# Patient Record
Sex: FEMALE | Race: BLACK OR AFRICAN AMERICAN | NOT HISPANIC OR LATINO | Employment: OTHER | URBAN - METROPOLITAN AREA
[De-identification: names, ages, dates, MRNs, and addresses within clinical notes are randomized per-mention and may not be internally consistent; named-entity substitution may affect disease eponyms.]

---

## 2014-07-14 LAB — CRC RECOMMENDATION EXT: NORMAL

## 2017-01-10 ENCOUNTER — HISTORICAL (OUTPATIENT)
Dept: ADMINISTRATIVE | Facility: HOSPITAL | Age: 67
End: 2017-01-10

## 2017-04-25 ENCOUNTER — HISTORICAL (OUTPATIENT)
Dept: LAB | Facility: HOSPITAL | Age: 67
End: 2017-04-25

## 2017-08-02 ENCOUNTER — HISTORICAL (OUTPATIENT)
Dept: LAB | Facility: HOSPITAL | Age: 67
End: 2017-08-02

## 2017-08-02 LAB
DEPRECATED CALCIDIOL+CALCIFEROL SERPL-MC: 145.22 NG/ML (ref 30–80)
EST. AVERAGE GLUCOSE BLD GHB EST-MCNC: 131 MG/DL
HAV IGM SERPL QL IA: NEGATIVE
HBA1C MFR BLD: 6.2 % (ref 4.2–6.3)
HBV CORE IGM SERPL QL IA: NEGATIVE
HBV SURFACE AG SERPL QL IA: NEGATIVE
HCV AB SERPL QL IA: NEGATIVE
HEPATITIS PANEL INTERP: NORMAL

## 2017-10-11 ENCOUNTER — HISTORICAL (OUTPATIENT)
Dept: LAB | Facility: HOSPITAL | Age: 67
End: 2017-10-11

## 2017-10-11 LAB
BUN SERPL-MCNC: 14 MG/DL (ref 7–18)
CALCIUM SERPL-MCNC: 8.8 MG/DL (ref 8.5–10.1)
CHLORIDE SERPL-SCNC: 108 MMOL/L (ref 98–107)
CHOLEST SERPL-MCNC: 187 MG/DL (ref 0–200)
CHOLEST/HDLC SERPL: 3.2 {RATIO} (ref 0–4)
CO2 SERPL-SCNC: 27 MMOL/L (ref 21–32)
CREAT SERPL-MCNC: 0.59 MG/DL (ref 0.55–1.02)
DEPRECATED CALCIDIOL+CALCIFEROL SERPL-MC: 62.56 NG/ML (ref 30–80)
GLUCOSE SERPL-MCNC: 106 MG/DL (ref 74–106)
HDLC SERPL-MCNC: 58 MG/DL (ref 35–60)
LDLC SERPL CALC-MCNC: 115 MG/DL (ref 0–129)
MAGNESIUM SERPL-MCNC: 2.3 MG/DL (ref 1.8–2.4)
POTASSIUM SERPL-SCNC: 3.5 MMOL/L (ref 3.5–5.1)
SODIUM SERPL-SCNC: 145 MMOL/L (ref 136–145)
TRIGL SERPL-MCNC: 68 MG/DL (ref 30–150)
VLDLC SERPL CALC-MCNC: 14 MG/DL

## 2017-11-28 ENCOUNTER — HISTORICAL (OUTPATIENT)
Dept: INTENSIVE CARE | Facility: HOSPITAL | Age: 67
End: 2017-11-28

## 2018-07-17 ENCOUNTER — HISTORICAL (OUTPATIENT)
Dept: LAB | Facility: HOSPITAL | Age: 68
End: 2018-07-17

## 2018-07-17 LAB
ABS NEUT (OLG): 4.76 X10(3)/MCL (ref 2.1–9.2)
ALBUMIN SERPL-MCNC: 3.2 GM/DL (ref 3.4–5)
ALBUMIN/GLOB SERPL: 0.8 {RATIO}
ALP SERPL-CCNC: 100 UNIT/L (ref 38–126)
ALT SERPL-CCNC: 35 UNIT/L (ref 12–78)
APPEARANCE, UA: CLEAR
AST SERPL-CCNC: 27 UNIT/L (ref 15–37)
BACTERIA SPEC CULT: ABNORMAL /HPF
BASOPHILS # BLD AUTO: 0 X10(3)/MCL (ref 0–0.2)
BASOPHILS NFR BLD AUTO: 0 %
BILIRUB SERPL-MCNC: 0.5 MG/DL (ref 0.2–1)
BILIRUB UR QL STRIP: NEGATIVE
BILIRUBIN DIRECT+TOT PNL SERPL-MCNC: 0.1 MG/DL (ref 0–0.2)
BILIRUBIN DIRECT+TOT PNL SERPL-MCNC: 0.4 MG/DL (ref 0–0.8)
BUN SERPL-MCNC: 16 MG/DL (ref 7–18)
CALCIUM SERPL-MCNC: 8.1 MG/DL (ref 8.5–10.1)
CHLORIDE SERPL-SCNC: 106 MMOL/L (ref 98–107)
CHOLEST SERPL-MCNC: 203 MG/DL (ref 0–200)
CHOLEST/HDLC SERPL: 2.8 {RATIO} (ref 0–4)
CO2 SERPL-SCNC: 29 MMOL/L (ref 21–32)
COLOR UR: YELLOW
CREAT SERPL-MCNC: 0.69 MG/DL (ref 0.55–1.02)
DEPRECATED CALCIDIOL+CALCIFEROL SERPL-MC: 44 NG/ML (ref 30–80)
EOSINOPHIL # BLD AUTO: 0.1 X10(3)/MCL (ref 0–0.9)
EOSINOPHIL NFR BLD AUTO: 2 %
ERYTHROCYTE [DISTWIDTH] IN BLOOD BY AUTOMATED COUNT: 18.6 % (ref 11.5–17)
EST. AVERAGE GLUCOSE BLD GHB EST-MCNC: 140 MG/DL
GLOBULIN SER-MCNC: 3.9 GM/DL (ref 2.4–3.5)
GLUCOSE (UA): NEGATIVE
GLUCOSE SERPL-MCNC: 79 MG/DL (ref 74–106)
HBA1C MFR BLD: 6.5 % (ref 4.2–6.3)
HCT VFR BLD AUTO: 40.2 % (ref 37–47)
HDLC SERPL-MCNC: 72 MG/DL (ref 35–60)
HGB BLD-MCNC: 12.4 GM/DL (ref 12–16)
HGB UR QL STRIP: NEGATIVE
KETONES UR QL STRIP: NEGATIVE
LDLC SERPL CALC-MCNC: 113 MG/DL (ref 0–129)
LEUKOCYTE ESTERASE UR QL STRIP: ABNORMAL
LYMPHOCYTES # BLD AUTO: 1.7 X10(3)/MCL (ref 0.6–4.6)
LYMPHOCYTES NFR BLD AUTO: 24 %
MCH RBC QN AUTO: 27.9 PG (ref 27–31)
MCHC RBC AUTO-ENTMCNC: 30.8 GM/DL (ref 33–36)
MCV RBC AUTO: 90.3 FL (ref 80–94)
MONOCYTES # BLD AUTO: 0.5 X10(3)/MCL (ref 0.1–1.3)
MONOCYTES NFR BLD AUTO: 7 %
NEUTROPHILS # BLD AUTO: 4.76 X10(3)/MCL (ref 2.1–9.2)
NEUTROPHILS NFR BLD AUTO: 66 %
NITRITE UR QL STRIP: NEGATIVE
PH UR STRIP: 9 [PH] (ref 5–9)
PLATELET # BLD AUTO: 181 X10(3)/MCL (ref 130–400)
PMV BLD AUTO: 12.3 FL (ref 9.4–12.4)
POTASSIUM SERPL-SCNC: 3.5 MMOL/L (ref 3.5–5.1)
PROT SERPL-MCNC: 7.1 GM/DL (ref 6.4–8.2)
PROT UR QL STRIP: NEGATIVE
RBC # BLD AUTO: 4.45 X10(6)/MCL (ref 4.2–5.4)
RBC #/AREA URNS HPF: ABNORMAL /HPF
SODIUM SERPL-SCNC: 144 MMOL/L (ref 136–145)
SP GR UR STRIP: 1.02 (ref 1–1.03)
SQUAMOUS EPITHELIAL, UA: ABNORMAL
TRIGL SERPL-MCNC: 91 MG/DL (ref 30–150)
TSH SERPL-ACNC: 1.74 MIU/L (ref 0.36–3.74)
UROBILINOGEN UR STRIP-ACNC: 1
VLDLC SERPL CALC-MCNC: 18 MG/DL
WBC # SPEC AUTO: 7.2 X10(3)/MCL (ref 4.5–11.5)
WBC #/AREA URNS HPF: ABNORMAL /[HPF]

## 2018-09-04 ENCOUNTER — HISTORICAL (OUTPATIENT)
Dept: LAB | Facility: HOSPITAL | Age: 68
End: 2018-09-04

## 2018-09-04 LAB
CHOLEST SERPL-MCNC: 131 MG/DL (ref 0–200)
CHOLEST/HDLC SERPL: 2.2 {RATIO} (ref 0–4)
HDLC SERPL-MCNC: 59 MG/DL (ref 35–60)
LDLC SERPL CALC-MCNC: 60 MG/DL (ref 0–129)
TRIGL SERPL-MCNC: 58 MG/DL (ref 30–150)
VLDLC SERPL CALC-MCNC: 12 MG/DL

## 2019-01-22 ENCOUNTER — HISTORICAL (OUTPATIENT)
Dept: LAB | Facility: HOSPITAL | Age: 69
End: 2019-01-22

## 2019-01-22 LAB
ABS NEUT (OLG): 3.46 X10(3)/MCL (ref 2.1–9.2)
ALBUMIN SERPL-MCNC: 3.4 GM/DL (ref 3.4–5)
ALBUMIN/GLOB SERPL: 1.1 {RATIO}
ALP SERPL-CCNC: 93 UNIT/L (ref 38–126)
ALT SERPL-CCNC: 25 UNIT/L (ref 12–78)
APPEARANCE, UA: CLEAR
AST SERPL-CCNC: 25 UNIT/L (ref 15–37)
BACTERIA SPEC CULT: ABNORMAL /HPF
BASOPHILS # BLD AUTO: 0 X10(3)/MCL (ref 0–0.2)
BASOPHILS NFR BLD AUTO: 0 %
BILIRUB SERPL-MCNC: 0.7 MG/DL (ref 0.2–1)
BILIRUB UR QL STRIP: NEGATIVE
BILIRUBIN DIRECT+TOT PNL SERPL-MCNC: 0.2 MG/DL (ref 0–0.2)
BILIRUBIN DIRECT+TOT PNL SERPL-MCNC: 0.5 MG/DL (ref 0–0.8)
BUN SERPL-MCNC: 15 MG/DL (ref 7–18)
CALCIUM SERPL-MCNC: 8.5 MG/DL (ref 8.5–10.1)
CHLORIDE SERPL-SCNC: 104 MMOL/L (ref 98–107)
CHOLEST SERPL-MCNC: 121 MG/DL (ref 0–200)
CHOLEST/HDLC SERPL: 2.4 {RATIO} (ref 0–4)
CO2 SERPL-SCNC: 32 MMOL/L (ref 21–32)
COLOR UR: YELLOW
CREAT SERPL-MCNC: 0.72 MG/DL (ref 0.55–1.02)
CREAT UR-MCNC: 211 MG/DL
EOSINOPHIL # BLD AUTO: 0.1 X10(3)/MCL (ref 0–0.9)
EOSINOPHIL NFR BLD AUTO: 1 %
ERYTHROCYTE [DISTWIDTH] IN BLOOD BY AUTOMATED COUNT: 18.6 % (ref 11.5–17)
EST. AVERAGE GLUCOSE BLD GHB EST-MCNC: 140 MG/DL
GLOBULIN SER-MCNC: 3.1 GM/DL (ref 2.4–3.5)
GLUCOSE (UA): NEGATIVE
GLUCOSE SERPL-MCNC: 136 MG/DL (ref 74–106)
HBA1C MFR BLD: 6.5 % (ref 4.2–6.3)
HCT VFR BLD AUTO: 38.4 % (ref 37–47)
HDLC SERPL-MCNC: 51 MG/DL (ref 35–60)
HGB BLD-MCNC: 11.9 GM/DL (ref 12–16)
HGB UR QL STRIP: NEGATIVE
KETONES UR QL STRIP: ABNORMAL
LDLC SERPL CALC-MCNC: 53 MG/DL (ref 0–129)
LEUKOCYTE ESTERASE UR QL STRIP: ABNORMAL
LYMPHOCYTES # BLD AUTO: 1.4 X10(3)/MCL (ref 0.6–4.6)
LYMPHOCYTES NFR BLD AUTO: 26 %
MCH RBC QN AUTO: 27.9 PG (ref 27–31)
MCHC RBC AUTO-ENTMCNC: 31 GM/DL (ref 33–36)
MCV RBC AUTO: 90.1 FL (ref 80–94)
MICROALBUMIN UR-MCNC: 1.5 MG/DL
MICROALBUMIN/CREAT RATIO PNL UR: 7.1 MG/GM CR (ref 0–30)
MONOCYTES # BLD AUTO: 0.5 X10(3)/MCL (ref 0.1–1.3)
MONOCYTES NFR BLD AUTO: 9 %
NEUTROPHILS # BLD AUTO: 3.46 X10(3)/MCL (ref 2.1–9.2)
NEUTROPHILS NFR BLD AUTO: 62 %
NITRITE UR QL STRIP: NEGATIVE
PH UR STRIP: 7 [PH] (ref 5–9)
PLATELET # BLD AUTO: 222 X10(3)/MCL (ref 130–400)
PMV BLD AUTO: 11.3 FL (ref 9.4–12.4)
POTASSIUM SERPL-SCNC: 3.2 MMOL/L (ref 3.5–5.1)
PROT SERPL-MCNC: 6.5 GM/DL (ref 6.4–8.2)
PROT UR QL STRIP: NEGATIVE
RBC # BLD AUTO: 4.26 X10(6)/MCL (ref 4.2–5.4)
RBC #/AREA URNS HPF: ABNORMAL /[HPF]
SODIUM SERPL-SCNC: 143 MMOL/L (ref 136–145)
SP GR UR STRIP: 1.03 (ref 1–1.03)
SQUAMOUS EPITHELIAL, UA: 17 /HPF (ref 0–4)
TRIGL SERPL-MCNC: 83 MG/DL (ref 30–150)
UROBILINOGEN UR STRIP-ACNC: 1
VLDLC SERPL CALC-MCNC: 17 MG/DL
WBC # SPEC AUTO: 5.6 X10(3)/MCL (ref 4.5–11.5)
WBC #/AREA URNS HPF: ABNORMAL /[HPF]

## 2019-01-28 ENCOUNTER — HISTORICAL (OUTPATIENT)
Dept: LAB | Facility: HOSPITAL | Age: 69
End: 2019-01-28

## 2019-01-28 LAB — HEMOCCULT SP1 STL QL: NEGATIVE

## 2019-01-31 ENCOUNTER — HISTORICAL (OUTPATIENT)
Dept: LAB | Facility: HOSPITAL | Age: 69
End: 2019-01-31

## 2019-01-31 LAB — HEMOCCULT SP2 STL QL: NEGATIVE

## 2019-04-22 ENCOUNTER — HISTORICAL (OUTPATIENT)
Dept: ADMINISTRATIVE | Facility: HOSPITAL | Age: 69
End: 2019-04-22

## 2019-04-22 LAB
BUN SERPL-MCNC: 17 MG/DL (ref 7–18)
CALCIUM SERPL-MCNC: 8.5 MG/DL (ref 8.5–10.1)
CHLORIDE SERPL-SCNC: 104 MMOL/L (ref 98–107)
CO2 SERPL-SCNC: 31 MMOL/L (ref 21–32)
CREAT SERPL-MCNC: 0.67 MG/DL (ref 0.55–1.02)
CREAT/UREA NIT SERPL: 25.4
EST. AVERAGE GLUCOSE BLD GHB EST-MCNC: 134 MG/DL
GLUCOSE SERPL-MCNC: 84 MG/DL (ref 74–106)
HBA1C MFR BLD: 6.3 % (ref 4.2–6.3)
POTASSIUM SERPL-SCNC: 3.3 MMOL/L (ref 3.5–5.1)
SODIUM SERPL-SCNC: 142 MMOL/L (ref 136–145)

## 2019-10-02 ENCOUNTER — HISTORICAL (OUTPATIENT)
Dept: LAB | Facility: HOSPITAL | Age: 69
End: 2019-10-02

## 2019-10-02 LAB
ABS NEUT (OLG): 3.31 X10(3)/MCL (ref 2.1–9.2)
ALBUMIN SERPL-MCNC: 3.3 GM/DL (ref 3.4–5)
ALBUMIN/GLOB SERPL: 0.9 {RATIO}
ALP SERPL-CCNC: 77 UNIT/L (ref 38–126)
ALT SERPL-CCNC: 28 UNIT/L (ref 12–78)
AST SERPL-CCNC: 26 UNIT/L (ref 15–37)
BASOPHILS # BLD AUTO: 0 X10(3)/MCL (ref 0–0.2)
BASOPHILS NFR BLD AUTO: 0 %
BILIRUB SERPL-MCNC: 0.4 MG/DL (ref 0.2–1)
BILIRUBIN DIRECT+TOT PNL SERPL-MCNC: 0.2 MG/DL (ref 0–0.2)
BILIRUBIN DIRECT+TOT PNL SERPL-MCNC: 0.2 MG/DL (ref 0–0.8)
BUN SERPL-MCNC: 17 MG/DL (ref 7–18)
CALCIUM SERPL-MCNC: 8.8 MG/DL (ref 8.5–10.1)
CHLORIDE SERPL-SCNC: 108 MMOL/L (ref 98–107)
CO2 SERPL-SCNC: 28 MMOL/L (ref 21–32)
CREAT SERPL-MCNC: 0.7 MG/DL (ref 0.55–1.02)
EOSINOPHIL # BLD AUTO: 0.1 X10(3)/MCL (ref 0–0.9)
EOSINOPHIL NFR BLD AUTO: 1 %
ERYTHROCYTE [DISTWIDTH] IN BLOOD BY AUTOMATED COUNT: 18.4 % (ref 11.5–17)
GLOBULIN SER-MCNC: 3.5 GM/DL (ref 2.4–3.5)
GLUCOSE SERPL-MCNC: 120 MG/DL (ref 74–106)
HCT VFR BLD AUTO: 36.2 % (ref 37–47)
HGB BLD-MCNC: 11.3 GM/DL (ref 12–16)
LYMPHOCYTES # BLD AUTO: 1.5 X10(3)/MCL (ref 0.6–4.6)
LYMPHOCYTES NFR BLD AUTO: 28 %
MCH RBC QN AUTO: 28.5 PG (ref 27–31)
MCHC RBC AUTO-ENTMCNC: 31.2 GM/DL (ref 33–36)
MCV RBC AUTO: 91.2 FL (ref 80–94)
MONOCYTES # BLD AUTO: 0.5 X10(3)/MCL (ref 0.1–1.3)
MONOCYTES NFR BLD AUTO: 9 %
NEUTROPHILS # BLD AUTO: 3.31 X10(3)/MCL (ref 2.1–9.2)
NEUTROPHILS NFR BLD AUTO: 61 %
PLATELET # BLD AUTO: 206 X10(3)/MCL (ref 130–400)
PMV BLD AUTO: 12.4 FL (ref 9.4–12.4)
POTASSIUM SERPL-SCNC: 3.5 MMOL/L (ref 3.5–5.1)
PROT SERPL-MCNC: 6.8 GM/DL (ref 6.4–8.2)
RBC # BLD AUTO: 3.97 X10(6)/MCL (ref 4.2–5.4)
SODIUM SERPL-SCNC: 141 MMOL/L (ref 136–145)
WBC # SPEC AUTO: 5.4 X10(3)/MCL (ref 4.5–11.5)

## 2019-10-24 ENCOUNTER — HISTORICAL (OUTPATIENT)
Dept: ADMINISTRATIVE | Facility: HOSPITAL | Age: 69
End: 2019-10-24

## 2019-10-24 LAB
ABS NEUT (OLG): 8.84 X10(3)/MCL (ref 2.1–9.2)
BASOPHILS # BLD AUTO: 0 X10(3)/MCL (ref 0–0.2)
BASOPHILS NFR BLD AUTO: 0 %
CRP SERPL HS-MCNC: 1.83 MG/L (ref 0–3)
EOSINOPHIL # BLD AUTO: 0.1 X10(3)/MCL (ref 0–0.9)
EOSINOPHIL NFR BLD AUTO: 1 %
ERYTHROCYTE [DISTWIDTH] IN BLOOD BY AUTOMATED COUNT: 19.9 % (ref 11.5–17)
ERYTHROCYTE [SEDIMENTATION RATE] IN BLOOD: 13 MM/HR (ref 0–20)
HAV IGM SERPL QL IA: NEGATIVE
HBV CORE IGM SERPL QL IA: NEGATIVE
HBV SURFACE AG SERPL QL IA: NEGATIVE
HCT VFR BLD AUTO: 38 % (ref 37–47)
HCV AB SERPL QL IA: NEGATIVE
HEPATITIS PANEL INTERP: NORMAL
HGB BLD-MCNC: 12.1 GM/DL (ref 12–16)
LYMPHOCYTES # BLD AUTO: 2.5 X10(3)/MCL (ref 0.6–4.6)
LYMPHOCYTES NFR BLD AUTO: 21 %
MCH RBC QN AUTO: 29 PG (ref 27–31)
MCHC RBC AUTO-ENTMCNC: 31.8 GM/DL (ref 33–36)
MCV RBC AUTO: 91.1 FL (ref 80–94)
MONOCYTES # BLD AUTO: 0.7 X10(3)/MCL (ref 0.1–1.3)
MONOCYTES NFR BLD AUTO: 6 %
NEUTROPHILS # BLD AUTO: 8.84 X10(3)/MCL (ref 2.1–9.2)
NEUTROPHILS NFR BLD AUTO: 72 %
PLATELET # BLD AUTO: 195 X10(3)/MCL (ref 130–400)
PMV BLD AUTO: 10.5 FL (ref 9.4–12.4)
RBC # BLD AUTO: 4.17 X10(6)/MCL (ref 4.2–5.4)
WBC # SPEC AUTO: 12.2 X10(3)/MCL (ref 4.5–11.5)

## 2020-05-22 ENCOUNTER — HISTORICAL (OUTPATIENT)
Dept: CARDIOLOGY | Facility: HOSPITAL | Age: 70
End: 2020-05-22

## 2020-05-22 LAB
ABS NEUT (OLG): 3.61 X10(3)/MCL (ref 2.1–9.2)
ALBUMIN SERPL-MCNC: 3.5 GM/DL (ref 3.4–5)
ALBUMIN/GLOB SERPL: 1 RATIO (ref 1.1–2)
ALP SERPL-CCNC: 95 UNIT/L (ref 40–150)
ALT SERPL-CCNC: 19 UNIT/L (ref 0–55)
APPEARANCE, UA: CLEAR
AST SERPL-CCNC: 36 UNIT/L (ref 5–34)
BACTERIA SPEC CULT: ABNORMAL /HPF
BASOPHILS # BLD AUTO: 0 X10(3)/MCL (ref 0–0.2)
BASOPHILS NFR BLD AUTO: 0 %
BILIRUB SERPL-MCNC: 0.6 MG/DL
BILIRUB UR QL STRIP: NEGATIVE
BILIRUBIN DIRECT+TOT PNL SERPL-MCNC: 0.2 MG/DL (ref 0–0.5)
BILIRUBIN DIRECT+TOT PNL SERPL-MCNC: 0.4 MG/DL (ref 0–0.8)
BUN SERPL-MCNC: 13.8 MG/DL (ref 9.8–20.1)
CALCIUM SERPL-MCNC: 8.5 MG/DL (ref 8.4–10.2)
CHLORIDE SERPL-SCNC: 102 MMOL/L (ref 98–107)
CHOLEST SERPL-MCNC: 133 MG/DL
CHOLEST/HDLC SERPL: 3 {RATIO} (ref 0–5)
CO2 SERPL-SCNC: 28 MMOL/L (ref 23–31)
COLOR UR: YELLOW
CREAT SERPL-MCNC: 0.71 MG/DL (ref 0.55–1.02)
CREAT UR-MCNC: 130.5 MG/DL (ref 45–106)
EOSINOPHIL # BLD AUTO: 0.1 X10(3)/MCL (ref 0–0.9)
EOSINOPHIL NFR BLD AUTO: 1 %
ERYTHROCYTE [DISTWIDTH] IN BLOOD BY AUTOMATED COUNT: 18.6 % (ref 11.5–17)
EST. AVERAGE GLUCOSE BLD GHB EST-MCNC: 128.4 MG/DL
GLOBULIN SER-MCNC: 3.6 GM/DL (ref 2.4–3.5)
GLUCOSE (UA): NEGATIVE
GLUCOSE SERPL-MCNC: 91 MG/DL (ref 82–115)
HBA1C MFR BLD: 6.1 %
HCT VFR BLD AUTO: 37 % (ref 37–47)
HDLC SERPL-MCNC: 49 MG/DL (ref 35–60)
HGB BLD-MCNC: 11.1 GM/DL (ref 12–16)
HGB UR QL STRIP: NEGATIVE
KETONES UR QL STRIP: NEGATIVE
LDLC SERPL CALC-MCNC: 71 MG/DL (ref 50–140)
LEUKOCYTE ESTERASE UR QL STRIP: ABNORMAL
LYMPHOCYTES # BLD AUTO: 1.3 X10(3)/MCL (ref 0.6–4.6)
LYMPHOCYTES NFR BLD AUTO: 24 %
MCH RBC QN AUTO: 26.1 PG (ref 27–31)
MCHC RBC AUTO-ENTMCNC: 30 GM/DL (ref 33–36)
MCV RBC AUTO: 86.9 FL (ref 80–94)
MICROALBUMIN UR-MCNC: 7.4 UG/ML
MICROALBUMIN/CREAT RATIO PNL UR: 5.7 MG/GM CR (ref 0–30)
MONOCYTES # BLD AUTO: 0.5 X10(3)/MCL (ref 0.1–1.3)
MONOCYTES NFR BLD AUTO: 9 %
NEUTROPHILS # BLD AUTO: 3.61 X10(3)/MCL (ref 2.1–9.2)
NEUTROPHILS NFR BLD AUTO: 65 %
NITRITE UR QL STRIP: NEGATIVE
PH UR STRIP: 8 [PH] (ref 5–9)
PLATELET # BLD AUTO: 196 X10(3)/MCL (ref 130–400)
PMV BLD AUTO: 11.4 FL (ref 9.4–12.4)
POTASSIUM SERPL-SCNC: 4.2 MMOL/L (ref 3.5–5.1)
PROT SERPL-MCNC: 7.1 GM/DL (ref 5.8–7.6)
PROT UR QL STRIP: NEGATIVE
RBC # BLD AUTO: 4.26 X10(6)/MCL (ref 4.2–5.4)
RBC #/AREA URNS HPF: ABNORMAL /[HPF]
SODIUM SERPL-SCNC: 139 MMOL/L (ref 136–145)
SP GR UR STRIP: 1.02 (ref 1–1.03)
SQUAMOUS EPITHELIAL, UA: ABNORMAL
TRIGL SERPL-MCNC: 66 MG/DL (ref 37–140)
UROBILINOGEN UR STRIP-ACNC: 1
VLDLC SERPL CALC-MCNC: 13 MG/DL
WBC # SPEC AUTO: 5.5 X10(3)/MCL (ref 4.5–11.5)
WBC #/AREA URNS HPF: ABNORMAL /[HPF]

## 2020-07-15 ENCOUNTER — HISTORICAL (OUTPATIENT)
Dept: ADMINISTRATIVE | Facility: HOSPITAL | Age: 70
End: 2020-07-15

## 2020-07-15 LAB — POC CREATININE: 0.6 MG/DL (ref 0.6–1.3)

## 2020-09-28 ENCOUNTER — HISTORICAL (OUTPATIENT)
Dept: ADMINISTRATIVE | Facility: HOSPITAL | Age: 70
End: 2020-09-28

## 2020-09-28 LAB
DEPRECATED CALCIDIOL+CALCIFEROL SERPL-MC: 41.3 NG/ML (ref 6.6–49.9)
EST. AVERAGE GLUCOSE BLD GHB EST-MCNC: 157.1 MG/DL
HBA1C MFR BLD: 7.1 %
T PALLIDUM AB SER QL: NONREACTIVE
VIT B12 SERPL-MCNC: 277 PG/ML (ref 213–816)

## 2020-10-28 ENCOUNTER — HISTORICAL (OUTPATIENT)
Dept: RADIOLOGY | Facility: HOSPITAL | Age: 70
End: 2020-10-28

## 2020-11-12 ENCOUNTER — HISTORICAL (OUTPATIENT)
Dept: ADMINISTRATIVE | Facility: HOSPITAL | Age: 70
End: 2020-11-12

## 2020-11-12 LAB
ALBUMIN SERPL-MCNC: 3.6 GM/DL (ref 3.4–4.8)
ALBUMIN/GLOB SERPL: 1.3 RATIO (ref 1.1–2)
ALP SERPL-CCNC: 81 UNIT/L (ref 40–150)
ALT SERPL-CCNC: 17 UNIT/L (ref 0–55)
APPEARANCE, UA: CLEAR
AST SERPL-CCNC: 25 UNIT/L (ref 5–34)
BACTERIA SPEC CULT: NORMAL /HPF
BILIRUB SERPL-MCNC: 0.7 MG/DL
BILIRUB UR QL STRIP: NEGATIVE
BILIRUBIN DIRECT+TOT PNL SERPL-MCNC: 0.3 MG/DL (ref 0–0.5)
BILIRUBIN DIRECT+TOT PNL SERPL-MCNC: 0.4 MG/DL (ref 0–0.8)
BUN SERPL-MCNC: 14.2 MG/DL (ref 9.8–20.1)
CALCIUM SERPL-MCNC: 8.4 MG/DL (ref 8.4–10.2)
CHLORIDE SERPL-SCNC: 105 MMOL/L (ref 98–107)
CHOLEST SERPL-MCNC: 118 MG/DL
CHOLEST/HDLC SERPL: 3 {RATIO} (ref 0–5)
CO2 SERPL-SCNC: 31 MMOL/L (ref 23–31)
COLOR UR: NORMAL
CREAT SERPL-MCNC: 0.65 MG/DL (ref 0.55–1.02)
CREAT UR-MCNC: 109.1 MG/DL (ref 45–106)
EST. AVERAGE GLUCOSE BLD GHB EST-MCNC: 131.2 MG/DL
GLOBULIN SER-MCNC: 2.8 GM/DL (ref 2.4–3.5)
GLUCOSE (UA): NEGATIVE
GLUCOSE SERPL-MCNC: 84 MG/DL (ref 82–115)
HBA1C MFR BLD: 6.2 %
HDLC SERPL-MCNC: 47 MG/DL (ref 35–60)
HGB UR QL STRIP: NEGATIVE
KETONES UR QL STRIP: NEGATIVE
LDLC SERPL CALC-MCNC: 58 MG/DL (ref 50–140)
LEUKOCYTE ESTERASE UR QL STRIP: NEGATIVE
MICROALBUMIN UR-MCNC: 7.7 UG/ML
MICROALBUMIN/CREAT RATIO PNL UR: 7.1 MG/GM CR (ref 0–30)
NITRITE UR QL STRIP: NEGATIVE
PH UR STRIP: 7 [PH] (ref 5–9)
POTASSIUM SERPL-SCNC: 3.6 MMOL/L (ref 3.5–5.1)
PROT SERPL-MCNC: 6.4 GM/DL (ref 5.8–7.6)
PROT UR QL STRIP: NEGATIVE
RBC #/AREA URNS HPF: NORMAL /[HPF]
SODIUM SERPL-SCNC: 145 MMOL/L (ref 136–145)
SP GR UR STRIP: 1.02 (ref 1–1.03)
SQUAMOUS EPITHELIAL, UA: NORMAL
TRIGL SERPL-MCNC: 66 MG/DL (ref 37–140)
UROBILINOGEN UR STRIP-ACNC: 1
VIT B12 SERPL-MCNC: 402 PG/ML (ref 213–816)
VIT B12 SERPL-MCNC: 426 PG/ML (ref 213–816)
VLDLC SERPL CALC-MCNC: 13 MG/DL
WBC #/AREA URNS HPF: NORMAL /[HPF]

## 2020-12-17 ENCOUNTER — HISTORICAL (OUTPATIENT)
Dept: RADIOLOGY | Facility: HOSPITAL | Age: 70
End: 2020-12-17

## 2020-12-31 ENCOUNTER — HISTORICAL (OUTPATIENT)
Dept: RADIOLOGY | Facility: HOSPITAL | Age: 70
End: 2020-12-31

## 2021-05-12 ENCOUNTER — HISTORICAL (OUTPATIENT)
Dept: ADMINISTRATIVE | Facility: HOSPITAL | Age: 71
End: 2021-05-12

## 2021-05-12 LAB
ABS NEUT (OLG): 3.6 X10(3)/MCL (ref 2.1–9.2)
ALBUMIN SERPL-MCNC: 3.4 GM/DL (ref 3.4–4.8)
ALBUMIN/GLOB SERPL: 1.1 RATIO (ref 1.1–2)
ALP SERPL-CCNC: 91 UNIT/L (ref 40–150)
ALT SERPL-CCNC: 17 UNIT/L (ref 0–55)
APPEARANCE, UA: CLEAR
AST SERPL-CCNC: 25 UNIT/L (ref 5–34)
BACTERIA SPEC CULT: NORMAL /HPF
BASOPHILS # BLD AUTO: 0 X10(3)/MCL (ref 0–0.2)
BASOPHILS NFR BLD AUTO: 0 %
BILIRUB SERPL-MCNC: 0.6 MG/DL
BILIRUB UR QL STRIP: NEGATIVE
BILIRUBIN DIRECT+TOT PNL SERPL-MCNC: 0.3 MG/DL (ref 0–0.5)
BILIRUBIN DIRECT+TOT PNL SERPL-MCNC: 0.3 MG/DL (ref 0–0.8)
BUN SERPL-MCNC: 14.4 MG/DL (ref 9.8–20.1)
CALCIUM SERPL-MCNC: 8.9 MG/DL (ref 8.4–10.2)
CHLORIDE SERPL-SCNC: 108 MMOL/L (ref 98–107)
CO2 SERPL-SCNC: 28 MMOL/L (ref 23–31)
COLOR UR: NORMAL
CREAT SERPL-MCNC: 0.82 MG/DL (ref 0.55–1.02)
CREAT UR-MCNC: 114 MG/DL (ref 45–106)
EOSINOPHIL # BLD AUTO: 0.1 X10(3)/MCL (ref 0–0.9)
EOSINOPHIL NFR BLD AUTO: 2 %
ERYTHROCYTE [DISTWIDTH] IN BLOOD BY AUTOMATED COUNT: 19.7 % (ref 11.5–17)
EST. AVERAGE GLUCOSE BLD GHB EST-MCNC: 119.8 MG/DL
FERRITIN SERPL-MCNC: 5.78 NG/ML (ref 4.63–204)
GLOBULIN SER-MCNC: 3 GM/DL (ref 2.4–3.5)
GLUCOSE (UA): NEGATIVE
GLUCOSE SERPL-MCNC: 149 MG/DL (ref 82–115)
HBA1C MFR BLD: 5.8 %
HCT VFR BLD AUTO: 33.6 % (ref 37–47)
HGB BLD-MCNC: 10 GM/DL (ref 12–16)
HGB UR QL STRIP: NEGATIVE
IRON SATN MFR SERPL: 9 % (ref 20–50)
IRON SERPL-MCNC: 33 UG/DL (ref 50–170)
KETONES UR QL STRIP: NEGATIVE
LEUKOCYTE ESTERASE UR QL STRIP: NEGATIVE
LYMPHOCYTES # BLD AUTO: 1.5 X10(3)/MCL (ref 0.6–4.6)
LYMPHOCYTES NFR BLD AUTO: 26 %
MCH RBC QN AUTO: 24.7 PG (ref 27–31)
MCHC RBC AUTO-ENTMCNC: 29.8 GM/DL (ref 33–36)
MCV RBC AUTO: 83 FL (ref 80–94)
MICROALBUMIN UR-MCNC: 6.7 UG/ML
MICROALBUMIN/CREAT RATIO PNL UR: 5.9 MG/GM CR (ref 0–30)
MONOCYTES # BLD AUTO: 0.5 X10(3)/MCL (ref 0.1–1.3)
MONOCYTES NFR BLD AUTO: 9 %
NEUTROPHILS # BLD AUTO: 3.6 X10(3)/MCL (ref 2.1–9.2)
NEUTROPHILS NFR BLD AUTO: 63 %
NITRITE UR QL STRIP: NEGATIVE
PH UR STRIP: 8 [PH] (ref 5–9)
PLATELET # BLD AUTO: 234 X10(3)/MCL (ref 130–400)
PMV BLD AUTO: 11.6 FL (ref 9.4–12.4)
POTASSIUM SERPL-SCNC: 3.5 MMOL/L (ref 3.5–5.1)
PROT SERPL-MCNC: 6.4 GM/DL (ref 5.8–7.6)
PROT UR QL STRIP: NEGATIVE
RBC # BLD AUTO: 4.05 X10(6)/MCL (ref 4.2–5.4)
RBC #/AREA URNS HPF: NORMAL /[HPF]
SODIUM SERPL-SCNC: 142 MMOL/L (ref 136–145)
SP GR UR STRIP: 1.02 (ref 1–1.03)
SQUAMOUS EPITHELIAL, UA: NORMAL /HPF (ref 0–4)
TIBC SERPL-MCNC: 325 UG/DL (ref 70–310)
TIBC SERPL-MCNC: 358 UG/DL (ref 250–450)
TRANSFERRIN SERPL-MCNC: 317 MG/DL (ref 173–360)
UROBILINOGEN UR STRIP-ACNC: 1
WBC # SPEC AUTO: 5.7 X10(3)/MCL (ref 4.5–11.5)
WBC #/AREA URNS HPF: NORMAL /[HPF]

## 2021-05-13 ENCOUNTER — HISTORICAL (OUTPATIENT)
Dept: ADMINISTRATIVE | Facility: HOSPITAL | Age: 71
End: 2021-05-13

## 2021-05-13 LAB
ABS NEUT (OLG): 4.06 X10(3)/MCL (ref 2.1–9.2)
BASOPHILS # BLD AUTO: 0 X10(3)/MCL (ref 0–0.2)
BASOPHILS NFR BLD AUTO: 0 %
CHOLEST SERPL-MCNC: 128 MG/DL
CHOLEST/HDLC SERPL: 3 {RATIO} (ref 0–5)
EOSINOPHIL # BLD AUTO: 0.1 X10(3)/MCL (ref 0–0.9)
EOSINOPHIL NFR BLD AUTO: 2 %
ERYTHROCYTE [DISTWIDTH] IN BLOOD BY AUTOMATED COUNT: 19.9 % (ref 11.5–17)
FERRITIN SERPL-MCNC: 6.04 NG/ML (ref 4.63–204)
HCT VFR BLD AUTO: 34.3 % (ref 37–47)
HDLC SERPL-MCNC: 49 MG/DL (ref 35–60)
HGB BLD-MCNC: 10.3 GM/DL (ref 12–16)
IRON SATN MFR SERPL: 10 % (ref 20–50)
IRON SERPL-MCNC: 36 UG/DL (ref 50–170)
LDLC SERPL CALC-MCNC: 67 MG/DL (ref 50–140)
LYMPHOCYTES # BLD AUTO: 1.2 X10(3)/MCL (ref 0.6–4.6)
LYMPHOCYTES NFR BLD AUTO: 20 %
MCH RBC QN AUTO: 24.6 PG (ref 27–31)
MCHC RBC AUTO-ENTMCNC: 30 GM/DL (ref 33–36)
MCV RBC AUTO: 82.1 FL (ref 80–94)
MONOCYTES # BLD AUTO: 0.6 X10(3)/MCL (ref 0.1–1.3)
MONOCYTES NFR BLD AUTO: 10 %
NEUTROPHILS # BLD AUTO: 4.06 X10(3)/MCL (ref 2.1–9.2)
NEUTROPHILS NFR BLD AUTO: 68 %
PLATELET # BLD AUTO: 206 X10(3)/MCL (ref 130–400)
PMV BLD AUTO: 11.2 FL (ref 9.4–12.4)
RBC # BLD AUTO: 4.18 X10(6)/MCL (ref 4.2–5.4)
TIBC SERPL-MCNC: 329 UG/DL (ref 70–310)
TIBC SERPL-MCNC: 365 UG/DL (ref 250–450)
TRANSFERRIN SERPL-MCNC: 329 MG/DL (ref 173–360)
TRIGL SERPL-MCNC: 58 MG/DL (ref 37–140)
VLDLC SERPL CALC-MCNC: 12 MG/DL
WBC # SPEC AUTO: 6 X10(3)/MCL (ref 4.5–11.5)

## 2021-05-17 ENCOUNTER — HISTORICAL (OUTPATIENT)
Dept: ADMINISTRATIVE | Facility: HOSPITAL | Age: 71
End: 2021-05-17

## 2021-05-17 LAB — HEMOCCULT SP1 STL QL: NEGATIVE

## 2021-05-18 LAB — HEMOCCULT SP2 STL QL: NEGATIVE

## 2021-05-20 ENCOUNTER — HISTORICAL (OUTPATIENT)
Dept: INFUSION THERAPY | Facility: HOSPITAL | Age: 71
End: 2021-05-20

## 2021-05-27 ENCOUNTER — HISTORICAL (OUTPATIENT)
Dept: INFUSION THERAPY | Facility: HOSPITAL | Age: 71
End: 2021-05-27

## 2021-07-27 ENCOUNTER — HISTORICAL (OUTPATIENT)
Dept: ADMINISTRATIVE | Facility: HOSPITAL | Age: 71
End: 2021-07-27

## 2021-07-27 LAB
ABS NEUT (OLG): 4.66 X10(3)/MCL (ref 2.1–9.2)
ALBUMIN SERPL-MCNC: 3.2 GM/DL (ref 3.4–4.8)
ALBUMIN/GLOB SERPL: 1 RATIO (ref 1.1–2)
ALP SERPL-CCNC: 93 UNIT/L (ref 40–150)
ALT SERPL-CCNC: 37 UNIT/L (ref 0–55)
ANISOCYTOSIS BLD QL SMEAR: 1
AST SERPL-CCNC: 31 UNIT/L (ref 5–34)
BASOPHILS # BLD AUTO: 0 X10(3)/MCL (ref 0–0.2)
BASOPHILS NFR BLD AUTO: 0 %
BILIRUB SERPL-MCNC: 0.5 MG/DL
BILIRUBIN DIRECT+TOT PNL SERPL-MCNC: 0.2 MG/DL (ref 0–0.5)
BILIRUBIN DIRECT+TOT PNL SERPL-MCNC: 0.3 MG/DL (ref 0–0.8)
BUN SERPL-MCNC: 11.7 MG/DL (ref 9.8–20.1)
CALCIUM SERPL-MCNC: 8.6 MG/DL (ref 8.4–10.2)
CHLORIDE SERPL-SCNC: 105 MMOL/L (ref 98–107)
CO2 SERPL-SCNC: 28 MMOL/L (ref 23–31)
CREAT SERPL-MCNC: 0.72 MG/DL (ref 0.55–1.02)
EOSINOPHIL # BLD AUTO: 0.2 X10(3)/MCL (ref 0–0.9)
EOSINOPHIL NFR BLD AUTO: 3 %
ERYTHROCYTE [DISTWIDTH] IN BLOOD BY AUTOMATED COUNT: 23.9 % (ref 11.5–17)
GLOBULIN SER-MCNC: 3.3 GM/DL (ref 2.4–3.5)
GLUCOSE SERPL-MCNC: 102 MG/DL (ref 82–115)
HCT VFR BLD AUTO: 40 % (ref 37–47)
HGB BLD-MCNC: 12.7 GM/DL (ref 12–16)
HYPOCHROMIA BLD QL SMEAR: 1
IRON SATN MFR SERPL: 30 % (ref 20–50)
IRON SERPL-MCNC: 76 UG/DL (ref 50–170)
LYMPHOCYTES # BLD AUTO: 1.4 X10(3)/MCL (ref 0.6–4.6)
LYMPHOCYTES NFR BLD AUTO: 20 %
MACROCYTES BLD QL SMEAR: 1 /MCL
MCH RBC QN AUTO: 29.6 PG (ref 27–31)
MCHC RBC AUTO-ENTMCNC: 31.8 GM/DL (ref 33–36)
MCV RBC AUTO: 93.2 FL (ref 80–94)
MICROCYTES BLD QL SMEAR: 1
MONOCYTES # BLD AUTO: 0.6 X10(3)/MCL (ref 0.1–1.3)
MONOCYTES NFR BLD AUTO: 8 %
NEUTROPHILS # BLD AUTO: 4.66 X10(3)/MCL (ref 2.1–9.2)
NEUTROPHILS NFR BLD AUTO: 68 %
PLATELET # BLD AUTO: 167 X10(3)/MCL (ref 130–400)
PLATELET # BLD EST: NORMAL 10*3/UL
PMV BLD AUTO: 12.1 FL (ref 7.4–10.4)
POIKILOCYTOSIS BLD QL SMEAR: 1
POTASSIUM SERPL-SCNC: 3.5 MMOL/L (ref 3.5–5.1)
PROT SERPL-MCNC: 6.5 GM/DL (ref 5.8–7.6)
RBC # BLD AUTO: 4.29 X10(6)/MCL (ref 4.2–5.4)
RBC MORPH BLD: ABNORMAL
SODIUM SERPL-SCNC: 144 MMOL/L (ref 136–145)
TARGETS BLD QL SMEAR: 2
TIBC SERPL-MCNC: 177 UG/DL (ref 70–310)
TIBC SERPL-MCNC: 253 UG/DL (ref 250–450)
TRANSFERRIN SERPL-MCNC: 213 MG/DL (ref 173–360)
WBC # SPEC AUTO: 6.9 X10(3)/MCL (ref 4.5–11.5)

## 2021-09-13 ENCOUNTER — HISTORICAL (OUTPATIENT)
Dept: ADMINISTRATIVE | Facility: HOSPITAL | Age: 71
End: 2021-09-13

## 2021-09-13 LAB
ABS NEUT (OLG): 2.89 X10(3)/MCL (ref 2.1–9.2)
ALBUMIN SERPL-MCNC: 3.8 GM/DL (ref 3.4–4.8)
ALBUMIN/GLOB SERPL: 1.2 RATIO (ref 1.1–2)
ALP SERPL-CCNC: 76 UNIT/L (ref 40–150)
ALT SERPL-CCNC: 30 UNIT/L (ref 0–55)
AST SERPL-CCNC: 29 UNIT/L (ref 5–34)
BASOPHILS # BLD AUTO: 0 X10(3)/MCL (ref 0–0.2)
BASOPHILS NFR BLD AUTO: 0.4 %
BILIRUB SERPL-MCNC: 0.5 MG/DL
BILIRUBIN DIRECT+TOT PNL SERPL-MCNC: 0.2 MG/DL (ref 0–0.5)
BILIRUBIN DIRECT+TOT PNL SERPL-MCNC: 0.3 MG/DL (ref 0–0.8)
BUN SERPL-MCNC: 14.2 MG/DL (ref 9.8–20.1)
CALCIUM SERPL-MCNC: 9.3 MG/DL (ref 8.4–10.2)
CHLORIDE SERPL-SCNC: 102 MMOL/L (ref 98–107)
CO2 SERPL-SCNC: 32 MMOL/L (ref 23–31)
CREAT SERPL-MCNC: 0.63 MG/DL (ref 0.55–1.02)
EOSINOPHIL # BLD AUTO: 0.1 X10(3)/MCL (ref 0–0.9)
EOSINOPHIL NFR BLD AUTO: 2.1 %
ERYTHROCYTE [DISTWIDTH] IN BLOOD BY AUTOMATED COUNT: 15.5 % (ref 11.5–17)
FERRITIN SERPL-MCNC: 294.66 NG/ML (ref 4.63–204)
FOLATE SERPL-MCNC: 12.6 NG/ML (ref 7–31.4)
GLOBULIN SER-MCNC: 3.1 GM/DL (ref 2.4–3.5)
GLUCOSE SERPL-MCNC: 83 MG/DL (ref 82–115)
HCT VFR BLD AUTO: 43 % (ref 37–47)
HGB BLD-MCNC: 13.8 GM/DL (ref 12–16)
IRON SATN MFR SERPL: 25 % (ref 20–50)
IRON SERPL-MCNC: 63 UG/DL (ref 50–170)
LDH SERPL-CCNC: 216 UNIT/L (ref 140–271)
LYMPHOCYTES # BLD AUTO: 1.4 X10(3)/MCL (ref 0.6–4.6)
LYMPHOCYTES NFR BLD AUTO: 29.4 %
MCH RBC QN AUTO: 31.9 PG (ref 27–31)
MCHC RBC AUTO-ENTMCNC: 32.1 GM/DL (ref 33–36)
MCV RBC AUTO: 99.3 FL (ref 80–94)
MONOCYTES # BLD AUTO: 0.4 X10(3)/MCL (ref 0.1–1.3)
MONOCYTES NFR BLD AUTO: 8.3 %
NEUTROPHILS # BLD AUTO: 2.9 X10(3)/MCL (ref 2.1–9.2)
NEUTROPHILS NFR BLD AUTO: 59.8 %
PLATELET # BLD AUTO: 153 X10(3)/MCL (ref 130–400)
PMV BLD AUTO: 11.5 FL (ref 9.4–12.4)
POTASSIUM SERPL-SCNC: 3.8 MMOL/L (ref 3.5–5.1)
PROT SERPL-MCNC: 6.9 GM/DL (ref 5.8–7.6)
RBC # BLD AUTO: 4.33 X10(6)/MCL (ref 4.2–5.4)
SODIUM SERPL-SCNC: 143 MMOL/L (ref 136–145)
TIBC SERPL-MCNC: 187 UG/DL (ref 70–310)
TIBC SERPL-MCNC: 250 UG/DL (ref 250–450)
TRANSFERRIN SERPL-MCNC: 228 MG/DL (ref 173–360)
VIT B12 SERPL-MCNC: 545 PG/ML (ref 213–816)
WBC # SPEC AUTO: 4.8 X10(3)/MCL (ref 4.5–11.5)

## 2021-09-16 ENCOUNTER — HISTORICAL (OUTPATIENT)
Dept: ADMINISTRATIVE | Facility: HOSPITAL | Age: 71
End: 2021-09-16

## 2021-09-16 LAB
ABS NEUT (OLG): 3.19 X10(3)/MCL (ref 2.1–9.2)
BASOPHILS # BLD AUTO: 0 X10(3)/MCL (ref 0–0.2)
BASOPHILS NFR BLD AUTO: 0 %
EOSINOPHIL # BLD AUTO: 0.1 X10(3)/MCL (ref 0–0.9)
EOSINOPHIL NFR BLD AUTO: 2 %
ERYTHROCYTE [DISTWIDTH] IN BLOOD BY AUTOMATED COUNT: 15.2 % (ref 11.5–17)
HCT VFR BLD AUTO: 38.6 % (ref 37–47)
HGB BLD-MCNC: 12.6 GM/DL (ref 12–16)
INR PPP: 2.9 (ref 0–1.3)
LYMPHOCYTES # BLD AUTO: 1.3 X10(3)/MCL (ref 0.6–4.6)
LYMPHOCYTES NFR BLD AUTO: 25 %
MCH RBC QN AUTO: 31.7 PG (ref 27–31)
MCHC RBC AUTO-ENTMCNC: 32.6 GM/DL (ref 33–36)
MCV RBC AUTO: 97.2 FL (ref 80–94)
MONOCYTES # BLD AUTO: 0.5 X10(3)/MCL (ref 0.1–1.3)
MONOCYTES NFR BLD AUTO: 10 %
NEUTROPHILS # BLD AUTO: 3.19 X10(3)/MCL (ref 2.1–9.2)
NEUTROPHILS NFR BLD AUTO: 63 %
PLATELET # BLD AUTO: 158 X10(3)/MCL (ref 130–400)
PMV BLD AUTO: 11.1 FL (ref 9.4–12.4)
PROTHROMBIN TIME: 30 SECOND(S) (ref 12.5–14.5)
RBC # BLD AUTO: 3.97 X10(6)/MCL (ref 4.2–5.4)
WBC # SPEC AUTO: 5.1 X10(3)/MCL (ref 4.5–11.5)

## 2022-04-10 ENCOUNTER — HISTORICAL (OUTPATIENT)
Dept: ADMINISTRATIVE | Facility: HOSPITAL | Age: 72
End: 2022-04-10
Payer: MEDICARE

## 2022-04-18 ENCOUNTER — HISTORICAL (OUTPATIENT)
Dept: LAB | Facility: HOSPITAL | Age: 72
End: 2022-04-18
Payer: MEDICARE

## 2022-04-21 LAB
BCS RECOMMENDATION EXT: NORMAL
BMD RECOMMENDATION EXT: NORMAL

## 2022-04-29 VITALS
WEIGHT: 159.19 LBS | HEIGHT: 64 IN | SYSTOLIC BLOOD PRESSURE: 109 MMHG | OXYGEN SATURATION: 96 % | BODY MASS INDEX: 27.18 KG/M2 | DIASTOLIC BLOOD PRESSURE: 67 MMHG

## 2022-05-11 ENCOUNTER — TELEPHONE (OUTPATIENT)
Dept: INTERNAL MEDICINE | Facility: CLINIC | Age: 72
End: 2022-05-11
Payer: MEDICARE

## 2022-05-11 NOTE — TELEPHONE ENCOUNTER
----- Message from Per Medrano MA sent at 5/11/2022  8:10 AM CDT -----  Regarding: PV 5/18/22 @ 9:40 Dr. Madrid  1. Are there any outstanding tasks in the patient's chart? Yes, fasting labs    2. Is there any documentation in the chart? No    3.Has patient been seen in an ER, Urgent care clinic, or been admitted since last visit?  If yes, When, where, and why    4. Has patient seen any other healthcare providers since last visit?  If yes, when, where, and why    5. Has patient had any bloodwork or XR done since last visit?    6. Is patient signed up for patient portal?

## 2022-05-13 DIAGNOSIS — E78.5 HYPERLIPIDEMIA, UNSPECIFIED HYPERLIPIDEMIA TYPE: Primary | ICD-10-CM

## 2022-05-13 DIAGNOSIS — I10 HYPERTENSION, UNSPECIFIED TYPE: ICD-10-CM

## 2022-05-20 ENCOUNTER — HOSPITAL ENCOUNTER (OUTPATIENT)
Facility: HOSPITAL | Age: 72
Discharge: HOME | End: 2022-05-21
Attending: EMERGENCY MEDICINE | Admitting: INTERNAL MEDICINE
Payer: MEDICARE

## 2022-05-20 DIAGNOSIS — R07.9 CHEST PAIN: Primary | ICD-10-CM

## 2022-05-20 DIAGNOSIS — I10 HYPERTENSION, UNSPECIFIED TYPE: ICD-10-CM

## 2022-05-20 DIAGNOSIS — R07.9 CHEST PAIN WITH MODERATE RISK OF ACUTE CORONARY SYNDROME: ICD-10-CM

## 2022-05-20 LAB
ALBUMIN SERPL-MCNC: 3.8 GM/DL (ref 3.4–4.8)
ALBUMIN/GLOB SERPL: 1.2 RATIO (ref 1.1–2)
ALP SERPL-CCNC: 70 UNIT/L (ref 40–150)
ALT SERPL-CCNC: 31 UNIT/L (ref 0–55)
AST SERPL-CCNC: 32 UNIT/L (ref 5–34)
BASOPHILS # BLD AUTO: 0.02 X10(3)/MCL (ref 0–0.2)
BASOPHILS NFR BLD AUTO: 0.4 %
BILIRUBIN DIRECT+TOT PNL SERPL-MCNC: 0.7 MG/DL
BUN SERPL-MCNC: 12 MG/DL (ref 9.8–20.1)
CALCIUM SERPL-MCNC: 9.4 MG/DL (ref 8.4–10.2)
CHLORIDE SERPL-SCNC: 103 MMOL/L (ref 98–107)
CO2 SERPL-SCNC: 33 MMOL/L (ref 23–31)
CREAT SERPL-MCNC: 0.61 MG/DL (ref 0.55–1.02)
EOSINOPHIL # BLD AUTO: 0.08 X10(3)/MCL (ref 0–0.9)
EOSINOPHIL NFR BLD AUTO: 1.5 %
ERYTHROCYTE [DISTWIDTH] IN BLOOD BY AUTOMATED COUNT: 13.4 % (ref 11.5–17)
GLOBULIN SER-MCNC: 3.2 GM/DL (ref 2.4–3.5)
GLUCOSE SERPL-MCNC: 78 MG/DL (ref 82–115)
HCT VFR BLD AUTO: 43.4 % (ref 37–47)
HGB BLD-MCNC: 14 GM/DL (ref 12–16)
IMM GRANULOCYTES # BLD AUTO: 0.02 X10(3)/MCL (ref 0–0.02)
IMM GRANULOCYTES NFR BLD AUTO: 0.4 % (ref 0–0.43)
LYMPHOCYTES # BLD AUTO: 1.5 X10(3)/MCL (ref 0.6–4.6)
LYMPHOCYTES NFR BLD AUTO: 28 %
MCH RBC QN AUTO: 31.9 PG (ref 27–31)
MCHC RBC AUTO-ENTMCNC: 32.3 MG/DL (ref 33–36)
MCV RBC AUTO: 98.9 FL (ref 80–94)
MONOCYTES # BLD AUTO: 0.45 X10(3)/MCL (ref 0.1–1.3)
MONOCYTES NFR BLD AUTO: 8.4 %
NEUTROPHILS # BLD AUTO: 3.3 X10(3)/MCL (ref 2.1–9.2)
NEUTROPHILS NFR BLD AUTO: 61.3 %
NRBC BLD AUTO-RTO: 0 %
PLATELET # BLD AUTO: 176 X10(3)/MCL (ref 130–400)
PMV BLD AUTO: 11.8 FL (ref 9.4–12.4)
POTASSIUM SERPL-SCNC: 3.9 MMOL/L (ref 3.5–5.1)
PROT SERPL-MCNC: 7 GM/DL (ref 5.8–7.6)
RBC # BLD AUTO: 4.39 X10(6)/MCL (ref 4.2–5.4)
SODIUM SERPL-SCNC: 143 MMOL/L (ref 136–145)
TROPONIN I SERPL-MCNC: <0.01 NG/ML (ref 0–0.04)
WBC # SPEC AUTO: 5.4 X10(3)/MCL (ref 4.5–11.5)

## 2022-05-20 PROCEDURE — 25000242 PHARM REV CODE 250 ALT 637 W/ HCPCS: Performed by: EMERGENCY MEDICINE

## 2022-05-20 PROCEDURE — 93005 ELECTROCARDIOGRAM TRACING: CPT

## 2022-05-20 PROCEDURE — G0378 HOSPITAL OBSERVATION PER HR: HCPCS

## 2022-05-20 PROCEDURE — 85025 COMPLETE CBC W/AUTO DIFF WBC: CPT | Performed by: PHYSICIAN ASSISTANT

## 2022-05-20 PROCEDURE — 80053 COMPREHEN METABOLIC PANEL: CPT | Performed by: PHYSICIAN ASSISTANT

## 2022-05-20 PROCEDURE — 93010 EKG 12-LEAD: ICD-10-PCS | Mod: ,,, | Performed by: INTERNAL MEDICINE

## 2022-05-20 PROCEDURE — 94761 N-INVAS EAR/PLS OXIMETRY MLT: CPT

## 2022-05-20 PROCEDURE — 93010 ELECTROCARDIOGRAM REPORT: CPT | Mod: ,,, | Performed by: INTERNAL MEDICINE

## 2022-05-20 PROCEDURE — 87635 SARS-COV-2 COVID-19 AMP PRB: CPT | Performed by: EMERGENCY MEDICINE

## 2022-05-20 PROCEDURE — 25000003 PHARM REV CODE 250: Performed by: EMERGENCY MEDICINE

## 2022-05-20 PROCEDURE — 99285 EMERGENCY DEPT VISIT HI MDM: CPT | Mod: 25,CS

## 2022-05-20 PROCEDURE — 84484 ASSAY OF TROPONIN QUANT: CPT | Performed by: PHYSICIAN ASSISTANT

## 2022-05-20 PROCEDURE — 36415 COLL VENOUS BLD VENIPUNCTURE: CPT | Performed by: PHYSICIAN ASSISTANT

## 2022-05-20 RX ORDER — OMEPRAZOLE 40 MG/1
40 CAPSULE, DELAYED RELEASE ORAL DAILY
COMMUNITY
Start: 2021-12-04 | End: 2022-06-28

## 2022-05-20 RX ORDER — HYDROCHLOROTHIAZIDE 25 MG/1
25 TABLET ORAL DAILY
COMMUNITY
Start: 2022-05-05 | End: 2022-09-14

## 2022-05-20 RX ORDER — TALC
6 POWDER (GRAM) TOPICAL NIGHTLY PRN
Status: DISCONTINUED | OUTPATIENT
Start: 2022-05-21 | End: 2022-05-21 | Stop reason: HOSPADM

## 2022-05-20 RX ORDER — HYDROCHLOROTHIAZIDE 25 MG/1
25 TABLET ORAL DAILY
Status: ON HOLD | COMMUNITY
Start: 2021-11-04 | End: 2022-05-21 | Stop reason: SDUPTHER

## 2022-05-20 RX ORDER — NITROGLYCERIN 0.4 MG/1
0.4 TABLET SUBLINGUAL
Status: COMPLETED | OUTPATIENT
Start: 2022-05-20 | End: 2022-05-20

## 2022-05-20 RX ORDER — SODIUM CHLORIDE 0.9 % (FLUSH) 0.9 %
10 SYRINGE (ML) INJECTION
Status: DISCONTINUED | OUTPATIENT
Start: 2022-05-21 | End: 2022-05-21 | Stop reason: HOSPADM

## 2022-05-20 RX ORDER — MORPHINE SULFATE 15 MG/1
1.5 TABLET ORAL 2 TIMES DAILY PRN
COMMUNITY
Start: 2022-05-19 | End: 2023-09-05

## 2022-05-20 RX ORDER — TIMOLOL MALEATE 5 MG/ML
1 SOLUTION/ DROPS OPHTHALMIC DAILY
COMMUNITY
Start: 2022-05-12 | End: 2024-02-15

## 2022-05-20 RX ORDER — ESCITALOPRAM OXALATE 10 MG/1
TABLET, FILM COATED ORAL
COMMUNITY
Start: 2022-05-03

## 2022-05-20 RX ORDER — FAMOTIDINE 20 MG/1
TABLET, FILM COATED ORAL
Status: ON HOLD | COMMUNITY
Start: 2022-04-25 | End: 2022-05-21 | Stop reason: HOSPADM

## 2022-05-20 RX ORDER — WARFARIN 4 MG/1
TABLET ORAL
COMMUNITY
Start: 2021-10-08 | End: 2022-06-28

## 2022-05-20 RX ORDER — IPRATROPIUM BROMIDE 21 UG/1
2 SPRAY, METERED NASAL
COMMUNITY
End: 2023-03-01

## 2022-05-20 RX ORDER — METFORMIN HYDROCHLORIDE 500 MG/1
TABLET ORAL
Status: ON HOLD | COMMUNITY
Start: 2022-04-25 | End: 2022-05-21 | Stop reason: SDUPTHER

## 2022-05-20 RX ORDER — BACLOFEN 10 MG/1
TABLET ORAL 4 TIMES DAILY
COMMUNITY

## 2022-05-20 RX ORDER — CELECOXIB 100 MG/1
CAPSULE ORAL
COMMUNITY
Start: 2022-03-08 | End: 2023-09-05

## 2022-05-20 RX ORDER — METFORMIN HYDROCHLORIDE 500 MG/1
500 TABLET ORAL 2 TIMES DAILY
COMMUNITY
Start: 2022-04-25 | End: 2023-07-17

## 2022-05-20 RX ORDER — CHOLECALCIFEROL (VITAMIN D3) 25 MCG
5000 TABLET ORAL DAILY
COMMUNITY

## 2022-05-20 RX ORDER — AZELASTINE 1 MG/ML
SPRAY, METERED NASAL
COMMUNITY
Start: 2022-04-25 | End: 2023-01-23 | Stop reason: SDUPTHER

## 2022-05-20 RX ORDER — LIDOCAINE 40 MG/G
CREAM TOPICAL NIGHTLY
COMMUNITY
End: 2022-08-16

## 2022-05-20 RX ORDER — ASPIRIN 325 MG
325 TABLET, DELAYED RELEASE (ENTERIC COATED) ORAL
Status: COMPLETED | OUTPATIENT
Start: 2022-05-20 | End: 2022-05-20

## 2022-05-20 RX ORDER — FAMOTIDINE 20 MG/1
20 TABLET, FILM COATED ORAL 2 TIMES DAILY
COMMUNITY
Start: 2022-04-25 | End: 2022-09-14

## 2022-05-20 RX ORDER — ASPIRIN 81 MG/1
81 TABLET ORAL DAILY
COMMUNITY
End: 2023-03-01

## 2022-05-20 RX ORDER — PRAVASTATIN SODIUM 40 MG/1
40 TABLET ORAL DAILY
COMMUNITY
Start: 2022-04-25 | End: 2022-06-28

## 2022-05-20 RX ORDER — POLYETHYLENE GLYCOL 3350 17 G/17G
17 POWDER, FOR SOLUTION ORAL ONCE
COMMUNITY
End: 2023-03-01

## 2022-05-20 RX ORDER — ONDANSETRON 2 MG/ML
4 INJECTION INTRAMUSCULAR; INTRAVENOUS EVERY 8 HOURS PRN
Status: DISCONTINUED | OUTPATIENT
Start: 2022-05-21 | End: 2022-05-21 | Stop reason: HOSPADM

## 2022-05-20 RX ADMIN — ASPIRIN 325 MG: 325 TABLET, COATED ORAL at 09:05

## 2022-05-20 RX ADMIN — NITROGLYCERIN 0.4 MG: 0.4 TABLET SUBLINGUAL at 10:05

## 2022-05-20 NOTE — FIRST PROVIDER EVALUATION
"Medical screening exam completed.  I have conducted a focused provider triage encounter, findings are as follows:    Brief history of present illness:  71 yo female presents to ED for evaluation of chest pain since this morning. Denies any SOB.  Follows with [facility], Dr. Rosas    Vitals:    05/20/22 1538   BP: (!) 160/84   BP Location: Left arm   Patient Position: Sitting   Pulse: (!) 59   Resp: 18   Temp: 97.7 °F (36.5 °C)   TempSrc: Oral   SpO2: 99%   Weight: 72.6 kg (160 lb)   Height: 5' 4" (1.626 m)       Pertinent physical exam:  Awake, alert and oriented      Brief workup plan:  Labs including troponin, CXR, and EKG.    Preliminary workup initiated; this workup will be continued and followed by the physician or advanced practice provider that is assigned to the patient when roomed.  "

## 2022-05-21 VITALS
BODY MASS INDEX: 27.31 KG/M2 | SYSTOLIC BLOOD PRESSURE: 96 MMHG | WEIGHT: 160 LBS | RESPIRATION RATE: 18 BRPM | HEART RATE: 55 BPM | OXYGEN SATURATION: 96 % | HEIGHT: 64 IN | DIASTOLIC BLOOD PRESSURE: 56 MMHG | TEMPERATURE: 98 F

## 2022-05-21 PROBLEM — R07.9 CHEST PAIN: Status: ACTIVE | Noted: 2022-05-21

## 2022-05-21 LAB
INR BLD: 3.14 (ref 0–1.3)
PROTHROMBIN TIME: 31.8 SECONDS (ref 12.5–14.5)
SARS-COV-2 RDRP RESP QL NAA+PROBE: NEGATIVE
TROPONIN I SERPL-MCNC: 0.02 NG/ML (ref 0–0.04)
TROPONIN I SERPL-MCNC: <0.01 NG/ML (ref 0–0.04)
TROPONIN I SERPL-MCNC: <0.01 NG/ML (ref 0–0.04)

## 2022-05-21 PROCEDURE — G0378 HOSPITAL OBSERVATION PER HR: HCPCS

## 2022-05-21 PROCEDURE — 25000003 PHARM REV CODE 250: Performed by: NURSE PRACTITIONER

## 2022-05-21 PROCEDURE — 84484 ASSAY OF TROPONIN QUANT: CPT | Mod: 91 | Performed by: NURSE PRACTITIONER

## 2022-05-21 PROCEDURE — 85610 PROTHROMBIN TIME: CPT | Performed by: NURSE PRACTITIONER

## 2022-05-21 PROCEDURE — 84484 ASSAY OF TROPONIN QUANT: CPT | Performed by: EMERGENCY MEDICINE

## 2022-05-21 PROCEDURE — 94761 N-INVAS EAR/PLS OXIMETRY MLT: CPT

## 2022-05-21 PROCEDURE — 36415 COLL VENOUS BLD VENIPUNCTURE: CPT | Performed by: NURSE PRACTITIONER

## 2022-05-21 PROCEDURE — 36415 COLL VENOUS BLD VENIPUNCTURE: CPT | Performed by: EMERGENCY MEDICINE

## 2022-05-21 RX ORDER — MORPHINE SULFATE 15 MG/1
15 TABLET ORAL 2 TIMES DAILY PRN
Status: DISCONTINUED | OUTPATIENT
Start: 2022-05-21 | End: 2022-05-21 | Stop reason: HOSPADM

## 2022-05-21 RX ORDER — ASPIRIN 81 MG/1
81 TABLET ORAL DAILY
Status: DISCONTINUED | OUTPATIENT
Start: 2022-05-21 | End: 2022-05-21 | Stop reason: HOSPADM

## 2022-05-21 RX ORDER — HYDROCHLOROTHIAZIDE 25 MG/1
25 TABLET ORAL DAILY
Status: DISCONTINUED | OUTPATIENT
Start: 2022-05-21 | End: 2022-05-21 | Stop reason: HOSPADM

## 2022-05-21 RX ORDER — ESCITALOPRAM OXALATE 10 MG/1
10 TABLET ORAL DAILY
Status: DISCONTINUED | OUTPATIENT
Start: 2022-05-21 | End: 2022-05-21 | Stop reason: HOSPADM

## 2022-05-21 RX ORDER — BETAMETHASONE DIPROPIONATE 0.5 MG/G
1 CREAM TOPICAL 2 TIMES DAILY PRN
COMMUNITY
End: 2022-10-04 | Stop reason: SDUPTHER

## 2022-05-21 RX ORDER — PRAVASTATIN SODIUM 40 MG/1
40 TABLET ORAL DAILY
Status: DISCONTINUED | OUTPATIENT
Start: 2022-05-21 | End: 2022-05-21 | Stop reason: HOSPADM

## 2022-05-21 RX ORDER — TIMOLOL MALEATE 5 MG/ML
1 SOLUTION/ DROPS OPHTHALMIC DAILY
Status: DISCONTINUED | OUTPATIENT
Start: 2022-05-21 | End: 2022-05-21 | Stop reason: HOSPADM

## 2022-05-21 RX ORDER — B-COMPLEX WITH VITAMIN C
1 TABLET ORAL DAILY
COMMUNITY

## 2022-05-21 RX ORDER — FAMOTIDINE 20 MG/1
20 TABLET, FILM COATED ORAL DAILY
Status: DISCONTINUED | OUTPATIENT
Start: 2022-05-21 | End: 2022-05-21 | Stop reason: HOSPADM

## 2022-05-21 RX ORDER — LABETALOL HYDROCHLORIDE 5 MG/ML
20 INJECTION, SOLUTION INTRAVENOUS EVERY 4 HOURS PRN
Status: DISCONTINUED | OUTPATIENT
Start: 2022-05-21 | End: 2022-05-21 | Stop reason: HOSPADM

## 2022-05-21 RX ORDER — HYDRALAZINE HYDROCHLORIDE 20 MG/ML
20 INJECTION INTRAMUSCULAR; INTRAVENOUS EVERY 4 HOURS PRN
Status: DISCONTINUED | OUTPATIENT
Start: 2022-05-21 | End: 2022-05-21 | Stop reason: HOSPADM

## 2022-05-21 RX ORDER — ALPRAZOLAM 0.25 MG/1
0.25 TABLET ORAL 3 TIMES DAILY PRN
Status: DISCONTINUED | OUTPATIENT
Start: 2022-05-21 | End: 2022-05-21 | Stop reason: HOSPADM

## 2022-05-21 RX ORDER — FAMOTIDINE 20 MG/1
20 TABLET, FILM COATED ORAL 2 TIMES DAILY
Status: DISCONTINUED | OUTPATIENT
Start: 2022-05-21 | End: 2022-05-21 | Stop reason: HOSPADM

## 2022-05-21 RX ORDER — ZOLPIDEM TARTRATE 5 MG/1
5 TABLET ORAL NIGHTLY PRN
Status: DISCONTINUED | OUTPATIENT
Start: 2022-05-21 | End: 2022-05-21 | Stop reason: HOSPADM

## 2022-05-21 RX ADMIN — ASPIRIN 81 MG: 81 TABLET, COATED ORAL at 10:05

## 2022-05-21 RX ADMIN — FAMOTIDINE 20 MG: 20 TABLET, FILM COATED ORAL at 10:05

## 2022-05-21 RX ADMIN — HYDROCHLOROTHIAZIDE 25 MG: 25 TABLET ORAL at 10:05

## 2022-05-21 RX ADMIN — ESCITALOPRAM OXALATE 10 MG: 10 TABLET ORAL at 10:05

## 2022-05-21 NOTE — PROGRESS NOTES
72-year-old female followed by Dr. Rosas with a history of DVT secondary to protein C deficiency currently on Coumadin, carotid disease, hypertension, dyslipidemia, chronic back pain, sleep apnea and diabetes.  She had an echocardiogram performed February 2022 that showed good LV function and no valvular disease.  Previous PET scan in July 2020 was negative for ischemia.  She presents to the ER this evening with complaints of chest pain on and off for the past month describes a pressure type sensation.  EKG done upon arrival shows a normal sinus rhythm with no acute changes.  Her lab work is unremarkable.  She is still having some discomfort in the ER.    Admit for observation  Cycle cardiac biomarkers  Telemetry monitoring  Continue her home dose p.o. morphine  Check INR  Aspirin

## 2022-05-21 NOTE — ED NOTES
Pt to ED with complaints of L sided CP that began at approx 0300 this AM; states that pain has decreased since then from 8 to 4. Denies SOB, N/V; pt aaox4, nad, MAGALLON: pt denies needs at this time; pt updated on plan of care, verbalizes understanding

## 2022-05-21 NOTE — ED PROVIDER NOTES
Encounter Date: 5/20/2022       History     Chief Complaint   Patient presents with    Chest Pain     Pt presents c/o midline CP. Onset this am.  Denies SOB/ denies cardiac hx/ dr galvez.      72-year-old female with history of hypertension, diabetes, former smoker who presents to the ED w/ left sided chest pain since this AM. She denies nay associated shortness of breath, N/V or diaphoresis. No known hx CAD.    The history is provided by the patient.   Chest Pain  The current episode started today. Chest pain occurs constantly. The chest pain is unchanged. The quality of the pain is described as pressure-like. The pain does not radiate. Pertinent negatives for primary symptoms include no fever, no cough, no abdominal pain, no nausea, no vomiting and no dizziness.   Pertinent negatives for associated symptoms include no diaphoresis. She tried nothing for the symptoms. Risk factors include smoking/tobacco exposure.   Her past medical history is significant for diabetes and hypertension.     Review of patient's allergies indicates:   Allergen Reactions    Hydrocodone-acetaminophen      Other reaction(s): Difficulty breathing, Throat tightness    Oxycodone-acetaminophen Hallucinations    Iodine     Meperidine      Other reaction(s): Unknown (qualifier value)    Promethazine      Other reaction(s): Unknown (qualifier value), Unknown (qualifier value)     Past Medical History:   Diagnosis Date    Back pain     Diabetes mellitus     DVT (deep venous thrombosis)     Hypertension     Neuropathy     Protein C deficiency      Past Surgical History:   Procedure Laterality Date    SPINAL CORD STIMULATOR IMPLANT       History reviewed. No pertinent family history.     Review of Systems   Constitutional: Negative for diaphoresis and fever.   HENT: Negative for congestion.    Respiratory: Negative for cough.    Cardiovascular: Positive for chest pain. Negative for leg swelling.   Gastrointestinal: Negative for abdominal  pain, nausea and vomiting.   Musculoskeletal: Negative for back pain.   Skin: Negative for rash.   Neurological: Negative for dizziness and syncope.   All other systems reviewed and are negative.      Physical Exam     Initial Vitals [05/20/22 1538]   BP Pulse Resp Temp SpO2   (!) 160/84 (!) 59 18 97.7 °F (36.5 °C) 99 %      MAP       --         Physical Exam    Nursing note and vitals reviewed.  Constitutional: She appears well-developed and well-nourished. No distress.   HENT:   Head: Normocephalic and atraumatic.   Eyes: Conjunctivae are normal.   Neck: No tracheal deviation present. No JVD present.   Cardiovascular: Normal rate, regular rhythm and normal heart sounds.   Pulmonary/Chest: Breath sounds normal. No respiratory distress.   Abdominal: Abdomen is soft. She exhibits no distension. There is no abdominal tenderness.   Musculoskeletal:         General: No tenderness.     Neurological: She is alert and oriented to person, place, and time. GCS score is 15. GCS eye subscore is 4. GCS verbal subscore is 5. GCS motor subscore is 6.   Skin: Skin is warm and dry. No rash noted.         ED Course   Procedures  Labs Reviewed   COMPREHENSIVE METABOLIC PANEL - Abnormal; Notable for the following components:       Result Value    Carbon Dioxide 33 (*)     Glucose Level 78 (*)     All other components within normal limits   CBC WITH DIFFERENTIAL - Abnormal; Notable for the following components:    MCV 98.9 (*)     MCH 31.9 (*)     MCHC 32.3 (*)     IG# 0.02 (*)     All other components within normal limits   TROPONIN I - Normal   CBC W/ AUTO DIFFERENTIAL    Narrative:     The following orders were created for panel order CBC auto differential.  Procedure                               Abnormality         Status                     ---------                               -----------         ------                     CBC with Differential[[id number]]        Abnormal            Final result                 Please view  results for these tests on the individual orders.   TROPONIN I     EKG Readings: (Independently Interpreted)   Initial Reading: No STEMI. Rhythm: Sinus Bradycardia. Heart Rate: 56. ST Segments: Normal ST Segments. T Waves: Normal. Axis: Normal.   Low amplitude QRS in anterior leads     ECG Results          EKG 12-lead (In process)  Result time 05/20/22 15:50:23    In process by Interface, Lab In [facility] (05/20/22 15:50:23)                 Narrative:    Test Reason : R07.9,    Vent. Rate : 056 BPM     Atrial Rate : 056 BPM     P-R Int : 168 ms          QRS Dur : 084 ms      QT Int : 468 ms       P-R-T Axes : 071 063 061 degrees     QTc Int : 451 ms    Sinus bradycardia  Cannot rule out Anterior infarct ,age undetermined  Abnormal ECG  No previous ECGs available    Referred By:             Confirmed By:                             Imaging Results          X-Ray Chest 1 View (Final result)  Result time 05/20/22 16:24:41    Final result by Ana M Ward MD (05/20/22 16:24:41)                 Impression:      No acute abnormality of the chest.      Electronically signed by: Ana M Ward  Date:    05/20/2022  Time:    16:24             Narrative:    EXAMINATION:  XR CHEST 1 VIEW    CLINICAL HISTORY:  Chest pain, unspecified    TECHNIQUE:  AP chest    COMPARISON:  Chest x-ray dated 10/24/2019    FINDINGS:  The heart is normal in size.  There is no focal airspace consolidation.  There is no pleural effusion or visible pneumothorax.  Spinal cord leads are visualized.                              X-Rays:   Independently Interpreted Readings:   Chest X-Ray: Normal heart size.  No infiltrates.  No acute abnormalities.     Medications   nitroGLYCERIN SL tablet 0.4 mg (0.4 mg Sublingual Given 5/20/22 2200)   aspirin EC tablet 325 mg (325 mg Oral Given 5/20/22 2158)     Medical Decision Making:   Initial Assessment:   Ms. Rodriguez presented for evaluation of chest pain in setting of multiple cardiac risk factors.  Hemodynamically stable. Nontoxic apeparing. Nonfocal neurologic examination.   Differential Diagnosis:   Acute coronary syndrome, MI, unstable angina, atypical chest pain, pneumonia, pneumothorax, GERD, acute hepatobiliary disease, atypical chest pain  Independently Interpreted Test(s):   I have ordered and independently interpreted X-rays - see prior notes.  I have ordered and independently interpreted EKG Reading(s) - see prior notes  Clinical Tests:   Lab Tests: Ordered and Reviewed  The following lab test(s) were unremarkable: CBC, CMP and Troponin  Radiological Study: Ordered and Reviewed  Medical Tests: Ordered and Reviewed  ED Management:  Ms. Rodriguez's work up is unremarkable in the ED; however, given her age, comorbid conditions/CAD risk factors and new onset chest pain, will admit for further telemetry monitoring, plan to trend cardiac enzymes, cardiology evaluation to r/o ACS. Findings and plan discussed with the patient and she is agreeable to admission at this time.                         Clinical Impression:   Final diagnoses:  [R07.9] Chest pain (Primary)  [R07.9] Chest pain with moderate risk of acute coronary syndrome  [I10] Hypertension, unspecified type            Observation     Jovita Quintana MD  05/25/22 2460

## 2022-05-21 NOTE — H&P
[facility] - 4th Floor Medical Telemetry  Cardiology  History and Physical     Patient Name: Alejandrina Rodriguez  MRN: [id number]  Admission Date: 5/20/2022  Code Status: Full Code   Attending Provider: Shawn Rosas MD   Primary Care Physician: Alex Trevioñ MD  Principal Problem:<principal problem not specified>    Patient information was obtained from patient and ER records.     Subjective:     Chief Complaint: Chest Pain     HPI:  Ms. Rodriguez is a 72 year old female, known to Dr. Rosas, who presented to the ED with reports of anterior CP. Initial troponin is WNL. EKG revealed Sinus Bradycardia with no overt ST-Segment elevation or Depression. Chest Xray is without overt cardiopulmonary abnormality. Patient is hemodynamically stable. She is admitted to [facility] for further workup of her CP. Her CP lasted for hours yesterday.  Was non-exertional without associated symptoms.  No recent exertional CP or SOB.  Feels fine now other than her weak legs from her chronic back problems and neuropathy.     PMH: Hypertension, Dyslipidemia, Carotid Artery Stenosis, Sleep Apnea, Radiculopathy, DM II, History of DVT Due to Protein C Deficiency (On Warfarin- Managed by [facility]), Chronic Back Pain  PSH: Back Surgery  Family History: No Significant Family History is Noted  Social History: Tobacco- Former Smoker, Alcohol- Negative, Substance Abuse- Negative    Previous Cardiac Diagnostics:   Echocardiogram (2.21.22):  The study quality is average.   The left ventricle is normal in size. Global left ventricular systolic function is normal. The left ventricular ejection fraction is 60%. Left ventricular diastolic function is normal. Normal wall thickness.  No significant valvular pathology noted.     Cardiac PET (7.28.20):  This is a normal perfusion study, no perfusion defects noted. There is no evidence of ischemia.   This scan is suggestive of low risk for future cardiovascular events.   The left  ventricular cavity is noted to be normal on the stress studies. The stress left ventricular ejection fraction was calculated to be 74% and left ventricular global function is normal. The rest left ventricular cavity is noted to be normal. The rest left ventricular ejection fraction was calculated to be 67% and rest left ventricular global function is normal.   When compared to the resting ejection fraction (67%), the stress ejection fraction (74%) has increased.   The study quality is excellent.     Calcium Score (7.13.20): Total Score 0 Between 0-25th Percentile for Women her Age    Carotid US (5.22.20):  CARMINA < 50% Stenosis. LICA with no Hemodynamically Significant Stenosis.  Bilateral Vertebral Arteries were Patient with Antegrade Flow.    Review of patient's allergies indicates:   Allergen Reactions    Hydrocodone-acetaminophen      Other reaction(s): Difficulty breathing, Throat tightness    Oxycodone-acetaminophen Hallucinations    Iodine     Meperidine      Other reaction(s): Unknown (qualifier value)    Promethazine      Other reaction(s): Unknown (qualifier value), Unknown (qualifier value)       No current facility-administered medications on file prior to encounter.     Current Outpatient Medications on File Prior to Encounter   Medication Sig    aspirin (ECOTRIN) 81 MG EC tablet Take 81 mg by mouth once daily.    azelastine (ASTELIN) 137 mcg (0.1 %) nasal spray     B-complex with vitamin C (Z-BEC OR EQUIV) tablet Take 1 tablet by mouth once daily.    baclofen (LIORESAL) 10 MG tablet Take 10 mg by mouth 4 (four) times daily. PRN muscle spasms    betamethasone dipropionate 0.05 % cream Apply 1 application topically 2 (two) times daily as needed.    celecoxib (CELEBREX) 100 MG capsule TAKE ONE CAPSULE BY MOUTH ONCE DAILY WITH FOOD AS NEEDED FOR PAIN    famotidine (PEPCID) 20 MG tablet     famotidine (PEPCID) 20 MG tablet Take 20 mg by mouth 2 (two) times a day.    hydroCHLOROthiazide  (HYDRODIURIL) 25 MG tablet Take 25 mg by mouth once daily.    ipratropium (ATROVENT) 21 mcg (0.03 %) nasal spray 2 sprays by Nasal route every 12 (twelve) hours.    LEXAPRO 10 mg tablet TAKE 1 TABLET BY MOUTH ONCE DAILY AS DIRECTED FOR MOOD/CHRONIC PAIN AS DIRECTED    LIDOcaine (LMX) 4 % cream Apply topically nightly.    metFORMIN (GLUCOPHAGE) 500 MG tablet Take 500 mg by mouth 2 (two) times a day.    morphine (MSIR) 15 MG tablet Take 1.5 tablets by mouth 2 (two) times daily as needed.    OMEGA-3-EPA-FISH OIL ORAL Take 1 capsule by mouth once daily.    omeprazole (PRILOSEC) 40 MG capsule Take 40 mg by mouth Daily. At bedtime    polyethylene glycol (GLYCOLAX) 17 gram PwPk Take 17 g by mouth once. PRN    pravastatin (PRAVACHOL) 40 MG tablet Take 40 mg by mouth Daily. At bedtime    timolol maleate 0.5% (TIMOPTIC) 0.5 % Drop Place 1 drop into both eyes Daily.    vitamin D (VITAMIN D3) 1000 units Tab Take 5,000 Units by mouth once daily.    warfarin (COUMADIN) 4 MG tablet   See Instructions, TAKE 1 TABLET EVERY DAY, # 90 tab(s), 1 Refill(s), Pharmacy: [facility], 163, cm, Height/Length Dosing, 10/04/21 9:42:00 CDT, 72.3, kg, Weight Dosing, 10/04/21 9:42:00 CDT    [DISCONTINUED] hydroCHLOROthiazide (HYDRODIURIL) 25 MG tablet Take 25 mg by mouth Daily.    [DISCONTINUED] metFORMIN (GLUCOPHAGE) 500 MG tablet      Review of Systems   Constitutional: Negative for diaphoresis.   HENT: Negative for congestion.    Eyes: Negative for double vision.   Cardiovascular: Positive for chest pain. Negative for dyspnea on exertion and near-syncope.        Presented with Anterior CP   Respiratory: Negative for shortness of breath.    Endocrine: Negative for polyuria.   Skin: Negative for rash.   Musculoskeletal: Positive for back pain.   Gastrointestinal: Negative for bloating and abdominal pain.   Genitourinary: Negative for dysuria.   Psychiatric/Behavioral: Negative for altered mental status.   All other  systems reviewed and are negative.    Objective:     Vital Signs (Most Recent):  Temp: 97.6 °F (36.4 °C) (05/21/22 0444)  Pulse: (!) 56 (05/21/22 0444)  Resp: 18 (05/21/22 0444)  BP: 124/64 (05/21/22 0444)  SpO2: 98 % (05/21/22 0444) Vital Signs (24h Range):  Temp:  [97.6 °F (36.4 °C)-97.7 °F (36.5 °C)] 97.6 °F (36.4 °C)  Pulse:  [50-61] 56  Resp:  [13-20] 18  SpO2:  [96 %-99 %] 98 %  BP: (124-187)/(64-88) 124/64     Weight: 72.6 kg (160 lb)  Body mass index is 27.46 kg/m².    SpO2: 98 %  O2 Device (Oxygen Therapy): room air    No intake or output data in the 24 hours ending 05/21/22 0654    Lines/Drains/Airways     Peripheral Intravenous Line  Duration                Peripheral IV - Single Lumen 05/20/22 2355 22 G Left Wrist <1 day                Physical Exam  HENT:      Head: Normocephalic.      Mouth/Throat:      Mouth: Mucous membranes are moist.   Eyes:      Extraocular Movements: Extraocular movements intact.   Cardiovascular:      Rate and Rhythm: Normal rate and regular rhythm.      Heart sounds: Normal heart sounds.   Pulmonary:      Effort: Pulmonary effort is normal. No respiratory distress.      Breath sounds: Normal breath sounds.   Abdominal:      General: Abdomen is flat.      Palpations: Abdomen is soft.   Musculoskeletal:         General: Normal range of motion.      Cervical back: Neck supple.      Right lower leg: No edema.      Left lower leg: No edema.   Skin:     General: Skin is warm and dry.   Neurological:      General: No focal deficit present.      Mental Status: She is alert and oriented to person, place, and time. Mental status is at baseline.   Psychiatric:         Mood and Affect: Mood normal.         Behavior: Behavior normal.         Significant Labs:   CMP   Recent Labs   Lab 05/20/22  1713      K 3.9   CO2 33*   BUN 12.0   CREATININE 0.61   CALCIUM 9.4   ALBUMIN 3.8   BILITOT 0.7   ALKPHOS 70   AST 32   ALT 31   , CBC   Recent Labs   Lab 05/20/22 1713   WBC 5.4   HGB 14.0    HCT 43.4      , INR No results for input(s): INR, PROTIME in the last 48 hours. and Troponin   Recent Labs   Lab 05/20/22  1713   TROPONINI <0.010       Significant Imaging: X-Ray: CXR: X-Ray Chest PA and Lateral (CXR): No results found for this visit on 05/20/22.  Assessment and Plan:   Impression:  Chest Pain    - Initial Troponin WNL    - Calcium Score 0 (July 2020)    - Cardiac PET - No Ischemia/Low Risk (July 2020)     - CP was for hours yesterday and no troponin yet, prior cardiac testing suggests that CAD is unlikely    - Will get final troponin, but can go home if that is ok  Hypertension (Above Goal initially, but improved and is well controlled at home)  Hyperlipidemia  Carotid Artery Stenosis (mild/Nonobstructive)  History of Hypercoagulable State (Protein C Deficiency)- On Chronic Warfarin    - History of Lower Extremity DVT  Chronic Back Pain with Neuropathy to her legs  Obstructive Sleep Apnea  History of Radiculopathy  DM II  COVID-19 Negative on 5.20.22  Iodine Allergy    Plan:  Resume Home Medications  Get her final troponin, can discharge if ok and f/u with Rosas      VTE Risk Mitigation (From admission, onward)         Ordered     IP VTE HIGH RISK PATIENT  Once         05/20/22 2321     Place sequential compression device  Until discontinued         05/20/22 2321                DEYVI Ireland  Cardiology  [facility] - 4th Floor Medical Telemetry  05/21/2022 6:54 AM

## 2022-05-21 NOTE — DISCHARGE SUMMARY
[facility] - 4th Floor Medical Telemetry  Discharge Note      DISPOSITION: Home or Self Care    FINAL DIAGNOSIS:  Chest pain    FOLLOWUP: In clinic    DISCHARGE INSTRUCTIONS:      Discharge Procedure Orders   Diet Cardiac     Activity as tolerated      Patient to be discharged home on prior home medications if last troponin is negative. This discussed with bedside RN. Follow up with Dr. Rosas Outpatient. Please see Same Day H/P for Full Note/Discharge Summary.    TIME SPENT ON DISCHARGE: 30 minutes

## 2022-06-13 ENCOUNTER — LAB VISIT (OUTPATIENT)
Dept: LAB | Facility: HOSPITAL | Age: 72
End: 2022-06-13
Attending: INTERNAL MEDICINE
Payer: MEDICARE

## 2022-06-13 ENCOUNTER — TELEPHONE (OUTPATIENT)
Dept: INTERNAL MEDICINE | Facility: CLINIC | Age: 72
End: 2022-06-13
Payer: MEDICARE

## 2022-06-13 DIAGNOSIS — I10 HYPERTENSION, UNSPECIFIED TYPE: ICD-10-CM

## 2022-06-13 DIAGNOSIS — R73.09 INCREASED GLUCOSE LEVEL: Primary | ICD-10-CM

## 2022-06-13 DIAGNOSIS — E78.5 HYPERLIPIDEMIA, UNSPECIFIED HYPERLIPIDEMIA TYPE: ICD-10-CM

## 2022-06-13 DIAGNOSIS — R73.09 INCREASED GLUCOSE LEVEL: ICD-10-CM

## 2022-06-13 LAB
ALBUMIN SERPL-MCNC: 3.6 GM/DL (ref 3.4–4.8)
ALBUMIN/GLOB SERPL: 1.2 RATIO (ref 1.1–2)
ALP SERPL-CCNC: 71 UNIT/L (ref 40–150)
ALT SERPL-CCNC: 24 UNIT/L (ref 0–55)
APPEARANCE UR: CLEAR
AST SERPL-CCNC: 26 UNIT/L (ref 5–34)
BACTERIA #/AREA URNS AUTO: NORMAL /HPF
BASOPHILS # BLD AUTO: 0.02 X10(3)/MCL (ref 0–0.2)
BASOPHILS NFR BLD AUTO: 0.4 %
BILIRUB UR QL STRIP.AUTO: ABNORMAL MG/DL
BILIRUBIN DIRECT+TOT PNL SERPL-MCNC: 0.9 MG/DL
BUN SERPL-MCNC: 13.2 MG/DL (ref 9.8–20.1)
CALCIUM SERPL-MCNC: 9.2 MG/DL (ref 8.4–10.2)
CHLORIDE SERPL-SCNC: 106 MMOL/L (ref 98–107)
CHOLEST SERPL-MCNC: 119 MG/DL
CHOLEST/HDLC SERPL: 2 {RATIO} (ref 0–5)
CO2 SERPL-SCNC: 30 MMOL/L (ref 23–31)
COLOR UR AUTO: ABNORMAL
CREAT SERPL-MCNC: 0.65 MG/DL (ref 0.55–1.02)
EOSINOPHIL # BLD AUTO: 0.1 X10(3)/MCL (ref 0–0.9)
EOSINOPHIL NFR BLD AUTO: 1.9 %
ERYTHROCYTE [DISTWIDTH] IN BLOOD BY AUTOMATED COUNT: 13.3 % (ref 11.5–17)
EST. AVERAGE GLUCOSE BLD GHB EST-MCNC: 119.8 MG/DL
GLOBULIN SER-MCNC: 3 GM/DL (ref 2.4–3.5)
GLUCOSE SERPL-MCNC: 86 MG/DL (ref 82–115)
GLUCOSE UR QL STRIP.AUTO: NEGATIVE MG/DL
HBA1C MFR BLD: 5.8 %
HCT VFR BLD AUTO: 41.1 % (ref 37–47)
HDLC SERPL-MCNC: 48 MG/DL (ref 35–60)
HGB BLD-MCNC: 13.8 GM/DL (ref 12–16)
IMM GRANULOCYTES # BLD AUTO: 0.01 X10(3)/MCL (ref 0–0.02)
IMM GRANULOCYTES NFR BLD AUTO: 0.2 % (ref 0–0.43)
KETONES UR QL STRIP.AUTO: NEGATIVE MG/DL
LDLC SERPL CALC-MCNC: 61 MG/DL (ref 50–140)
LEUKOCYTE ESTERASE UR QL STRIP.AUTO: NEGATIVE UNIT/L
LYMPHOCYTES # BLD AUTO: 1.32 X10(3)/MCL (ref 0.6–4.6)
LYMPHOCYTES NFR BLD AUTO: 24.9 %
MCH RBC QN AUTO: 32.2 PG (ref 27–31)
MCHC RBC AUTO-ENTMCNC: 33.6 MG/DL (ref 33–36)
MCV RBC AUTO: 95.8 FL (ref 80–94)
MONOCYTES # BLD AUTO: 0.45 X10(3)/MCL (ref 0.1–1.3)
MONOCYTES NFR BLD AUTO: 8.5 %
NEUTROPHILS # BLD AUTO: 3.4 X10(3)/MCL (ref 2.1–9.2)
NEUTROPHILS NFR BLD AUTO: 64.1 %
NITRITE UR QL STRIP.AUTO: NEGATIVE
NRBC BLD AUTO-RTO: 0 %
PH UR STRIP.AUTO: 6.5 [PH]
PLATELET # BLD AUTO: 194 X10(3)/MCL (ref 130–400)
PMV BLD AUTO: 11.8 FL (ref 9.4–12.4)
POTASSIUM SERPL-SCNC: 3.1 MMOL/L (ref 3.5–5.1)
PROT SERPL-MCNC: 6.6 GM/DL (ref 5.8–7.6)
PROT UR QL STRIP.AUTO: NEGATIVE MG/DL
RBC # BLD AUTO: 4.29 X10(6)/MCL (ref 4.2–5.4)
RBC #/AREA URNS AUTO: <5 /HPF
RBC UR QL AUTO: NEGATIVE UNIT/L
SODIUM SERPL-SCNC: 146 MMOL/L (ref 136–145)
SP GR UR STRIP.AUTO: 1.03 (ref 1–1.03)
SQUAMOUS #/AREA URNS AUTO: <4 /LPF
TRIGL SERPL-MCNC: 49 MG/DL (ref 37–140)
TSH SERPL-ACNC: 1.27 UIU/ML (ref 0.35–4.94)
UROBILINOGEN UR STRIP-ACNC: 1 MG/DL
VLDLC SERPL CALC-MCNC: 10 MG/DL
WBC # SPEC AUTO: 5.3 X10(3)/MCL (ref 4.5–11.5)
WBC #/AREA URNS AUTO: <5 /HPF

## 2022-06-13 PROCEDURE — 83036 HEMOGLOBIN GLYCOSYLATED A1C: CPT

## 2022-06-13 PROCEDURE — 81001 URINALYSIS AUTO W/SCOPE: CPT

## 2022-06-13 PROCEDURE — 85025 COMPLETE CBC W/AUTO DIFF WBC: CPT

## 2022-06-13 PROCEDURE — 80061 LIPID PANEL: CPT

## 2022-06-13 PROCEDURE — 36415 COLL VENOUS BLD VENIPUNCTURE: CPT

## 2022-06-13 PROCEDURE — 80053 COMPREHEN METABOLIC PANEL: CPT

## 2022-06-13 PROCEDURE — 84443 ASSAY THYROID STIM HORMONE: CPT

## 2022-06-13 NOTE — TELEPHONE ENCOUNTER
----- Message from Alex Treviño MD sent at 6/13/2022 12:32 PM CDT -----  Received patient's laboratory studies could potassium levels a touch low at 3.1 would encourage her to increase intake of potassium rich foods, she could eat a banana several times per week she could also benefit from an over-the-counter potassium supplement, 10 mEq 1 daily.  She is coming in for a visit on the 21st so will recheck that potassium level at that visit.

## 2022-06-14 DIAGNOSIS — M85.80 OSTEOPENIA, UNSPECIFIED LOCATION: ICD-10-CM

## 2022-06-14 DIAGNOSIS — E78.5 HYPERLIPIDEMIA, UNSPECIFIED HYPERLIPIDEMIA TYPE: ICD-10-CM

## 2022-06-14 DIAGNOSIS — I10 HYPERTENSION, UNSPECIFIED TYPE: Primary | ICD-10-CM

## 2022-06-14 DIAGNOSIS — R06.02 SHORTNESS OF BREATH: Primary | ICD-10-CM

## 2022-06-14 DIAGNOSIS — E11.9 TYPE 2 DIABETES MELLITUS WITHOUT COMPLICATION, UNSPECIFIED WHETHER LONG TERM INSULIN USE: ICD-10-CM

## 2022-06-16 ENCOUNTER — PATIENT OUTREACH (OUTPATIENT)
Dept: ADMINISTRATIVE | Facility: HOSPITAL | Age: 72
End: 2022-06-16
Payer: MEDICARE

## 2022-06-16 NOTE — PROGRESS NOTES
Population Health. Out Reach.  The following record(s)  below were uploaded for Health Maintenance .      4/21/22 MAMMOGRAM SCREENING    4/21/22 DEXA SCREENING         7/14/14 COLONOSCOPY

## 2022-06-20 RX ORDER — POLYETHYLENE GLYCOL 400 AND PROPYLENE GLYCOL 4; 3 MG/ML; MG/ML
1 SOLUTION/ DROPS OPHTHALMIC
COMMUNITY
Start: 2021-09-14 | End: 2023-09-05

## 2022-06-20 RX ORDER — NITROGLYCERIN 0.4 MG/1
TABLET SUBLINGUAL
COMMUNITY
Start: 2022-06-03 | End: 2023-09-05

## 2022-06-21 ENCOUNTER — OFFICE VISIT (OUTPATIENT)
Dept: INTERNAL MEDICINE | Facility: CLINIC | Age: 72
End: 2022-06-21
Payer: MEDICARE

## 2022-06-21 VITALS
SYSTOLIC BLOOD PRESSURE: 126 MMHG | BODY MASS INDEX: 26.6 KG/M2 | TEMPERATURE: 96 F | OXYGEN SATURATION: 99 % | WEIGHT: 155.81 LBS | HEART RATE: 65 BPM | DIASTOLIC BLOOD PRESSURE: 72 MMHG | RESPIRATION RATE: 16 BRPM | HEIGHT: 64 IN

## 2022-06-21 DIAGNOSIS — I82.409 RECURRENT DEEP VEIN THROMBOSIS (DVT): ICD-10-CM

## 2022-06-21 DIAGNOSIS — D68.59 PROTEIN C DEFICIENCY: ICD-10-CM

## 2022-06-21 DIAGNOSIS — M70.71 BURSITIS OF BOTH HIPS, UNSPECIFIED BURSA: ICD-10-CM

## 2022-06-21 DIAGNOSIS — E11.9 TYPE 2 DIABETES MELLITUS WITHOUT COMPLICATION, WITHOUT LONG-TERM CURRENT USE OF INSULIN: ICD-10-CM

## 2022-06-21 DIAGNOSIS — Z00.00 ANNUAL PHYSICAL EXAM: Primary | ICD-10-CM

## 2022-06-21 DIAGNOSIS — M70.72 BURSITIS OF BOTH HIPS, UNSPECIFIED BURSA: ICD-10-CM

## 2022-06-21 DIAGNOSIS — E87.6 HYPOKALEMIA: ICD-10-CM

## 2022-06-21 PROBLEM — M70.70 HIP BURSITIS: Status: ACTIVE | Noted: 2022-06-21

## 2022-06-21 PROCEDURE — 3288F FALL RISK ASSESSMENT DOCD: CPT | Mod: CPTII,,, | Performed by: INTERNAL MEDICINE

## 2022-06-21 PROCEDURE — 1101F PT FALLS ASSESS-DOCD LE1/YR: CPT | Mod: CPTII,,, | Performed by: INTERNAL MEDICINE

## 2022-06-21 PROCEDURE — 3074F PR MOST RECENT SYSTOLIC BLOOD PRESSURE < 130 MM HG: ICD-10-PCS | Mod: CPTII,,, | Performed by: INTERNAL MEDICINE

## 2022-06-21 PROCEDURE — 3078F PR MOST RECENT DIASTOLIC BLOOD PRESSURE < 80 MM HG: ICD-10-PCS | Mod: CPTII,,, | Performed by: INTERNAL MEDICINE

## 2022-06-21 PROCEDURE — 3008F PR BODY MASS INDEX (BMI) DOCUMENTED: ICD-10-PCS | Mod: CPTII,,, | Performed by: INTERNAL MEDICINE

## 2022-06-21 PROCEDURE — G0439 PPPS, SUBSEQ VISIT: HCPCS | Mod: ,,, | Performed by: INTERNAL MEDICINE

## 2022-06-21 PROCEDURE — 1159F MED LIST DOCD IN RCRD: CPT | Mod: CPTII,,, | Performed by: INTERNAL MEDICINE

## 2022-06-21 PROCEDURE — 3008F BODY MASS INDEX DOCD: CPT | Mod: CPTII,,, | Performed by: INTERNAL MEDICINE

## 2022-06-21 PROCEDURE — 3078F DIAST BP <80 MM HG: CPT | Mod: CPTII,,, | Performed by: INTERNAL MEDICINE

## 2022-06-21 PROCEDURE — 1101F PR PT FALLS ASSESS DOC 0-1 FALLS W/OUT INJ PAST YR: ICD-10-PCS | Mod: CPTII,,, | Performed by: INTERNAL MEDICINE

## 2022-06-21 PROCEDURE — G0439 PR MEDICARE ANNUAL WELLNESS SUBSEQUENT VISIT: ICD-10-PCS | Mod: ,,, | Performed by: INTERNAL MEDICINE

## 2022-06-21 PROCEDURE — 3288F PR FALLS RISK ASSESSMENT DOCUMENTED: ICD-10-PCS | Mod: CPTII,,, | Performed by: INTERNAL MEDICINE

## 2022-06-21 PROCEDURE — 1159F PR MEDICATION LIST DOCUMENTED IN MEDICAL RECORD: ICD-10-PCS | Mod: CPTII,,, | Performed by: INTERNAL MEDICINE

## 2022-06-21 PROCEDURE — 3074F SYST BP LT 130 MM HG: CPT | Mod: CPTII,,, | Performed by: INTERNAL MEDICINE

## 2022-06-21 RX ORDER — LIDOCAINE 50 MG/G
1 PATCH TOPICAL
COMMUNITY

## 2022-06-21 RX ORDER — POTASSIUM CHLORIDE 20 MEQ/1
20 TABLET, EXTENDED RELEASE ORAL DAILY
Qty: 30 TABLET | Refills: 4 | Status: SHIPPED | OUTPATIENT
Start: 2022-06-21 | End: 2022-12-07 | Stop reason: SDUPTHER

## 2022-06-21 NOTE — PROGRESS NOTES
"TIME UP & GO (TUG)  Test begins with patient sitting back in standard arm chair.   When "Go" is said, the patient stands up and walks 10 feet at a normal pace before turning, walking back and sitting down.    Observe the patients postural stability, gait, stride length, and sway.  Check all that apply:  ? [] Slow tentative pace  ? [] Loss of balance  ? [] Short strides  ? [] Little or no arm swing  ? [] Steadying self on walls  ? [] Shuffling  ? [] En bloc turning  ? [] Not using assistive device properly    Time in seconds:  10 Seconds  (Older adults who takes = or > 12 seconds to complete TUG is at risk for falling.      WHISPER TEST  Test begins with patient standing arms length away (2 feet), facing away from examiner.  Patient covers the ear that is NOT being tested with one finger over the tragus.  Whisper a number-letter-number combination.  If a patient gets 3 total letters and/or numbers correct after a second attempt, it is considered a pass.    Right Ear: passed    [] 8-M-3   [] K-5-R   [] 2-K-7   [] S-4-G  Left Ear: passed       [] 8-M-3   [] K-5-R   [] 2-K-7   [] S-4-G      VISION SCREENING  unable to measure      MINI-COGNITIVE  Three Word Registration   []Version 1 []Version 2 []Version 3 []Version 4 []Version 5 []Version 6   [facility] Captain Daughter   [employer] Garden Heaven   Chair Table Baby Finger Picture Moutain     Word Recall 3 points  Clock Drawing 2      HOME SAFETY QUESTIONNAIRE  Are emergency numbers kept by the phone and regularly updated? Yes  Are all household members aware of the dangers of smoking, especially in bed? Yes  Are working smoke alarm(s) and fire extinguisher(s) available for use? Yes  Do all household members know how to use them? Yes  Are firearms stored unloaded and securely locked? N/A  Have throw rugs been removed or fastened down? N/A  Are non-slip mats in all bathtubs and showers?  Yes  Do all stairways have a railing or " banister?  Yes  Are sidewalks and all outdoor steps clear of tools, toys and other articles?  Yes  Are doorways, halls, and stairs free of clutter?  Yes  Are all electrical cords in working order, easily seen, and not run under rug/carpets or wrapped around nails? N/A             Answers for HPI/ROS submitted by the patient on 6/20/2022  activity change: No  unexpected weight change: No  neck pain: Yes  hearing loss: No  rhinorrhea: No  trouble swallowing: Yes  eye discharge: No  visual disturbance: No  chest tightness: No  wheezing: No  chest pain: No  palpitations: No  blood in stool: Yes  constipation: Yes  vomiting: No  diarrhea: No  polydipsia: Yes  polyuria: Yes  difficulty urinating: No  hematuria: No  menstrual problem: No  dysuria: No  joint swelling: No  arthralgias: No  headaches: No  weakness: Yes  confusion: No  dysphoric mood: Yes

## 2022-06-21 NOTE — PROGRESS NOTES
Patient Name: Alejandrina Rodriguez     Date of service:  6/21/22      Member ID: [id number] - (Managed Medicare)    YOB: 1950   Gender: female   Race: White      Ethnicity: Not  or /a   Medical Record Number: [id number]     Reason for Visit:   Chief Complaint   Patient presents with    Medicare AWV        History of Present Illness:   72-year-old -American female here today for revisit  Dr. Howard for chronic pain management  Past medical history of recurrent DVT secondary to protein C deficiency. She is on chronic anticoagulation with Coumadin and has her INR levels checked  Past medical history of intestinal rupture? She sees Dr. Humberto Chopra- GI; had a FOBT positive  She's been disabled for the past 14 years since back surgery with Dr. Sanchez left her with lower extremity radiculopathy.  Dr. Jurgen Witt for spider veins to the lower extremities; history of ablation on the left   Dr. Rosas-carotid disease  Dr. Blue for for venous insufficency  Dr. Eng 2 years for Pap smears  Having hip bursitis so needs referral for her pain management doctor.   Labs reviewed at 3.1      Past Medical History:   Diagnosis Date    Abnormal blood smear     Acid reflux     Anxiety and depression     Back pain     Bilateral carotid artery disease     Cervical spondylosis with radiculopathy     Chronic lumbar pain     Chronic pain     Diabetes mellitus     DVT (deep venous thrombosis)     History of continuous positive airway pressure (CPAP) therapy at home     HLD (hyperlipidemia)     Hypertension     Iron deficiency anemia, unspecified     Neuropathy     On anticoagulant therapy     Osteopenia     Personal history of colonic polyps 07/14/2014    Protein C deficiency     Sleep apnea, unspecified     Unspecified osteoarthritis, unspecified site       Providers regularly involved with care (specialists/suppliers):   Patient Care Team:  Alex Treviño MD as PCP - General  (Internal Medicine)     Past Surgical History:   Procedure Laterality Date    COLONOSCOPY  07/14/2014    SPINAL CORD STIMULATOR IMPLANT          Medication reconciliation completed.      No current facility-administered medications for this visit.      There are no discontinued medications.     Review of patient's allergies indicates:   Allergen Reactions    Hydrocodone-acetaminophen      Other reaction(s): Difficulty breathing, Throat tightness    Oxycodone-acetaminophen Hallucinations    Iodine     Meperidine      Other reaction(s): Unknown (qualifier value)    Promethazine      Other reaction(s): Unknown (qualifier value), Unknown (qualifier value)     Social History     Socioeconomic History    Marital status:    Tobacco Use    Smoking status: Never Smoker    Smokeless tobacco: Never Used   Substance and Sexual Activity    Alcohol use: Never    Drug use: Never    Sexual activity: Yes     Partners: Male        Family History   Problem Relation Age of Onset    Cancer Mother     Hypertension Mother     Diabetes Mother     Arthritis Mother     Cervical cancer Mother     Hypertension Father     Diabetes Father     Arthritis Father     Heart disease Father     Prostate cancer Father           Review of Systems   Constitutional: No fever, no chills, no night sweats, no changes in weight, no changes in appetite.  Eye: No blurring of vision, no double vision, no conjunctival injection, no acute vision loss  ENMT: No trauma, No sore throat, no rhinorrhea, no tinnitus, no headache, no vertigo, no ear pain or discharge  Respiratory: No cough, no sputum production, no shortness of breath, no hemoptysis, no wheezing.  Cardiovascular: No chest pain, no GREEN, no PND, no orthopnea, no edema, no palpitations.  Gastrointestinal: No abdominal pain, nausea, no vomiting, no changes in frequency or consistency of stools, no diarrhea, no constipation, no BRBPR.  Genitourinary: No dysuria, no hematuria, no  "foul-smelling urine, no straining to urinate, no increase in frequency  Heme/Lymph: No easy bruising/ bleeding, no swollen or painful glands.  Endocrine: No polyuria, no polydipsia, no polyphagia, no heat or cold intolerance.  Musculoskeletal: No muscle pain, no muscle weakness, no joint pain, no difficulties on reference to range of motion.  Integumentary: No rash, no pruritis.  Neurologic: No sensory deficit, no motor deficit, no headache, no neck rigidity, no paresthesias, no syncope.  Psychiatric: no mood changes, no anxiety, no depression, no tension, no memory defecits  All Other ROS: Negative with exception of what is documented in the history of present illness  Vitals:    06/21/22 1402   BP: 126/72   Pulse: 65   Resp: 16   Temp: 96.4 °F (35.8 °C)   TempSrc: Temporal   SpO2: 99%   Weight: 70.7 kg (155 lb 12.8 oz)   Height: 5' 4" (1.626 m)      Body mass index is 26.74 kg/m².       Physical Exam   General : Alert and oriented, No acute distress, afebrile.  Eye : PERRLA. EOMI. Normal conjunctiva, Sclerae are nonicteric. No conjunctival injection, no pallor.  HEENT : Normocephalic/ atraumatic, Normal hearing, Oral mucosa is moist.  Respiratory : Respirations are non-labored and clear to auscultation bilaterally. Symmetrical air entry bilaterally, no crackles, no wheezes, no rhonchi. No cyanosis, no clubbing.  Cardiovascular : Normal rate, Regular rhythm. No murmurs, rubs, or gallops. Pulses are 2+ throughout. No JVD. No Edema.  Gastrointestinal : Soft, nontender, non-distended, bowel sounds are present in all quadrants, no organomegaly, no guarding, no rebound.  Musculoskeletal : Normal range of motion throughout. No muscle tenderness.  Integumentary : Warm, moist, intact.  Neurologic : Alert, Oriented  Psychiatric : Cooperative, Appropriate mood & affect.    Health Maintenance Topics with due status: Not Due       Topic Last Completion Date    Colorectal Cancer Screening 07/14/2014    Mammogram 04/21/2022    " DEXA Scan 04/21/2022    Lipid Panel 06/13/2022        Social History     Tobacco Use   Smoking Status Never Smoker   Smokeless Tobacco Never Used          Immunization History   Administered Date(s) Administered    COVID-19, MRNA, LN-S, PF (Pfizer) (Purple Cap) 01/22/2021, 02/12/2021, 10/28/2021    Influenza - High Dose - PF (65 years and older) 11/03/2015, 09/21/2018, 10/23/2019    Influenza - Quadrivalent - High Dose - PF (65 years and older) 09/28/2021    Influenza - Trivalent (ADULT) 09/05/2011, 09/08/2012, 11/22/2013, 11/03/2014    Influenza - Trivalent - PF (ADULT) 09/05/2011, 09/08/2012, 11/22/2013, 11/03/2014, 11/07/2016, 10/11/2017, 09/10/2020    Influenza A (H1N1) 2009 Monovalent - IM 12/17/2009    Pneumococcal Conjugate - 13 Valent 12/23/2016    Pneumococcal Polysaccharide - 23 Valent 07/17/2018        Lab Results   Component Value Date    CHOL 119 06/13/2022    TRIG 49 06/13/2022    HDL 48 06/13/2022    TOTALCHOLEST 2 06/13/2022        The following assessments were completed and reviewed.  Timed Get Up and Go  Depression screening  Whisper Test  Nutrition screening  Cognitive function screening  ADLs/Functional status assessment  Physical Activity Questionnaire (PAQ)  Functional/Cognitive Status: Disability Status      Diagnoses with ICD-10 code:    ICD-10-CM ICD-9-CM   1. Annual physical exam  Z00.00 V70.0   2. Bursitis of both hips, unspecified bursa  M70.71 726.5    M70.72    3. Hypokalemia  E87.6 276.8   4. Type 2 diabetes mellitus without complication, without long-term current use of insulin  E11.9 250.00   5. Protein C deficiency  D68.59 289.81   6. Recurrent deep vein thrombosis (DVT)  I82.409 453.40      Diagnoses with associated orders:  Problem List Items Addressed This Visit        Renal/    Hypokalemia    Relevant Orders    Basic Metabolic Panel       Hematology    Protein C deficiency    Current Assessment & Plan     Stable on chronic anticoagulation           Recurrent deep vein  thrombosis (DVT)       Endocrine    Type 2 diabetes mellitus without complication, without long-term current use of insulin       Orthopedic    Hip bursitis    Current Assessment & Plan     Patient requesting referral to her pain management physician for her hip bursitis for insurance reasons.           Relevant Orders    Ambulatory referral/consult to Pain Clinic       Other    Annual physical exam - Primary    Current Assessment & Plan     General health maintenance education given, labs reviewed potassium at 3.1 will plan to replenish potassium and recheck in 6 weeks.  Remainder of age-appropriate exams are up-to-date.               Plan:  1.  Continue current medications  2.  Side effects and expected results discussed  3.  Diet and exercise as tolerated  4.  Nutritional support  5.  Health maintenance updated accordingly  6.  Call with increased complaints or concerns      Screening/prevention plan for the next 10 years:  Goal of exercise is 150 minutes a week.   Encouraged to follow balanced diet with daily servings of fresh fruit and vegetables.  Make sure to schedule all health maintenance appointments to achieve health care goals.   Annual check up is due every 12 months with your designated provider/care team.  Health Maintenance Due   Topic Date Due    TETANUS VACCINE  Never done    Shingles Vaccine (1 of 2) Never done    COVID-19 Vaccine (4 - Booster for Pfizer series) 02/28/2022      Follow-up: Follow up in about 6 weeks (around 8/2/2022) for with labs prior to visit, NURSE PRACTITIONER--- K revisit.   Patient Instructions:  There are no Patient Instructions on file for this visit.

## 2022-06-21 NOTE — ASSESSMENT & PLAN NOTE
Patient requesting referral to her pain management physician for her hip bursitis for insurance reasons.

## 2022-06-21 NOTE — ASSESSMENT & PLAN NOTE
General health maintenance education given, labs reviewed potassium at 3.1 will plan to replenish potassium and recheck in 6 weeks.  Remainder of age-appropriate exams are up-to-date.

## 2022-06-24 ENCOUNTER — TELEPHONE (OUTPATIENT)
Dept: INTERNAL MEDICINE | Facility: CLINIC | Age: 72
End: 2022-06-24
Payer: MEDICARE

## 2022-06-24 NOTE — TELEPHONE ENCOUNTER
----- Message from Kelle Marshall sent at 6/24/2022  9:03 AM CDT -----  Regarding: Advice  Type:  Needs Medical Advice    Who Called: Patient  Symptoms (please be specific):    How long has patient had these symptoms:    Pharmacy name and phone #:    Would the patient rather a call back or a response via [facility]?   Best Call Back Number: [phone]  Additional Information: Wanted to report she is covid positive with at home test. She wants to know how many days to quarantine.

## 2022-07-08 ENCOUNTER — HOSPITAL ENCOUNTER (OUTPATIENT)
Dept: RADIOLOGY | Facility: HOSPITAL | Age: 72
Discharge: HOME | End: 2022-07-08
Attending: INTERNAL MEDICINE
Payer: MEDICARE

## 2022-07-08 DIAGNOSIS — R06.02 SHORTNESS OF BREATH: ICD-10-CM

## 2022-07-08 PROCEDURE — 25500020 PHARM REV CODE 255: Performed by: INTERNAL MEDICINE

## 2022-07-08 PROCEDURE — 75635 CT ANGIO ABDOMINAL ARTERIES: CPT | Mod: TC

## 2022-07-08 RX ADMIN — IOPAMIDOL 100 ML: 755 INJECTION, SOLUTION INTRAVENOUS at 10:07

## 2022-08-11 NOTE — TELEPHONE ENCOUNTER
After Visit Summary   1/12/2018    Ijeoma Avalos    MRN: [id number]           Patient Information     Date Of Birth          1968        Visit Information        Provider Department      1/12/2018 7:30 AM EC DIABETIC ED RESOURCE [facility] [place]        Today's Diagnoses     NO SHOW    -  1       Follow-ups after your visit        Who to contact     If you have questions or need follow up information about today's clinic visit or your schedule please contact [facility] [place] directly at [phone].  Normal or non-critical lab and imaging results will be communicated to you by [facility], letter or phone within 4 business days after the clinic has received the results. If you do not hear from us within 7 days, please contact the clinic through [email or web address] or phone. If you have a critical or abnormal lab result, we will notify you by phone as soon as possible.  Submit refill requests through [facility] or call your pharmacy and they will forward the refill request to us. Please allow 3 business days for your refill to be completed.          Additional Information About Your Visit        MyChart Information     [facility] gives you secure access to your electronic health record. If you see a primary care provider, you can also send messages to your care team and make appointments. If you have questions, please call your primary care clinic.  If you do not have a primary care provider, please call [phone] and they will assist you.        Care EveryWhere ID     This is your Care EveryWhere ID. This could be used by other organizations to access your [facility] medical records  [id number]        Your Vitals Were     Last Period                   04/14/2017            Blood Pressure from Last 3 Encounters:   12/19/17 (!) 151/106   12/13/17 (!) 136/95   11/22/17 126/84    Weight from Last 3 Encounters:   12/19/17 (P) 64.4 kg (142 lb)   12/13/17 63.5 kg (140  Called patient to advise quarantine 5 days. Continue to wear a mask when in out public for 10 days post Covid. Patient states she is experiencing a post nasal drip otherwise she is feeling ok.     Patient advised if symptoms progress give us a call to schedule telemed visit, patient verbalized understanding.   lb)   11/22/17 65.3 kg (144 lb)              Today, you had the following     No orders found for display       Primary Care Provider Office Phone # Fax #    Dulce Hernandez [phone]       [address]  [place] [address]        Equal Access to Services     [facility]: Hadii aad ku hadasho Soomaali, waaxda luqadaha, qaybta kaalmada adeegyada, waxay idiin hayaan adeeg kharash la'aan . So [facility] [phone].    ATENCIÓN: Si habla español, tiene a irene disposición servicios gratuitos de asistencia lingüística. Llame al [phone].    We comply with applicable federal civil rights laws and [place] laws. We do not discriminate on the basis of race, color, national origin, age, disability, sex, sexual orientation, or gender identity.            Thank you!     Thank you for choosing [facility] [place]  for your care. Our goal is always to provide you with excellent care. Hearing back from our patients is one way we can continue to improve our services. Please take a few minutes to complete the written survey that you may receive in the mail after your visit with us. Thank you!             Your Updated Medication List - Protect others around you: Learn how to safely use, store and throw away your medicines at www.disposemymeds.org.          This list is accurate as of: 1/12/18  7:38 AM.  Always use your most recent med list.                   Brand Name Dispense Instructions for use Diagnosis    aspirin 81 MG tablet     90 tablet    Take 1 tablet (81 mg) by mouth daily    New onset type 2 diabetes mellitus (H)       atorvastatin 10 MG tablet    LIPITOR    30 tablet    Take 1 tablet (10 mg) by mouth daily    Hyperlipidemia LDL goal <130       blood glucose monitoring lancets     100 each    Use to test blood sugars  1-2 times daily or as directed.    New onset type 2 diabetes mellitus (H)       blood glucose monitoring meter device kit    no brand specified    1  kit    Use to test blood sugar  2-3  times daily or as directed.    New onset type 2 diabetes mellitus (H)       blood glucose monitoring test strip    no brand specified    1 Box    Use to test blood sugar 1-2  times daily or as directed.    New onset type 2 diabetes mellitus (H)       Glucosamine-Chondroitin--200-150 MG Tabs           levothyroxine 112 MCG tablet    SYNTHROID/LEVOTHROID    90 tablet    Take 1 tablet (112 mcg) by mouth daily    Other specified hypothyroidism       lisinopril 5 MG tablet    PRINIVIL/ZESTRIL    30 tablet    Take 1 tablet (5 mg) by mouth daily    Essential hypertension, New onset type 2 diabetes mellitus (H)       LORazepam 0.5 MG tablet    ATIVAN    20 tablet    Take 1 tablet (0.5 mg) by mouth every 8 hours as needed for anxiety    Adjustment disorder with mixed anxiety and depressed mood       metFORMIN 500 MG tablet    GLUCOPHAGE    60 tablet    Take 1 tablet (500 mg) by mouth 2 times daily (with meals)    New onset type 2 diabetes mellitus (H)       MULTIVITAMIN PO      Take by mouth daily        nabumetone 500 MG tablet    RELAFEN    30 tablet    Take 1-2 tablets (500-1,000 mg) by mouth 2 times daily as needed for moderate pain    Neck pain, TMJ (temporomandibular joint syndrome), Adjustment disorder with mixed anxiety and depressed mood       omeprazole 40 MG capsule    priLOSEC    90 capsule    Take 1 capsule (40 mg) by mouth daily    Gastroesophageal reflux disease, esophagitis presence not specified          [Negative] : Neurological

## 2022-08-15 NOTE — PROGRESS NOTES
Patient ID: [id number]     Chief Complaint: No chief complaint on file.        HPI:     Alejandrina Rodriguez is a 72 y.o. female here today for a follow up. No other complaints today.         ----------------------------  Abnormal blood smear  Acid reflux  Anxiety and depression  Back pain  Bilateral carotid artery disease  Cervical spondylosis with radiculopathy  Chronic lumbar pain  Chronic pain  Diabetes mellitus  DVT (deep venous thrombosis)  History of continuous positive airway pressure (CPAP) therapy at home  HLD (hyperlipidemia)  Hypertension  Iron deficiency anemia, unspecified  Neuropathy  On anticoagulant therapy  Osteopenia  Personal history of colonic polyps  Protein C deficiency  Sleep apnea, unspecified  Unspecified osteoarthritis, unspecified site     Past Surgical History:   Procedure Laterality Date    COLONOSCOPY  07/14/2014    SPINAL CORD STIMULATOR IMPLANT         Review of patient's allergies indicates:   Allergen Reactions    Hydrocodone-acetaminophen      Other reaction(s): Difficulty breathing, Throat tightness    Oxycodone-acetaminophen Hallucinations    Iodine     Meperidine      Other reaction(s): Unknown (qualifier value)    Promethazine      Other reaction(s): Unknown (qualifier value), Unknown (qualifier value)       No outpatient medications have been marked as taking for the 8/16/22 encounter (Appointment) with DEYVI Da Silva.       Social History     Socioeconomic History    Marital status:    Tobacco Use    Smoking status: Never Smoker    Smokeless tobacco: Never Used   Substance and Sexual Activity    Alcohol use: Never    Drug use: Never    Sexual activity: Yes     Partners: Male        Family History   Problem Relation Age of Onset    Cancer Mother     Hypertension Mother     Diabetes Mother     Arthritis Mother     Cervical cancer Mother     Hypertension Father     Diabetes Father     Arthritis Father     Heart disease Father     Prostate cancer  Father         Subjective:     ROS      See HPI for details    Constitutional: Denies Change in appetite. Denies Chills. Denies Fever. Denies Night sweats.  Eye: Denies Blurred vision. Denies Discharge. Denies Eye pain.  ENT: Denies Decreased hearing. Denies Sore throat. Denies Swollen glands.  Respiratory: Denies Cough. Denies Shortness of breath. Denies Shortness of breath with exertion. Denies Wheezing.  Cardiovascular: Denies Chest pain at rest. Denies Chest pain with exertion. Denies Irregular heartbeat. Denies Palpitations.  Gastrointestinal: Denies Abdominal pain. Denies Diarrhea. Denies Nausea. Denies Vomiting. Denies Hematemesis or Hematochezia.  Genitourinary: Denies Dysuria. Denies Urinary frequency. Denies Urinary urgency. Denies Blood in urine.  Endocrine: Denies Cold intolerance. Denies Excessive thirst. Denies Heat intolerance. Denies Weight loss. Denies Weight gain.  Musculoskeletal: Denies Painful joints. Denies Weakness.  Integumentary: Denies Rash. Denies Itching. Denies Dry skin.  Neurologic: Denies Dizziness. Denies Fainting. Denies Headache.  Psychiatric: Denies Depression. Denies Anxiety. Denies Suicidal/Homicidal ideations.    All Other ROS: Negative except as stated in HPI.       Objective:     There were no vitals taken for this visit.    Physical Exam    General: Alert and oriented, No acute distress.  Head: Normocephalic, Atraumatic.  Eye: Pupils are equal, round and reactive to light, Extraocular movements are intact, Sclera non-icteric.  Ears/Nose/Throat: Normal, Mucosa moist,Clear.  Neck/Thyroid: Supple, Non-tender, No carotid bruit, No palpable thyromegaly or thyroid nodule, No lymphadenopathy, No JVD, Full range of motion.  Respiratory: Clear to auscultation bilaterally; No wheezes, rales or rhonchi,Non-labored respirations, Symmetrical chest wall expansion.  Cardiovascular: Regular rate and rhythm, S1/S2 normal, No murmurs, rubs or gallops.  Gastrointestinal: Soft, Non-tender,  Non-distended, Normal bowel sounds, No palpable organomegaly.  Musculoskeletal: Normal range of motion.  Integumentary: Warm, Dry, Intact, No suspicious lesions or rashes.  Extremities: No clubbing, cyanosis or edema  Neurologic: No focal deficits, Cranial Nerves II-XII are grossly intact, Motor strength normal upper and lower extremities, Sensory exam intact.  Psychiatric: Normal interaction, Coherent speech, Euthymic mood, Appropriate affect         Assessment:     No diagnosis found.     Plan:     There are no diagnoses linked to this encounter.         Medication List with Changes/Refills   Current Medications    ASPIRIN (ECOTRIN) 81 MG EC TABLET    Take 81 mg by mouth once daily.       Start Date: --        End Date: --    AZELASTINE (ASTELIN) 137 MCG (0.1 %) NASAL SPRAY           Start Date: 4/25/2022 End Date: --    B-COMPLEX WITH VITAMIN C (Z-BEC OR EQUIV) TABLET    Take 1 tablet by mouth once daily.       Start Date: --        End Date: --    BACLOFEN (LIORESAL) 10 MG TABLET    Take 10 mg by mouth 4 (four) times daily. PRN muscle spasms       Start Date: --        End Date: --    BETAMETHASONE DIPROPIONATE 0.05 % CREAM    Apply 1 application topically 2 (two) times daily as needed.       Start Date: --        End Date: --    CELECOXIB (CELEBREX) 100 MG CAPSULE    TAKE ONE CAPSULE BY MOUTH ONCE DAILY WITH FOOD AS NEEDED FOR PAIN       Start Date: 3/8/2022  End Date: --    FAMOTIDINE (PEPCID) 20 MG TABLET    Take 20 mg by mouth 2 (two) times a day.       Start Date: 4/25/2022 End Date: --    HYDROCHLOROTHIAZIDE (HYDRODIURIL) 25 MG TABLET    Take 25 mg by mouth once daily.       Start Date: 5/5/2022  End Date: --    IPRATROPIUM (ATROVENT) 21 MCG (0.03 %) NASAL SPRAY    2 sprays by Nasal route every 12 (twelve) hours.       Start Date: --        End Date: --    LEXAPRO 10 MG TABLET    TAKE 1 TABLET BY MOUTH ONCE DAILY AS DIRECTED FOR MOOD/CHRONIC PAIN AS DIRECTED       Start Date: 5/3/2022  End Date: --     LIDOCAINE (LIDODERM) 5 %    Place 1 patch onto the skin every 24 hours. Remove & Discard patch within 12 hours or as directed by MD       Start Date: --        End Date: --    LIDOCAINE (LMX) 4 % CREAM    Apply topically nightly.       Start Date: --        End Date: --    METFORMIN (GLUCOPHAGE) 500 MG TABLET    Take 500 mg by mouth 2 (two) times a day.       Start Date: 4/25/2022 End Date: --    MORPHINE (MSIR) 15 MG TABLET    Take 1.5 tablets by mouth 2 (two) times daily as needed.       Start Date: 5/19/2022 End Date: --    NITROGLYCERIN (NITROSTAT) 0.4 MG SL TABLET    PLEASE SEE ATTACHED FOR DETAILED DIRECTIONS       Start Date: 6/3/2022  End Date: --    OMEGA-3-EPA-FISH OIL ORAL    Take 1 capsule by mouth once daily.       Start Date: --        End Date: --    OMEPRAZOLE (PRILOSEC) 40 MG CAPSULE    TAKE 1 CAPSULE EVERY DAY       Start Date: 6/28/2022 End Date: --    -PROPYLENE GLYCOL (SYSTANE, PROPYLENE GLYCOL,) 0.4-0.3 % DROP    Apply 1 drop to eye.       Start Date: 9/14/2021 End Date: --    POLYETHYLENE GLYCOL (GLYCOLAX) 17 GRAM PWPK    Take 17 g by mouth once. PRN       Start Date: --        End Date: --    POTASSIUM CHLORIDE SA (K-DUR,KLOR-CON) 20 MEQ TABLET    Take 1 tablet (20 mEq total) by mouth once daily.       Start Date: 6/21/2022 End Date: --    PRAVASTATIN (PRAVACHOL) 40 MG TABLET    TAKE 1 TABLET EVERY DAY       Start Date: 6/28/2022 End Date: --    TIMOLOL MALEATE 0.5% (TIMOPTIC) 0.5 % DROP    Place 1 drop into both eyes Daily.       Start Date: 5/12/2022 End Date: --    VITAMIN D (VITAMIN D3) 1000 UNITS TAB    Take 5,000 Units by mouth once daily.       Start Date: --        End Date: --    WARFARIN (COUMADIN) 4 MG TABLET    TAKE 1 TABLET EVERY DAY       Start Date: 6/28/2022 End Date: --          No follow-ups on file.

## 2022-08-16 ENCOUNTER — OFFICE VISIT (OUTPATIENT)
Dept: INTERNAL MEDICINE | Facility: CLINIC | Age: 72
End: 2022-08-16
Payer: MEDICARE

## 2022-08-16 VITALS
WEIGHT: 149 LBS | BODY MASS INDEX: 25.44 KG/M2 | SYSTOLIC BLOOD PRESSURE: 124 MMHG | RESPIRATION RATE: 16 BRPM | OXYGEN SATURATION: 99 % | HEART RATE: 60 BPM | DIASTOLIC BLOOD PRESSURE: 76 MMHG | TEMPERATURE: 97 F | HEIGHT: 64 IN

## 2022-08-16 DIAGNOSIS — E87.6 HYPOKALEMIA: ICD-10-CM

## 2022-08-16 DIAGNOSIS — B35.1 NAIL FUNGUS: ICD-10-CM

## 2022-08-16 DIAGNOSIS — E87.5 HYPERKALEMIA: Primary | ICD-10-CM

## 2022-08-16 DIAGNOSIS — L71.9 ROSACEA: ICD-10-CM

## 2022-08-16 PROCEDURE — 1159F MED LIST DOCD IN RCRD: CPT | Mod: CPTII,,, | Performed by: INTERNAL MEDICINE

## 2022-08-16 PROCEDURE — 3074F PR MOST RECENT SYSTOLIC BLOOD PRESSURE < 130 MM HG: ICD-10-PCS | Mod: CPTII,,, | Performed by: INTERNAL MEDICINE

## 2022-08-16 PROCEDURE — 99213 OFFICE O/P EST LOW 20 MIN: CPT | Mod: ,,, | Performed by: INTERNAL MEDICINE

## 2022-08-16 PROCEDURE — 1101F PT FALLS ASSESS-DOCD LE1/YR: CPT | Mod: CPTII,,, | Performed by: INTERNAL MEDICINE

## 2022-08-16 PROCEDURE — 3008F BODY MASS INDEX DOCD: CPT | Mod: CPTII,,, | Performed by: INTERNAL MEDICINE

## 2022-08-16 PROCEDURE — 1159F PR MEDICATION LIST DOCUMENTED IN MEDICAL RECORD: ICD-10-PCS | Mod: CPTII,,, | Performed by: INTERNAL MEDICINE

## 2022-08-16 PROCEDURE — 3074F SYST BP LT 130 MM HG: CPT | Mod: CPTII,,, | Performed by: INTERNAL MEDICINE

## 2022-08-16 PROCEDURE — 1101F PR PT FALLS ASSESS DOC 0-1 FALLS W/OUT INJ PAST YR: ICD-10-PCS | Mod: CPTII,,, | Performed by: INTERNAL MEDICINE

## 2022-08-16 PROCEDURE — 3008F PR BODY MASS INDEX (BMI) DOCUMENTED: ICD-10-PCS | Mod: CPTII,,, | Performed by: INTERNAL MEDICINE

## 2022-08-16 PROCEDURE — 3288F PR FALLS RISK ASSESSMENT DOCUMENTED: ICD-10-PCS | Mod: CPTII,,, | Performed by: INTERNAL MEDICINE

## 2022-08-16 PROCEDURE — 3078F DIAST BP <80 MM HG: CPT | Mod: CPTII,,, | Performed by: INTERNAL MEDICINE

## 2022-08-16 PROCEDURE — 3078F PR MOST RECENT DIASTOLIC BLOOD PRESSURE < 80 MM HG: ICD-10-PCS | Mod: CPTII,,, | Performed by: INTERNAL MEDICINE

## 2022-08-16 PROCEDURE — 3288F FALL RISK ASSESSMENT DOCD: CPT | Mod: CPTII,,, | Performed by: INTERNAL MEDICINE

## 2022-08-16 PROCEDURE — 99213 PR OFFICE/OUTPT VISIT, EST, LEVL III, 20-29 MIN: ICD-10-PCS | Mod: ,,, | Performed by: INTERNAL MEDICINE

## 2022-08-16 RX ORDER — CICLOPIROX OLAMINE 7.7 MG/100ML
SUSPENSION TOPICAL 2 TIMES DAILY
Qty: 30 ML | Refills: 0 | Status: SHIPPED | OUTPATIENT
Start: 2022-08-16 | End: 2023-03-01

## 2022-08-16 RX ORDER — METRONIDAZOLE 7.5 MG/G
GEL TOPICAL 2 TIMES DAILY
Qty: 45 G | Refills: 0 | Status: SHIPPED | OUTPATIENT
Start: 2022-08-16 | End: 2022-10-03 | Stop reason: SDUPTHER

## 2022-08-16 NOTE — PROGRESS NOTES
Subjective:      Patient ID: Alejandrina Rodriguez is a 72 y.o. female.    Chief Complaint: Follow-up (6 week f/u for hypokalemia)      HPI:    72-year-old -American female here for potassium revisit   Dr. 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 She is on chronic anticoagulation with Coumadin and has her INR levels checked  Past medical history of intestinal rupture? She sees Dr. Humberto Chopra- GI; had a FOBT positive  She's been disabled for the past 14 years since back surgery with Dr. Sanchez left her with lower extremity radiculopathy.  Dr. Jurgen Witt for spider veins to the lower extremities; history of ablation on the left   Dr. Rosas-carotid disease  Dr. Blue for for venous insufficency  Dr. Eng 2 years for Pap smears  Having hip bursitis so needs referral for her pain management doctor.   Labs reviewed K was at 3.1; needs recheck  Complains of nail fungus to a big toe, and her thumb nail  She also claims of acneiform rash to the cheeks that comes and goes      Problem List Items Addressed This Visit        Derm    Rosacea    Current Assessment & Plan     Acneiform/pustular rosacea.  Rx metronidazole gel           Nail fungus    Current Assessment & Plan     Topical antifungals for those 2 nails.  Rx sent to pharmacy              Renal/    Hypokalemia    Current Assessment & Plan     Order placed, will check that today.  Will call patient with results             Other Visit Diagnoses     Hyperkalemia    -  Primary    Relevant Orders    Basic Metabolic Panel              Past Medical History:  Past Medical History:   Diagnosis Date    Abnormal blood smear     Acid reflux     Anxiety and depression     Back pain     Bilateral carotid artery disease     Cervical spondylosis with radiculopathy     Chronic lumbar pain     Chronic pain     Diabetes mellitus     DVT (deep venous thrombosis)     History of continuous positive  airway pressure (CPAP) therapy at home     HLD (hyperlipidemia)     Hypertension     Iron deficiency anemia, unspecified     Neuropathy     On anticoagulant therapy     Osteopenia     Personal history of colonic polyps 07/14/2014    Protein C deficiency     Sleep apnea, unspecified     Unspecified osteoarthritis, unspecified site      Past Surgical History:   Procedure Laterality Date    COLONOSCOPY  07/14/2014    SPINAL CORD STIMULATOR IMPLANT       Review of patient's allergies indicates:   Allergen Reactions    Hydrocodone-acetaminophen      Other reaction(s): Difficulty breathing, Throat tightness    Oxycodone-acetaminophen Hallucinations    Iodine     Meperidine      Other reaction(s): Unknown (qualifier value)    Promethazine      Other reaction(s): Unknown (qualifier value), Unknown (qualifier value)     Current Outpatient Medications on File Prior to Visit   Medication Sig Dispense Refill    aspirin (ECOTRIN) 81 MG EC tablet Take 81 mg by mouth once daily.      azelastine (ASTELIN) 137 mcg (0.1 %) nasal spray       B-complex with vitamin C (Z-BEC OR EQUIV) tablet Take 1 tablet by mouth once daily.      baclofen (LIORESAL) 10 MG tablet Take 10 mg by mouth 4 (four) times daily. PRN muscle spasms      betamethasone dipropionate 0.05 % cream Apply 1 application topically 2 (two) times daily as needed.      celecoxib (CELEBREX) 100 MG capsule TAKE ONE CAPSULE BY MOUTH ONCE DAILY WITH FOOD AS NEEDED FOR PAIN      famotidine (PEPCID) 20 MG tablet Take 20 mg by mouth 2 (two) times a day.      hydroCHLOROthiazide (HYDRODIURIL) 25 MG tablet Take 25 mg by mouth once daily.      ipratropium (ATROVENT) 21 mcg (0.03 %) nasal spray 2 sprays by Nasal route every 12 (twelve) hours.      LEXAPRO 10 mg tablet TAKE 1 TABLET BY MOUTH ONCE DAILY AS DIRECTED FOR MOOD/CHRONIC PAIN AS DIRECTED      LIDOcaine (LIDODERM) 5 % Place 1 patch onto the skin every 24 hours. Remove & Discard patch within 12 hours  or as directed by MD      metFORMIN (GLUCOPHAGE) 500 MG tablet Take 500 mg by mouth 2 (two) times a day.      morphine (MSIR) 15 MG tablet Take 1.5 tablets by mouth 2 (two) times daily as needed.      nitroGLYCERIN (NITROSTAT) 0.4 MG SL tablet PLEASE SEE ATTACHED FOR DETAILED DIRECTIONS      OMEGA-3-EPA-FISH OIL ORAL Take 1 capsule by mouth once daily.      omeprazole (PRILOSEC) 40 MG capsule TAKE 1 CAPSULE EVERY DAY 90 capsule 4    peg 400-propylene glycol (SYSTANE, PROPYLENE GLYCOL,) 0.4-0.3 % Drop Apply 1 drop to eye.      polyethylene glycol (GLYCOLAX) 17 gram PwPk Take 17 g by mouth once. PRN      potassium chloride SA (K-DUR,KLOR-CON) 20 MEQ tablet Take 1 tablet (20 mEq total) by mouth once daily. 30 tablet 4    pravastatin (PRAVACHOL) 40 MG tablet TAKE 1 TABLET EVERY DAY 90 tablet 4    timolol maleate 0.5% (TIMOPTIC) 0.5 % Drop Place 1 drop into both eyes Daily.      vitamin D (VITAMIN D3) 1000 units Tab Take 5,000 Units by mouth once daily.      warfarin (COUMADIN) 4 MG tablet TAKE 1 TABLET EVERY DAY 90 tablet 4    [DISCONTINUED] LIDOcaine (LMX) 4 % cream Apply topically nightly.       No current facility-administered medications on file prior to visit.     Social History     Socioeconomic History    Marital status:    Tobacco Use    Smoking status: Never Smoker    Smokeless tobacco: Never Used   Substance and Sexual Activity    Alcohol use: Never    Drug use: Never    Sexual activity: Yes     Partners: Male     Family History   Problem Relation Age of Onset    Cancer Mother     Hypertension Mother     Diabetes Mother     Arthritis Mother     Cervical cancer Mother     Hypertension Father     Diabetes Father     Arthritis Father     Heart disease Father     Prostate cancer Father            Review of Systems  Constitutional: No fever,  no fatigue, no chills, no night sweats, no weight gain, no weight loss, no changes in appetite.   Eye: No redness, no acute vision loss, no  "blurred vision, no double vision, no eye pain  ENMT: No sore throat, no nasal drainage, no nose bleeds,  no headache, no ear pain, no ear drainage, no acute hearing loss  Respiratory: No cough, no sputum production, no shortness of breath, no hemoptysis, no wheezing.  Cardiovascular: No chest pain, no chest tightness, no GREEN, no PND, no orthopnea, no swelling, no palpitations.  Gastrointestinal: No abdominal pain, no nausea, no vomiting, no diarrhea, no constipation, no difficulty swallowing, no change in bowel habits, no rectal bleeding  Genitourinary: no urgency, no frequency, no burning or pain when urinating, no blood in urine, no incontinence  Heme/Lymph: No easy bruising and/or bleeding, no swollen or painful glands.  Endocrine: No polyuria, no polydipsia, no polyphagia, no heat or cold intolerance.  Musculoskeletal: No muscle pain, no muscle weakness, no joint pain, no red or swollen joints.  Integumentary: + rash, no pruritis, no hair or nail changes.  Neurologic: No dizziness, no fainting, no tremors, no tingling and/ or numbness.  Psychiatric: No anxiety, no depression, no memory loss  All Other ROS: Negative with exception of what is documented in the history of present illness     Objective:   /76 (BP Location: Left arm, Patient Position: Sitting, BP Method: Medium (Manual))   Pulse 60   Temp 97.3 °F (36.3 °C) (Temporal)   Resp 16   Ht 5' 4" (1.626 m)   Wt 67.6 kg (149 lb)   SpO2 99%   BMI 25.58 kg/m²     Physical Exam  General : Alert and oriented, No acute distress, well, developed, well nourished, afebrile   Eye : PERRLA. EOMI.   Respiratory : Lungs are clear to auscultation bilaterally, non-labored. Symmetrical chest wall expansion. No crackles, wheeze, or rhonci.  Cardiovascular : Normal rate, Regular rhythm. No murmurs, rubs, or gallops. Pulses 2+ in all extremities. No Edema.  Gastrointestinal : Soft, nontender, non-distended, bowel sounds normal, no organomegaly, no guarding, no " rebound.  Musculoskeletal : Normal ROM.  No muscle tenderness.  Integumentary : Warm to touch. Intact. No rash.    Neurologic : Alert and oriented. No focal deficits.   Psychiatric : Cooperative, Appropriate mood & affect. Normal judgment.          Assessment:     1. Hyperkalemia    2. Rosacea    3. Nail fungus    4. Hypokalemia                  Plan:       I am having Alejandrina Rodriguez start on metroNIDAZOLE and ciclopirox. I am also having her maintain her azelastine, celecoxib, LEXAPRO, hydroCHLOROthiazide, morphine, timolol maleate 0.5%, famotidine, metFORMIN, baclofen, aspirin, polyethylene glycol, vitamin D, ipratropium, OMEGA-3-EPA-FISH OIL ORAL, B-complex with vitamin C, betamethasone dipropionate, Systane (propylene glycoL), nitroGLYCERIN, LIDOcaine, potassium chloride SA, omeprazole, warfarin, and pravastatin.      Problem List Items Addressed This Visit        Derm    Rosacea     Acneiform/pustular rosacea.  Rx metronidazole gel           Nail fungus     Topical antifungals for those 2 nails.  Rx sent to pharmacy              Renal/    Hypokalemia     Order placed, will check that today.  Will call patient with results             Other Visit Diagnoses     Hyperkalemia    -  Primary    Relevant Orders    Basic Metabolic Panel            Alejandrina was seen today for follow-up.    Diagnoses and all orders for this visit:    Hyperkalemia  -     Basic Metabolic Panel; Future    Rosacea    Nail fungus    Hypokalemia    Other orders  -     metroNIDAZOLE (METROGEL) 0.75 % gel; Apply topically 2 (two) times daily.  -     ciclopirox (LOPROX) 0.77 % Susp; Apply topically 2 (two) times daily.            Medications Ordered This Encounter   Medications    ciclopirox (LOPROX) 0.77 % Susp     Sig: Apply topically 2 (two) times daily.     Dispense:  30 mL     Refill:  0    metroNIDAZOLE (METROGEL) 0.75 % gel     Sig: Apply topically 2 (two) times daily.     Dispense:  45 g     Refill:  0     [unfilled]  Orders Placed This  Encounter   Procedures    Basic Metabolic Panel     Standing Status:   Future     Standing Expiration Date:   9/16/2022       Medication List with Changes/Refills   New Medications    CICLOPIROX (LOPROX) 0.77 % SUSP    Apply topically 2 (two) times daily.    METRONIDAZOLE (METROGEL) 0.75 % GEL    Apply topically 2 (two) times daily.   Current Medications    ASPIRIN (ECOTRIN) 81 MG EC TABLET    Take 81 mg by mouth once daily.    AZELASTINE (ASTELIN) 137 MCG (0.1 %) NASAL SPRAY        B-COMPLEX WITH VITAMIN C (Z-BEC OR EQUIV) TABLET    Take 1 tablet by mouth once daily.    BACLOFEN (LIORESAL) 10 MG TABLET    Take 10 mg by mouth 4 (four) times daily. PRN muscle spasms    BETAMETHASONE DIPROPIONATE 0.05 % CREAM    Apply 1 application topically 2 (two) times daily as needed.    CELECOXIB (CELEBREX) 100 MG CAPSULE    TAKE ONE CAPSULE BY MOUTH ONCE DAILY WITH FOOD AS NEEDED FOR PAIN    FAMOTIDINE (PEPCID) 20 MG TABLET    Take 20 mg by mouth 2 (two) times a day.    HYDROCHLOROTHIAZIDE (HYDRODIURIL) 25 MG TABLET    Take 25 mg by mouth once daily.    IPRATROPIUM (ATROVENT) 21 MCG (0.03 %) NASAL SPRAY    2 sprays by Nasal route every 12 (twelve) hours.    LEXAPRO 10 MG TABLET    TAKE 1 TABLET BY MOUTH ONCE DAILY AS DIRECTED FOR MOOD/CHRONIC PAIN AS DIRECTED    LIDOCAINE (LIDODERM) 5 %    Place 1 patch onto the skin every 24 hours. Remove & Discard patch within 12 hours or as directed by MD    METFORMIN (GLUCOPHAGE) 500 MG TABLET    Take 500 mg by mouth 2 (two) times a day.    MORPHINE (MSIR) 15 MG TABLET    Take 1.5 tablets by mouth 2 (two) times daily as needed.    NITROGLYCERIN (NITROSTAT) 0.4 MG SL TABLET    PLEASE SEE ATTACHED FOR DETAILED DIRECTIONS    OMEGA-3-EPA-FISH OIL ORAL    Take 1 capsule by mouth once daily.    OMEPRAZOLE (PRILOSEC) 40 MG CAPSULE    TAKE 1 CAPSULE EVERY DAY    -PROPYLENE GLYCOL (SYSTANE, PROPYLENE GLYCOL,) 0.4-0.3 % DROP    Apply 1 drop to eye.    POLYETHYLENE GLYCOL (GLYCOLAX) 17 GRAM  PWPK    Take 17 g by mouth once. PRN    POTASSIUM CHLORIDE SA (K-DUR,KLOR-CON) 20 MEQ TABLET    Take 1 tablet (20 mEq total) by mouth once daily.    PRAVASTATIN (PRAVACHOL) 40 MG TABLET    TAKE 1 TABLET EVERY DAY    TIMOLOL MALEATE 0.5% (TIMOPTIC) 0.5 % DROP    Place 1 drop into both eyes Daily.    VITAMIN D (VITAMIN D3) 1000 UNITS TAB    Take 5,000 Units by mouth once daily.    WARFARIN (COUMADIN) 4 MG TABLET    TAKE 1 TABLET EVERY DAY   Discontinued Medications    LIDOCAINE (LMX) 4 % CREAM    Apply topically nightly.      Medication List with Changes/Refills   New Medications    CICLOPIROX (LOPROX) 0.77 % SUSP    Apply topically 2 (two) times daily.       Start Date: 8/16/2022 End Date: --    METRONIDAZOLE (METROGEL) 0.75 % GEL    Apply topically 2 (two) times daily.       Start Date: 8/16/2022 End Date: 8/16/2023   Current Medications    ASPIRIN (ECOTRIN) 81 MG EC TABLET    Take 81 mg by mouth once daily.       Start Date: --        End Date: --    AZELASTINE (ASTELIN) 137 MCG (0.1 %) NASAL SPRAY           Start Date: 4/25/2022 End Date: --    B-COMPLEX WITH VITAMIN C (Z-BEC OR EQUIV) TABLET    Take 1 tablet by mouth once daily.       Start Date: --        End Date: --    BACLOFEN (LIORESAL) 10 MG TABLET    Take 10 mg by mouth 4 (four) times daily. PRN muscle spasms       Start Date: --        End Date: --    BETAMETHASONE DIPROPIONATE 0.05 % CREAM    Apply 1 application topically 2 (two) times daily as needed.       Start Date: --        End Date: --    CELECOXIB (CELEBREX) 100 MG CAPSULE    TAKE ONE CAPSULE BY MOUTH ONCE DAILY WITH FOOD AS NEEDED FOR PAIN       Start Date: 3/8/2022  End Date: --    FAMOTIDINE (PEPCID) 20 MG TABLET    Take 20 mg by mouth 2 (two) times a day.       Start Date: 4/25/2022 End Date: --    HYDROCHLOROTHIAZIDE (HYDRODIURIL) 25 MG TABLET    Take 25 mg by mouth once daily.       Start Date: 5/5/2022  End Date: --    IPRATROPIUM (ATROVENT) 21 MCG (0.03 %) NASAL SPRAY    2 sprays by Nasal  route every 12 (twelve) hours.       Start Date: --        End Date: --    LEXAPRO 10 MG TABLET    TAKE 1 TABLET BY MOUTH ONCE DAILY AS DIRECTED FOR MOOD/CHRONIC PAIN AS DIRECTED       Start Date: 5/3/2022  End Date: --    LIDOCAINE (LIDODERM) 5 %    Place 1 patch onto the skin every 24 hours. Remove & Discard patch within 12 hours or as directed by MD       Start Date: --        End Date: --    METFORMIN (GLUCOPHAGE) 500 MG TABLET    Take 500 mg by mouth 2 (two) times a day.       Start Date: 4/25/2022 End Date: --    MORPHINE (MSIR) 15 MG TABLET    Take 1.5 tablets by mouth 2 (two) times daily as needed.       Start Date: 5/19/2022 End Date: --    NITROGLYCERIN (NITROSTAT) 0.4 MG SL TABLET    PLEASE SEE ATTACHED FOR DETAILED DIRECTIONS       Start Date: 6/3/2022  End Date: --    OMEGA-3-EPA-FISH OIL ORAL    Take 1 capsule by mouth once daily.       Start Date: --        End Date: --    OMEPRAZOLE (PRILOSEC) 40 MG CAPSULE    TAKE 1 CAPSULE EVERY DAY       Start Date: 6/28/2022 End Date: --    -PROPYLENE GLYCOL (SYSTANE, PROPYLENE GLYCOL,) 0.4-0.3 % DROP    Apply 1 drop to eye.       Start Date: 9/14/2021 End Date: --    POLYETHYLENE GLYCOL (GLYCOLAX) 17 GRAM PWPK    Take 17 g by mouth once. PRN       Start Date: --        End Date: --    POTASSIUM CHLORIDE SA (K-DUR,KLOR-CON) 20 MEQ TABLET    Take 1 tablet (20 mEq total) by mouth once daily.       Start Date: 6/21/2022 End Date: --    PRAVASTATIN (PRAVACHOL) 40 MG TABLET    TAKE 1 TABLET EVERY DAY       Start Date: 6/28/2022 End Date: --    TIMOLOL MALEATE 0.5% (TIMOPTIC) 0.5 % DROP    Place 1 drop into both eyes Daily.       Start Date: 5/12/2022 End Date: --    VITAMIN D (VITAMIN D3) 1000 UNITS TAB    Take 5,000 Units by mouth once daily.       Start Date: --        End Date: --    WARFARIN (COUMADIN) 4 MG TABLET    TAKE 1 TABLET EVERY DAY       Start Date: 6/28/2022 End Date: --   Discontinued Medications    LIDOCAINE (LMX) 4 % CREAM    Apply topically  nightly.       Start Date: --        End Date: 8/16/2022            Follow up in about 6 months (around 2/16/2023) for NURSE PRACTITIONER.

## 2022-08-17 ENCOUNTER — TELEPHONE (OUTPATIENT)
Dept: INTERNAL MEDICINE | Facility: CLINIC | Age: 72
End: 2022-08-17
Payer: MEDICARE

## 2022-08-17 ENCOUNTER — LAB VISIT (OUTPATIENT)
Dept: LAB | Facility: HOSPITAL | Age: 72
End: 2022-08-17
Attending: INTERNAL MEDICINE
Payer: MEDICARE

## 2022-08-17 DIAGNOSIS — E87.5 HYPERKALEMIA: ICD-10-CM

## 2022-08-17 LAB
ANION GAP SERPL CALC-SCNC: 13 MEQ/L
BUN SERPL-MCNC: 11.8 MG/DL (ref 9.8–20.1)
CALCIUM SERPL-MCNC: 9.5 MG/DL (ref 8.4–10.2)
CHLORIDE SERPL-SCNC: 103 MMOL/L (ref 98–107)
CO2 SERPL-SCNC: 28 MMOL/L (ref 23–31)
CREAT SERPL-MCNC: 0.66 MG/DL (ref 0.55–1.02)
CREAT/UREA NIT SERPL: 18
GFR SERPLBLD CREATININE-BSD FMLA CKD-EPI: >60 MLS/MIN/1.73/M2
GLUCOSE SERPL-MCNC: 92 MG/DL (ref 82–115)
POTASSIUM SERPL-SCNC: 3.4 MMOL/L (ref 3.5–5.1)
SODIUM SERPL-SCNC: 144 MMOL/L (ref 136–145)

## 2022-08-17 PROCEDURE — 36415 COLL VENOUS BLD VENIPUNCTURE: CPT

## 2022-08-17 PROCEDURE — 80048 BASIC METABOLIC PNL TOTAL CA: CPT

## 2022-08-17 NOTE — TELEPHONE ENCOUNTER
----- Message from Alex Treviño MD sent at 8/17/2022  2:01 PM CDT -----  Potassium improved from 3.1 to 3.4, continue potassium supplementation

## 2022-08-29 LAB — CRC RECOMMENDATION EXT: NORMAL

## 2022-09-26 PROBLEM — Z00.00 ANNUAL PHYSICAL EXAM: Status: RESOLVED | Noted: 2022-06-21 | Resolved: 2022-09-26

## 2022-10-03 RX ORDER — METRONIDAZOLE 7.5 MG/G
GEL TOPICAL
Qty: 45 G | Refills: 0 | Status: SHIPPED | OUTPATIENT
Start: 2022-10-03 | End: 2023-03-01

## 2022-10-04 ENCOUNTER — TELEPHONE (OUTPATIENT)
Dept: INTERNAL MEDICINE | Facility: CLINIC | Age: 72
End: 2022-10-04
Payer: MEDICARE

## 2022-10-04 DIAGNOSIS — L71.9 ROSACEA: Primary | ICD-10-CM

## 2022-10-04 RX ORDER — BETAMETHASONE DIPROPIONATE 0.5 MG/G
1 CREAM TOPICAL 2 TIMES DAILY
Qty: 45 G | Refills: 1 | Status: SHIPPED | OUTPATIENT
Start: 2022-10-04 | End: 2023-06-27

## 2022-10-04 RX ORDER — BETAMETHASONE DIPROPIONATE 0.5 MG/G
1 CREAM TOPICAL 2 TIMES DAILY
Qty: 45 G | Refills: 1 | Status: SHIPPED | OUTPATIENT
Start: 2022-10-04 | End: 2022-10-04 | Stop reason: SDUPTHER

## 2022-10-04 NOTE — TELEPHONE ENCOUNTER
----- Message from Alex Treviño MD sent at 10/4/2022  9:52 AM CDT -----  Is this for her face?  We don't typically put that on the face for an extended period of time  And the refill on the metro gel came as a request from the [facility]   ----- Message -----  From: Saira Kitchen  Sent: 10/4/2022   9:26 AM CDT  To: Alex Treviño MD    Pt stated Medication that was called in for her on yesterday Metronidaxole (Metrogel) 0.75, she has some already and finds that don't work as well.    Pt requested to get the Betamethasone Valerate 0.1% cream instead, once prescribed (she finds it works better).    Pharmacy: [facility] ([id number] [facility]

## 2022-10-04 NOTE — TELEPHONE ENCOUNTER
----- Message from Saira Kitchen sent at 10/4/2022  3:43 PM CDT -----  Pt requesting Betamethasone Valerate 0.1% cream (for her Hands)  Pharmacy: [facility] ([id number] [facility]

## 2022-10-04 NOTE — TELEPHONE ENCOUNTER
----- Message from Saira Kitchen sent at 10/4/2022  9:20 AM CDT -----  Pt stated Medication that was called in for her on yesterday Metronidaxole (Metrogel) 0.75, she has some already and finds that don't work as well.    Pt requested to get the Betamethasone Valerate 0.1% cream instead, once prescribed (she finds it works better).    Ph[facility]: [facility] ([id number] [facility])

## 2022-12-07 DIAGNOSIS — E87.6 HYPOKALEMIA: Primary | ICD-10-CM

## 2022-12-07 RX ORDER — POTASSIUM CHLORIDE 20 MEQ/1
20 TABLET, EXTENDED RELEASE ORAL DAILY
Qty: 30 TABLET | Refills: 4 | Status: SHIPPED | OUTPATIENT
Start: 2022-12-07 | End: 2023-01-23

## 2022-12-27 ENCOUNTER — DOCUMENTATION ONLY (OUTPATIENT)
Dept: FAMILY MEDICINE | Facility: CLINIC | Age: 72
End: 2022-12-27
Payer: MEDICARE

## 2023-01-23 ENCOUNTER — TELEPHONE (OUTPATIENT)
Dept: INTERNAL MEDICINE | Facility: CLINIC | Age: 73
End: 2023-01-23
Payer: MEDICARE

## 2023-01-23 DIAGNOSIS — Z91.09 ENVIRONMENTAL ALLERGIES: Primary | ICD-10-CM

## 2023-01-23 RX ORDER — AZELASTINE 1 MG/ML
1 SPRAY, METERED NASAL 2 TIMES DAILY
Qty: 30 ML | Refills: 3 | Status: SHIPPED | OUTPATIENT
Start: 2023-01-23 | End: 2023-01-31 | Stop reason: SDUPTHER

## 2023-01-23 NOTE — TELEPHONE ENCOUNTER
----- Message from Saira Kitchen sent at 1/23/2023  8:10 AM CST -----  Refill:   Azelastine 0.1% 137MCG / ACT Nasal Spray  Qty: 30  [facility]

## 2023-01-31 ENCOUNTER — TELEPHONE (OUTPATIENT)
Dept: INTERNAL MEDICINE | Facility: CLINIC | Age: 73
End: 2023-01-31
Payer: MEDICARE

## 2023-01-31 DIAGNOSIS — Z91.09 ENVIRONMENTAL ALLERGIES: ICD-10-CM

## 2023-01-31 RX ORDER — AZELASTINE 1 MG/ML
1 SPRAY, METERED NASAL 2 TIMES DAILY
Qty: 30 ML | Refills: 3 | Status: SHIPPED | OUTPATIENT
Start: 2023-01-31

## 2023-01-31 NOTE — TELEPHONE ENCOUNTER
----- Message from Mara Frias sent at 1/31/2023  8:51 AM CST -----  Regarding: refill  Type:  RX Refill Request    Who Called: pt  Refill or New Rx:refill  RX Name and Strength:azelastine (ASTELIN) 137 mcg (0.1 %) nasal spray  How is the patient currently taking it? (ex. 1XDay):  Is this a 30 day or 90 day RX:90  [facility]  Local or Mail Order:mail order  Ordering Provider:robby james  Would the patient rather a call back or a response via [facility]? C/b  Best Call Back Number:[phone]  Additional Information: pt is looking for medication, was supposed to be sent to [facility] mail order not [facility]

## 2023-02-20 ENCOUNTER — LAB VISIT (OUTPATIENT)
Dept: LAB | Facility: HOSPITAL | Age: 73
End: 2023-02-20
Attending: INTERNAL MEDICINE
Payer: MEDICARE

## 2023-02-20 DIAGNOSIS — E11.9 TYPE 2 DIABETES MELLITUS WITHOUT COMPLICATION, UNSPECIFIED WHETHER LONG TERM INSULIN USE: ICD-10-CM

## 2023-02-20 DIAGNOSIS — E55.9 VITAMIN D DEFICIENCY: ICD-10-CM

## 2023-02-20 DIAGNOSIS — I10 HYPERTENSION, UNSPECIFIED TYPE: ICD-10-CM

## 2023-02-20 DIAGNOSIS — I10 HYPERTENSION, UNSPECIFIED TYPE: Primary | ICD-10-CM

## 2023-02-20 DIAGNOSIS — E78.5 HYPERLIPIDEMIA, UNSPECIFIED HYPERLIPIDEMIA TYPE: ICD-10-CM

## 2023-02-20 LAB
ALBUMIN SERPL-MCNC: 3.2 G/DL (ref 3.4–4.8)
ALBUMIN/GLOB SERPL: 1.3 RATIO (ref 1.1–2)
ALP SERPL-CCNC: 58 UNIT/L (ref 40–150)
ALT SERPL-CCNC: 16 UNIT/L (ref 0–55)
APPEARANCE UR: CLEAR
AST SERPL-CCNC: 17 UNIT/L (ref 5–34)
BACTERIA #/AREA URNS AUTO: NORMAL /HPF
BASOPHILS # BLD AUTO: 0.02 X10(3)/MCL (ref 0–0.2)
BASOPHILS NFR BLD AUTO: 0.4 %
BILIRUB UR QL STRIP.AUTO: NEGATIVE MG/DL
BILIRUBIN DIRECT+TOT PNL SERPL-MCNC: 0.7 MG/DL
BUN SERPL-MCNC: 16.6 MG/DL (ref 9.8–20.1)
CALCIUM SERPL-MCNC: 8.7 MG/DL (ref 8.4–10.2)
CHLORIDE SERPL-SCNC: 105 MMOL/L (ref 98–107)
CHOLEST SERPL-MCNC: 129 MG/DL
CHOLEST/HDLC SERPL: 2 {RATIO} (ref 0–5)
CO2 SERPL-SCNC: 32 MMOL/L (ref 23–31)
COLOR UR AUTO: YELLOW
CREAT SERPL-MCNC: 0.62 MG/DL (ref 0.55–1.02)
CREAT UR-MCNC: 78.2 MG/DL (ref 47–110)
DEPRECATED CALCIDIOL+CALCIFEROL SERPL-MC: 37.3 NG/ML (ref 30–80)
EOSINOPHIL # BLD AUTO: 0.11 X10(3)/MCL (ref 0–0.9)
EOSINOPHIL NFR BLD AUTO: 2 %
ERYTHROCYTE [DISTWIDTH] IN BLOOD BY AUTOMATED COUNT: 15.8 % (ref 11.5–17)
EST. AVERAGE GLUCOSE BLD GHB EST-MCNC: 122.6 MG/DL
GFR SERPLBLD CREATININE-BSD FMLA CKD-EPI: >60 MLS/MIN/1.73/M2
GLOBULIN SER-MCNC: 2.5 GM/DL (ref 2.4–3.5)
GLUCOSE SERPL-MCNC: 83 MG/DL (ref 82–115)
GLUCOSE UR QL STRIP.AUTO: NEGATIVE MG/DL
HBA1C MFR BLD: 5.9 %
HCT VFR BLD AUTO: 38.7 % (ref 37–47)
HDLC SERPL-MCNC: 54 MG/DL (ref 35–60)
HGB BLD-MCNC: 12.7 G/DL (ref 12–16)
IMM GRANULOCYTES # BLD AUTO: 0.02 X10(3)/MCL (ref 0–0.04)
IMM GRANULOCYTES NFR BLD AUTO: 0.4 %
KETONES UR QL STRIP.AUTO: NEGATIVE MG/DL
LDLC SERPL CALC-MCNC: 68 MG/DL (ref 50–140)
LEUKOCYTE ESTERASE UR QL STRIP.AUTO: NEGATIVE UNIT/L
LYMPHOCYTES # BLD AUTO: 1.15 X10(3)/MCL (ref 0.6–4.6)
LYMPHOCYTES NFR BLD AUTO: 20.5 %
MCH RBC QN AUTO: 31.4 PG
MCHC RBC AUTO-ENTMCNC: 32.8 G/DL (ref 33–36)
MCV RBC AUTO: 95.6 FL (ref 80–94)
MICROALBUMIN UR-MCNC: <5 UG/ML
MICROALBUMIN/CREAT RATIO PNL UR: <6.4 MG/GM CR (ref 0–30)
MONOCYTES # BLD AUTO: 0.52 X10(3)/MCL (ref 0.1–1.3)
MONOCYTES NFR BLD AUTO: 9.3 %
NEUTROPHILS # BLD AUTO: 3.78 X10(3)/MCL (ref 2.1–9.2)
NEUTROPHILS NFR BLD AUTO: 67.4 %
NITRITE UR QL STRIP.AUTO: NEGATIVE
NRBC BLD AUTO-RTO: 0 %
PH UR STRIP.AUTO: 8 [PH]
PLATELET # BLD AUTO: 172 X10(3)/MCL (ref 130–400)
PMV BLD AUTO: 10.6 FL (ref 7.4–10.4)
POTASSIUM SERPL-SCNC: 3.6 MMOL/L (ref 3.5–5.1)
PROT SERPL-MCNC: 5.7 GM/DL (ref 5.8–7.6)
PROT UR QL STRIP.AUTO: NEGATIVE MG/DL
RBC # BLD AUTO: 4.05 X10(6)/MCL (ref 4.2–5.4)
RBC #/AREA URNS AUTO: <5 /HPF
RBC UR QL AUTO: NEGATIVE UNIT/L
SODIUM SERPL-SCNC: 143 MMOL/L (ref 136–145)
SP GR UR STRIP.AUTO: 1.02 (ref 1–1.03)
SQUAMOUS #/AREA URNS AUTO: <5 /HPF
TRIGL SERPL-MCNC: 33 MG/DL (ref 37–140)
TSH SERPL-ACNC: 0.77 UIU/ML (ref 0.35–4.94)
UROBILINOGEN UR STRIP-ACNC: 1 MG/DL
VLDLC SERPL CALC-MCNC: 7 MG/DL
WBC # SPEC AUTO: 5.6 X10(3)/MCL (ref 4.5–11.5)
WBC #/AREA URNS AUTO: <5 /HPF

## 2023-02-20 PROCEDURE — 85025 COMPLETE CBC W/AUTO DIFF WBC: CPT

## 2023-02-20 PROCEDURE — 81001 URINALYSIS AUTO W/SCOPE: CPT

## 2023-02-20 PROCEDURE — 82043 UR ALBUMIN QUANTITATIVE: CPT

## 2023-02-20 PROCEDURE — 80061 LIPID PANEL: CPT

## 2023-02-20 PROCEDURE — 82306 VITAMIN D 25 HYDROXY: CPT

## 2023-02-20 PROCEDURE — 83036 HEMOGLOBIN GLYCOSYLATED A1C: CPT

## 2023-02-20 PROCEDURE — 36415 COLL VENOUS BLD VENIPUNCTURE: CPT

## 2023-02-20 PROCEDURE — 84443 ASSAY THYROID STIM HORMONE: CPT

## 2023-02-20 PROCEDURE — 80053 COMPREHEN METABOLIC PANEL: CPT

## 2023-03-01 ENCOUNTER — OFFICE VISIT (OUTPATIENT)
Dept: INTERNAL MEDICINE | Facility: CLINIC | Age: 73
End: 2023-03-01
Payer: MEDICARE

## 2023-03-01 VITALS
HEIGHT: 64 IN | OXYGEN SATURATION: 97 % | TEMPERATURE: 97 F | SYSTOLIC BLOOD PRESSURE: 124 MMHG | HEART RATE: 83 BPM | WEIGHT: 138 LBS | BODY MASS INDEX: 23.56 KG/M2 | DIASTOLIC BLOOD PRESSURE: 68 MMHG | RESPIRATION RATE: 16 BRPM

## 2023-03-01 DIAGNOSIS — E87.6 HYPOKALEMIA: ICD-10-CM

## 2023-03-01 DIAGNOSIS — E11.9 TYPE 2 DIABETES MELLITUS WITHOUT COMPLICATION, WITHOUT LONG-TERM CURRENT USE OF INSULIN: ICD-10-CM

## 2023-03-01 DIAGNOSIS — I10 PRIMARY HYPERTENSION: Primary | ICD-10-CM

## 2023-03-01 DIAGNOSIS — E11.9 TYPE 2 DIABETES MELLITUS WITHOUT COMPLICATION, WITHOUT LONG-TERM CURRENT USE OF INSULIN: Primary | ICD-10-CM

## 2023-03-01 DIAGNOSIS — I10 PRIMARY HYPERTENSION: ICD-10-CM

## 2023-03-01 DIAGNOSIS — I82.409 RECURRENT DEEP VEIN THROMBOSIS (DVT): ICD-10-CM

## 2023-03-01 DIAGNOSIS — E78.2 MIXED HYPERLIPIDEMIA: ICD-10-CM

## 2023-03-01 DIAGNOSIS — K21.9 GASTROESOPHAGEAL REFLUX DISEASE, UNSPECIFIED WHETHER ESOPHAGITIS PRESENT: ICD-10-CM

## 2023-03-01 PROBLEM — G89.29 CHRONIC LUMBAR PAIN: Status: ACTIVE | Noted: 2023-03-01

## 2023-03-01 PROBLEM — E78.5 HLD (HYPERLIPIDEMIA): Status: ACTIVE | Noted: 2023-03-01

## 2023-03-01 PROBLEM — M85.80 OSTEOPENIA: Status: ACTIVE | Noted: 2023-03-01

## 2023-03-01 PROBLEM — Z79.01 ON ANTICOAGULANT THERAPY: Status: ACTIVE | Noted: 2023-03-01

## 2023-03-01 PROBLEM — M54.50 CHRONIC LUMBAR PAIN: Status: ACTIVE | Noted: 2023-03-01

## 2023-03-01 PROBLEM — M47.22 CERVICAL SPONDYLOSIS WITH RADICULOPATHY: Status: ACTIVE | Noted: 2023-03-01

## 2023-03-01 PROBLEM — F41.9 ANXIETY AND DEPRESSION: Status: ACTIVE | Noted: 2023-03-01

## 2023-03-01 PROBLEM — M54.9 BACK PAIN: Status: ACTIVE | Noted: 2023-03-01

## 2023-03-01 PROBLEM — G62.9 NEUROPATHY: Status: ACTIVE | Noted: 2023-03-01

## 2023-03-01 PROBLEM — I77.9 BILATERAL CAROTID ARTERY DISEASE: Status: ACTIVE | Noted: 2023-03-01

## 2023-03-01 PROBLEM — D50.9 IRON DEFICIENCY ANEMIA, UNSPECIFIED: Status: ACTIVE | Noted: 2023-03-01

## 2023-03-01 PROBLEM — Z99.89 HISTORY OF CONTINUOUS POSITIVE AIRWAY PRESSURE (CPAP) THERAPY AT HOME: Status: ACTIVE | Noted: 2023-03-01

## 2023-03-01 PROBLEM — F32.A ANXIETY AND DEPRESSION: Status: ACTIVE | Noted: 2023-03-01

## 2023-03-01 PROCEDURE — 1101F PR PT FALLS ASSESS DOC 0-1 FALLS W/OUT INJ PAST YR: ICD-10-PCS | Mod: CPTII,,, | Performed by: NURSE PRACTITIONER

## 2023-03-01 PROCEDURE — 3061F NEG MICROALBUMINURIA REV: CPT | Mod: CPTII,,, | Performed by: NURSE PRACTITIONER

## 2023-03-01 PROCEDURE — 3008F BODY MASS INDEX DOCD: CPT | Mod: CPTII,,, | Performed by: NURSE PRACTITIONER

## 2023-03-01 PROCEDURE — 3078F PR MOST RECENT DIASTOLIC BLOOD PRESSURE < 80 MM HG: ICD-10-PCS | Mod: CPTII,,, | Performed by: NURSE PRACTITIONER

## 2023-03-01 PROCEDURE — 3061F PR NEG MICROALBUMINURIA RESULT DOCUMENTED/REVIEW: ICD-10-PCS | Mod: CPTII,,, | Performed by: NURSE PRACTITIONER

## 2023-03-01 PROCEDURE — 3288F FALL RISK ASSESSMENT DOCD: CPT | Mod: CPTII,,, | Performed by: NURSE PRACTITIONER

## 2023-03-01 PROCEDURE — 3074F PR MOST RECENT SYSTOLIC BLOOD PRESSURE < 130 MM HG: ICD-10-PCS | Mod: CPTII,,, | Performed by: NURSE PRACTITIONER

## 2023-03-01 PROCEDURE — 3078F DIAST BP <80 MM HG: CPT | Mod: CPTII,,, | Performed by: NURSE PRACTITIONER

## 2023-03-01 PROCEDURE — 1101F PT FALLS ASSESS-DOCD LE1/YR: CPT | Mod: CPTII,,, | Performed by: NURSE PRACTITIONER

## 2023-03-01 PROCEDURE — 3288F PR FALLS RISK ASSESSMENT DOCUMENTED: ICD-10-PCS | Mod: CPTII,,, | Performed by: NURSE PRACTITIONER

## 2023-03-01 PROCEDURE — 99213 PR OFFICE/OUTPT VISIT, EST, LEVL III, 20-29 MIN: ICD-10-PCS | Mod: ,,, | Performed by: NURSE PRACTITIONER

## 2023-03-01 PROCEDURE — 1160F RVW MEDS BY RX/DR IN RCRD: CPT | Mod: CPTII,,, | Performed by: NURSE PRACTITIONER

## 2023-03-01 PROCEDURE — 3074F SYST BP LT 130 MM HG: CPT | Mod: CPTII,,, | Performed by: NURSE PRACTITIONER

## 2023-03-01 PROCEDURE — 1160F PR REVIEW ALL MEDS BY PRESCRIBER/CLIN PHARMACIST DOCUMENTED: ICD-10-PCS | Mod: CPTII,,, | Performed by: NURSE PRACTITIONER

## 2023-03-01 PROCEDURE — 3066F PR DOCUMENTATION OF TREATMENT FOR NEPHROPATHY: ICD-10-PCS | Mod: CPTII,,, | Performed by: NURSE PRACTITIONER

## 2023-03-01 PROCEDURE — 3008F PR BODY MASS INDEX (BMI) DOCUMENTED: ICD-10-PCS | Mod: CPTII,,, | Performed by: NURSE PRACTITIONER

## 2023-03-01 PROCEDURE — 99213 OFFICE O/P EST LOW 20 MIN: CPT | Mod: ,,, | Performed by: NURSE PRACTITIONER

## 2023-03-01 PROCEDURE — 1159F MED LIST DOCD IN RCRD: CPT | Mod: CPTII,,, | Performed by: NURSE PRACTITIONER

## 2023-03-01 PROCEDURE — 3066F NEPHROPATHY DOC TX: CPT | Mod: CPTII,,, | Performed by: NURSE PRACTITIONER

## 2023-03-01 PROCEDURE — 1159F PR MEDICATION LIST DOCUMENTED IN MEDICAL RECORD: ICD-10-PCS | Mod: CPTII,,, | Performed by: NURSE PRACTITIONER

## 2023-03-01 RX ORDER — CLOPIDOGREL BISULFATE 75 MG/1
1 TABLET ORAL DAILY
Status: ON HOLD | COMMUNITY
Start: 2022-12-19 | End: 2024-02-08 | Stop reason: HOSPADM

## 2023-03-01 NOTE — PROGRESS NOTES
Patient ID: [id number]     Chief Complaint: Follow-up (6 month F/U)      HPI:     Alejandrina Rodriguez is a 72 y.o. female here today for a follow up. Reviewed and discussed lab results. Overall she feels well. Potassium normalized. No other complaints today.       Past Medical History:  has a past medical history of Abnormal blood smear, Acid reflux, Anxiety and depression, Bilateral carotid artery disease, Cervical spondylosis with radiculopathy, Chronic lumbar pain, Diabetes mellitus, History of continuous positive airway pressure (CPAP) therapy at home, HLD (hyperlipidemia), Hypertension, Iron deficiency anemia, unspecified, Neuropathy, On anticoagulant therapy, Osteopenia, Personal history of colonic polyps, Protein C deficiency, Recurrent deep vein thrombosis (DVT), Sleep apnea, unspecified, and Unspecified osteoarthritis, unspecified site.    Surgical History:  has a past surgical history that includes Spinal cord stimulator implant and Colonoscopy (07/14/2014).    Family History: family history includes Arthritis in her father and mother; Cancer in her mother; Cervical cancer in her mother; Diabetes in her father and mother; Heart disease in her father; Hypertension in her father and mother; Prostate cancer in her father.    Social History:  reports that she has never smoked. She has never used smokeless tobacco. She reports that she does not drink alcohol and does not use drugs.    Current Outpatient Medications   Medication Instructions    azelastine (ASTELIN) 137 mcg, Nasal, 2 times daily    B-complex with vitamin C (Z-BEC OR EQUIV) tablet 1 tablet, Oral, Daily    baclofen (LIORESAL) 10 mg, Oral, 4 times daily, PRN muscle spasms    betamethasone dipropionate 0.05 % cream 1 application, Topical (Top), 2 times daily    celecoxib (CELEBREX) 100 MG capsule TAKE ONE CAPSULE BY MOUTH ONCE DAILY WITH FOOD AS NEEDED FOR PAIN    clopidogreL (PLAVIX) 75 mg tablet 1 tablet, Oral, Daily    famotidine (PEPCID) 20 MG  "tablet TAKE 1 TABLET TWICE DAILY    hydroCHLOROthiazide (HYDRODIURIL) 25 MG tablet TAKE 1 TABLET EVERY DAY    LEXAPRO 10 mg tablet TAKE 1 TABLET BY MOUTH ONCE DAILY AS DIRECTED FOR MOOD/CHRONIC PAIN AS DIRECTED    LIDOcaine (LIDODERM) 5 % 1 patch, Transdermal, Every 24 hours (non-standard times), Remove & Discard patch within 12 hours or as directed by MD    metFORMIN (GLUCOPHAGE) 500 mg, Oral, 2 times daily    morphine (MSIR) 15 MG tablet 1.5 tablets, Oral, 2 times daily PRN    nitroGLYCERIN (NITROSTAT) 0.4 MG SL tablet PLEASE SEE ATTACHED FOR DETAILED DIRECTIONS    OMEGA-3-EPA-FISH OIL ORAL 1 capsule, Oral, Daily    omeprazole (PRILOSEC) 40 MG capsule TAKE 1 CAPSULE EVERY DAY    peg 400-propylene glycol (SYSTANE, PROPYLENE GLYCOL,) 0.4-0.3 % Drop 1 drop, Ophthalmic    potassium chloride SA (K-DUR,KLOR-CON) 20 MEQ tablet TAKE 1 TABLET ONE TIME DAILY    pravastatin (PRAVACHOL) 40 MG tablet TAKE 1 TABLET EVERY DAY    timolol maleate 0.5% (TIMOPTIC) 0.5 % Drop 1 drop, Both Eyes, Daily    vitamin D (VITAMIN D3) 5,000 Units, Oral, Daily    warfarin (COUMADIN) 4 MG tablet TAKE 1 TABLET EVERY DAY       Patient is allergic to hydrocodone-acetaminophen, oxycodone-acetaminophen, iodine, meperidine, and promethazine.     Patient Care Team:  Alex Treviño MD as PCP - General (Internal Medicine)       Subjective:     Review of Systems    12 point review of systems conducted, negative except as stated in the history of present illness. See HPI for details.      Objective:     Visit Vitals  /68 (BP Location: Right arm, Patient Position: Sitting, BP Method: Medium (Manual))   Pulse 83   Temp 96.8 °F (36 °C) (Temporal)   Resp 16   Ht 5' 4" (1.626 m)   Wt 62.6 kg (138 lb)   SpO2 97%   BMI 23.69 kg/m²       Physical Exam    General: Alert and oriented, No acute distress.  Head: Normocephalic, Atraumatic.  Eye: Pupils are equal, round and reactive to light, Extraocular movements are intact, Sclera " non-icteric.  Ears/Nose/Throat: Normal, Mucosa moist,Clear.  Neck/Thyroid: Supple, Non-tender, No carotid bruit, No lymphadenopathy, No JVD, Full range of motion.  Respiratory: Clear to auscultation bilaterally; No wheezes, rales or rhonchi,Non-labored respirations, Symmetrical chest wall expansion.  Cardiovascular: Regular rate and rhythm, S1/S2 normal, No murmurs, rubs or gallops.  Gastrointestinal: Soft, Non-tender, Non-distended, Normal bowel sounds, No palpable organomegaly.  Musculoskeletal: Normal range of motion.  Integumentary: Warm, Dry, Intact, No suspicious lesions or rashes.  Extremities: No clubbing, cyanosis or edema  Neurologic: No focal deficits, Cranial Nerves II-XII are grossly intact, Motor strength normal upper and lower extremities, Sensory exam intact.  Psychiatric: Normal interaction, Coherent speech, Euthymic mood, Appropriate affect       Labs Reviewed:     Chemistry:  Lab Results   Component Value Date     02/20/2023    K 3.6 02/20/2023    CHLORIDE 105 02/20/2023    BUN 16.6 02/20/2023    CREATININE 0.62 02/20/2023    EGFRNORACEVR >60 02/20/2023    GLUCOSE 83 02/20/2023    CALCIUM 8.7 02/20/2023    ALKPHOS 58 02/20/2023    LABPROT 5.7 (L) 02/20/2023    ALBUMIN 3.2 (L) 02/20/2023    BILIDIR 0.2 09/13/2021    IBILI 0.30 09/13/2021    AST 17 02/20/2023    ALT 16 02/20/2023    MG 2.3 10/11/2017    [id number] 37.3 02/20/2023    TSH 0.768 02/20/2023        Lab Results   Component Value Date    HGBA1C 5.9 02/20/2023        Hematology:  Lab Results   Component Value Date    WBC 5.6 02/20/2023    HGB 12.7 02/20/2023    HCT 38.7 02/20/2023     02/20/2023       Lipid Panel:  Lab Results   Component Value Date    CHOL 129 02/20/2023    HDL 54 02/20/2023    LDL 68.00 02/20/2023    TRIG 33 (L) 02/20/2023    TOTALCHOLEST 2 02/20/2023        Urine:  Lab Results   Component Value Date    COLORUA Yellow 02/20/2023    APPEARANCEUA Clear 02/20/2023    SGUA 1.020 02/20/2023    PHUA 8.0  02/20/2023    PROTEINUA Negative 02/20/2023    GLUCOSEUA Negative 02/20/2023    KETONESUA Negative 02/20/2023    BLOODUA Negative 02/20/2023    NITRITESUA Negative 02/20/2023    LEUKOCYTESUR Negative 02/20/2023    RBCUA <5 02/20/2023    WBCUA <5 02/20/2023    BACTERIA None Seen 02/20/2023    CREATRANDUR 78.2 02/20/2023          Assessment:       ICD-10-CM ICD-9-CM   1. Type 2 diabetes mellitus without complication, without long-term current use of insulin  E11.9 250.00   2. Primary hypertension  I10 401.9   3. Mixed hyperlipidemia  E78.2 272.2   4. Recurrent deep vein thrombosis (DVT)  I82.409 453.40        Plan:     1. Type 2 diabetes mellitus without complication, without long-term current use of insulin  Lab Results   Component Value Date    HGBA1C 5.9 02/20/2023    HGBA1C 5.8 06/13/2022    LDL 68.00 02/20/2023    CREATININE 0.62 02/20/2023      Continue Metformin 500mg BID  On ACE and Statin according to guidelines.  Discussed caution with SGLT2s + diuretics as concomitant use can cause volume depletion. Discussed caution with DPP-Ivs and HF risk.  Follow ADA Diet. Avoid soda, simple sweets, and limit rice/pasta/breads/starches (no more than 45-50 grams per meal).  Maintain healthy weight with goal BMI <30.  Exercise 5 times per week for 30 minutes per day.  Stressed importance of daily foot exams.  Stressed importance of annual dilated eye exam.      2. Primary hypertension  Well controlled.   Continue HCTZ 25mg daily + Potassium 20meq daily  Low Sodium Diet (DASH Diet - Less than 2 grams of sodium per day).  Monitor blood pressure daily and log. Report consistent numbers greater than 140/90.  Maintain healthy weight with goal BMI <30. Exercise 30 minutes per day, 5 days per week.      3. Mixed hyperlipidemia  Lab Results   Component Value Date    LDL 68.00 02/20/2023    TRIG 33 (L) 02/20/2023    HDL 54 02/20/2023    TOTALCHOLEST 2 02/20/2023     Continue Pravastatin 40mg daily   Stressed importance of dietary  modifications. Follow a low cholesterol, low saturated fat diet with less that 200mg of cholesterol a day.  Avoid fried foods and high saturated fats (high saturated fats less than 7% of calories).  Add Flax Seed/Fish Oil supplements to diet. Increase dietary fiber.  Regular exercise can reduce LDL and raise HDL. Stressed importance of physical activity 5 times per week for 30 minutes per day.       4. Recurrent deep vein thrombosis (DVT)  Protein C Deficiency  Coumadin Managed by coumadin clinic with [facility].         Follow up in about 6 months (around 9/1/2023) for Medicare Wellness. In addition to their scheduled follow up, the patient has also been instructed to follow up on as needed basis.     Future Appointments   Date Time Provider Department Center   9/5/2023  9:20 AM Alex Treviño MD [facility]        DEYVI Maher

## 2023-03-15 ENCOUNTER — PATIENT MESSAGE (OUTPATIENT)
Dept: ADMINISTRATIVE | Facility: HOSPITAL | Age: 73
End: 2023-03-15
Payer: MEDICARE

## 2023-05-10 LAB — BCS RECOMMENDATION EXT: NORMAL

## 2023-05-15 ENCOUNTER — PATIENT MESSAGE (OUTPATIENT)
Dept: ADMINISTRATIVE | Facility: HOSPITAL | Age: 73
End: 2023-05-15
Payer: MEDICARE

## 2023-05-17 ENCOUNTER — PATIENT OUTREACH (OUTPATIENT)
Dept: ADMINISTRATIVE | Facility: HOSPITAL | Age: 73
End: 2023-05-17
Payer: MEDICARE

## 2023-05-17 NOTE — LETTER
AUTHORIZATION FOR RELEASE OF   CONFIDENTIAL INFORMATION    Dear Dr. Chopra,    We are seeing Alejandrina Rodriguez, date of birth 1950, in the clinic at [place] [address] [facility]. Alex Treviño MD is the patient's PCP. Alejandrina Rodriguez has an outstanding lab/procedure at the time we reviewed her chart. In order to help keep her health information updated, she has authorized us to request the following medical record(s):        (  )  MAMMOGRAM                                      ( x )  COLONOSCOPY      (  )  PAP SMEAR                                          (  )  OUTSIDE LAB RESULTS     (  )  DEXA SCAN                                          (  )  EYE EXAM            (  )  FOOT EXAM                                          (  )  ENTIRE RECORD     (  )  OUTSIDE IMMUNIZATIONS                 (  )  _______________         Please fax records to [facility], Tyler Perrin-Bellelo, MD, [phone]       If you have any questions, please contact Margaret at [phone]            Patient Name: Alejandrina Rodriguez  : 1950  Patient Phone #: [phone]

## 2023-05-17 NOTE — PROGRESS NOTES
Population Health Outreach.   The following record(s)  below were uploaded for Health Maintenance .    MAMMOGRAM SCREENING     05/10/2023         COLONOSCOPY    -external claim from Dr. Chopra-record request sent.

## 2023-05-22 ENCOUNTER — PATIENT OUTREACH (OUTPATIENT)
Dept: ADMINISTRATIVE | Facility: HOSPITAL | Age: 73
End: 2023-05-22
Payer: MEDICARE

## 2023-05-22 NOTE — PROGRESS NOTES
Population Health Outreach.   The following record(s)  below were uploaded for Health Maintenance .    COLONOSCOPY     08/29/22

## 2023-05-25 ENCOUNTER — PATIENT MESSAGE (OUTPATIENT)
Dept: ADMINISTRATIVE | Facility: HOSPITAL | Age: 73
End: 2023-05-25
Payer: MEDICARE

## 2023-06-03 ENCOUNTER — OFFICE VISIT (OUTPATIENT)
Dept: URGENT CARE | Facility: CLINIC | Age: 73
End: 2023-06-03
Payer: MEDICARE

## 2023-06-03 VITALS
HEIGHT: 64 IN | OXYGEN SATURATION: 98 % | WEIGHT: 138 LBS | SYSTOLIC BLOOD PRESSURE: 155 MMHG | RESPIRATION RATE: 20 BRPM | DIASTOLIC BLOOD PRESSURE: 78 MMHG | HEART RATE: 65 BPM | TEMPERATURE: 98 F | BODY MASS INDEX: 23.56 KG/M2

## 2023-06-03 DIAGNOSIS — K29.70 GASTRITIS, PRESENCE OF BLEEDING UNSPECIFIED, UNSPECIFIED CHRONICITY, UNSPECIFIED GASTRITIS TYPE: Primary | ICD-10-CM

## 2023-06-03 PROCEDURE — 99214 OFFICE O/P EST MOD 30 MIN: CPT | Mod: 25,,, | Performed by: FAMILY MEDICINE

## 2023-06-03 PROCEDURE — 99214 PR OFFICE/OUTPT VISIT, EST, LEVL IV, 30-39 MIN: ICD-10-PCS | Mod: 25,,, | Performed by: FAMILY MEDICINE

## 2023-06-03 PROCEDURE — 96372 THER/PROPH/DIAG INJ SC/IM: CPT | Mod: ,,, | Performed by: FAMILY MEDICINE

## 2023-06-03 PROCEDURE — 96372 PR INJECTION,THERAP/PROPH/DIAG2ST, IM OR SUBCUT: ICD-10-PCS | Mod: ,,, | Performed by: FAMILY MEDICINE

## 2023-06-03 RX ORDER — ONDANSETRON 2 MG/ML
4 INJECTION INTRAMUSCULAR; INTRAVENOUS
Status: DISCONTINUED | OUTPATIENT
Start: 2023-06-03 | End: 2023-06-03

## 2023-06-03 RX ORDER — ONDANSETRON 2 MG/ML
4 INJECTION INTRAMUSCULAR; INTRAVENOUS
Status: DISCONTINUED | OUTPATIENT
Start: 2023-06-03 | End: 2023-06-03 | Stop reason: HOSPADM

## 2023-06-03 RX ORDER — ONDANSETRON 8 MG/1
8 TABLET, ORALLY DISINTEGRATING ORAL 2 TIMES DAILY
Qty: 12 TABLET | Refills: 0 | Status: SHIPPED | OUTPATIENT
Start: 2023-06-03 | End: 2023-09-05

## 2023-06-03 RX ADMIN — ONDANSETRON 4 MG: 2 INJECTION INTRAMUSCULAR; INTRAVENOUS at 10:06

## 2023-06-03 NOTE — PROGRESS NOTES
"Subjective:      Patient ID: Alejandrina Rodriguez is a 73 y.o. female.    Vitals:  height is 5' 4" (1.626 m) and weight is 62.6 kg (138 lb). Her temperature is 98.4 °F (36.9 °C). Her blood pressure is 155/78 (abnormal) and her pulse is 65. Her respiration is 20 and oxygen saturation is 98%.     Chief Complaint: Abdominal Pain (Esophagus and stomach cramps, N/V, vomited x3. Started last night.)    1 day of upper GI upset with NV.  No diarrhea, no fever, no known exposures.       Constitution: Negative for chills, sweating and fever.   HENT:  Negative for sinus pressure.    Respiratory:  Negative for cough.    Gastrointestinal:  Positive for abdominal pain, nausea and vomiting.   Genitourinary:  Negative for frequency, urgency, flank pain, vaginal discharge and pelvic pain.   Skin:  Negative for rash.   Neurological:  Negative for loss of consciousness.    Objective:     Physical Exam   Constitutional: She is oriented to person, place, and time. She appears well-developed.   HENT:   Head: Normocephalic and atraumatic.   Ears:   Right Ear: External ear normal.   Left Ear: External ear normal.   Nose: Nose normal.   Mouth/Throat: Mucous membranes are normal.   Eyes: Conjunctivae and lids are normal.   Neck: Trachea normal. Neck supple.   Cardiovascular: Normal rate, regular rhythm and normal heart sounds.   Pulmonary/Chest: Effort normal and breath sounds normal. No respiratory distress.   Abdominal: Normal appearance and bowel sounds are normal. She exhibits no distension and no mass. Soft. There is abdominal tenderness (epigastric, mild). There is no rebound and no guarding.   Musculoskeletal: Normal range of motion.         General: Normal range of motion.   Neurological: no focal deficit. She is alert and oriented to person, place, and time. She has normal strength.   Skin: Skin is warm, dry, intact, not diaphoretic and not pale.   Psychiatric: Her speech is normal and behavior is normal. Judgment and thought content " normal.   Nursing note and vitals reviewed.    Assessment:     1. Gastritis, presence of bleeding unspecified, unspecified chronicity, unspecified gastritis type        Plan:       Gastritis, presence of bleeding unspecified, unspecified chronicity, unspecified gastritis type  -     ondansetron (ZOFRAN-ODT) 8 MG TbDL; Take 1 tablet (8 mg total) by mouth 2 (two) times daily.  Dispense: 12 tablet; Refill: 0  -     ondansetron injection 4 mg

## 2023-06-03 NOTE — PATIENT INSTRUCTIONS
Force fluids, Zofran for nausea.  Dingle diet. Nexium daily.  Can also try liquid antacid like Mylanta.  Recheck as needed.

## 2023-06-04 ENCOUNTER — ANESTHESIA EVENT (OUTPATIENT)
Dept: SURGERY | Facility: HOSPITAL | Age: 73
DRG: 335 | End: 2023-06-04
Payer: MEDICARE

## 2023-06-04 ENCOUNTER — HOSPITAL ENCOUNTER (INPATIENT)
Facility: HOSPITAL | Age: 73
LOS: 15 days | Discharge: HOME | DRG: 335 | End: 2023-06-19
Attending: EMERGENCY MEDICINE | Admitting: INTERNAL MEDICINE
Payer: MEDICARE

## 2023-06-04 ENCOUNTER — ANESTHESIA (OUTPATIENT)
Dept: SURGERY | Facility: HOSPITAL | Age: 73
DRG: 335 | End: 2023-06-04
Payer: MEDICARE

## 2023-06-04 DIAGNOSIS — R58 HEMORRHAGE: ICD-10-CM

## 2023-06-04 DIAGNOSIS — I95.9 HYPOTENSION, UNSPECIFIED HYPOTENSION TYPE: ICD-10-CM

## 2023-06-04 DIAGNOSIS — K21.9 GASTROESOPHAGEAL REFLUX DISEASE, UNSPECIFIED WHETHER ESOPHAGITIS PRESENT: ICD-10-CM

## 2023-06-04 DIAGNOSIS — R52 PAIN: ICD-10-CM

## 2023-06-04 DIAGNOSIS — I82.409 DVT (DEEP VENOUS THROMBOSIS): ICD-10-CM

## 2023-06-04 DIAGNOSIS — M79.631 PAIN AND SWELLING OF FOREARM, RIGHT: ICD-10-CM

## 2023-06-04 DIAGNOSIS — K56.609 SMALL BOWEL OBSTRUCTION: ICD-10-CM

## 2023-06-04 DIAGNOSIS — G89.29 CHRONIC LOW BACK PAIN, UNSPECIFIED BACK PAIN LATERALITY, UNSPECIFIED WHETHER SCIATICA PRESENT: ICD-10-CM

## 2023-06-04 DIAGNOSIS — M54.9 BACK PAIN, UNSPECIFIED BACK LOCATION, UNSPECIFIED BACK PAIN LATERALITY, UNSPECIFIED CHRONICITY: ICD-10-CM

## 2023-06-04 DIAGNOSIS — M79.89 PAIN AND SWELLING OF FOREARM, RIGHT: ICD-10-CM

## 2023-06-04 DIAGNOSIS — M54.50 CHRONIC LOW BACK PAIN, UNSPECIFIED BACK PAIN LATERALITY, UNSPECIFIED WHETHER SCIATICA PRESENT: ICD-10-CM

## 2023-06-04 DIAGNOSIS — K56.609 SBO (SMALL BOWEL OBSTRUCTION): Primary | ICD-10-CM

## 2023-06-04 DIAGNOSIS — D68.59 PROTEIN C DEFICIENCY: ICD-10-CM

## 2023-06-04 LAB
ABORH RETYPE: NORMAL
ALBUMIN SERPL-MCNC: 4.2 G/DL (ref 3.4–4.8)
ALBUMIN/GLOB SERPL: 1.1 RATIO (ref 1.1–2)
ALP SERPL-CCNC: 101 UNIT/L (ref 40–150)
ALT SERPL-CCNC: 17 UNIT/L (ref 0–55)
APTT PPP: 28.1 SECONDS (ref 23.2–33.7)
AST SERPL-CCNC: 24 UNIT/L (ref 5–34)
BASOPHILS # BLD AUTO: 0.01 X10(3)/MCL
BASOPHILS NFR BLD AUTO: 0.1 %
BILIRUBIN DIRECT+TOT PNL SERPL-MCNC: 1.6 MG/DL
BUN SERPL-MCNC: 13.3 MG/DL (ref 9.8–20.1)
CALCIUM SERPL-MCNC: 9.8 MG/DL (ref 8.4–10.2)
CHLORIDE SERPL-SCNC: 96 MMOL/L (ref 98–107)
CLOSURE TME COLL+ADP BLD: 93 SECONDS (ref 46–119)
CLOSURE TME COLL+EPINEP BLD: 107 SECONDS (ref 68–183)
CO2 SERPL-SCNC: 29 MMOL/L (ref 23–31)
CREAT SERPL-MCNC: 0.7 MG/DL (ref 0.55–1.02)
EOSINOPHIL # BLD AUTO: 0 X10(3)/MCL (ref 0–0.9)
EOSINOPHIL NFR BLD AUTO: 0 %
ERYTHROCYTE [DISTWIDTH] IN BLOOD BY AUTOMATED COUNT: 13.4 % (ref 11.5–17)
EST. AVERAGE GLUCOSE BLD GHB EST-MCNC: 122.6 MG/DL
GFR SERPLBLD CREATININE-BSD FMLA CKD-EPI: >60 MLS/MIN/1.73/M2
GLOBULIN SER-MCNC: 3.7 GM/DL (ref 2.4–3.5)
GLUCOSE SERPL-MCNC: 116 MG/DL (ref 82–115)
GROUP & RH: NORMAL
HBA1C MFR BLD: 5.9 %
HCT VFR BLD AUTO: 48 % (ref 37–47)
HGB BLD-MCNC: 16.4 G/DL (ref 12–16)
IMM GRANULOCYTES # BLD AUTO: 0.03 X10(3)/MCL (ref 0–0.04)
IMM GRANULOCYTES NFR BLD AUTO: 0.3 %
INDIRECT COOMBS GEL: NORMAL
INR BLD: 1.59 (ref 0–1.3)
LACTATE SERPL-SCNC: 1.3 MMOL/L (ref 0.5–2.2)
LIPASE SERPL-CCNC: 15 U/L
LYMPHOCYTES # BLD AUTO: 0.97 X10(3)/MCL (ref 0.6–4.6)
LYMPHOCYTES NFR BLD AUTO: 9.8 %
MCH RBC QN AUTO: 31.8 PG (ref 27–31)
MCHC RBC AUTO-ENTMCNC: 34.2 G/DL (ref 33–36)
MCV RBC AUTO: 93.2 FL (ref 80–94)
MONOCYTES # BLD AUTO: 0.47 X10(3)/MCL (ref 0.1–1.3)
MONOCYTES NFR BLD AUTO: 4.8 %
NEUTROPHILS # BLD AUTO: 8.41 X10(3)/MCL (ref 2.1–9.2)
NEUTROPHILS NFR BLD AUTO: 85 %
NRBC BLD AUTO-RTO: 0 %
PLATELET # BLD AUTO: 219 X10(3)/MCL (ref 130–400)
PMV BLD AUTO: 10.7 FL (ref 7.4–10.4)
POCT GLUCOSE: 124 MG/DL (ref 70–110)
POTASSIUM SERPL-SCNC: 3.4 MMOL/L (ref 3.5–5.1)
PROT SERPL-MCNC: 7.9 GM/DL (ref 5.8–7.6)
PROTHROMBIN TIME: 18.8 SECONDS (ref 12.5–14.5)
RBC # BLD AUTO: 5.15 X10(6)/MCL (ref 4.2–5.4)
SODIUM SERPL-SCNC: 142 MMOL/L (ref 136–145)
SPECIMEN OUTDATE: NORMAL
WBC # SPEC AUTO: 9.89 X10(3)/MCL (ref 4.5–11.5)

## 2023-06-04 PROCEDURE — 99223 1ST HOSP IP/OBS HIGH 75: CPT | Mod: AI,,, | Performed by: INTERNAL MEDICINE

## 2023-06-04 PROCEDURE — 63600175 PHARM REV CODE 636 W HCPCS: Performed by: STUDENT IN AN ORGANIZED HEALTH CARE EDUCATION/TRAINING PROGRAM

## 2023-06-04 PROCEDURE — 85025 COMPLETE CBC W/AUTO DIFF WBC: CPT | Performed by: EMERGENCY MEDICINE

## 2023-06-04 PROCEDURE — 93010 ELECTROCARDIOGRAM REPORT: CPT | Mod: ,,, | Performed by: INTERNAL MEDICINE

## 2023-06-04 PROCEDURE — 37000008 HC ANESTHESIA 1ST 15 MINUTES: Performed by: SURGERY

## 2023-06-04 PROCEDURE — 63600175 PHARM REV CODE 636 W HCPCS: Performed by: INTERNAL MEDICINE

## 2023-06-04 PROCEDURE — 96361 HYDRATE IV INFUSION ADD-ON: CPT

## 2023-06-04 PROCEDURE — 83690 ASSAY OF LIPASE: CPT | Performed by: EMERGENCY MEDICINE

## 2023-06-04 PROCEDURE — 85576 BLOOD PLATELET AGGREGATION: CPT | Performed by: STUDENT IN AN ORGANIZED HEALTH CARE EDUCATION/TRAINING PROGRAM

## 2023-06-04 PROCEDURE — 85730 THROMBOPLASTIN TIME PARTIAL: CPT | Performed by: EMERGENCY MEDICINE

## 2023-06-04 PROCEDURE — 25000003 PHARM REV CODE 250

## 2023-06-04 PROCEDURE — 96367 TX/PROPH/DG ADDL SEQ IV INF: CPT

## 2023-06-04 PROCEDURE — 36000707: Performed by: SURGERY

## 2023-06-04 PROCEDURE — 83605 ASSAY OF LACTIC ACID: CPT | Performed by: EMERGENCY MEDICINE

## 2023-06-04 PROCEDURE — 86923 COMPATIBILITY TEST ELECTRIC: CPT | Mod: 91 | Performed by: SURGERY

## 2023-06-04 PROCEDURE — 63600175 PHARM REV CODE 636 W HCPCS: Performed by: ANESTHESIOLOGY

## 2023-06-04 PROCEDURE — 71000033 HC RECOVERY, INTIAL HOUR: Performed by: SURGERY

## 2023-06-04 PROCEDURE — 25000003 PHARM REV CODE 250: Performed by: NURSE ANESTHETIST, CERTIFIED REGISTERED

## 2023-06-04 PROCEDURE — D9220A PRA ANESTHESIA: ICD-10-PCS | Mod: CRNA,,, | Performed by: NURSE ANESTHETIST, CERTIFIED REGISTERED

## 2023-06-04 PROCEDURE — 63600175 PHARM REV CODE 636 W HCPCS: Performed by: EMERGENCY MEDICINE

## 2023-06-04 PROCEDURE — D9220A PRA ANESTHESIA: Mod: ANES,,, | Performed by: ANESTHESIOLOGY

## 2023-06-04 PROCEDURE — 37000009 HC ANESTHESIA EA ADD 15 MINS: Performed by: SURGERY

## 2023-06-04 PROCEDURE — 80053 COMPREHEN METABOLIC PANEL: CPT | Performed by: EMERGENCY MEDICINE

## 2023-06-04 PROCEDURE — 96365 THER/PROPH/DIAG IV INF INIT: CPT

## 2023-06-04 PROCEDURE — P9047 ALBUMIN (HUMAN), 25%, 50ML: HCPCS | Mod: JZ,JG | Performed by: NURSE ANESTHETIST, CERTIFIED REGISTERED

## 2023-06-04 PROCEDURE — 93005 ELECTROCARDIOGRAM TRACING: CPT

## 2023-06-04 PROCEDURE — D9220A PRA ANESTHESIA: Mod: CRNA,,, | Performed by: NURSE ANESTHETIST, CERTIFIED REGISTERED

## 2023-06-04 PROCEDURE — 85610 PROTHROMBIN TIME: CPT | Performed by: EMERGENCY MEDICINE

## 2023-06-04 PROCEDURE — 83036 HEMOGLOBIN GLYCOSYLATED A1C: CPT | Performed by: INTERNAL MEDICINE

## 2023-06-04 PROCEDURE — C9113 INJ PANTOPRAZOLE SODIUM, VIA: HCPCS | Performed by: INTERNAL MEDICINE

## 2023-06-04 PROCEDURE — 86900 BLOOD TYPING SEROLOGIC ABO: CPT | Performed by: SURGERY

## 2023-06-04 PROCEDURE — 99223 PR INITIAL HOSPITAL CARE,LEVL III: ICD-10-PCS | Mod: AI,,, | Performed by: INTERNAL MEDICINE

## 2023-06-04 PROCEDURE — 25000003 PHARM REV CODE 250: Performed by: EMERGENCY MEDICINE

## 2023-06-04 PROCEDURE — 36000706: Performed by: SURGERY

## 2023-06-04 PROCEDURE — 93010 EKG 12-LEAD: ICD-10-PCS | Mod: ,,, | Performed by: INTERNAL MEDICINE

## 2023-06-04 PROCEDURE — 96375 TX/PRO/DX INJ NEW DRUG ADDON: CPT

## 2023-06-04 PROCEDURE — 63600175 PHARM REV CODE 636 W HCPCS: Performed by: NURSE ANESTHETIST, CERTIFIED REGISTERED

## 2023-06-04 PROCEDURE — D9220A PRA ANESTHESIA: ICD-10-PCS | Mod: ANES,,, | Performed by: ANESTHESIOLOGY

## 2023-06-04 PROCEDURE — 71000039 HC RECOVERY, EACH ADD'L HOUR: Performed by: SURGERY

## 2023-06-04 PROCEDURE — 11000001 HC ACUTE MED/SURG PRIVATE ROOM

## 2023-06-04 PROCEDURE — 99285 EMERGENCY DEPT VISIT HI MDM: CPT | Mod: 25

## 2023-06-04 RX ORDER — TIMOLOL MALEATE 5 MG/ML
1 SOLUTION/ DROPS OPHTHALMIC DAILY
Status: DISCONTINUED | OUTPATIENT
Start: 2023-06-04 | End: 2023-06-19 | Stop reason: HOSPADM

## 2023-06-04 RX ORDER — HYDROMORPHONE HYDROCHLORIDE 2 MG/ML
0.4 INJECTION, SOLUTION INTRAMUSCULAR; INTRAVENOUS; SUBCUTANEOUS EVERY 5 MIN PRN
Status: DISCONTINUED | OUTPATIENT
Start: 2023-06-04 | End: 2023-06-04 | Stop reason: HOSPADM

## 2023-06-04 RX ORDER — PROPOFOL 10 MG/ML
VIAL (ML) INTRAVENOUS
Status: DISCONTINUED | OUTPATIENT
Start: 2023-06-04 | End: 2023-06-04

## 2023-06-04 RX ORDER — NITROGLYCERIN 0.4 MG/1
0.4 TABLET SUBLINGUAL EVERY 5 MIN PRN
Status: DISCONTINUED | OUTPATIENT
Start: 2023-06-04 | End: 2023-06-19 | Stop reason: HOSPADM

## 2023-06-04 RX ORDER — ONDANSETRON 2 MG/ML
4 INJECTION INTRAMUSCULAR; INTRAVENOUS ONCE
Status: DISCONTINUED | OUTPATIENT
Start: 2023-06-04 | End: 2023-06-04 | Stop reason: HOSPADM

## 2023-06-04 RX ORDER — ONDANSETRON 2 MG/ML
4 INJECTION INTRAMUSCULAR; INTRAVENOUS
Status: COMPLETED | OUTPATIENT
Start: 2023-06-04 | End: 2023-06-04

## 2023-06-04 RX ORDER — LABETALOL HYDROCHLORIDE 5 MG/ML
20 INJECTION, SOLUTION INTRAVENOUS EVERY 4 HOURS PRN
Status: DISCONTINUED | OUTPATIENT
Start: 2023-06-04 | End: 2023-06-05

## 2023-06-04 RX ORDER — MEPERIDINE HYDROCHLORIDE 25 MG/ML
12.5 INJECTION INTRAMUSCULAR; INTRAVENOUS; SUBCUTANEOUS ONCE
Status: DISCONTINUED | OUTPATIENT
Start: 2023-06-04 | End: 2023-06-04 | Stop reason: HOSPADM

## 2023-06-04 RX ORDER — HYDROMORPHONE HYDROCHLORIDE 2 MG/ML
INJECTION, SOLUTION INTRAMUSCULAR; INTRAVENOUS; SUBCUTANEOUS
Status: DISPENSED
Start: 2023-06-04 | End: 2023-06-05

## 2023-06-04 RX ORDER — LIDOCAINE HYDROCHLORIDE 20 MG/ML
5 SOLUTION OROPHARYNGEAL
Status: DISCONTINUED | OUTPATIENT
Start: 2023-06-04 | End: 2023-06-04

## 2023-06-04 RX ORDER — PANTOPRAZOLE SODIUM 40 MG/10ML
40 INJECTION, POWDER, LYOPHILIZED, FOR SOLUTION INTRAVENOUS DAILY
Status: DISCONTINUED | OUTPATIENT
Start: 2023-06-04 | End: 2023-06-19 | Stop reason: HOSPADM

## 2023-06-04 RX ORDER — ONDANSETRON 2 MG/ML
INJECTION INTRAMUSCULAR; INTRAVENOUS
Status: DISCONTINUED | OUTPATIENT
Start: 2023-06-04 | End: 2023-06-04

## 2023-06-04 RX ORDER — DEXTROSE MONOHYDRATE AND SODIUM CHLORIDE 5; .9 G/100ML; G/100ML
INJECTION, SOLUTION INTRAVENOUS CONTINUOUS
Status: DISCONTINUED | OUTPATIENT
Start: 2023-06-04 | End: 2023-06-05

## 2023-06-04 RX ORDER — LIDOCAINE HYDROCHLORIDE 20 MG/ML
INJECTION, SOLUTION EPIDURAL; INFILTRATION; INTRACAUDAL; PERINEURAL
Status: DISCONTINUED | OUTPATIENT
Start: 2023-06-04 | End: 2023-06-04

## 2023-06-04 RX ORDER — PHENYLEPHRINE HYDROCHLORIDE 10 MG/ML
INJECTION INTRAVENOUS
Status: DISCONTINUED | OUTPATIENT
Start: 2023-06-04 | End: 2023-06-04

## 2023-06-04 RX ORDER — INSULIN ASPART 100 [IU]/ML
0-5 INJECTION, SOLUTION INTRAVENOUS; SUBCUTANEOUS EVERY 6 HOURS PRN
Status: DISCONTINUED | OUTPATIENT
Start: 2023-06-04 | End: 2023-06-19 | Stop reason: HOSPADM

## 2023-06-04 RX ORDER — ALBUMIN HUMAN 250 G/1000ML
SOLUTION INTRAVENOUS CONTINUOUS PRN
Status: DISCONTINUED | OUTPATIENT
Start: 2023-06-04 | End: 2023-06-04

## 2023-06-04 RX ORDER — HYDROMORPHONE HYDROCHLORIDE 2 MG/ML
0.5 INJECTION, SOLUTION INTRAMUSCULAR; INTRAVENOUS; SUBCUTANEOUS EVERY 6 HOURS PRN
Status: DISCONTINUED | OUTPATIENT
Start: 2023-06-04 | End: 2023-06-06

## 2023-06-04 RX ORDER — SODIUM CITRATE AND CITRIC ACID MONOHYDRATE 334; 500 MG/5ML; MG/5ML
SOLUTION ORAL
Status: COMPLETED
Start: 2023-06-04 | End: 2023-06-04

## 2023-06-04 RX ORDER — MORPHINE SULFATE 4 MG/ML
2 INJECTION, SOLUTION INTRAMUSCULAR; INTRAVENOUS EVERY 4 HOURS PRN
Status: DISCONTINUED | OUTPATIENT
Start: 2023-06-04 | End: 2023-06-05

## 2023-06-04 RX ORDER — ENOXAPARIN SODIUM 100 MG/ML
40 INJECTION SUBCUTANEOUS EVERY 12 HOURS
Status: DISCONTINUED | OUTPATIENT
Start: 2023-06-04 | End: 2023-06-05

## 2023-06-04 RX ORDER — ACETAMINOPHEN 10 MG/ML
INJECTION, SOLUTION INTRAVENOUS
Status: DISCONTINUED | OUTPATIENT
Start: 2023-06-04 | End: 2023-06-04

## 2023-06-04 RX ORDER — SUCCINYLCHOLINE CHLORIDE 20 MG/ML
INJECTION INTRAMUSCULAR; INTRAVENOUS
Status: DISCONTINUED | OUTPATIENT
Start: 2023-06-04 | End: 2023-06-04

## 2023-06-04 RX ORDER — MIDAZOLAM HYDROCHLORIDE 1 MG/ML
INJECTION INTRAMUSCULAR; INTRAVENOUS
Status: DISCONTINUED | OUTPATIENT
Start: 2023-06-04 | End: 2023-06-04

## 2023-06-04 RX ORDER — FENTANYL CITRATE 50 UG/ML
INJECTION, SOLUTION INTRAMUSCULAR; INTRAVENOUS
Status: DISCONTINUED | OUTPATIENT
Start: 2023-06-04 | End: 2023-06-04

## 2023-06-04 RX ORDER — BUPIVACAINE HYDROCHLORIDE AND EPINEPHRINE 5; 5 MG/ML; UG/ML
INJECTION, SOLUTION EPIDURAL; INTRACAUDAL; PERINEURAL
Status: DISPENSED
Start: 2023-06-04 | End: 2023-06-05

## 2023-06-04 RX ORDER — ROCURONIUM BROMIDE 10 MG/ML
INJECTION, SOLUTION INTRAVENOUS
Status: DISCONTINUED | OUTPATIENT
Start: 2023-06-04 | End: 2023-06-04

## 2023-06-04 RX ORDER — POTASSIUM CHLORIDE 7.45 MG/ML
10 INJECTION INTRAVENOUS 2 TIMES DAILY
Status: DISCONTINUED | OUTPATIENT
Start: 2023-06-04 | End: 2023-06-08

## 2023-06-04 RX ORDER — GLUCAGON 1 MG
1 KIT INJECTION
Status: DISCONTINUED | OUTPATIENT
Start: 2023-06-04 | End: 2023-06-19 | Stop reason: HOSPADM

## 2023-06-04 RX ORDER — GLYCOPYRROLATE 0.2 MG/ML
INJECTION INTRAMUSCULAR; INTRAVENOUS
Status: DISCONTINUED | OUTPATIENT
Start: 2023-06-04 | End: 2023-06-04

## 2023-06-04 RX ORDER — HYDROMORPHONE HYDROCHLORIDE 2 MG/ML
0.5 INJECTION, SOLUTION INTRAMUSCULAR; INTRAVENOUS; SUBCUTANEOUS
Status: COMPLETED | OUTPATIENT
Start: 2023-06-04 | End: 2023-06-04

## 2023-06-04 RX ADMIN — ALBUMIN (HUMAN): 12.5 SOLUTION INTRAVENOUS at 05:06

## 2023-06-04 RX ADMIN — SODIUM CHLORIDE, POTASSIUM CHLORIDE, SODIUM LACTATE AND CALCIUM CHLORIDE 1000 ML: 600; 310; 30; 20 INJECTION, SOLUTION INTRAVENOUS at 09:06

## 2023-06-04 RX ADMIN — SODIUM CITRATE AND CITRIC ACID MONOHYDRATE 30 ML: 500; 334 SOLUTION ORAL at 04:06

## 2023-06-04 RX ADMIN — ACETAMINOPHEN 1000 MG: 10 INJECTION, SOLUTION INTRAVENOUS at 05:06

## 2023-06-04 RX ADMIN — HYDROMORPHONE HYDROCHLORIDE 0.4 MG: 2 INJECTION INTRAMUSCULAR; INTRAVENOUS; SUBCUTANEOUS at 06:06

## 2023-06-04 RX ADMIN — ONDANSETRON 4 MG: 2 INJECTION INTRAMUSCULAR; INTRAVENOUS at 07:06

## 2023-06-04 RX ADMIN — ONDANSETRON 4 MG: 2 INJECTION INTRAMUSCULAR; INTRAVENOUS at 05:06

## 2023-06-04 RX ADMIN — FENTANYL CITRATE 100 MCG: 50 INJECTION, SOLUTION INTRAMUSCULAR; INTRAVENOUS at 04:06

## 2023-06-04 RX ADMIN — PHENYLEPHRINE HYDROCHLORIDE 300 MCG: 10 INJECTION INTRAVENOUS at 04:06

## 2023-06-04 RX ADMIN — ROCURONIUM BROMIDE 10 MG: 10 SOLUTION INTRAVENOUS at 04:06

## 2023-06-04 RX ADMIN — LIDOCAINE HYDROCHLORIDE 40 MG: 20 INJECTION, SOLUTION EPIDURAL; INFILTRATION; INTRACAUDAL; PERINEURAL at 04:06

## 2023-06-04 RX ADMIN — PROPOFOL 50 MG: 10 INJECTION, EMULSION INTRAVENOUS at 05:06

## 2023-06-04 RX ADMIN — DEXTROSE AND SODIUM CHLORIDE: 5; 900 INJECTION, SOLUTION INTRAVENOUS at 11:06

## 2023-06-04 RX ADMIN — DEXTROSE AND SODIUM CHLORIDE: 5; 900 INJECTION, SOLUTION INTRAVENOUS at 08:06

## 2023-06-04 RX ADMIN — PROPOFOL 50 MG: 10 INJECTION, EMULSION INTRAVENOUS at 06:06

## 2023-06-04 RX ADMIN — POTASSIUM CHLORIDE 10 MEQ: 7.46 INJECTION, SOLUTION INTRAVENOUS at 10:06

## 2023-06-04 RX ADMIN — ENOXAPARIN SODIUM 40 MG: 40 INJECTION SUBCUTANEOUS at 11:06

## 2023-06-04 RX ADMIN — PIPERACILLIN AND TAZOBACTAM 4.5 G: 2; .25 INJECTION, POWDER, LYOPHILIZED, FOR SOLUTION INTRAVENOUS; PARENTERAL at 05:06

## 2023-06-04 RX ADMIN — MIDAZOLAM HYDROCHLORIDE 2 MG: 1 INJECTION, SOLUTION INTRAMUSCULAR; INTRAVENOUS at 04:06

## 2023-06-04 RX ADMIN — PROPOFOL 150 MG: 10 INJECTION, EMULSION INTRAVENOUS at 04:06

## 2023-06-04 RX ADMIN — HYDROMORPHONE HYDROCHLORIDE 0.5 MG: 2 INJECTION INTRAMUSCULAR; INTRAVENOUS; SUBCUTANEOUS at 12:06

## 2023-06-04 RX ADMIN — SODIUM CHLORIDE, SODIUM GLUCONATE, SODIUM ACETATE, POTASSIUM CHLORIDE AND MAGNESIUM CHLORIDE: 526; 502; 368; 37; 30 INJECTION, SOLUTION INTRAVENOUS at 04:06

## 2023-06-04 RX ADMIN — SUGAMMADEX 200 MG: 100 INJECTION, SOLUTION INTRAVENOUS at 05:06

## 2023-06-04 RX ADMIN — POTASSIUM CHLORIDE 10 MEQ: 7.46 INJECTION, SOLUTION INTRAVENOUS at 11:06

## 2023-06-04 RX ADMIN — SUCCINYLCHOLINE CHLORIDE 160 MG: 20 INJECTION, SOLUTION INTRAMUSCULAR; INTRAVENOUS at 04:06

## 2023-06-04 RX ADMIN — GLYCOPYRROLATE 0.1 MG: 0.2 INJECTION INTRAMUSCULAR; INTRAVENOUS at 04:06

## 2023-06-04 RX ADMIN — ENOXAPARIN SODIUM 40 MG: 40 INJECTION SUBCUTANEOUS at 01:06

## 2023-06-04 RX ADMIN — ROCURONIUM BROMIDE 30 MG: 10 SOLUTION INTRAVENOUS at 05:06

## 2023-06-04 RX ADMIN — PANTOPRAZOLE SODIUM 40 MG: 40 INJECTION, POWDER, FOR SOLUTION INTRAVENOUS at 10:06

## 2023-06-04 RX ADMIN — MORPHINE SULFATE 2 MG: 4 INJECTION INTRAVENOUS at 10:06

## 2023-06-04 RX ADMIN — PIPERACILLIN AND TAZOBACTAM 4.5 G: 4; .5 INJECTION, POWDER, LYOPHILIZED, FOR SOLUTION INTRAVENOUS; PARENTERAL at 09:06

## 2023-06-04 RX ADMIN — HYDROMORPHONE HYDROCHLORIDE 0.5 MG: 2 INJECTION INTRAMUSCULAR; INTRAVENOUS; SUBCUTANEOUS at 07:06

## 2023-06-04 RX ADMIN — SODIUM CHLORIDE, POTASSIUM CHLORIDE, SODIUM LACTATE AND CALCIUM CHLORIDE 1000 ML: 600; 310; 30; 20 INJECTION, SOLUTION INTRAVENOUS at 07:06

## 2023-06-04 NOTE — BRIEF OP NOTE
[facility]  Brief Operative Note    SUMMARY     Surgery Date: 6/4/2023     Surgeon(s) and Role:     * Sd Conway Jr., MD - Primary    Assisting Surgeon: Matilde Vasquez MD PGY2 Angeles Cagle MD PGY1    Pre-op Diagnosis:  Small bowel obstruction [K56.609]    Post-op Diagnosis:  Post-Op Diagnosis Codes:     * Small bowel obstruction [K56.609]    Procedure(s) (LRB):  LAPAROTOMY, EXPLORATORY (N/A)  LYSIS, ADHESIONS (N/A)    Anesthesia: General    Operative Findings: many omental adhesions, loop of small bowel adhesed to left later abdominal wall with large stool ball proximal to twisted loop, stool ball broken up and adhesions lysed. Dilated injected bowel proximally and collapsed bowel distally, all pink and viable.    Estimated Blood Loss: Minimal    Estimated Blood Loss has been documented.         Specimens:   Specimen (24h ago, onward)      None            [id number]    Patient tolerated the procedure well and was awoken from anesthesia, extubated, and taken to PACU without immediate complication.    Plan: keep Vasquez overnight, NGT to LIWS with PPI, okay for ice chips for comfort, okay to clamp for meds.    Matilde Vasquez MD  [facility] General Surgery [facility]

## 2023-06-04 NOTE — ANESTHESIA PREPROCEDURE EVALUATION
"                                                                                                             06/04/2023  Alejandrina Rodriguez is a 73 y.o., female   Pre-operative evaluation for Procedure(s) (LRB):  LAPAROTOMY, EXPLORATORY (N/A)    BP (!) 145/99   Pulse 68   Temp 36.7 °C (98.1 °F)   Resp (!) 22   Ht 5' 4" (1.626 m)   Wt 61.2 kg (135 lb)   SpO2 96%   BMI 23.17 kg/m²     Past Medical History:   Diagnosis Date    Abnormal blood smear     Acid reflux     Anxiety and depression     Bilateral carotid artery disease     Cervical spondylosis with radiculopathy     Chronic lumbar pain     Diabetes mellitus     History of continuous positive airway pressure (CPAP) therapy at home     HLD (hyperlipidemia)     Hypertension     Iron deficiency anemia, unspecified     Neuropathy     On anticoagulant therapy     Osteopenia     Personal history of colonic polyps 07/14/2014    Protein C deficiency     Recurrent deep vein thrombosis (DVT) 6/21/2022    Sleep apnea, unspecified     Unspecified osteoarthritis, unspecified site        Patient Active Problem List   Diagnosis    Hip bursitis    Diabetes mellitus    Protein C deficiency    Recurrent deep vein thrombosis (DVT)    Hypokalemia    Rosacea    Nail fungus    Acid reflux    Anxiety and depression    Back pain    Bilateral carotid artery disease    Cervical spondylosis with radiculopathy    Chronic lumbar pain    Personal history of colonic polyps    Osteopenia    On anticoagulant therapy    Neuropathy    Iron deficiency anemia, unspecified    Hypertension    HLD (hyperlipidemia)    History of continuous positive airway pressure (CPAP) therapy at home    Sleep apnea, unspecified    SBO (small bowel obstruction)       Review of patient's allergies indicates:   Allergen Reactions    Hydrocodone-acetaminophen      Other reaction(s): Difficulty breathing, Throat tightness    Oxycodone-acetaminophen Hallucinations    Iodine  "    Meperidine      Other reaction(s): Unknown (qualifier value)    Promethazine      Other reaction(s): Unknown (qualifier value), Unknown (qualifier value)       Current Outpatient Medications   Medication Instructions    azelastine (ASTELIN) 137 mcg, Nasal, 2 times daily    B-complex with vitamin C (Z-BEC OR EQUIV) tablet 1 tablet, Oral, Daily    baclofen (LIORESAL) 10 mg, Oral, 4 times daily, PRN muscle spasms    betamethasone dipropionate 0.05 % cream 1 application, Topical (Top), 2 times daily    celecoxib (CELEBREX) 100 MG capsule TAKE ONE CAPSULE BY MOUTH ONCE DAILY WITH FOOD AS NEEDED FOR PAIN    clopidogreL (PLAVIX) 75 mg tablet 1 tablet, Oral, Daily    famotidine (PEPCID) 20 MG tablet TAKE 1 TABLET TWICE DAILY    hydroCHLOROthiazide (HYDRODIURIL) 25 MG tablet TAKE 1 TABLET EVERY DAY    LEXAPRO 10 mg tablet TAKE 1 TABLET BY MOUTH ONCE DAILY AS DIRECTED FOR MOOD/CHRONIC PAIN AS DIRECTED    LIDOcaine (LIDODERM) 5 % 1 patch, Transdermal, Every 24 hours (non-standard times), Remove & Discard patch within 12 hours or as directed by MD    metFORMIN (GLUCOPHAGE) 500 mg, Oral, 2 times daily    morphine (MSIR) 15 MG tablet 1.5 tablets, Oral, 2 times daily PRN    nitroGLYCERIN (NITROSTAT) 0.4 MG SL tablet PLEASE SEE ATTACHED FOR DETAILED DIRECTIONS    OMEGA-3-EPA-FISH OIL ORAL 1 capsule, Oral, Daily    omeprazole (PRILOSEC) 40 MG capsule TAKE 1 CAPSULE EVERY DAY    ondansetron (ZOFRAN-ODT) 8 mg, Oral, 2 times daily    peg 400-propylene glycol (SYSTANE, PROPYLENE GLYCOL,) 0.4-0.3 % Drop 1 drop, Ophthalmic    potassium chloride SA (K-DUR,KLOR-CON) 20 MEQ tablet TAKE 1 TABLET ONE TIME DAILY    pravastatin (PRAVACHOL) 40 MG tablet TAKE 1 TABLET EVERY DAY    timolol maleate 0.5% (TIMOPTIC) 0.5 % Drop 1 drop, Both Eyes, Daily    vitamin D (VITAMIN D3) 5,000 Units, Oral, Daily    warfarin (COUMADIN) 4 MG tablet TAKE 1 TABLET EVERY DAY       Past Surgical History:   Procedure Laterality Date     COLONOSCOPY  07/14/2014    COLONOSCOPY W/ POLYPECTOMY  08/29/2022    Humberto Chopra/ Follow up pending pathology results    SPINAL CORD STIMULATOR IMPLANT           Lab Results   Component Value Date    WBC 9.89 06/04/2023    HGB 16.4 (H) 06/04/2023    HCT 48.0 (H) 06/04/2023    MCV 93.2 06/04/2023     06/04/2023          BMP  Lab Results   Component Value Date     06/04/2023    K 3.4 (L) 06/04/2023    CHLORIDE 96 (L) 06/04/2023    CO2 29 06/04/2023    GLUCOSE 116 (H) 06/04/2023    BUN 13.3 06/04/2023    CREATININE 0.70 06/04/2023    CALCIUM 9.8 06/04/2023    EGFRNONAA >60 09/13/2021        INR  Recent Labs     06/04/23  1201   INR 1.59*   PROTIME 18.8*           Diagnostic Studies:      EKG:  Results for orders placed or performed during the hospital encounter of 05/20/22   EKG 12-lead    Collection Time: 05/20/22  3:40 PM    Narrative    Test Reason : R07.9,    Vent. Rate : 056 BPM     Atrial Rate : 056 BPM     P-R Int : 168 ms          QRS Dur : 084 ms      QT Int : 468 ms       P-R-T Axes : 071 063 061 degrees     QTc Int : 451 ms    Sinus bradycardia  Otherwise normal ECG  Confirmed by Leeroy Goss MD ([id number]) on 5/21/2022 8:21:48 AM    Referred By:             Confirmed By:Leeroy Goss MD       No results found for this or any previous visit.        .Last Plavix and Coumadin 2d ago    ASSESSMENT & PLAN:    74 y/o w/ hx ex lap several decades ago here with SBO. Hemodynamically stable, labs wnl, mild TTP on exam. Discussed attempting initial conservative management first due to multiple blood thinners and stable clinical picture. Will likely require surgery during this admission considering appearance of bowel on CT scan with apparent swirl in mesentery. If her exam or pain changes significantly we will take her to OR sooner otherwise likely tomorrow. Pt and  are in agreement with this plan,     -hospital medicine for admission & mgmt comorbidities  -ngt to [facility]  -npo with  mivf  -replace electrolytes as needed  -repeat labs in AM including coags. INR 1.6 today  -platelet function assay ordered, pending.  -repeat abd exams  -hold anticoagulation  -ppi     Matilde Vasquez MD  [facility] General Surgery [facility]II         Pre-op Assessment    I have reviewed the Patient Summary Reports.    I have reviewed the NPO Status.   I have reviewed the Medications.     Review of Systems  Anesthesia Hx:  No problems with previous Anesthesia  Denies Family Hx of Anesthesia complications.   Denies Personal Hx of Anesthesia complications.   Cardiovascular:  Cardiovascular Normal  No Cardiac Complaints   Pulmonary:  Pulmonary Normal No Pulmonary Complaints   Hepatic/GI:   No Current GERD Sx       Physical Exam  General: Alert and Oriented    Airway:  Mallampati: II   Mouth Opening: Normal  TM Distance: Normal  Tongue: Normal  Neck ROM: Normal ROM    Dental:  Intact    Chest/Lungs:  Clear to auscultation, Normal Respiratory Rate    Heart:  Rate: Normal  Rhythm: Regular Rhythm        Anesthesia Plan  Type of Anesthesia, risks & benefits discussed:    Anesthesia Type: Gen ETT  Intra-op Monitoring Plan: Standard ASA Monitors  Post Op Pain Control Plan: multimodal analgesia  Induction:  IV and Inhalation  Airway Plan: Direct, Post-Induction  Informed Consent: Informed consent signed with the Patient and all parties understand the risks and agree with anesthesia plan.  All questions answered.   ASA Score: 3  Day of Surgery Review of History & Physical: H&P Update referred to the surgeon/provider.  Anesthesia Plan Notes: Discussed Anesthetic Plan w/ Pt/Family. Questions Entertained. Accepted.    Ready For Surgery From Anesthesia Perspective.     .    Bicitra given

## 2023-06-04 NOTE — ANESTHESIA PROCEDURE NOTES
Intubation    Date/Time: 6/4/2023 4:53 PM  Performed by: Justyn Beltre CRNA  Authorized by: Neptali Pratt MD     Intubation:     Induction:  Rapid sequence induction    Intubated:  Postinduction    Mask Ventilation:  Not attempted    Attempts:  1    Attempted By:  CRNA    Method of Intubation:  Direct    Blade:  Landon 2    Laryngeal View Grade: Grade IIA - cords partially seen      Difficult Airway Encountered?: No      Complications:  None    Airway Device:  Oral endotracheal tube    Airway Device Size:  7.5    Style/Cuff Inflation:  Cuffed    Tube secured:  21    Secured at:  The lips    Placement Verified By:  Capnometry    Complicating Factors:  None    Findings Post-Intubation:  BS equal bilateral  Notes:      Ngt sxn prior

## 2023-06-04 NOTE — CONSULTS
Acute Care Surgery   History and Physical Note    Patient Name: Alejandrina Rodriguez  YOB: 1950  Date: 06/04/2023 2:39 PM  Date of Admission: 6/4/2023  HD#0  POD#* No surgery found *    PRESENTING HISTORY   Chief Complaint/Reason for Admission: SBO (small bowel obstruction)    History of Present Illness:  74 y/o F with 2 days of cramping abdominal pain, n/v, constipation. Last bm Friday, does have hx chronic constipation. No sob cp fevers dysuria hematochezia melena. She states she had an ex lap in the 70s where a piece of her colon had to be repaired with a piece of her stomach. No issues since then. On plavix for L iliac vein stent and coumadin for hx DVT with protein C deficiency. CT scan shows dilated proximal small bowel with transition point in pelvis.    Review of Systems:  12 point ROS negative except as stated in HPI    PAST HISTORY:   Past medical history:  Past Medical History:   Diagnosis Date    Abnormal blood smear     Acid reflux     Anxiety and depression     Bilateral carotid artery disease     Cervical spondylosis with radiculopathy     Chronic lumbar pain     Diabetes mellitus     History of continuous positive airway pressure (CPAP) therapy at home     HLD (hyperlipidemia)     Hypertension     Iron deficiency anemia, unspecified     Neuropathy     On anticoagulant therapy     Osteopenia     Personal history of colonic polyps 07/14/2014    Protein C deficiency     Recurrent deep vein thrombosis (DVT) 6/21/2022    Sleep apnea, unspecified     Unspecified osteoarthritis, unspecified site        Past surgical history:  Past Surgical History:   Procedure Laterality Date    COLONOSCOPY  07/14/2014    COLONOSCOPY W/ POLYPECTOMY  08/29/2022    Humberto Chopra/ Follow up pending pathology results    SPINAL CORD STIMULATOR IMPLANT         Family history:  Family History   Problem Relation Age of Onset    Cancer Mother     Hypertension Mother     Diabetes Mother     Arthritis Mother     Cervical  cancer Mother     Hypertension Father     Diabetes Father     Arthritis Father     Heart disease Father     Prostate cancer Father        Social history:  Social History     Socioeconomic History    Marital status:    Tobacco Use    Smoking status: Never    Smokeless tobacco: Never   Substance and Sexual Activity    Alcohol use: Never    Drug use: Never    Sexual activity: Yes     Partners: Male     Social History     Tobacco Use   Smoking Status Never   Smokeless Tobacco Never      Social History     Substance and Sexual Activity   Alcohol Use Never        MEDICATIONS & ALLERGIES:     No current facility-administered medications on file prior to encounter.     Current Outpatient Medications on File Prior to Encounter   Medication Sig    azelastine (ASTELIN) 137 mcg (0.1 %) nasal spray 1 spray (137 mcg total) by Nasal route 2 (two) times daily.    B-complex with vitamin C (Z-BEC OR EQUIV) tablet Take 1 tablet by mouth once daily.    baclofen (LIORESAL) 10 MG tablet Take 10 mg by mouth 4 (four) times daily. PRN muscle spasms    betamethasone dipropionate 0.05 % cream Apply 1 application topically 2 (two) times daily.    celecoxib (CELEBREX) 100 MG capsule TAKE ONE CAPSULE BY MOUTH ONCE DAILY WITH FOOD AS NEEDED FOR PAIN    clopidogreL (PLAVIX) 75 mg tablet Take 1 tablet by mouth Daily.    famotidine (PEPCID) 20 MG tablet TAKE 1 TABLET TWICE DAILY    hydroCHLOROthiazide (HYDRODIURIL) 25 MG tablet TAKE 1 TABLET EVERY DAY    LEXAPRO 10 mg tablet TAKE 1 TABLET BY MOUTH ONCE DAILY AS DIRECTED FOR MOOD/CHRONIC PAIN AS DIRECTED    LIDOcaine (LIDODERM) 5 % Place 1 patch onto the skin every 24 hours. Remove & Discard patch within 12 hours or as directed by MD    metFORMIN (GLUCOPHAGE) 500 MG tablet Take 500 mg by mouth 2 (two) times a day.    morphine (MSIR) 15 MG tablet Take 1.5 tablets by mouth 2 (two) times daily as needed.    nitroGLYCERIN (NITROSTAT) 0.4 MG SL tablet PLEASE SEE ATTACHED FOR DETAILED DIRECTIONS     "OMEGA-3-EPA-FISH OIL ORAL Take 1 capsule by mouth once daily.    omeprazole (PRILOSEC) 40 MG capsule TAKE 1 CAPSULE EVERY DAY    ondansetron (ZOFRAN-ODT) 8 MG TbDL Take 1 tablet (8 mg total) by mouth 2 (two) times daily.    peg 400-propylene glycol (SYSTANE, PROPYLENE GLYCOL,) 0.4-0.3 % Drop Apply 1 drop to eye.    potassium chloride SA (K-DUR,KLOR-CON) 20 MEQ tablet TAKE 1 TABLET ONE TIME DAILY    pravastatin (PRAVACHOL) 40 MG tablet TAKE 1 TABLET EVERY DAY    timolol maleate 0.5% (TIMOPTIC) 0.5 % Drop Place 1 drop into both eyes Daily.    vitamin D (VITAMIN D3) 1000 units Tab Take 5,000 Units by mouth once daily.    warfarin (COUMADIN) 4 MG tablet TAKE 1 TABLET EVERY DAY       Allergies:   Review of patient's allergies indicates:   Allergen Reactions    Hydrocodone-acetaminophen      Other reaction(s): Difficulty breathing, Throat tightness    Oxycodone-acetaminophen Hallucinations    Iodine     Meperidine      Other reaction(s): Unknown (qualifier value)    Promethazine      Other reaction(s): Unknown (qualifier value), Unknown (qualifier value)       Scheduled Meds:   benzocaine   Mouth/Throat ED 1 Time    enoxparin  40 mg Subcutaneous Q12H (prophylaxis, 0900/2100)    pantoprazole  40 mg Intravenous Daily    potassium chloride in water  10 mEq Intravenous BID    timolol maleate 0.5%  1 drop Both Eyes Daily       Continuous Infusions:   dextrose 5 % and 0.9 % NaCl         PRN Meds:dextrose 10%, dextrose 10%, glucagon (human recombinant), HYDROmorphone, insulin aspart U-100, labetaloL, nitroGLYCERIN    OBJECTIVE:   Vital Signs:  VITAL SIGNS: 24 HR MIN & MAX LAST   Temp  Min: 98.1 °F (36.7 °C)  Max: 98.1 °F (36.7 °C)  98.1 °F (36.7 °C)   BP  Min: 128/77  Max: 173/114  128/77    Pulse  Min: 74  Max: 92  74    Resp  Min: 17  Max: 21  20    SpO2  Min: 95 %  Max: 100 %  95 %      HT: 5' 4" (162.6 cm)  WT: 61.2 kg (135 lb)  BMI: 23.2     Intake/output:  Intake/Output - Last 3 Shifts       None          No intake or " output data in the 24 hours ending 06/04/23 1439      Physical Exam:  General: Well developed, well nourished, no acute distress  HEENT: Normocephalic, atraumatic, PERRL, ngt in place  CV: RR  Resp: NWOB  GI:  Abdomen soft, ND, mild TTP R sided abdomen no guarding or rebound. No masses palpated. Ex lap incision well healed  MSK: No muscle atrophy, cyanosis, peripheral edema, moving all extremities spontaneously  Skin/wounds:  No rashes, ulcers, erythema  Neuro:  CNII-XII grossly intact, alert and oriented to person, place, and time    Labs:  Troponin:  No results for input(s): TROPONINI in the last 72 hours.  CBC:  Recent Labs     06/04/23  0739   WBC 9.89   RBC 5.15   HGB 16.4*   HCT 48.0*      MCV 93.2   MCH 31.8*   MCHC 34.2     CMP:  Recent Labs     06/04/23  0739   CALCIUM 9.8   ALBUMIN 4.2      K 3.4*   CO2 29   BUN 13.3   CREATININE 0.70   ALKPHOS 101   ALT 17   AST 24   BILITOT 1.6*     Lactic Acid:  No results for input(s): LACTATE in the last 72 hours.  ETOH:  No results for input(s): ETHANOL in the last 72 hours.   Urine Drug Screen:  No results for input(s): COCAINE, OPIATE, BARBITURATE, AMPHETAMINE, FENTANYL, CANNABINOIDS, MDMA in the last 72 hours.    Invalid input(s): BENZODIAZEPINE, PHENCYCLIDINE   ABG:  No results for input(s): PH, PCO2, PO2, HCO3, BE, POCSATURATED in the last 72 hours.    Diagnostic Results:  XR Gastric tube check, non-radiologist performed   Final Result      NG tube projects over the body of the stomach.         Electronically signed by: Evin Manzo   Date:    06/04/2023   Time:    10:47      CT Abdomen Pelvis  Without Contrast   Final Result      Suspected small-bowel obstruction with transition point in the right lower abdomen.         Electronically signed by: Sunil Peralta   Date:    06/04/2023   Time:    09:05      XR Gastric tube check, non-radiologist performed    (Results Pending)       ASSESSMENT & PLAN:    74 y/o w/ hx ex lap several decades ago here  with SBO. Hemodynamically stable, labs wnl, mild TTP on exam. Discussed attempting initial conservative management first due to multiple blood thinners and stable clinical picture. Will likely require surgery during this admission considering appearance of bowel on CT scan with apparent swirl in mesentery. If her exam or pain changes significantly we will take her to OR sooner otherwise likely tomorrow. Pt and  are in agreement with this plan,    -hospital medicine for admission & mgmt comorbidities  -ngt to liws  -npo with mivf  -replace electrolytes as needed  -repeat labs in AM including coags. INR 1.6 today  -platelet function assay ordered, pending.  -repeat abd exams  -hold anticoagulation  -ppi    Matilde Vasquez MD  [employer]

## 2023-06-04 NOTE — TRANSFER OF CARE
"Anesthesia Transfer of Care Note    Patient: Alejandrina Rodriguez    Procedure(s) Performed: Procedure(s) (LRB):  LAPAROTOMY, EXPLORATORY (N/A)  LYSIS, ADHESIONS (N/A)    Patient location: PACU    Anesthesia Type: general    Transport from OR: Transported from OR on room air with adequate spontaneous ventilation    Post pain: adequate analgesia    Post assessment: no apparent anesthetic complications    Post vital signs: stable    Level of consciousness: awake and responds to stimulation    Nausea/Vomiting: no nausea/vomiting    Complications: none    Transfer of care protocol was followed      Last vitals:   Visit Vitals  BP (!) 172/89   Pulse 70   Temp 36.7 °C (98.1 °F)   Resp 20   Ht 5' 4" (1.626 m)   Wt 61.2 kg (135 lb)   SpO2 97%   BMI 23.17 kg/m²     "

## 2023-06-04 NOTE — ED PROVIDER NOTES
Encounter Date: 6/4/2023    SCRIBE #1 NOTE: I, Brandi Rogel, am scribing for, and in the presence of,  Erasmo Cardoza III, MD. I have scribed the following portions of the note - Other sections scribed: HPI, ROS, PE.     History     Chief Complaint   Patient presents with    Abdominal Pain     Pt reports upper abd pain, bloating nausea and vomiting for a couple of days. Pt was seen at urgent care and given nausea meds however pt states she is now vomiting chunks that are brown. Pt concerned with obstruction. Pt states she had exploratory surgery on her colon in the 70s and they had to take part of her stomach to repair her colon.      72 y/o female with pmhx of Exploratory surgery, DM, HLD, HTN, DVT, and CAD presents to ED for worsening, generalized abdominal pain with associated vomiting onset 2 days ago. Pt reports she came home from grocery shopping and started cramping that lasted throughout the night. The next day, she went to [facility] and was given a shot of Ondansetron and prescribed Ondansetron PO with no relief. She reports chronic constipation and chills, but denies diarrhea, hematemesis, SOB, chest pain, and irregular flatulence. States she is on pain medications for her lower back.  at bedside states pt had a colonoscopy last year and found a polyp stuck in esophagus, but was unable to remove due to risks.     The history is provided by the patient and the spouse. No  was used.   Review of patient's allergies indicates:   Allergen Reactions    Hydrocodone-acetaminophen      Other reaction(s): Difficulty breathing, Throat tightness    Oxycodone-acetaminophen Hallucinations    Iodine     Meperidine      Other reaction(s): Unknown (qualifier value)    Promethazine      Other reaction(s): Unknown (qualifier value), Unknown (qualifier value)     Past Medical History:   Diagnosis Date    Abnormal blood smear     Acid reflux     Anxiety and depression     Bilateral carotid  artery disease     Cervical spondylosis with radiculopathy     Chronic lumbar pain     Diabetes mellitus     History of continuous positive airway pressure (CPAP) therapy at home     HLD (hyperlipidemia)     Hypertension     Iron deficiency anemia, unspecified     Neuropathy     On anticoagulant therapy     Osteopenia     Personal history of colonic polyps 07/14/2014    Protein C deficiency     Recurrent deep vein thrombosis (DVT) 6/21/2022    Sleep apnea, unspecified     Unspecified osteoarthritis, unspecified site      Past Surgical History:   Procedure Laterality Date    COLONOSCOPY  07/14/2014    COLONOSCOPY W/ POLYPECTOMY  08/29/2022    Humberto Chopra/ Follow up pending pathology results    SPINAL CORD STIMULATOR IMPLANT       Family History   Problem Relation Age of Onset    Cancer Mother     Hypertension Mother     Diabetes Mother     Arthritis Mother     Cervical cancer Mother     Hypertension Father     Diabetes Father     Arthritis Father     Heart disease Father     Prostate cancer Father      Social History     Tobacco Use    Smoking status: Never    Smokeless tobacco: Never   Substance Use Topics    Alcohol use: Never    Drug use: Never     Review of Systems   Constitutional:  Positive for chills. Negative for fatigue and fever.   HENT:  Negative for congestion and sore throat.    Eyes:  Negative for visual disturbance.   Respiratory:  Negative for cough and shortness of breath.    Cardiovascular:  Negative for chest pain.   Gastrointestinal:  Positive for abdominal pain (generalized), constipation and vomiting. Negative for diarrhea and nausea.   Genitourinary:  Negative for dysuria.   Musculoskeletal:  Negative for myalgias.   Skin:  Negative for rash.   Neurological:  Negative for weakness, numbness and headaches.   All other systems reviewed and are negative.    Physical Exam     Initial Vitals [06/04/23 0714]   BP Pulse Resp Temp SpO2   (!) 161/99 92 18 98.1 °F (36.7 °C) 99 %      MAP       --          Physical Exam    Nursing note and vitals reviewed.  Constitutional:   Pt appears moderately uncomfortable.   HENT:   Head: Normocephalic and atraumatic.   Neck: Trachea normal.   Cardiovascular:  Normal rate and regular rhythm.           No murmur heard.  Pulmonary/Chest: Breath sounds normal. No respiratory distress.   Abdominal: She exhibits distension (mild). There is abdominal tenderness (global).   Musculoskeletal:         General: Normal range of motion.      Lumbar back: Normal range of motion.     Neurological: She is alert and oriented to person, place, and time. She has normal strength. No cranial nerve deficit.   Skin: Skin is warm and dry. No rash noted.   Psychiatric: She has a normal mood and affect. Judgment normal.       ED Course   Procedures  Labs Reviewed   COMPREHENSIVE METABOLIC PANEL - Abnormal; Notable for the following components:       Result Value    Potassium Level 3.4 (*)     Chloride 96 (*)     Glucose Level 116 (*)     Protein Total 7.9 (*)     Globulin 3.7 (*)     Bilirubin Total 1.6 (*)     All other components within normal limits   CBC WITH DIFFERENTIAL - Abnormal; Notable for the following components:    Hgb 16.4 (*)     Hct 48.0 (*)     MCH 31.8 (*)     MPV 10.7 (*)     All other components within normal limits   LIPASE - Normal   LACTIC ACID, PLASMA - Normal   CBC W/ AUTO DIFFERENTIAL    Narrative:     The following orders were created for panel order CBC W/ AUTO DIFFERENTIAL.  Procedure                               Abnormality         Status                     ---------                               -----------         ------                     CBC with Differential[[id number]]        Abnormal            Final result                 Please view results for these tests on the individual orders.   URINALYSIS, REFLEX TO URINE CULTURE   PROTIME-INR   APTT          Imaging Results              CT Abdomen Pelvis  Without Contrast (Final result)  Result time 06/04/23 09:05:58       Final result by Sunil Peralta MD (06/04/23 09:05:58)                   Impression:      Suspected small-bowel obstruction with transition point in the right lower abdomen.      Electronically signed by: Sunil Peralta  Date:    06/04/2023  Time:    09:05               Narrative:    EXAMINATION:  CT ABDOMEN PELVIS WITHOUT CONTRAST    CLINICAL HISTORY:  Abdominal pain, acute, nonlocalized;    TECHNIQUE:  CT imaging of the abdomen and pelvis without intravenous contrast. Axial, coronal and sagittal reformatted images reviewed. Dose length product is 460 mGycm. Automatic exposure control, adjustment of mA/kV or iterative reconstruction technique used to limit radiation dose.    COMPARISON:  CTA 07/08/2022    FINDINGS:  Assessment of the visceral organs and vasculature is limited by the lack of IV contrast.    Liver/biliary: No concerning hepatic findings. No radiodense gallstone or biliary dilatation appreciated.    Pancreas: Normal.    Spleen: Normal.    Adrenals: Normal.    Genitourinary: No defined urinary stone or hydronephrosis.  Bladder within normal limits.    Stomach/bowel: Proximal and mid segments of small bowel are fluid-filled and dilated, measuring up to about 4 cm.  Suspected transition point in the right lower abdomen (series 2, images 70 through 84).  Appendix not confidently seen.    Lymph nodes and peritoneum: No pathologically enlarged lymph node identified with noncontrast technique. No pneumoperitoneum or significant ascites.    Vasculature: Small caliber labial varices.  Left external/common iliac venous stent.    Abdominal wall: Subcutaneous generator in the right buttock with lead extending to the central canal.    Lung bases: No consolidation or pleural effusion.    Bones: No acute osseous findings.                                       Medications   piperacillin-tazobactam (ZOSYN) 4.5 g in dextrose 5 % in water (D5W) 5 % 100 mL IVPB (MB+) (4.5 g Intravenous New Bag 6/4/23 0915)    lactated ringers bolus 1,000 mL (1,000 mLs Intravenous New Bag 6/4/23 0915)   LIDOcaine HCl 2% oral solution 5 mL (has no administration in time range)   benzocaine 20 % mouth spray (has no administration in time range)   ondansetron injection 4 mg (4 mg Intravenous Given 6/4/23 0754)   HYDROmorphone (PF) injection 0.5 mg (0.5 mg Intravenous Given 6/4/23 0754)   lactated ringers bolus 1,000 mL (1,000 mLs Intravenous New Bag 6/4/23 0757)       Medical Decision Making  The differential diagnosis includes, but is not limited to: Gastritis, Pancreatitis, Bowel obstruction and perforation.     Global abdominal pain without outright rebound or guarding.  Patient's CBC normal was given IV fluids pain control with some moderation of symptoms CT did show dilated bowel consistent with small-bowel obstruction patient was given IV Zosyn and surgery consulted for surgical management NG tube will be placed no evidence of perforation    Amount and/or Complexity of Data Reviewed  Independent Historian: spouse     Details:  at bedside states pt had a colonoscopy last year and found a polyp stuck in esophagus, but was unable to remove due to risks.               Scribe Attestation:   Scribe #1: I performed the above scribed service and the documentation accurately describes the services I performed. I attest to the accuracy of the note.    Attending Attestation:           Physician Attestation for Scribe:  Physician Attestation Statement for Scribe #1: I, Erasmo Cardoza III, MD, reviewed documentation, as scribed by Brandi Rogel in my presence, and it is both accurate and complete.           ED Course as of 06/04/23 0930   Sun Jun 04, 2023   0923 CT wet read and confirmed as small-bowel obstruction NG tube placed discussed case with surgery will see and evaluate patient patient was given IV fluids and Zosyn [FK]   0930 Discussed case with Dr. Cagle will evaluate patient also informed Dr. Lee who was on-call for patient's  primary care doctor [FK]      ED Course User Index  [FK] Erasmo Cardoza III, MD                 Clinical Impression:   Final diagnoses:  [K56.609] Small bowel obstruction (Primary)        ED Disposition Condition    Admit Stable                Erasmo Cardoza III, MD  06/04/23 0926       Erasmo Cardoza III, MD  06/04/23 0956

## 2023-06-04 NOTE — H&P
Chief complaint:  Nausea vomiting and epigastric discomfort for the last 2 days     The patient is a 73-year-old woman who presented to the walk-in clinic with those symptoms.  She was diagnosed with gastritis but her symptoms persisted and she presented to the emergency room where she is been found to have a small-bowel obstruction by CT scanning as well as clinical findings.  She is had no hematemesis.  Everything she tried to eat would come up.  No change in bowel habits, she does have issues with constipation.  No fever chills.  No prior episodes in the past but she is have surgery many years ago for a ruptured colon.  This would traumatic that was corrected surgically.  She does not remember exactly what was done.    Prior to the onset of those symptoms she was doing well does have a number of chronic problems that have been stable.    Home meds include baclofen Celebrex Plavix Pepcid hydrochlorothiazide Lexapro metformin 500 b.i.d. morphine instant release 15 mg tablets 1-1/2 b.i.d. nitroglycerin sublingual p.r.n. fish oil, Protonix 40 daily KCl 20 mEq daily pravastatin 40 daily eyedrops vitamin-D and Coumadin 4 mg daily.    Allergies:  Hydrocodone, iodine, Demerol, Phenergan and oxycodone.    Past medical history notable for hypertension, recurrent DVT with a history of protein C deficiency, diabetes mellitus been controlled with metformin diet, chronic low back pain, hyperlipidemia, and peripheral vascular disease and cerebrovascular disease.      She does not smoke or use alcohol.      She is a retired  home with her .  She remains active.  Family history noncontributory.      Review of systems notable for small weight loss over the past few months.  She attributes that to improve diet.  No chronic fevers chills or sweats or recent fevers or sweats.  No unusual headaches.  No dysphagia.  No thyroid disease.  Her blood sugars have been well controlled.  No recent chest pain shortness her  breath orthopnea PND or pedal edema.  No chronic cough wheezing asthma.  The above-mentioned nausea vomiting.  Chronic constipation noted.  No melena or hematochezia.  No urgency frequency dysuria.    Physical exam:  She is afebrile.  Blood pressure most recently 128/77.  It did spike earlier at 173/114.  Heart rate 74.  Respiratory rate 16.  O2 sat 95% room air.  General appearance is that of an elderly woman of average build in no distress.  She is alert and appropriate.  HEENT exam was unremarkable except for the presence of an NG tube that was just placed.  Neck is supple without thyromegaly or adenopathy.  Carotid pulses 2+ bilaterally.  Heart has a regular rate and rhythm.  Lungs clear.  Abdomen non tender.  Bowel sounds scant.  Extremities without clubbing cyanosis or edema.  Foot pulses intact.    Laboratory studies show H&H of 16 and 48 which is higher than normal.  Chemistry profile notable for potassium 3.4.  Blood sugar 116.  Other studies essentially unremarkable.  Lactic acid normal.  CT abdomen without contrast showed evidence of small-bowel obstruction.  No other abnormalities noted.    Assessment:  1. Acute small-bowel obstruction.  Likely related to adhesive disease from prior surgery     2. Remote history of traumatic colon rupture status post surgical repair     3. Diabetes mellitus.  Well controlled in the past     4. Hypertension.  Currently stable     5. History of protein C deficiency and recurrent DVT on anticoagulation with Coumadin    6. History of peripheral vascular disease including stent in 1 leg and carotid stenosis.  On Plavix    7. Chronic low back pain    Plan:  The patient has been admitted for further evaluation and treatment.  She is already been seen by the surgery team.  We will have to check a protime and begin Lovenox as an anticoagulant.  She will get some extra potassium through her IV as well as continued IV fluids.  Will put on sliding scale of insulin and add some  p.r.n. antihypertensives.

## 2023-06-05 ENCOUNTER — ANESTHESIA EVENT (OUTPATIENT)
Dept: SURGERY | Facility: HOSPITAL | Age: 73
DRG: 335 | End: 2023-06-05
Payer: MEDICARE

## 2023-06-05 ENCOUNTER — ANESTHESIA (OUTPATIENT)
Dept: SURGERY | Facility: HOSPITAL | Age: 73
DRG: 335 | End: 2023-06-05
Payer: MEDICARE

## 2023-06-05 LAB
ABO + RH BLD: NORMAL
ALBUMIN SERPL-MCNC: 2.6 G/DL (ref 3.4–4.8)
ALBUMIN/GLOB SERPL: 1.5 RATIO (ref 1.1–2)
ALP SERPL-CCNC: 36 UNIT/L (ref 40–150)
ALT SERPL-CCNC: 7 UNIT/L (ref 0–55)
ANION GAP SERPL CALC-SCNC: 10 MEQ/L
ANION GAP SERPL CALC-SCNC: 9 MEQ/L
APPEARANCE UR: ABNORMAL
AST SERPL-CCNC: 14 UNIT/L (ref 5–34)
BACTERIA #/AREA URNS AUTO: ABNORMAL /HPF
BASE EXCESS BLD CALC-SCNC: 7.9 MMOL/L
BASOPHILS # BLD AUTO: 0.01 X10(3)/MCL
BASOPHILS # BLD AUTO: 0.02 X10(3)/MCL
BASOPHILS NFR BLD AUTO: 0.1 %
BASOPHILS NFR BLD AUTO: 0.3 %
BILIRUB UR QL STRIP.AUTO: ABNORMAL MG/DL
BILIRUBIN DIRECT+TOT PNL SERPL-MCNC: 1.3 MG/DL
BLD PROD TYP BPU: NORMAL
BLOOD GAS SAMPLE TYPE (OHS): ABNORMAL
BLOOD UNIT EXPIRATION DATE: NORMAL
BLOOD UNIT TYPE CODE: 1700
BLOOD UNIT TYPE CODE: 6200
BUN SERPL-MCNC: 12.2 MG/DL (ref 9.8–20.1)
BUN SERPL-MCNC: 12.9 MG/DL (ref 9.8–20.1)
BUN SERPL-MCNC: 13.5 MG/DL (ref 9.8–20.1)
CA-I BLD-SCNC: 1.18 MMOL/L (ref 1.12–1.23)
CALCIUM SERPL-MCNC: 7.2 MG/DL (ref 8.4–10.2)
CALCIUM SERPL-MCNC: 7.2 MG/DL (ref 8.4–10.2)
CALCIUM SERPL-MCNC: 9.3 MG/DL (ref 8.4–10.2)
CHLORIDE SERPL-SCNC: 105 MMOL/L (ref 98–107)
CHLORIDE SERPL-SCNC: 106 MMOL/L (ref 98–107)
CHLORIDE SERPL-SCNC: 107 MMOL/L (ref 98–107)
CO2 BLDA-SCNC: 29.7 MMOL/L
CO2 SERPL-SCNC: 22 MMOL/L (ref 23–31)
CO2 SERPL-SCNC: 24 MMOL/L (ref 23–31)
CO2 SERPL-SCNC: 27 MMOL/L (ref 23–31)
COLOR UR: ABNORMAL
CREAT SERPL-MCNC: 0.59 MG/DL (ref 0.55–1.02)
CREAT SERPL-MCNC: 0.72 MG/DL (ref 0.55–1.02)
CREAT SERPL-MCNC: 0.87 MG/DL (ref 0.55–1.02)
CREAT/UREA NIT SERPL: 15
CREAT/UREA NIT SERPL: 19
CROSSMATCH INTERPRETATION: NORMAL
DISPENSE STATUS: NORMAL
DRAWN BY BLOOD GAS (OHS): ABNORMAL
EOSINOPHIL # BLD AUTO: 0 X10(3)/MCL (ref 0–0.9)
EOSINOPHIL # BLD AUTO: 0.01 X10(3)/MCL (ref 0–0.9)
EOSINOPHIL NFR BLD AUTO: 0 %
EOSINOPHIL NFR BLD AUTO: 0.1 %
ERYTHROCYTE [DISTWIDTH] IN BLOOD BY AUTOMATED COUNT: 13.8 % (ref 11.5–17)
ERYTHROCYTE [DISTWIDTH] IN BLOOD BY AUTOMATED COUNT: 15.9 % (ref 11.5–17)
ERYTHROCYTE [DISTWIDTH] IN BLOOD BY AUTOMATED COUNT: 16.2 % (ref 11.5–17)
FIO2 BLOOD GAS (OHS): 40 %
GFR SERPLBLD CREATININE-BSD FMLA CKD-EPI: >60 MLS/MIN/1.73/M2
GLOBULIN SER-MCNC: 1.7 GM/DL (ref 2.4–3.5)
GLUCOSE SERPL-MCNC: 171 MG/DL (ref 82–115)
GLUCOSE SERPL-MCNC: 194 MG/DL (ref 82–115)
GLUCOSE SERPL-MCNC: 308 MG/DL (ref 82–115)
GLUCOSE UR QL STRIP.AUTO: NEGATIVE MG/DL
HCO3 BLDA-SCNC: 28.8 MMOL/L
HCT VFR BLD AUTO: 21.4 % (ref 37–47)
HCT VFR BLD AUTO: 24.6 % (ref 37–47)
HCT VFR BLD AUTO: 25.8 % (ref 37–47)
HCT VFR BLD AUTO: 26.7 % (ref 37–47)
HGB BLD-MCNC: 6.9 G/DL (ref 12–16)
HGB BLD-MCNC: 7.9 G/DL (ref 12–16)
HGB BLD-MCNC: 9 G/DL (ref 12–16)
HGB BLD-MCNC: 9.4 G/DL (ref 12–16)
IMM GRANULOCYTES # BLD AUTO: 0.01 X10(3)/MCL (ref 0–0.04)
IMM GRANULOCYTES # BLD AUTO: 0.03 X10(3)/MCL (ref 0–0.04)
IMM GRANULOCYTES NFR BLD AUTO: 0.1 %
IMM GRANULOCYTES NFR BLD AUTO: 0.4 %
INR BLD: 1.54 (ref 0–1.3)
INR BLD: 2.56 (ref 0–1.3)
KETONES UR QL STRIP.AUTO: NEGATIVE MG/DL
LACTATE SERPL-SCNC: 1.7 MMOL/L (ref 0.5–2.2)
LACTATE SERPL-SCNC: 3.7 MMOL/L (ref 0.5–2.2)
LEUKOCYTE ESTERASE UR QL STRIP.AUTO: NEGATIVE UNIT/L
LIPASE SERPL-CCNC: 6 U/L
LYMPHOCYTES # BLD AUTO: 1.1 X10(3)/MCL (ref 0.6–4.6)
LYMPHOCYTES # BLD AUTO: 1.21 X10(3)/MCL (ref 0.6–4.6)
LYMPHOCYTES NFR BLD AUTO: 14.8 %
LYMPHOCYTES NFR BLD AUTO: 15.6 %
MAGNESIUM SERPL-MCNC: 1.9 MG/DL (ref 1.6–2.6)
MCH RBC QN AUTO: 30 PG (ref 27–31)
MCH RBC QN AUTO: 30.2 PG (ref 27–31)
MCH RBC QN AUTO: 31.2 PG (ref 27–31)
MCHC RBC AUTO-ENTMCNC: 32.1 G/DL (ref 33–36)
MCHC RBC AUTO-ENTMCNC: 34.9 G/DL (ref 33–36)
MCHC RBC AUTO-ENTMCNC: 35.2 G/DL (ref 33–36)
MCV RBC AUTO: 85.3 FL (ref 80–94)
MCV RBC AUTO: 86.6 FL (ref 80–94)
MCV RBC AUTO: 97.2 FL (ref 80–94)
MECH RR (OHS): 22 B/MIN
MECH VT (OHS): 450 ML
MODE (OHS): AC
MONOCYTES # BLD AUTO: 0.52 X10(3)/MCL (ref 0.1–1.3)
MONOCYTES # BLD AUTO: 0.67 X10(3)/MCL (ref 0.1–1.3)
MONOCYTES NFR BLD AUTO: 6.7 %
MONOCYTES NFR BLD AUTO: 9 %
NEUTROPHILS # BLD AUTO: 5.62 X10(3)/MCL (ref 2.1–9.2)
NEUTROPHILS # BLD AUTO: 6 X10(3)/MCL (ref 2.1–9.2)
NEUTROPHILS NFR BLD AUTO: 75.6 %
NEUTROPHILS NFR BLD AUTO: 77.3 %
NITRITE UR QL STRIP.AUTO: NEGATIVE
NRBC BLD AUTO-RTO: 0 %
OXYGEN DEVICE BLOOD GAS (OHS): ABNORMAL
PCO2 BLDA: 28 MMHG (ref 35–45)
PEEP (OHS): 5 CMH2O
PH BLDA: 7.62 [PH] (ref 7.35–7.45)
PH UR STRIP.AUTO: 6 [PH]
PHOSPHATE SERPL-MCNC: 2.9 MG/DL (ref 2.3–4.7)
PLATELET # BLD AUTO: 110 X10(3)/MCL (ref 130–400)
PLATELET # BLD AUTO: 126 X10(3)/MCL (ref 130–400)
PLATELET # BLD AUTO: 176 X10(3)/MCL (ref 130–400)
PMV BLD AUTO: 10.1 FL (ref 7.4–10.4)
PMV BLD AUTO: 10.3 FL (ref 7.4–10.4)
PMV BLD AUTO: 11.1 FL (ref 7.4–10.4)
PO2 BLDA: 69 MMHG (ref 80–100)
POCT GLUCOSE: 132 MG/DL (ref 70–110)
POCT GLUCOSE: 144 MG/DL (ref 70–110)
POTASSIUM BLOOD GAS (OHS): 2.5 MMOL/L (ref 3.5–5)
POTASSIUM SERPL-SCNC: 3 MMOL/L (ref 3.5–5.1)
POTASSIUM SERPL-SCNC: 3 MMOL/L (ref 3.5–5.1)
POTASSIUM SERPL-SCNC: 3.6 MMOL/L (ref 3.5–5.1)
PROT SERPL-MCNC: 4.3 GM/DL (ref 5.8–7.6)
PROT UR QL STRIP.AUTO: ABNORMAL MG/DL
PROTHROMBIN TIME: 18.3 SECONDS (ref 12.5–14.5)
PROTHROMBIN TIME: 27.1 SECONDS (ref 12.5–14.5)
RBC # BLD AUTO: 2.53 X10(6)/MCL (ref 4.2–5.4)
RBC # BLD AUTO: 2.98 X10(6)/MCL (ref 4.2–5.4)
RBC # BLD AUTO: 3.13 X10(6)/MCL (ref 4.2–5.4)
RBC #/AREA URNS AUTO: 12 /HPF
RBC UR QL AUTO: ABNORMAL UNIT/L
SAMPLE SITE BLOOD GAS (OHS): ABNORMAL
SAO2 % BLDA: 96 %
SODIUM BLOOD GAS (OHS): 138 MMOL/L (ref 137–145)
SODIUM SERPL-SCNC: 138 MMOL/L (ref 136–145)
SODIUM SERPL-SCNC: 138 MMOL/L (ref 136–145)
SODIUM SERPL-SCNC: 139 MMOL/L (ref 136–145)
SP GR UR STRIP.AUTO: 1.04 (ref 1–1.03)
SQUAMOUS #/AREA URNS AUTO: <5 /HPF
UNIT NUMBER: NORMAL
UROBILINOGEN UR STRIP-ACNC: 1 MG/DL
WBC # SPEC AUTO: 7.06 X10(3)/MCL (ref 4.5–11.5)
WBC # SPEC AUTO: 7.44 X10(3)/MCL (ref 4.5–11.5)
WBC # SPEC AUTO: 7.76 X10(3)/MCL (ref 4.5–11.5)
WBC #/AREA URNS AUTO: 9 /HPF

## 2023-06-05 PROCEDURE — 25000003 PHARM REV CODE 250: Performed by: INTERNAL MEDICINE

## 2023-06-05 PROCEDURE — 85014 HEMATOCRIT: CPT | Performed by: INTERNAL MEDICINE

## 2023-06-05 PROCEDURE — P9017 PLASMA 1 DONOR FRZ W/IN 8 HR: HCPCS | Performed by: SURGERY

## 2023-06-05 PROCEDURE — 27201423 OPTIME MED/SURG SUP & DEVICES STERILE SUPPLY: Performed by: SURGERY

## 2023-06-05 PROCEDURE — P9016 RBC LEUKOCYTES REDUCED: HCPCS | Performed by: SURGERY

## 2023-06-05 PROCEDURE — D9220A PRA ANESTHESIA: ICD-10-PCS | Mod: ANES,,, | Performed by: ANESTHESIOLOGY

## 2023-06-05 PROCEDURE — 88307 TISSUE EXAM BY PATHOLOGIST: CPT | Performed by: SURGERY

## 2023-06-05 PROCEDURE — 63600175 PHARM REV CODE 636 W HCPCS: Performed by: STUDENT IN AN ORGANIZED HEALTH CARE EDUCATION/TRAINING PROGRAM

## 2023-06-05 PROCEDURE — 84100 ASSAY OF PHOSPHORUS: CPT | Performed by: STUDENT IN AN ORGANIZED HEALTH CARE EDUCATION/TRAINING PROGRAM

## 2023-06-05 PROCEDURE — 83605 ASSAY OF LACTIC ACID: CPT

## 2023-06-05 PROCEDURE — 85025 COMPLETE CBC W/AUTO DIFF WBC: CPT

## 2023-06-05 PROCEDURE — 20000000 HC ICU ROOM

## 2023-06-05 PROCEDURE — 36000707: Performed by: SURGERY

## 2023-06-05 PROCEDURE — 63600175 PHARM REV CODE 636 W HCPCS

## 2023-06-05 PROCEDURE — 94002 VENT MGMT INPAT INIT DAY: CPT

## 2023-06-05 PROCEDURE — 63600175 PHARM REV CODE 636 W HCPCS: Performed by: INTERNAL MEDICINE

## 2023-06-05 PROCEDURE — 85027 COMPLETE CBC AUTOMATED: CPT | Performed by: STUDENT IN AN ORGANIZED HEALTH CARE EDUCATION/TRAINING PROGRAM

## 2023-06-05 PROCEDURE — 99900035 HC TECH TIME PER 15 MIN (STAT)

## 2023-06-05 PROCEDURE — 27200966 HC CLOSED SUCTION SYSTEM

## 2023-06-05 PROCEDURE — C9113 INJ PANTOPRAZOLE SODIUM, VIA: HCPCS | Performed by: INTERNAL MEDICINE

## 2023-06-05 PROCEDURE — 36620 INSERTION CATHETER ARTERY: CPT | Mod: 59,,, | Performed by: ANESTHESIOLOGY

## 2023-06-05 PROCEDURE — 99233 PR SUBSEQUENT HOSPITAL CARE,LEVL III: ICD-10-PCS | Mod: ,,, | Performed by: INTERNAL MEDICINE

## 2023-06-05 PROCEDURE — 99233 SBSQ HOSP IP/OBS HIGH 50: CPT | Mod: ,,, | Performed by: INTERNAL MEDICINE

## 2023-06-05 PROCEDURE — 83605 ASSAY OF LACTIC ACID: CPT | Performed by: STUDENT IN AN ORGANIZED HEALTH CARE EDUCATION/TRAINING PROGRAM

## 2023-06-05 PROCEDURE — D9220A PRA ANESTHESIA: ICD-10-PCS | Mod: CRNA,,, | Performed by: NURSE ANESTHETIST, CERTIFIED REGISTERED

## 2023-06-05 PROCEDURE — D9220A PRA ANESTHESIA: Mod: ANES,,, | Performed by: ANESTHESIOLOGY

## 2023-06-05 PROCEDURE — D9220A PRA ANESTHESIA: Mod: CRNA,,, | Performed by: NURSE ANESTHETIST, CERTIFIED REGISTERED

## 2023-06-05 PROCEDURE — 81001 URINALYSIS AUTO W/SCOPE: CPT | Performed by: EMERGENCY MEDICINE

## 2023-06-05 PROCEDURE — 80053 COMPREHEN METABOLIC PANEL: CPT | Performed by: STUDENT IN AN ORGANIZED HEALTH CARE EDUCATION/TRAINING PROGRAM

## 2023-06-05 PROCEDURE — 37799 UNLISTED PX VASCULAR SURGERY: CPT

## 2023-06-05 PROCEDURE — 37000009 HC ANESTHESIA EA ADD 15 MINS: Performed by: SURGERY

## 2023-06-05 PROCEDURE — 83735 ASSAY OF MAGNESIUM: CPT | Performed by: STUDENT IN AN ORGANIZED HEALTH CARE EDUCATION/TRAINING PROGRAM

## 2023-06-05 PROCEDURE — 27000221 HC OXYGEN, UP TO 24 HOURS

## 2023-06-05 PROCEDURE — 83690 ASSAY OF LIPASE: CPT | Performed by: STUDENT IN AN ORGANIZED HEALTH CARE EDUCATION/TRAINING PROGRAM

## 2023-06-05 PROCEDURE — 25000003 PHARM REV CODE 250: Performed by: NURSE ANESTHETIST, CERTIFIED REGISTERED

## 2023-06-05 PROCEDURE — 85384 FIBRINOGEN ACTIVITY: CPT | Performed by: STUDENT IN AN ORGANIZED HEALTH CARE EDUCATION/TRAINING PROGRAM

## 2023-06-05 PROCEDURE — 82803 BLOOD GASES ANY COMBINATION: CPT

## 2023-06-05 PROCEDURE — 63600175 PHARM REV CODE 636 W HCPCS: Performed by: NURSE ANESTHETIST, CERTIFIED REGISTERED

## 2023-06-05 PROCEDURE — 36000706: Performed by: SURGERY

## 2023-06-05 PROCEDURE — 37000008 HC ANESTHESIA 1ST 15 MINUTES: Performed by: SURGERY

## 2023-06-05 PROCEDURE — 85025 COMPLETE CBC W/AUTO DIFF WBC: CPT | Performed by: STUDENT IN AN ORGANIZED HEALTH CARE EDUCATION/TRAINING PROGRAM

## 2023-06-05 PROCEDURE — 36620 ARTERIAL: ICD-10-PCS | Mod: 59,,, | Performed by: ANESTHESIOLOGY

## 2023-06-05 PROCEDURE — P9037 PLATE PHERES LEUKOREDU IRRAD: HCPCS | Performed by: SURGERY

## 2023-06-05 PROCEDURE — 85610 PROTHROMBIN TIME: CPT | Performed by: STUDENT IN AN ORGANIZED HEALTH CARE EDUCATION/TRAINING PROGRAM

## 2023-06-05 RX ORDER — BISACODYL 10 MG
10 SUPPOSITORY, RECTAL RECTAL DAILY PRN
Status: DISCONTINUED | OUTPATIENT
Start: 2023-06-05 | End: 2023-06-19 | Stop reason: HOSPADM

## 2023-06-05 RX ORDER — ACETAMINOPHEN 10 MG/ML
1000 INJECTION, SOLUTION INTRAVENOUS EVERY 8 HOURS
Status: DISPENSED | OUTPATIENT
Start: 2023-06-05 | End: 2023-06-06

## 2023-06-05 RX ORDER — HYDROCODONE BITARTRATE AND ACETAMINOPHEN 500; 5 MG/1; MG/1
TABLET ORAL
Status: DISCONTINUED | OUTPATIENT
Start: 2023-06-05 | End: 2023-06-19 | Stop reason: HOSPADM

## 2023-06-05 RX ORDER — DEXAMETHASONE SODIUM PHOSPHATE 4 MG/ML
INJECTION, SOLUTION INTRA-ARTICULAR; INTRALESIONAL; INTRAMUSCULAR; INTRAVENOUS; SOFT TISSUE
Status: DISCONTINUED | OUTPATIENT
Start: 2023-06-05 | End: 2023-06-05

## 2023-06-05 RX ORDER — POTASSIUM CHLORIDE 14.9 MG/ML
40 INJECTION INTRAVENOUS
Status: DISPENSED | OUTPATIENT
Start: 2023-06-05 | End: 2023-06-05

## 2023-06-05 RX ORDER — CEFAZOLIN SODIUM 1 G/3ML
INJECTION, POWDER, FOR SOLUTION INTRAMUSCULAR; INTRAVENOUS
Status: DISCONTINUED | OUTPATIENT
Start: 2023-06-05 | End: 2023-06-05

## 2023-06-05 RX ORDER — PROPOFOL 10 MG/ML
VIAL (ML) INTRAVENOUS
Status: DISCONTINUED | OUTPATIENT
Start: 2023-06-05 | End: 2023-06-05

## 2023-06-05 RX ORDER — HYDROMORPHONE HYDROCHLORIDE 2 MG/ML
1 INJECTION, SOLUTION INTRAMUSCULAR; INTRAVENOUS; SUBCUTANEOUS EVERY 4 HOURS PRN
Status: DISCONTINUED | OUTPATIENT
Start: 2023-06-05 | End: 2023-06-10

## 2023-06-05 RX ORDER — ROCURONIUM BROMIDE 10 MG/ML
INJECTION, SOLUTION INTRAVENOUS
Status: DISCONTINUED | OUTPATIENT
Start: 2023-06-05 | End: 2023-06-05

## 2023-06-05 RX ORDER — HYDROMORPHONE HYDROCHLORIDE 2 MG/ML
INJECTION, SOLUTION INTRAMUSCULAR; INTRAVENOUS; SUBCUTANEOUS
Status: DISCONTINUED | OUTPATIENT
Start: 2023-06-05 | End: 2023-06-05

## 2023-06-05 RX ORDER — POTASSIUM CHLORIDE 14.9 MG/ML
40 INJECTION INTRAVENOUS
Status: DISCONTINUED | OUTPATIENT
Start: 2023-06-05 | End: 2023-06-05

## 2023-06-05 RX ORDER — CALCIUM CHLORIDE INJECTION 100 MG/ML
INJECTION, SOLUTION INTRAVENOUS
Status: DISCONTINUED | OUTPATIENT
Start: 2023-06-05 | End: 2023-06-05

## 2023-06-05 RX ORDER — ONDANSETRON 2 MG/ML
INJECTION INTRAMUSCULAR; INTRAVENOUS
Status: DISCONTINUED | OUTPATIENT
Start: 2023-06-05 | End: 2023-06-05

## 2023-06-05 RX ORDER — HYDROMORPHONE HYDROCHLORIDE 2 MG/ML
0.2 INJECTION, SOLUTION INTRAMUSCULAR; INTRAVENOUS; SUBCUTANEOUS EVERY 5 MIN PRN
Status: CANCELLED | OUTPATIENT
Start: 2023-06-05

## 2023-06-05 RX ORDER — MIDAZOLAM HYDROCHLORIDE 1 MG/ML
INJECTION INTRAMUSCULAR; INTRAVENOUS
Status: DISCONTINUED | OUTPATIENT
Start: 2023-06-05 | End: 2023-06-05

## 2023-06-05 RX ORDER — ACETAMINOPHEN 10 MG/ML
1000 INJECTION, SOLUTION INTRAVENOUS EVERY 8 HOURS
Status: DISCONTINUED | OUTPATIENT
Start: 2023-06-05 | End: 2023-06-05

## 2023-06-05 RX ORDER — HYDROCODONE BITARTRATE AND ACETAMINOPHEN 500; 5 MG/1; MG/1
TABLET ORAL
Status: DISCONTINUED | OUTPATIENT
Start: 2023-06-05 | End: 2023-06-07 | Stop reason: HOSPADM

## 2023-06-05 RX ORDER — SODIUM CHLORIDE, SODIUM LACTATE, POTASSIUM CHLORIDE, CALCIUM CHLORIDE 600; 310; 30; 20 MG/100ML; MG/100ML; MG/100ML; MG/100ML
INJECTION, SOLUTION INTRAVENOUS CONTINUOUS
Status: DISCONTINUED | OUTPATIENT
Start: 2023-06-05 | End: 2023-06-08

## 2023-06-05 RX ORDER — ACETAMINOPHEN 10 MG/ML
1000 INJECTION, SOLUTION INTRAVENOUS ONCE
Status: COMPLETED | OUTPATIENT
Start: 2023-06-05 | End: 2023-06-05

## 2023-06-05 RX ORDER — PHENYLEPHRINE HYDROCHLORIDE 10 MG/ML
INJECTION INTRAVENOUS
Status: DISCONTINUED | OUTPATIENT
Start: 2023-06-05 | End: 2023-06-05

## 2023-06-05 RX ORDER — PROPOFOL 10 MG/ML
0-50 INJECTION, EMULSION INTRAVENOUS CONTINUOUS
Status: DISCONTINUED | OUTPATIENT
Start: 2023-06-05 | End: 2023-06-07

## 2023-06-05 RX ORDER — MUPIROCIN 20 MG/G
OINTMENT TOPICAL 2 TIMES DAILY
Status: DISPENSED | OUTPATIENT
Start: 2023-06-05 | End: 2023-06-10

## 2023-06-05 RX ORDER — SODIUM CHLORIDE 0.9 % (FLUSH) 0.9 %
10 SYRINGE (ML) INJECTION
Status: CANCELLED | OUTPATIENT
Start: 2023-06-05

## 2023-06-05 RX ORDER — FENTANYL CITRATE 50 UG/ML
25 INJECTION, SOLUTION INTRAMUSCULAR; INTRAVENOUS EVERY 5 MIN PRN
Status: CANCELLED | OUTPATIENT
Start: 2023-06-05

## 2023-06-05 RX ORDER — FENTANYL CITRATE 50 UG/ML
INJECTION, SOLUTION INTRAMUSCULAR; INTRAVENOUS
Status: DISCONTINUED | OUTPATIENT
Start: 2023-06-05 | End: 2023-06-05

## 2023-06-05 RX ORDER — PHYTONADIONE 1 MG/.5ML
1 INJECTION, EMULSION INTRAMUSCULAR; INTRAVENOUS; SUBCUTANEOUS ONCE
Status: DISCONTINUED | OUTPATIENT
Start: 2023-06-05 | End: 2023-06-05

## 2023-06-05 RX ORDER — LIDOCAINE HYDROCHLORIDE 20 MG/ML
INJECTION, SOLUTION EPIDURAL; INFILTRATION; INTRACAUDAL; PERINEURAL
Status: DISCONTINUED | OUTPATIENT
Start: 2023-06-05 | End: 2023-06-05

## 2023-06-05 RX ORDER — SUCCINYLCHOLINE CHLORIDE 20 MG/ML
INJECTION INTRAMUSCULAR; INTRAVENOUS
Status: DISCONTINUED | OUTPATIENT
Start: 2023-06-05 | End: 2023-06-05

## 2023-06-05 RX ORDER — ACETAMINOPHEN 10 MG/ML
1000 INJECTION, SOLUTION INTRAVENOUS ONCE
Status: CANCELLED | OUTPATIENT
Start: 2023-06-05 | End: 2023-06-05

## 2023-06-05 RX ADMIN — PHENYLEPHRINE HYDROCHLORIDE 0.5 MCG/KG/MIN: 10 INJECTION INTRAVENOUS at 02:06

## 2023-06-05 RX ADMIN — ACETAMINOPHEN 1000 MG: 10 INJECTION, SOLUTION INTRAVENOUS at 05:06

## 2023-06-05 RX ADMIN — CALCIUM CHLORIDE INJECTION 2 G: 100 INJECTION, SOLUTION INTRAVENOUS at 02:06

## 2023-06-05 RX ADMIN — HYDROMORPHONE HYDROCHLORIDE 2 MG: 2 INJECTION, SOLUTION INTRAMUSCULAR; INTRAVENOUS; SUBCUTANEOUS at 03:06

## 2023-06-05 RX ADMIN — LIDOCAINE HYDROCHLORIDE 3 ML: 20 INJECTION, SOLUTION EPIDURAL; INFILTRATION; INTRACAUDAL; PERINEURAL at 01:06

## 2023-06-05 RX ADMIN — FENTANYL CITRATE 50 MCG: 50 INJECTION, SOLUTION INTRAMUSCULAR; INTRAVENOUS at 03:06

## 2023-06-05 RX ADMIN — FENTANYL CITRATE 50 MCG: 50 INJECTION, SOLUTION INTRAMUSCULAR; INTRAVENOUS at 01:06

## 2023-06-05 RX ADMIN — ROCURONIUM BROMIDE 50 MG: 10 SOLUTION INTRAVENOUS at 03:06

## 2023-06-05 RX ADMIN — MIDAZOLAM HYDROCHLORIDE 2 MG: 1 INJECTION, SOLUTION INTRAMUSCULAR; INTRAVENOUS at 03:06

## 2023-06-05 RX ADMIN — PANTOPRAZOLE SODIUM 40 MG: 40 INJECTION, POWDER, FOR SOLUTION INTRAVENOUS at 08:06

## 2023-06-05 RX ADMIN — SODIUM CHLORIDE, POTASSIUM CHLORIDE, SODIUM LACTATE AND CALCIUM CHLORIDE: 600; 310; 30; 20 INJECTION, SOLUTION INTRAVENOUS at 09:06

## 2023-06-05 RX ADMIN — INSULIN ASPART 4 UNITS: 100 INJECTION, SOLUTION INTRAVENOUS; SUBCUTANEOUS at 12:06

## 2023-06-05 RX ADMIN — ACETAMINOPHEN 1000 MG: 10 INJECTION, SOLUTION INTRAVENOUS at 09:06

## 2023-06-05 RX ADMIN — SUCCINYLCHOLINE CHLORIDE 140 MG: 20 INJECTION, SOLUTION INTRAMUSCULAR; INTRAVENOUS at 01:06

## 2023-06-05 RX ADMIN — HYDROMORPHONE HYDROCHLORIDE 0.5 MG: 2 INJECTION INTRAMUSCULAR; INTRAVENOUS; SUBCUTANEOUS at 08:06

## 2023-06-05 RX ADMIN — SODIUM CHLORIDE, POTASSIUM CHLORIDE, SODIUM LACTATE AND CALCIUM CHLORIDE: 600; 310; 30; 20 INJECTION, SOLUTION INTRAVENOUS at 04:06

## 2023-06-05 RX ADMIN — ROCURONIUM BROMIDE 50 MG: 10 SOLUTION INTRAVENOUS at 02:06

## 2023-06-05 RX ADMIN — PROPOFOL 75 MG: 10 INJECTION, EMULSION INTRAVENOUS at 01:06

## 2023-06-05 RX ADMIN — PROPOFOL 35 MCG/KG/MIN: 10 INJECTION, EMULSION INTRAVENOUS at 04:06

## 2023-06-05 RX ADMIN — ONDANSETRON 4 MG: 2 INJECTION INTRAMUSCULAR; INTRAVENOUS at 01:06

## 2023-06-05 RX ADMIN — POTASSIUM CHLORIDE 40 MEQ: 14.9 INJECTION, SOLUTION INTRAVENOUS at 04:06

## 2023-06-05 RX ADMIN — SODIUM CHLORIDE, POTASSIUM CHLORIDE, SODIUM LACTATE AND CALCIUM CHLORIDE 1000 ML: 600; 310; 30; 20 INJECTION, SOLUTION INTRAVENOUS at 08:06

## 2023-06-05 RX ADMIN — PHENYLEPHRINE HYDROCHLORIDE 200 MCG: 10 INJECTION INTRAVENOUS at 01:06

## 2023-06-05 RX ADMIN — CEFAZOLIN 2 G: 330 INJECTION, POWDER, FOR SOLUTION INTRAMUSCULAR; INTRAVENOUS at 02:06

## 2023-06-05 RX ADMIN — MUPIROCIN: 20 OINTMENT TOPICAL at 08:06

## 2023-06-05 RX ADMIN — MUPIROCIN: 20 OINTMENT TOPICAL at 10:06

## 2023-06-05 RX ADMIN — HYDROMORPHONE HYDROCHLORIDE 1 MG: 2 INJECTION INTRAMUSCULAR; INTRAVENOUS; SUBCUTANEOUS at 08:06

## 2023-06-05 RX ADMIN — PROPOFOL 30 MCG/KG/MIN: 10 INJECTION, EMULSION INTRAVENOUS at 08:06

## 2023-06-05 RX ADMIN — ACETAMINOPHEN 1000 MG: 10 INJECTION, SOLUTION INTRAVENOUS at 12:06

## 2023-06-05 RX ADMIN — POTASSIUM CHLORIDE 10 MEQ: 7.46 INJECTION, SOLUTION INTRAVENOUS at 09:06

## 2023-06-05 RX ADMIN — DEXAMETHASONE SODIUM PHOSPHATE 4 MG: 4 INJECTION, SOLUTION INTRA-ARTICULAR; INTRALESIONAL; INTRAMUSCULAR; INTRAVENOUS; SOFT TISSUE at 01:06

## 2023-06-05 RX ADMIN — SODIUM CHLORIDE, SODIUM GLUCONATE, SODIUM ACETATE, POTASSIUM CHLORIDE AND MAGNESIUM CHLORIDE: 526; 502; 368; 37; 30 INJECTION, SOLUTION INTRAVENOUS at 01:06

## 2023-06-05 RX ADMIN — ENOXAPARIN SODIUM 40 MG: 40 INJECTION SUBCUTANEOUS at 08:06

## 2023-06-05 NOTE — PROGRESS NOTES
Subjective:  The patient is complaining of lots of postop pain.  Also back pain.  She has been on chronic narcotics at home for low back pain.  She could not get any overnight because her blood pressure was too low.  She has responded to fluid boluses.  She just received 0.5 mg of Dilaudid and she is starting to feel somewhat better.  She was complaining of the need to pass gas and has not had a bowel movement in a few days.    Objective:  She is afebrile.  Blood pressure most recently 106/65 but most of her readings were less than that overnight.  Heart rate 66.  Respiratory rate 18.  O2 sat 99%.  She reportedly had 600 mL of urine output overnight    General appearance is that of a pale elderly woman in no acute distress.  She does appear to be uncomfortable.  Heart has a regular rate and rhythm.  Lungs clear.  Abdomen with mild generalized tenderness.  Extremities without clubbing cyanosis or edema.  No calf tenderness.    Today's lab work pending.  CBGS okay.    Assessment:  1.  Status post surgery for acute small-bowel obstruction.  It was secondary to adhesive disease.  She is stable clinically overall except for relatively low blood pressure but with adequate perfusion.  She was having lots of pain but we have been limited in what we could give her because of the hypotension.  She has been getting Ofirmev with minimal benefit.    2. Hypertension.  She is been mildly hypotensive all night     3. Mild diabetes mellitus.  Stable    4. History of hypercoagulable state and DVT    5. History of peripheral vascular disease    6. Chronic low back pain/chronic pain syndrome on chronic narcotics    Plan:  Await results of today's blood work.  Continue high dose of IV fluids.  Repeat blood work in the morning.  Narcotic pain medicines as tolerated.  Will add p.r.n. Dulcolax suppositories

## 2023-06-05 NOTE — H&P
[facility] - 8th Floor Med Surg  Pulmonary Critical Care Note    Patient Name: Alejandrina Rodriguez  MRN: [id number]  Admission Date: 6/4/2023  Hospital Length of Stay: 1 days  Code Status: Prior  Attending Provider: Alex Treviño, *  Primary Care Provider: Alex Treviño MD     Subjective:     HPI:   Alejandrina Reyes is a 73 y.o. female with PMH of ex-lap several decades ago, prior DVT with protein C deficiency w/ L iliac stent on plavix and coumadin at home, PVD, HTN, and DM. Patient was sent to the ED from clinic after presenting with several days of abdominal distention, nausea, and vomiting concerning for a small bowel obstruction. She was taken to the OR by surgery for lysis of adhesions and was found to have a large stool ball proximal to a twisted loop of bowel which was broken up. All bowel was pink and viable per OR note. Overnight she became hypotensive to 80s-90s systolic which dropped to the 60s-70s mid morning. Her abdomen was distended and her pain worsened and her AM hemoglobin was 6.9 from 16.4 yesterday for which she was transfused with multiple blood products. On AM labs her PT was 27 and INR was 2.56. Surgery plans to take her for a repeat ex-lap this afternoon with during which further adhesions were lysed, however, no bowel injury was found. The majority of her greater omentum was removed and her proximal and distal inferior epigastric arteries on the right were ligated and an abthera was used for closure with plans to return the OR later this week.     Hospital Course/Significant events:  6/4 - ex-lap with lysis of adhesions   6/5 - ex-lap      Past Medical History:   Diagnosis Date    Abnormal blood smear     Acid reflux     Anxiety and depression     Bilateral carotid artery disease     Cervical spondylosis with radiculopathy     Chronic lumbar pain     Diabetes mellitus     History of continuous positive airway pressure (CPAP) therapy at home     HLD (hyperlipidemia)      Hypertension     Iron deficiency anemia, unspecified     Neuropathy     On anticoagulant therapy     Osteopenia     Personal history of colonic polyps 07/14/2014    Protein C deficiency     Recurrent deep vein thrombosis (DVT) 6/21/2022    Sleep apnea, unspecified     Unspecified osteoarthritis, unspecified site        Past Surgical History:   Procedure Laterality Date    COLONOSCOPY  07/14/2014    COLONOSCOPY W/ POLYPECTOMY  08/29/2022    Humberto Nav/ Follow up pending pathology results    LAPAROTOMY, EXPLORATORY N/A 6/4/2023    Procedure: LAPAROTOMY, EXPLORATORY;  Surgeon: Sd Conway Jr., MD;  Location: [facility] OR;  Service: General;  Laterality: N/A;    LYSIS OF ADHESIONS N/A 6/4/2023    Procedure: LYSIS, ADHESIONS;  Surgeon: Sd Conway Jr., MD;  Location: [facility] OR;  Service: General;  Laterality: N/A;  detorsion of small bowel volvulus    SPINAL CORD STIMULATOR IMPLANT         Social History     Socioeconomic History    Marital status:    Tobacco Use    Smoking status: Never    Smokeless tobacco: Never   Substance and Sexual Activity    Alcohol use: Never    Drug use: Never    Sexual activity: Yes     Partners: Male           Current Outpatient Medications   Medication Instructions    azelastine (ASTELIN) 137 mcg, Nasal, 2 times daily    B-complex with vitamin C (Z-BEC OR EQUIV) tablet 1 tablet, Oral, Daily    baclofen (LIORESAL) 10 mg, Oral, 4 times daily, PRN muscle spasms    betamethasone dipropionate 0.05 % cream 1 application, Topical (Top), 2 times daily    celecoxib (CELEBREX) 100 MG capsule TAKE ONE CAPSULE BY MOUTH ONCE DAILY WITH FOOD AS NEEDED FOR PAIN    clopidogreL (PLAVIX) 75 mg tablet 1 tablet, Oral, Daily    famotidine (PEPCID) 20 MG tablet TAKE 1 TABLET TWICE DAILY    hydroCHLOROthiazide (HYDRODIURIL) 25 MG tablet TAKE 1 TABLET EVERY DAY    LEXAPRO 10 mg tablet TAKE 1 TABLET BY MOUTH ONCE DAILY AS DIRECTED FOR MOOD/CHRONIC PAIN AS DIRECTED    LIDOcaine (LIDODERM) 5 % 1 patch,  Transdermal, Every 24 hours (non-standard times), Remove & Discard patch within 12 hours or as directed by MD    metFORMIN (GLUCOPHAGE) 500 mg, Oral, 2 times daily    morphine (MSIR) 15 MG tablet 1.5 tablets, Oral, 2 times daily PRN    nitroGLYCERIN (NITROSTAT) 0.4 MG SL tablet PLEASE SEE ATTACHED FOR DETAILED DIRECTIONS    OMEGA-3-EPA-FISH OIL ORAL 1 capsule, Oral, Daily    omeprazole (PRILOSEC) 40 MG capsule TAKE 1 CAPSULE EVERY DAY    ondansetron (ZOFRAN-ODT) 8 mg, Oral, 2 times daily    peg 400-propylene glycol (SYSTANE, PROPYLENE GLYCOL,) 0.4-0.3 % Drop 1 drop, Ophthalmic    potassium chloride SA (K-DUR,KLOR-CON) 20 MEQ tablet TAKE 1 TABLET ONE TIME DAILY    pravastatin (PRAVACHOL) 40 MG tablet TAKE 1 TABLET EVERY DAY    timolol maleate 0.5% (TIMOPTIC) 0.5 % Drop 1 drop, Both Eyes, Daily    vitamin D (VITAMIN D3) 5,000 Units, Oral, Daily    warfarin (COUMADIN) 4 MG tablet TAKE 1 TABLET EVERY DAY       Current Inpatient Medications   acetaminophen  1,000 mg Intravenous Q8H    enoxparin  40 mg Subcutaneous Q12H (prophylaxis, 0900/2100)    mupirocin   Nasal BID    pantoprazole  40 mg Intravenous Daily    phytonadione ((AQUA-MEPHYTON) IVPB  10 mg Intravenous Once    potassium chloride in water  10 mEq Intravenous BID    timolol maleate 0.5%  1 drop Both Eyes Daily       Current Intravenous Infusions   dextrose 5 % and 0.9 % NaCl 100 mL/hr at 06/04/23 2039         Review of Systems   Reason unable to perform ROS: unable to perform dud to intubation and sedation.        Objective:       Intake/Output Summary (Last 24 hours) at 6/5/2023 1344  Last data filed at 6/4/2023 2200  Gross per 24 hour   Intake 1500 ml   Output 600 ml   Net 900 ml         Vital Signs (Most Recent):  Temp: 97.8 °F (36.6 °C) (06/05/23 0833)  Pulse: 78 (06/05/23 1320)  Resp: 18 (06/05/23 0843)  BP: 91/60 (06/05/23 1320)  SpO2: 99 % (06/05/23 1320)  Body mass index is 23.17 kg/m².  Weight: 61.2 kg (135 lb) Vital Signs (24h Range):  Temp:  [97.8 °F  (36.6 °C)-98.3 °F (36.8 °C)] 97.8 °F (36.6 °C)  Pulse:  [56-95] 78  Resp:  [8-30] 18  SpO2:  [86 %-100 %] 99 %  BP: ()/(41-99) 91/60     Physical Exam  Constitutional:       Comments: sedated   HENT:      Head: Normocephalic and atraumatic.      Mouth/Throat:      Mouth: Mucous membranes are moist.      Comments: ETT in place  Cardiovascular:      Rate and Rhythm: Normal rate and regular rhythm.   Pulmonary:      Comments: Intubated, mechanically ventilated   Abdominal:      Palpations: Abdomen is soft.      Comments: Open abdomen with wound vac in place   Musculoskeletal:      Cervical back: Normal range of motion.   Skin:     General: Skin is warm and dry.      Capillary Refill: Capillary refill takes less than 2 seconds.         Lines/Drains/Airways       Drain  Duration                  NG/OG Tube 06/04/23 1435 nasogastric 16 Fr. Left nostril <1 day         Urethral Catheter 06/04/23 1653 16 Fr. <1 day              Peripheral Intravenous Line  Duration                  Peripheral IV - Single Lumen 06/04/23 0735 22 G Posterior;Right Hand 1 day         Peripheral IV - Single Lumen 06/04/23 1150 20 G;Other (Comments) Anterior;Distal;Right Upper Arm 1 day         Peripheral IV - Single Lumen 06/04/23 1830 20 G Left;Posterior Wrist <1 day                    Significant Labs:    Lab Results   Component Value Date    WBC 7.44 06/05/2023    HGB 6.9 (L) 06/05/2023    HCT 21.4 (L) 06/05/2023    MCV 97.2 (H) 06/05/2023     06/05/2023         BMP  Lab Results   Component Value Date     06/05/2023    K 3.6 06/05/2023    CHLORIDE 107 06/05/2023    CO2 22 (L) 06/05/2023    BUN 12.9 06/05/2023    CREATININE 0.87 06/05/2023    CALCIUM 7.2 (L) 06/05/2023    AGAP 9.0 06/05/2023    EGFRNONAA >60 09/13/2021       ABG  No results for input(s): PH, PO2, PCO2, HCO3, BE in the last 168 hours.      Mechanical Ventilation Support:       Significant Imaging:  I have reviewed the pertinent imaging within the past 24  hours.        Assessment/Plan:     Assessment  SBO  Hypotension  Anemia 2/2 post-operative hemorrhage       Plan  Admit to ICU   Surgery on board for assistance with management  Continue intubation and sedation     DVT Prophylaxis: held 2/2 hemorrhage   GI Prophylaxis: pantoprazole 40 mg     32 minutes of critical care was time spent personally by me on the following activities: development of treatment plan with patient or surrogate and bedside caregivers, discussions with consultants, evaluation of patient's response to treatment, examination of patient, ordering and performing treatments and interventions, ordering and review of laboratory studies, ordering and review of radiographic studies, pulse oximetry, re-evaluation of patient's condition.  This critical care time did not overlap with that of any other provider or involve time for any procedures.     Luis Ness MD  Pulmonary Critical Care Medicine, PGY1  [facility] - 8th Floor Med Surg

## 2023-06-05 NOTE — PROGRESS NOTES
Inpatient Nutrition Assessment    Admit Date: 6/4/2023   Total duration of encounter: 1 day     Nutrition Recommendation/Prescription     Advance to low fiber diet when medically feasible. If advanced to clear or full liquids, recommend adding oral supplements.     Glucose control: D5 @ 100 mL/hr 408 kcal  from 120 g dextrose    Dietitian will monitor food and beverage intake, weight change, and gastrointestinal profile.    Communication of Recommendations: reviewed with patient and via EMR    Nutrition Assessment     Malnutrition Assessment/Nutrition-Focused Physical Exam       Malnutrition Level: other (see comments) (Does not meet criteria at this time)  Energy Intake (Malnutrition): other (see comments) (does not meet criteria at this time (4 days))  Weight Loss (Malnutrition): other (see comments) (intentional weight loss)                                                  A minimum of two characteristics is recommended for diagnosis of either severe or non-severe malnutrition.    Chart Review    Reason Seen: continuous nutrition monitoring    Malnutrition Screening Tool Results   Have you recently lost weight without trying?: No  Have you been eating poorly because of a decreased appetite?: No   MST Score: 0     Diagnosis:  Small Bowel Obstruction    Relevant Medical History: h/o colostomy s/p reversal, Gtube and Jtube s/p removal, and chronic back pain on home morphine, s/p exlap with lysis of adhesions 6/4    Nutrition-Related Medications: Dex 5% & NaCl 0.9% 100 mL/hr  Calorie Containing IV Medications:  D5 @ 100 mL/hr 408 kcal  from 120 g dextrose    Nutrition-Related Labs:  6/5/23: Glucose 194 & 308, Ca 7.2, HGB/HCT 6.9/21.4    Diet/PN Order: Diet NPO  Oral Supplement Order: none  Tube Feeding Order: none  Appetite/Oral Intake: NPO/NPO  Factors Affecting Nutritional Intake: abdominal pain and s/p exlap with lysis of adhesions 6/4  Food/Restorationist/Cultural Preferences: none reported  Food Allergies: none  "reported    Skin Integrity: incision  Wound(s):   none    Comments  23: Pt reports good appetite and no issues until 23 when she lost her appetite due to nausea, vomiting, and abdominal pain which continue through . Chronic constipation for the past 20 years (on medications to have bowel movements). Back pain improved, abdominal pain persists. Pt purposefully lost 25 lbs (160 lbs to 135 lbs) over the past 6 months using portion control and starting probiotics, endorses feeling better at lower weight. Per EMR weight consistent over past three months 138 to 135 lbs. No signs of muscle or fat pad wasting. Endorsed abdominal pain during interview, will follow up for diet education.     Anthropometrics    Height: 5' 4" (162.6 cm)    Last Weight: 61.2 kg (135 lb) (23 07) Weight Method: Standard Scale  BMI (Calculated): 23.2  BMI Classification: normal (BMI 18.5-24.9)     Ideal Body Weight (IBW), Female: 120 lb     % Ideal Body Weight, Female (lb): 112.5 %                    Usual Body Weight (UBW), k.6 kg (Intentional weight loss through dieting over past 6 months-year)  % Usual Body Weight: 84.52     Usual Weight Provided By: patient    Wt Readings from Last 5 Encounters:   23 61.2 kg (135 lb)   23 62.6 kg (138 lb)   23 62.6 kg (138 lb)   22 67.6 kg (149 lb)   22 70.7 kg (155 lb 12.8 oz)     Weight Change(s) Since Admission:  Admit Weight: 61.2 kg (135 lb) (23 0714)  Weight consistent over past 3 months.     Estimated Needs    Weight Used For Calorie Calculations: 61.2 kg (134 lb 14.7 oz)  Energy Calorie Requirements (kcal): 2973-2579  Energy Need Method: Kcal/kg (25-30)  Weight Used For Protein Calculations: 61.2 kg (134 lb 14.7 oz)  Protein Requirements: 61-74 (1-1.2 g/kg)     Temp (24hrs), Av °F (36.7 °C), Min:97.8 °F (36.6 °C), Max:98.3 °F (36.8 °C)       Enteral Nutrition    Patient not receiving enteral nutrition at this time.    Parenteral " Nutrition    Patient not receiving parenteral nutrition support at this time.    Evaluation of Received Nutrient Intake    Calories: not meeting estimated needs (D5 @ 100 mL/hr 408 kcal  from 120 g dextrose)  Protein: not meeting estimated needs    Patient Education    Not applicable.    Nutrition Diagnosis     PES: Inadequate energy intake related to  NPO due to abdominal pain and s/p exlap with lysis of adhesions 6/4 as evidenced by <25% of usual intake 6/2-3 and NPO 6/4-5. (new)    Interventions/Goals     Intervention(s): modified composition of meals/snacks and commercial beverage  Goal: Meet greater than 75% of nutritional needs by follow-up. (new)    Monitoring & Evaluation     Dietitian will monitor food and beverage intake, weight change, and gastrointestinal profile.  Nutrition Risk/Follow-Up: moderate (follow-up in 3-5 days)   Please consult if re-assessment needed sooner.

## 2023-06-05 NOTE — OP NOTE
Operation:  Exploratory laparotomy, abdominal washout, suture ligature of I fear epigastric artery, intraoperative placement of ABThera wound VAC device     Indications:  This is a 73-year-old female who was taken to the operating room for exploratory laparotomy and lysis of adhesions yesterday, postoperatively she was doing relatively well overnight but in the a.m. developed hypotension and severe abdominal pain.  On physical exam she demonstrated peritonitis, complained of severe worsening pain within a short amount of time, over 2-3 hours.  She demonstrated significant drop in hemoglobin from 16-6.9.      Due to her physical exam signs of peritonitis and significant drop in hemoglobin, sepsis versus hemorrhage was suspected.  We decided take her to the operating room for appropriate management     Preop diagnosis:  Peritonitis, postop hemorrhage    Postop diagnosis:  Postop bleeding     Surgeon:Camilo Valadez MD    Co Surgeon: Martine Saenz MD    Blood loss:  50 cc fresh blood, 1-1/2 L of old blood clot removed as well     Findings:  Patient had about a L and half of old and fresh blood clots, there was no succus entericus, no evidence of bowel injury, the rectus abdominis muscle was largely replaced with blood clots, the inferior epigastric artery was purely     Disposition:  Will remains in critical condition and will go to the MICU     Date of operation:  June 5, 2023     Details of operation:  Patient was brought to the operating room laid supine on operating table, general anesthesia was administered she was intubated endotracheally graft the abdomen was prepped and draped in usual sterile fashion, the midline lap anatomy and a Boo scissor was used to cut through the the fascial sutures    Entering the abdomen was obtained, the a L and a half of fresh and old blood clots present     The small bowel was run from ligament Treitz to terminal ileum, there was no succus entericus and no bowel perforation.       The colon also was examined in its entirety, there was no colonic perforation.      This patient had had elevated INR at the time of her surgery but due to her symptoms she needed to be taken emergently, it seemed that she mostly was oozing from surgical surfaces, the omentum had of the greater omentum and several areas of bleeding     The EnSeal device was used to remove the majority of the greater omentum.      The the right inferior rectus abdominis muscle was exposed    The proximal the proximal and distal inferior epigastric artery was encountered and tied off     The abdomen was irrigated copiously     Additional adhesiolysis was performed     We decided to place a ABThera wound VAC to allow for a second-look to ensure adequate removal of the large volume of blood clot and oozing     The abdomen was irrigated copiously with sterile saline     The ABThera wound VAC was placed and good suction was obtained    Patient will likely remain intubated and go to the MICU with plans to return to the operating room within 48 hours     Thank you end of dictation

## 2023-06-05 NOTE — ANESTHESIA PREPROCEDURE EVALUATION
06/05/2023  Alejandrina Rodriguez is a 73 y.o., female.      Pre-op Assessment    I have reviewed the Patient Summary Reports.     I have reviewed the Nursing Notes. I have reviewed the NPO Status.   I have reviewed the Medications.     Review of Systems  Anesthesia Hx:  No problems with previous Anesthesia    Social:  Non-Smoker    Hematology/Oncology:        Hematology Comments: Hx DVT    Recent Hemoglobin 6.9  Getting one unit  Ordered 2 more PRBC  2 FFP  1 platelet  INR 2.5     Cardiovascular:   Hypertension Denies MI.    Denies Angina.  Denies CHF. hyperlipidemia Bilateral carotid stenosis   Pulmonary:   Denies COPD.  Denies Asthma. Sleep Apnea, CPAP    Renal/:   Denies Chronic Renal Disease. no renal calculi     Hepatic/GI:   GERD Denies Liver Disease. Denies Hepatitis. Re-exploration after abdominal surgery yesterday   Musculoskeletal:  Spine Disorders: lumbar and cervical    Neurological:   Denies CVA. Denies Seizures.    Endocrine:   Diabetes, poorly controlled Denies Hypothyroidism. Denies Hyperthyroidism.  Denies Obesity / BMI > 30  Psych:   anxiety          Physical Exam  General: Well nourished, Cooperative, Alert and Oriented    Airway:  Mallampati: I   Mouth Opening: Normal  TM Distance: Normal  Tongue: Normal  Neck ROM: Normal ROM        Anesthesia Plan  Type of Anesthesia, risks & benefits discussed:    Anesthesia Type: Gen ETT  Intra-op Monitoring Plan: Standard ASA Monitors and Art Line  Induction:  IV  Informed Consent: Informed consent signed with the Patient and all parties understand the risks and agree with anesthesia plan.  All questions answered. Patient consented to blood products? Yes  ASA Score: 3 Emergent  Day of Surgery Review of History & Physical: H&P Update referred to the surgeon/provider.    Ready For Surgery From Anesthesia Perspective.     .

## 2023-06-05 NOTE — PROGRESS NOTES
Surgery Progress Note    S:  S/p exlap with QUINTEN for SBO 6/4    AF, HR WNL in 60s, BP borderline 90-100s/50s  Remains on 2L NC  NG with 200cc bilious output post-op  UOP 300cc clear yellow-orange in swanson with additional output not yet recorded, per nursing  Pt reports poor pain control, takes morphine at home for chronic back pain  Not yet passing flatus or having BMs    O:  Temp:  [97.8 °F (36.6 °C)-98.3 °F (36.8 °C)] 97.8 °F (36.6 °C)  Pulse:  [56-92] 67  Resp:  [8-30] 18  SpO2:  [95 %-100 %] 100 %  BP: ()/() 95/58    Physical Exam:  Gen: NAD, AAOx3  HEENT: EOMI, NCAT, NG in place to LIWS  CV: RR, borderline BP  Resp: no shortness of breath, normal WOB on 2L NC  Abd: soft, distended, appropriately tender to palpation, midline wound with island dressing with some strikethrough but no active drainage. Swanson in place  Ext: no edema    Labs:  CBC, CMP pending    A/P:  74 y/o w/ hx ex lap several decades ago with h/o colostomy s/p reversal, Gtube and Jtube s/p removal, and chronic back pain on home morphine, now admitted 6/4 with SBO. Pt s/p exlap with lysis of adhesions 6/4. Intra-op evaluation revealed many omental adhesions with a loop of small bowel adhesed to left lateral abdominal wall with large stool ball proximal to twisted loop. Stool ball broken up and adhesions lysed. Dilated injected bowel proximally and collapsed bowel distally, all pink and viable. Pt tolerated the procedure well without immediate complications.    Pt's main concern is her poor pain control.     - continue NG to LIWS  - continue swanson until UOP appropriate, BP WNL  - LR bolus for borderline BP, continue mIVF  - wean supplemental O2 as tolerated  - consider PCA for pain control, will discuss with staff  - PT/OT    Tenisha Brand MD  [facility]

## 2023-06-05 NOTE — ANESTHESIA POSTPROCEDURE EVALUATION
Anesthesia Post Evaluation    Patient: Alejandrina Rodriguez    Procedure(s) Performed: Procedure(s) (LRB):  LAPAROTOMY, EXPLORATORY (N/A)  LYSIS, ADHESIONS (N/A)    Final Anesthesia Type: general      Patient location during evaluation: PACU  Patient participation: Yes- Able to Participate  Level of consciousness: awake  Post-procedure vital signs: reviewed and stable  Pain management: adequate  Airway patency: patent    PONV status at discharge: vomiting (controlled) and nausea (controlled)  Anesthetic complications: no      Cardiovascular status: hemodynamically stable  Respiratory status: spontaneous ventilation and unassisted  Hydration status: euvolemic  Follow-up not needed.  Comments:              Vitals Value Taken Time   BP 95/58 06/05/23 0439   Temp 36.6 °C (97.8 °F) 06/04/23 2348   Pulse 67 06/05/23 0439   Resp 18 06/04/23 2243   SpO2 100 % 06/05/23 0439         Event Time   Out of Recovery 20:15:00         Pain/Jalil Score: Pain Rating Prior to Med Admin: 8 (6/5/2023  5:15 AM)  Pain Rating Post Med Admin: 0 (6/4/2023  2:51 PM)  Jalil Score: 8 (6/4/2023  6:52 PM)

## 2023-06-05 NOTE — ANESTHESIA PROCEDURE NOTES
Arterial    Diagnosis: Postoperative hemorrhage.    Patient location during procedure: done in OR  Procedure start time: 6/5/2023 1:55 PM  Timeout: 6/5/2023 1:55 PM  Procedure end time: 6/5/2023 1:55 PM    Staffing  Authorizing Provider: Raul Gutierrez DO  Performing Provider: Raul Gutierrez DO    Anesthesiologist was present at the time of the procedure.    Preanesthetic Checklist  Completed: patient identified, IV checked, site marked, risks and benefits discussed, surgical consent, monitors and equipment checked, pre-op evaluation, timeout performed and anesthesia consent givenArterial  Skin Prep: chlorhexidine gluconate  Local Infiltration: lidocaine  Orientation: right  Location: radial    Catheter Size: 20 G  Catheter placement by Anatomical landmarks. Heme positive aspiration all ports. Insertion Attempts: 1  Assessment  Dressing: secured with tape and tegaderm  Patient: Tolerated well

## 2023-06-05 NOTE — NURSING
Nurses Note -- 4 Eyes      6/5/2023   5:52 PM      Skin assessed during: Admit      [] No Altered Skin Integrity Present    []Prevention Measures Documented      [x] Yes- Altered Skin Integrity Present or Discovered   [] LDA Added if Not in Epic (Describe Wound)   [x] New Altered Skin Integrity was Present on Admit and Documented in LDA   [] Wound Image Taken    Wound Care Consulted? No    Attending Nurse:  Charbel Persaud RN     Second RN/Staff Member:  Brad So / Cait WEBBER

## 2023-06-05 NOTE — ANESTHESIA POSTPROCEDURE EVALUATION
Anesthesia Post Evaluation    Patient: Alejandrina Rodriguez    Procedure(s) Performed: Procedure(s) (LRB):  LAPAROTOMY, EXPLORATORY (N/A)    Final Anesthesia Type: general      Patient location during evaluation: ICU  Patient participation: No - Unable to Participate, Intubation  Level of consciousness: sedated  Post-procedure vital signs: reviewed and stable  Pain management: adequate  Airway patency: patent  ALEKSANDAR mitigation strategies: Multimodal analgesia  PONV status at discharge: No PONV  Anesthetic complications: no      Cardiovascular status: blood pressure returned to baseline and hemodynamically stable  Respiratory status: intubated and ventilator  Hydration status: euvolemic  Follow-up not needed.          Vitals Value Taken Time   BP  06/05/23 1542   Temp  06/05/23 1542   Pulse  06/05/23 1542   Resp  06/05/23 1542   SpO2  06/05/23 1542         No case tracking events are documented in the log.      Pain/Jalil Score: Pain Rating Prior to Med Admin: 10 (6/5/2023 12:33 PM)  Pain Rating Post Med Admin: 9 (6/5/2023  1:03 PM)  Jalil Score: 8 (6/4/2023  6:52 PM)

## 2023-06-05 NOTE — TRANSFER OF CARE
"Anesthesia Transfer of Care Note    Patient: Alejandrina Rodriguez    Procedure(s) Performed: Procedure(s) (LRB):  LAPAROTOMY, EXPLORATORY (N/A)    Patient location: ICU    Anesthesia Type: general    Transport from OR: Transported from OR intubated on 100% O2 by AMBU with assisted ventilation. Continuous ECG monitoring in transport. Continuous SpO2 monitoring in transport. Continuos invasive BP monitoring in transport    Post pain: adequate analgesia    Post assessment: no apparent anesthetic complications    Post vital signs: stable    Level of consciousness: sedated    Nausea/Vomiting: no nausea/vomiting    Complications: none    Transfer of care protocol was followed      Last vitals:   Visit Vitals  BP (!) 110/45 (BP Location: Left arm, Patient Position: Lying)   Pulse 60   Temp 36.5 °C (97.7 °F) (Skin)   Resp 18   Ht 5' 4" (1.626 m)   Wt 61.2 kg (135 lb)   SpO2 99%   BMI 23.17 kg/m²     "

## 2023-06-05 NOTE — ANESTHESIA PROCEDURE NOTES
Intubation    Date/Time: 6/5/2023 1:51 PM  Performed by: Santos Tan CRNA  Authorized by: Santos Tan CRNA     Intubation:     Induction:  Rapid sequence induction    Intubated:  Postinduction    Mask Ventilation:  Not attempted    Attempts:  1    Attempted By:  Student (STACEY Davis)    Method of Intubation:  Direct    Blade:  Burciaga 3    Laryngeal View Grade: Grade I - full view of cords      Difficult Airway Encountered?: No      Complications:  None    Airway Device:  Oral endotracheal tube    Airway Device Size:  7.5    Style/Cuff Inflation:  Cuffed    Inflation Amount (mL):  7    Tube secured:  21    Secured at:  The lips    Placement Verified By:  Capnometry    Complicating Factors:  None    Findings Post-Intubation:  BS equal bilateral and atraumatic/condition of teeth unchanged

## 2023-06-05 NOTE — NURSING
Nurses Note -- 4 Eyes      6/5/2023   9:00 AM      Skin assessed during: Admit      [x] No Altered Skin Integrity Present    []Prevention Measures Documented      [] Yes- Altered Skin Integrity Present or Discovered   [] LDA Added if Not in Epic (Describe Wound)   [] New Altered Skin Integrity was Present on Admit and Documented in LDA   [] Wound Image Taken    Wound Care Consulted? No    Attending Nurse:  Eunice Maldonado RN     Second RN/Staff Member:  Jillian Singh RN

## 2023-06-06 ENCOUNTER — ANESTHESIA EVENT (OUTPATIENT)
Dept: SURGERY | Facility: HOSPITAL | Age: 73
DRG: 335 | End: 2023-06-06
Payer: MEDICARE

## 2023-06-06 LAB
ABO + RH BLD: NORMAL
ALBUMIN SERPL-MCNC: 2.8 G/DL (ref 3.4–4.8)
ALBUMIN/GLOB SERPL: 1.6 RATIO (ref 1.1–2)
ALP SERPL-CCNC: 44 UNIT/L (ref 40–150)
ALT SERPL-CCNC: 467 UNIT/L (ref 0–55)
AST SERPL-CCNC: 676 UNIT/L (ref 5–34)
BASOPHILS # BLD AUTO: 0.01 X10(3)/MCL
BASOPHILS # BLD AUTO: 0.01 X10(3)/MCL
BASOPHILS NFR BLD AUTO: 0.1 %
BASOPHILS NFR BLD AUTO: 0.1 %
BILIRUBIN DIRECT+TOT PNL SERPL-MCNC: 1.7 MG/DL
BLD PROD TYP BPU: NORMAL
BLOOD UNIT EXPIRATION DATE: NORMAL
BLOOD UNIT TYPE CODE: 6200
BUN SERPL-MCNC: 13.8 MG/DL (ref 9.8–20.1)
CALCIUM SERPL-MCNC: 8.6 MG/DL (ref 8.4–10.2)
CHLORIDE SERPL-SCNC: 106 MMOL/L (ref 98–107)
CO2 SERPL-SCNC: 28 MMOL/L (ref 23–31)
CREAT SERPL-MCNC: 0.62 MG/DL (ref 0.55–1.02)
CROSSMATCH INTERPRETATION: NORMAL
DISPENSE STATUS: NORMAL
EOSINOPHIL # BLD AUTO: 0 X10(3)/MCL (ref 0–0.9)
EOSINOPHIL # BLD AUTO: 0.01 X10(3)/MCL (ref 0–0.9)
EOSINOPHIL NFR BLD AUTO: 0 %
EOSINOPHIL NFR BLD AUTO: 0.1 %
ERYTHROCYTE [DISTWIDTH] IN BLOOD BY AUTOMATED COUNT: 16.5 % (ref 11.5–17)
ERYTHROCYTE [DISTWIDTH] IN BLOOD BY AUTOMATED COUNT: 17.2 % (ref 11.5–17)
GFR SERPLBLD CREATININE-BSD FMLA CKD-EPI: >60 MLS/MIN/1.73/M2
GLOBULIN SER-MCNC: 1.7 GM/DL (ref 2.4–3.5)
GLUCOSE SERPL-MCNC: 112 MG/DL (ref 82–115)
GLUCOSE SERPL-MCNC: 201 MG/DL (ref 70–110)
HCO3 UR-SCNC: 27.2 MMOL/L (ref 24–28)
HCT VFR BLD AUTO: 24.6 % (ref 37–47)
HCT VFR BLD AUTO: 25.4 % (ref 37–47)
HCT VFR BLD CALC: 22 %PCV (ref 36–54)
HGB BLD-MCNC: 8 G/DL
HGB BLD-MCNC: 8.3 G/DL (ref 12–16)
HGB BLD-MCNC: 9.1 G/DL (ref 12–16)
IMM GRANULOCYTES # BLD AUTO: 0.03 X10(3)/MCL (ref 0–0.04)
IMM GRANULOCYTES # BLD AUTO: 0.06 X10(3)/MCL (ref 0–0.04)
IMM GRANULOCYTES NFR BLD AUTO: 0.4 %
IMM GRANULOCYTES NFR BLD AUTO: 0.6 %
INR BLD: 1.55 (ref 0–1.3)
LYMPHOCYTES # BLD AUTO: 1.11 X10(3)/MCL (ref 0.6–4.6)
LYMPHOCYTES # BLD AUTO: 1.29 X10(3)/MCL (ref 0.6–4.6)
LYMPHOCYTES NFR BLD AUTO: 12.4 %
LYMPHOCYTES NFR BLD AUTO: 13.7 %
MCH RBC QN AUTO: 30 PG (ref 27–31)
MCH RBC QN AUTO: 30.4 PG (ref 27–31)
MCHC RBC AUTO-ENTMCNC: 33.7 G/DL (ref 33–36)
MCHC RBC AUTO-ENTMCNC: 35.8 G/DL (ref 33–36)
MCV RBC AUTO: 84.9 FL (ref 80–94)
MCV RBC AUTO: 88.8 FL (ref 80–94)
MONOCYTES # BLD AUTO: 0.42 X10(3)/MCL (ref 0.1–1.3)
MONOCYTES # BLD AUTO: 0.64 X10(3)/MCL (ref 0.1–1.3)
MONOCYTES NFR BLD AUTO: 5.2 %
MONOCYTES NFR BLD AUTO: 6.1 %
NEUTROPHILS # BLD AUTO: 6.53 X10(3)/MCL (ref 2.1–9.2)
NEUTROPHILS # BLD AUTO: 8.41 X10(3)/MCL (ref 2.1–9.2)
NEUTROPHILS NFR BLD AUTO: 80.6 %
NEUTROPHILS NFR BLD AUTO: 80.7 %
NRBC BLD AUTO-RTO: 0 %
NRBC BLD AUTO-RTO: 0 %
PCO2 BLDA: 43.6 MMHG (ref 35–45)
PH SMN: 7.4 [PH] (ref 7.35–7.45)
PLATELET # BLD AUTO: 125 X10(3)/MCL (ref 130–400)
PLATELET # BLD AUTO: 168 X10(3)/MCL (ref 130–400)
PMV BLD AUTO: 10.4 FL (ref 7.4–10.4)
PMV BLD AUTO: 10.8 FL (ref 7.4–10.4)
PO2 BLDA: 398 MMHG (ref 80–100)
POC BE: 2 MMOL/L
POC IONIZED CALCIUM: 1.19 MMOL/L (ref 1.06–1.42)
POC SATURATED O2: 100 % (ref 95–100)
POC TCO2: 29 MMOL/L (ref 23–27)
POCT GLUCOSE: 101 MG/DL (ref 70–110)
POCT GLUCOSE: 161 MG/DL (ref 70–110)
POCT GLUCOSE: 97 MG/DL (ref 70–110)
POTASSIUM BLD-SCNC: 3.4 MMOL/L (ref 3.5–5.1)
POTASSIUM SERPL-SCNC: 3.5 MMOL/L (ref 3.5–5.1)
PROT SERPL-MCNC: 4.5 GM/DL (ref 5.8–7.6)
PROTHROMBIN TIME: 18.4 SECONDS (ref 12.5–14.5)
RBC # BLD AUTO: 2.77 X10(6)/MCL (ref 4.2–5.4)
RBC # BLD AUTO: 2.99 X10(6)/MCL (ref 4.2–5.4)
SAMPLE: ABNORMAL
SODIUM BLD-SCNC: 139 MMOL/L (ref 136–145)
SODIUM SERPL-SCNC: 141 MMOL/L (ref 136–145)
UNIT NUMBER: NORMAL
WBC # SPEC AUTO: 10.42 X10(3)/MCL (ref 4.5–11.5)
WBC # SPEC AUTO: 8.1 X10(3)/MCL (ref 4.5–11.5)

## 2023-06-06 PROCEDURE — 80053 COMPREHEN METABOLIC PANEL: CPT | Performed by: INTERNAL MEDICINE

## 2023-06-06 PROCEDURE — 25000003 PHARM REV CODE 250: Performed by: INTERNAL MEDICINE

## 2023-06-06 PROCEDURE — 20000000 HC ICU ROOM

## 2023-06-06 PROCEDURE — 85025 COMPLETE CBC W/AUTO DIFF WBC: CPT | Performed by: STUDENT IN AN ORGANIZED HEALTH CARE EDUCATION/TRAINING PROGRAM

## 2023-06-06 PROCEDURE — 85610 PROTHROMBIN TIME: CPT | Performed by: STUDENT IN AN ORGANIZED HEALTH CARE EDUCATION/TRAINING PROGRAM

## 2023-06-06 PROCEDURE — 27000221 HC OXYGEN, UP TO 24 HOURS

## 2023-06-06 PROCEDURE — P9035 PLATELET PHERES LEUKOREDUCED: HCPCS | Performed by: STUDENT IN AN ORGANIZED HEALTH CARE EDUCATION/TRAINING PROGRAM

## 2023-06-06 PROCEDURE — 94761 N-INVAS EAR/PLS OXIMETRY MLT: CPT

## 2023-06-06 PROCEDURE — C9113 INJ PANTOPRAZOLE SODIUM, VIA: HCPCS | Performed by: INTERNAL MEDICINE

## 2023-06-06 PROCEDURE — 63600175 PHARM REV CODE 636 W HCPCS: Performed by: STUDENT IN AN ORGANIZED HEALTH CARE EDUCATION/TRAINING PROGRAM

## 2023-06-06 PROCEDURE — 94003 VENT MGMT INPAT SUBQ DAY: CPT

## 2023-06-06 PROCEDURE — 63600175 PHARM REV CODE 636 W HCPCS: Performed by: INTERNAL MEDICINE

## 2023-06-06 PROCEDURE — 99900035 HC TECH TIME PER 15 MIN (STAT)

## 2023-06-06 PROCEDURE — 85025 COMPLETE CBC W/AUTO DIFF WBC: CPT | Performed by: INTERNAL MEDICINE

## 2023-06-06 PROCEDURE — 99900026 HC AIRWAY MAINTENANCE (STAT)

## 2023-06-06 PROCEDURE — 63600175 PHARM REV CODE 636 W HCPCS

## 2023-06-06 RX ORDER — LABETALOL HYDROCHLORIDE 5 MG/ML
5 INJECTION, SOLUTION INTRAVENOUS EVERY 6 HOURS
Status: DISCONTINUED | OUTPATIENT
Start: 2023-06-06 | End: 2023-06-06

## 2023-06-06 RX ORDER — METHOCARBAMOL 100 MG/ML
500 INJECTION, SOLUTION INTRAMUSCULAR; INTRAVENOUS EVERY 8 HOURS
Status: COMPLETED | OUTPATIENT
Start: 2023-06-06 | End: 2023-06-09

## 2023-06-06 RX ORDER — HYDROCODONE BITARTRATE AND ACETAMINOPHEN 500; 5 MG/1; MG/1
TABLET ORAL
Status: DISCONTINUED | OUTPATIENT
Start: 2023-06-06 | End: 2023-06-19 | Stop reason: HOSPADM

## 2023-06-06 RX ORDER — LABETALOL HYDROCHLORIDE 5 MG/ML
5 INJECTION, SOLUTION INTRAVENOUS EVERY 6 HOURS PRN
Status: DISCONTINUED | OUTPATIENT
Start: 2023-06-06 | End: 2023-06-08

## 2023-06-06 RX ORDER — HYDROMORPHONE HYDROCHLORIDE 2 MG/ML
1 INJECTION, SOLUTION INTRAMUSCULAR; INTRAVENOUS; SUBCUTANEOUS
Status: DISCONTINUED | OUTPATIENT
Start: 2023-06-06 | End: 2023-06-08

## 2023-06-06 RX ORDER — ACETAMINOPHEN 10 MG/ML
1000 INJECTION, SOLUTION INTRAVENOUS EVERY 8 HOURS
Status: COMPLETED | OUTPATIENT
Start: 2023-06-06 | End: 2023-06-07

## 2023-06-06 RX ADMIN — ACETAMINOPHEN 1000 MG: 10 INJECTION, SOLUTION INTRAVENOUS at 09:06

## 2023-06-06 RX ADMIN — POTASSIUM CHLORIDE 10 MEQ: 7.46 INJECTION, SOLUTION INTRAVENOUS at 09:06

## 2023-06-06 RX ADMIN — ACETAMINOPHEN 1000 MG: 10 INJECTION, SOLUTION INTRAVENOUS at 02:06

## 2023-06-06 RX ADMIN — HYDROMORPHONE HYDROCHLORIDE 1 MG: 2 INJECTION INTRAMUSCULAR; INTRAVENOUS; SUBCUTANEOUS at 11:06

## 2023-06-06 RX ADMIN — HYDROMORPHONE HYDROCHLORIDE 1 MG: 2 INJECTION INTRAMUSCULAR; INTRAVENOUS; SUBCUTANEOUS at 01:06

## 2023-06-06 RX ADMIN — PROPOFOL 35 MCG/KG/MIN: 10 INJECTION, EMULSION INTRAVENOUS at 04:06

## 2023-06-06 RX ADMIN — MUPIROCIN: 20 OINTMENT TOPICAL at 09:06

## 2023-06-06 RX ADMIN — HYDROMORPHONE HYDROCHLORIDE 1 MG: 2 INJECTION INTRAMUSCULAR; INTRAVENOUS; SUBCUTANEOUS at 06:06

## 2023-06-06 RX ADMIN — TIMOLOL MALEATE 1 DROP: 5 SOLUTION/ DROPS OPHTHALMIC at 04:06

## 2023-06-06 RX ADMIN — LABETALOL HYDROCHLORIDE 5 MG: 5 INJECTION, SOLUTION INTRAVENOUS at 10:06

## 2023-06-06 RX ADMIN — METHOCARBAMOL 500 MG: 100 INJECTION INTRAMUSCULAR; INTRAVENOUS at 09:06

## 2023-06-06 RX ADMIN — HYDROMORPHONE HYDROCHLORIDE 1 MG: 2 INJECTION INTRAMUSCULAR; INTRAVENOUS; SUBCUTANEOUS at 04:06

## 2023-06-06 RX ADMIN — HYDROMORPHONE HYDROCHLORIDE 1 MG: 2 INJECTION INTRAMUSCULAR; INTRAVENOUS; SUBCUTANEOUS at 07:06

## 2023-06-06 RX ADMIN — PANTOPRAZOLE SODIUM 40 MG: 40 INJECTION, POWDER, FOR SOLUTION INTRAVENOUS at 08:06

## 2023-06-06 RX ADMIN — MUPIROCIN: 20 OINTMENT TOPICAL at 08:06

## 2023-06-06 RX ADMIN — METHOCARBAMOL 500 MG: 100 INJECTION INTRAMUSCULAR; INTRAVENOUS at 02:06

## 2023-06-06 RX ADMIN — SODIUM CHLORIDE, POTASSIUM CHLORIDE, SODIUM LACTATE AND CALCIUM CHLORIDE: 600; 310; 30; 20 INJECTION, SOLUTION INTRAVENOUS at 04:06

## 2023-06-06 RX ADMIN — HYDROMORPHONE HYDROCHLORIDE 1 MG: 2 INJECTION INTRAMUSCULAR; INTRAVENOUS; SUBCUTANEOUS at 09:06

## 2023-06-06 NOTE — PROGRESS NOTES
Acute Care Surgery   Progress Note  Admit Date: 6/4/2023  HD#2  POD#1 Day Post-Op    Subjective:   Interval history:  Taken to the OR yesterday for ex-lap and evacuation of large volume hemoperitoneum   Abdomen left open with abthera in place - 900cc serosanguinous output in cannister   Remained intubated on [facility] vent- minimal vent settings   On propofol for sedation   NGT to LIWS 400cc bilious   Opens eyes spontaneously and moving all extremities   Vasquez in place with 1580cc urine output over 24 hours       Home Meds:  Current Outpatient Medications   Medication Instructions    azelastine (ASTELIN) 137 mcg, Nasal, 2 times daily    B-complex with vitamin C (Z-BEC OR EQUIV) tablet 1 tablet, Oral, Daily    baclofen (LIORESAL) 10 mg, Oral, 4 times daily, PRN muscle spasms    betamethasone dipropionate 0.05 % cream 1 application, Topical (Top), 2 times daily    celecoxib (CELEBREX) 100 MG capsule TAKE ONE CAPSULE BY MOUTH ONCE DAILY WITH FOOD AS NEEDED FOR PAIN    clopidogreL (PLAVIX) 75 mg tablet 1 tablet, Oral, Daily    famotidine (PEPCID) 20 MG tablet TAKE 1 TABLET TWICE DAILY    hydroCHLOROthiazide (HYDRODIURIL) 25 MG tablet TAKE 1 TABLET EVERY DAY    LEXAPRO 10 mg tablet TAKE 1 TABLET BY MOUTH ONCE DAILY AS DIRECTED FOR MOOD/CHRONIC PAIN AS DIRECTED    LIDOcaine (LIDODERM) 5 % 1 patch, Transdermal, Every 24 hours (non-standard times), Remove & Discard patch within 12 hours or as directed by MD    metFORMIN (GLUCOPHAGE) 500 mg, Oral, 2 times daily    morphine (MSIR) 15 MG tablet 1.5 tablets, Oral, 2 times daily PRN    nitroGLYCERIN (NITROSTAT) 0.4 MG SL tablet PLEASE SEE ATTACHED FOR DETAILED DIRECTIONS    OMEGA-3-EPA-FISH OIL ORAL 1 capsule, Oral, Daily    omeprazole (PRILOSEC) 40 MG capsule TAKE 1 CAPSULE EVERY DAY    ondansetron (ZOFRAN-ODT) 8 mg, Oral, 2 times daily    peg 400-propylene glycol (SYSTANE, PROPYLENE GLYCOL,) 0.4-0.3 % Drop 1 drop, Ophthalmic    potassium chloride SA (K-DUR,KLOR-CON) 20 MEQ  "tablet TAKE 1 TABLET ONE TIME DAILY    pravastatin (PRAVACHOL) 40 MG tablet TAKE 1 TABLET EVERY DAY    timolol maleate 0.5% (TIMOPTIC) 0.5 % Drop 1 drop, Both Eyes, Daily    vitamin D (VITAMIN D3) 5,000 Units, Oral, Daily    warfarin (COUMADIN) 4 MG tablet TAKE 1 TABLET EVERY DAY      Scheduled Meds:   acetaminophen  1,000 mg Intravenous Q8H    mupirocin   Nasal BID    pantoprazole  40 mg Intravenous Daily    phytonadione ((AQUA-MEPHYTON) IVPB  10 mg Intravenous Once    potassium chloride in water  10 mEq Intravenous BID    timolol maleate 0.5%  1 drop Both Eyes Daily     Continuous Infusions:   lactated ringers 125 mL/hr at 06/06/23 0441    propofoL 35 mcg/kg/min (06/06/23 0442)     PRN Meds:sodium chloride, sodium chloride, sodium chloride, sodium chloride, sodium chloride, bisacodyL, dextrose 10%, dextrose 10%, glucagon (human recombinant), HYDROmorphone, HYDROmorphone, insulin aspart U-100, nitroGLYCERIN     Objective:     VITAL SIGNS: 24 HR MIN & MAX LAST   Temp  Min: 94 °F (34.4 °C)  Max: 98 °F (36.7 °C)  97.6 °F (36.4 °C)   BP  Min: 56/46  Max: 185/78  (!) 148/77    Pulse  Min: 47  Max: 95  (!) 47    Resp  Min: 16  Max: 22  16    SpO2  Min: 86 %  Max: 100 %  99 %      HT: 5' 4" (162.6 cm)  WT: 61.2 kg (135 lb)  BMI: 23.2     Intake/output:  Intake/Output - Last 3 Shifts         06/04 0700  06/05 0659 06/05 0700  06/06 0659    P.O.  0    I.V. (mL/kg) 500 (8.2) 1840.4 (30.1)    Blood  1398    NG/GT  50    IV Piggyback 1000 700    Total Intake(mL/kg) 1500 (24.5) 3988.4 (65.2)    Urine (mL/kg/hr) 300 (0.2) 1580 (1.1)    Drains 0 400    Other 300 950    Stool  0    Blood  3000    Total Output 600 5930    Net +900 -1941.6          Urine Occurrence  0 x    Stool Occurrence  0 x            Intake/Output Summary (Last 24 hours) at 6/6/2023 0653  Last data filed at 6/6/2023 0530  Gross per 24 hour   Intake 3988.4 ml   Output 5930 ml   Net -1941.6 ml           Lines/drains/airway:       Peripheral IV - Single Lumen " 06/04/23 0735 22 G Posterior;Right Hand (Active)   Site Assessment Clean;Dry;Intact 06/06/23 0400   Extremity Assessment Distal to IV No abnormal discoloration 06/06/23 0400   Line Status Infusing 06/06/23 0400   Dressing Status Clean;Dry;Intact 06/06/23 0400   Dressing Intervention Integrity maintained 06/06/23 0400   Number of days: 1            Peripheral IV - Single Lumen 06/04/23 1150 20 G;Other (Comments) Anterior;Distal;Right Upper Arm (Active)   Site Assessment Clean;Dry;Intact 06/06/23 0400   Extremity Assessment Distal to IV No abnormal discoloration 06/06/23 0400   Line Status Infusing 06/06/23 0400   Dressing Status Clean;Dry;Intact 06/06/23 0400   Dressing Intervention Integrity maintained 06/06/23 0400   Number of days: 1            Peripheral IV - Single Lumen 06/04/23 1830 20 G Left;Posterior Wrist (Active)   Site Assessment Clean;Dry;Intact 06/06/23 0400   Extremity Assessment Distal to IV No abnormal discoloration 06/06/23 0400   Line Status Flushed;Saline locked 06/06/23 0400   Dressing Status Clean;Dry;Intact 06/06/23 0400   Dressing Intervention Integrity maintained 06/06/23 0400   Number of days: 1       Arterial Line 06/05/23 1355 Right Radial (Active)   Site Assessment Clean;Dry;Intact 06/06/23 0400   Line Status Pulsatile blood flow 06/06/23 0400   Art Line Waveform Appropriate 06/06/23 0400   Arterial Line Interventions Zeroed and calibrated;Leveled;Connections checked and tightened;Flushed per protocol 06/06/23 0400   Color/Movement/Sensation Capillary refill less than 3 sec 06/06/23 0400   Dressing Type Central line dressing 06/06/23 0400   Dressing Status Clean;Dry;Intact 06/06/23 0400   Dressing Intervention Integrity maintained 06/06/23 0400   Number of days: 0            NG/OG Tube 06/04/23 1435 nasogastric 16 Fr. Left nostril (Active)   Placement Check placement verified by aspirate characteristics;placement verified by distal tube length measurement 06/06/23 0400   Distal Tube  "Length (cm) 55 06/06/23 0400   Tolerance no signs/symptoms of discomfort 06/06/23 0400   Securement secured to nostril center w/ adhesive device 06/06/23 0400   Clamp Status/Tolerance no abdominal discomfort;no abdominal distention;no emesis;no nausea 06/05/23 0800   Suction Setting/Drainage Method suction at;low;intermittent setting 06/06/23 0400   Insertion Site Appearance no redness, warmth, tenderness, skin breakdown, drainage 06/06/23 0400   Drainage Bile 06/06/23 0400   Water Bolus (mL) 50 mL 06/05/23 2200   Tube Output(mL)(Include Discarded Residual) 0 mL 06/06/23 0530   Number of days: 1            Urethral Catheter 06/04/23 1653 16 Fr. (Active)   Site Assessment Clean;Intact;Dry 06/06/23 0400   Collection Container Urimeter 06/06/23 0400   Securement Method secured to top of thigh w/ adhesive device 06/06/23 0400   Catheter Care Performed yes 06/06/23 0400   Reason for Continuing Urinary Catheterization Critically ill in ICU and requiring hourly monitoring of intake/output 06/06/23 0400   CAUTI Prevention Bundle Securement Device in place with 1" slack;Intact seal between catheter & drainage tubing;Drainage bag/urimeter off the floor;Sheeting clip in use;No dependent loops or kinks;Drainage bag/urimeter not overfilled (<2/3 full);Drainage bag/urimeter below bladder 06/05/23 2000   Output (mL) 60 mL 06/06/23 0530   Number of days: 1       Physical examination:  Gen: intubated on mech vent - opens eyes spontaneously   HEENT: ETT in place, NGT in place to LIWS with 400cc bilious output   CV: RRR  Resp: NWOB, intubated on mech vent  Abd: soft, attp following surgery, abthera in place with 900 serosanguinous output in cannister   Ext: moving all extremities spontaneously and purposefully  Neuro: CN II-XII grossly intact      Labs:  Renal:  Recent Labs     06/05/23  0840 06/05/23  1146 06/05/23  1559 06/06/23  0210   BUN 12.2 12.9 13.5 13.8   CREATININE 0.59 0.87 0.72 0.62     Recent Labs     06/04/23  0906 " 06/05/23  1559 06/05/23  2006   LACTIC 1.3 3.7* 1.7     FENGI:  Recent Labs     06/04/23  0739 06/05/23  0840 06/05/23  1146 06/05/23 1559 06/06/23  0210    138 138 139 141   K 3.4* 3.0* 3.6 3.0* 3.5   CO2 29 27 22* 24 28   CALCIUM 9.8 7.2* 7.2* 9.3 8.6   MG  --   --   --  1.90  --    PHOS  --   --   --  2.9  --    ALBUMIN 4.2 2.6*  --   --  2.8*   BILITOT 1.6* 1.3  --   --  1.7*   AST 24 14  --   --  676*   ALKPHOS 101 36*  --   --  44   ALT 17 7  --   --  467*     Heme:  Recent Labs     06/04/23  1201 06/05/23  0840 06/05/23 1144 06/05/23 1515 06/05/23 1559 06/05/23 2006 06/06/23  0210   HGB  --  7.9* 6.9*  --  9.0* 9.4* 9.1*   HCT  --  24.6* 21.4* 22* 25.8* 26.7* 25.4*   PLT  --  176  --   --  110* 126* 125*   PTT 28.1  --   --   --   --   --   --    INR 1.59* 2.56*  --   --  1.54*  --  1.55*     ID:  Recent Labs     06/05/23  0840 06/05/23 1559 06/05/23 2006 06/06/23  0210   WBC 7.44 7.06 7.76 8.10     CBG:  Recent Labs     06/05/23  0840 06/05/23  1146 06/05/23 1559 06/06/23  0210   GLUCOSE 194* 308* 171* 112      Cardiovascular:  No results for input(s): TROPONINI, CKTOTAL, CKMB, BNP in the last 168 hours.  ABG:  Recent Labs   Lab 06/05/23  1515   PH 7.403   PO2 398*   PCO2 43.6   HCO3 27.2   BE 2      I have reviewed all pertinent lab results within the past 24 hours.    Imaging:  XR Gastric tube check, non-radiologist performed   Final Result      Enteric tube side port about 3.5 cm below the GE junction.         Electronically signed by: Sunil Peralta   Date:    06/04/2023   Time:    14:49      XR Gastric tube check, non-radiologist performed   Final Result      NG tube projects over the body of the stomach.         Electronically signed by: Evin Manzo   Date:    06/04/2023   Time:    10:47      CT Abdomen Pelvis  Without Contrast   Final Result      Suspected small-bowel obstruction with transition point in the right lower abdomen.         Electronically signed by: Sunil Peralta    Date:    06/04/2023   Time:    09:05         I have reviewed all pertinent imaging results/findings within the past 24 hours.    Micro/Path/Other:  Microbiology Results (last 7 days)       ** No results found for the last 168 hours. **           Specimen (168h ago, onward)       Start     Ordered    06/05/23 1449  Specimen to Pathology  RELEASE UPON ORDERING        References:    Click here for ordering Quick Tip   Question:  Release to patient  Answer:  Immediate    06/05/23 1449                     Assessment & Plan:   72 y/o w/ hx ex lap several decades ago with h/o colostomy s/p reversal, Gtube and Jtube s/p removal, and chronic back pain on home morphine, now admitted 6/4 with SBO. Pt s/p exlap with lysis of adhesions 6/4. Taken back to the OR 6/5 for hypotension and post-op hemorrhage with evacuation of approximately 3L of blood and old clot, placement of temporary abdominal closure. Remains intubated on mechanical ventilator     - vitals, labs, and images reviewed   - Remains intubated on mechanical ventilator - minimal vent settings being managed by MICU   - Continue NPO, NGT to LIWS  - PPI while NGT in place   - Propofol for sedation, PRN dilaudid for pain control   - Abthera in place - 900 serosanguinous output   - Needs continued correction of coagulopathy and hypothermia prior to return to the OR tomorrow to limit bleeding   - 1 pack of platelets ordered today due to thrombocytopenia and history of plavix   - Will plan to take her back to the OR tomorrow for evaluation and abdominal closure   - Holding lovenox ppx today given hemorrhage - ensure that she has SCDs in place     Martine Bridges MD   [facility] General Surgery PGy4

## 2023-06-06 NOTE — PLAN OF CARE
JARRED staffed with patient's spouse and daughter, Sona Hill ([phone]) and obtained information requested by [employer] in order to initiate a case. JARRED will staffed with the rounding physician in the morning and address other needed questions prior to contacting [employer] again etc.

## 2023-06-06 NOTE — NURSING
Nurses Note -- 4 Eyes      6/6/2023   9:02 AM      Skin assessed during: Daily Assessment      [] No Altered Skin Integrity Present    []Prevention Measures Documented      [x] Yes- Altered Skin Integrity Present or Discovered   [] LDA Added if Not in Epic (Describe Wound)   [] New Altered Skin Integrity was Present on Admit and Documented in LDA   [] Wound Image Taken    Wound Care Consulted? No    Attending Nurse:  Charbel Persaud RN     Second RN/Staff Member:  PRACHI

## 2023-06-06 NOTE — PROGRESS NOTES
[facility] - 8th Floor Med Surg  Pulmonary Critical Care Note    Patient Name: Alejandrina Rodriguez  MRN: [id number]  Admission Date: 6/4/2023  Hospital Length of Stay: 2 days  Code Status: Prior  Attending Provider: ANTHONY Sims MD  Primary Care Provider: Alex Treviño MD     Subjective:     HPI:   Alejandrina Reyes is a 73 y.o. female with PMH of ex-lap several decades ago, prior DVT with protein C deficiency w/ L iliac stent on plavix and coumadin at home, PVD, HTN, and DM. Patient was sent to the ED from clinic after presenting with several days of abdominal distention, nausea, and vomiting concerning for a small bowel obstruction. She was taken to the OR by surgery for lysis of adhesions and was found to have a large stool ball proximal to a twisted loop of bowel which was broken up. All bowel was pink and viable per OR note. Overnight she became hypotensive to 80s-90s systolic which dropped to the 60s-70s mid morning. Her abdomen was distended and her pain worsened and her AM hemoglobin was 6.9 from 16.4 yesterday for which she was transfused with multiple blood products. On AM labs her PT was 27 and INR was 2.56. Surgery plans to take her for a repeat ex-lap this afternoon with during which further adhesions were lysed, however, no bowel injury was found. The majority of her greater omentum was removed and her proximal and distal inferior epigastric arteries on the right were ligated and an abthera was used for closure with plans to return the OR later this week.       Hospital Course/Significant events:  6/4 - ex-lap with lysis of adhesions 2/2 SBO   6/5 - ex-lap for evacuation of hemoperitoneum, with approximately 1.5L mixture of clotted and fresh blood removed      Interim:  Hemodynamically stable overnight, no vasopressor requirements.  Hemoglobin stable in 9s overnight.  Remained intubated following surgery yesterday, awake and interactive this morning on propofol at 35.      Review of  systems unobtainable due to intubation and sedation.      Past Medical History:   Diagnosis Date    Abnormal blood smear     Acid reflux     Anxiety and depression     Bilateral carotid artery disease     Cervical spondylosis with radiculopathy     Chronic lumbar pain     Diabetes mellitus     History of continuous positive airway pressure (CPAP) therapy at home     HLD (hyperlipidemia)     Hypertension     Iron deficiency anemia, unspecified     Neuropathy     On anticoagulant therapy     Osteopenia     Personal history of colonic polyps 07/14/2014    Protein C deficiency     Recurrent deep vein thrombosis (DVT) 6/21/2022    Sleep apnea, unspecified     Unspecified osteoarthritis, unspecified site        Past Surgical History:   Procedure Laterality Date    COLONOSCOPY  07/14/2014    COLONOSCOPY W/ POLYPECTOMY  08/29/2022    Humberto Chopra/ Follow up pending pathology results    LAPAROTOMY, EXPLORATORY N/A 6/4/2023    Procedure: LAPAROTOMY, EXPLORATORY;  Surgeon: Sd Conway Jr., MD;  Location: [facility] OR;  Service: General;  Laterality: N/A;    LYSIS OF ADHESIONS N/A 6/4/2023    Procedure: LYSIS, ADHESIONS;  Surgeon: Sd Conway Jr., MD;  Location: [facility] OR;  Service: General;  Laterality: N/A;  detorsion of small bowel volvulus    SPINAL CORD STIMULATOR IMPLANT         Social History     Socioeconomic History    Marital status:    Tobacco Use    Smoking status: Never    Smokeless tobacco: Never   Substance and Sexual Activity    Alcohol use: Never    Drug use: Never    Sexual activity: Yes     Partners: Male           Current Outpatient Medications   Medication Instructions    azelastine (ASTELIN) 137 mcg, Nasal, 2 times daily    B-complex with vitamin C (Z-BEC OR EQUIV) tablet 1 tablet, Oral, Daily    baclofen (LIORESAL) 10 mg, Oral, 4 times daily, PRN muscle spasms    betamethasone dipropionate 0.05 % cream 1 application, Topical (Top), 2 times daily    celecoxib (CELEBREX) 100 MG capsule TAKE ONE  CAPSULE BY MOUTH ONCE DAILY WITH FOOD AS NEEDED FOR PAIN    clopidogreL (PLAVIX) 75 mg tablet 1 tablet, Oral, Daily    famotidine (PEPCID) 20 MG tablet TAKE 1 TABLET TWICE DAILY    hydroCHLOROthiazide (HYDRODIURIL) 25 MG tablet TAKE 1 TABLET EVERY DAY    LEXAPRO 10 mg tablet TAKE 1 TABLET BY MOUTH ONCE DAILY AS DIRECTED FOR MOOD/CHRONIC PAIN AS DIRECTED    LIDOcaine (LIDODERM) 5 % 1 patch, Transdermal, Every 24 hours (non-standard times), Remove & Discard patch within 12 hours or as directed by MD    metFORMIN (GLUCOPHAGE) 500 mg, Oral, 2 times daily    morphine (MSIR) 15 MG tablet 1.5 tablets, Oral, 2 times daily PRN    nitroGLYCERIN (NITROSTAT) 0.4 MG SL tablet PLEASE SEE ATTACHED FOR DETAILED DIRECTIONS    OMEGA-3-EPA-FISH OIL ORAL 1 capsule, Oral, Daily    omeprazole (PRILOSEC) 40 MG capsule TAKE 1 CAPSULE EVERY DAY    ondansetron (ZOFRAN-ODT) 8 mg, Oral, 2 times daily    peg 400-propylene glycol (SYSTANE, PROPYLENE GLYCOL,) 0.4-0.3 % Drop 1 drop, Ophthalmic    potassium chloride SA (K-DUR,KLOR-CON) 20 MEQ tablet TAKE 1 TABLET ONE TIME DAILY    pravastatin (PRAVACHOL) 40 MG tablet TAKE 1 TABLET EVERY DAY    timolol maleate 0.5% (TIMOPTIC) 0.5 % Drop 1 drop, Both Eyes, Daily    vitamin D (VITAMIN D3) 5,000 Units, Oral, Daily    warfarin (COUMADIN) 4 MG tablet TAKE 1 TABLET EVERY DAY       Current Inpatient Medications   acetaminophen  1,000 mg Intravenous Q8H    acetaminophen  1,000 mg Intravenous Q8H    methocarbamoL  500 mg Intravenous Q8H    mupirocin   Nasal BID    pantoprazole  40 mg Intravenous Daily    phytonadione ((AQUA-MEPHYTON) IVPB  10 mg Intravenous Once    potassium chloride in water  10 mEq Intravenous BID    timolol maleate 0.5%  1 drop Both Eyes Daily       Current Intravenous Infusions   lactated ringers 125 mL/hr at 06/06/23 0441    propofoL 35 mcg/kg/min (06/06/23 0442)           Objective:       Intake/Output Summary (Last 24 hours) at 6/6/2023 2802  Last data filed at 6/6/2023 9281  Gross  per 24 hour   Intake 3988.4 ml   Output 5930 ml   Net -1941.6 ml           Vital Signs (Most Recent):  Temp: 97.6 °F (36.4 °C) (06/06/23 0400)  Pulse: (!) 47 (06/06/23 0650)  Resp: 16 (06/06/23 0650)  BP: (!) 148/77 (06/06/23 0600)  SpO2: 99 % (06/06/23 0650)  Body mass index is 23.17 kg/m².  Weight: 61.2 kg (135 lb) Vital Signs (24h Range):  Temp:  [94 °F (34.4 °C)-98 °F (36.7 °C)] 97.6 °F (36.4 °C)  Pulse:  [47-95] 47  Resp:  [16-22] 16  SpO2:  [86 %-100 %] 99 %  BP: ()/(45-81) 148/77  Arterial Line BP: (122-171)/(50-67) 149/62       Physical exam:  Gen- intubated, sedated but rouses to voice and tactile stimulation  HENT- ATNC, MMM, ETT in place  CV- RRR, no murmurs  Resp- CTAB, oxygen saturations high 90s on 40% FiO2  Abd- wound vac in place, +BS, winces with minimal palpation   MSK- WWP, no LE edema  Neuro- sedated but rouses easily to voice and tactile stimulation, alert and interactive      Lines/Drains/Airways       Drain  Duration                  NG/OG Tube 06/04/23 1435 nasogastric 16 Fr. Left nostril 1 day         Urethral Catheter 06/04/23 1653 16 Fr. 1 day              Airway  Duration                  Airway - Non-Surgical 06/05/23 1351 <1 day              Arterial Line  Duration             Arterial Line 06/05/23 1355 Right Radial <1 day              Peripheral Intravenous Line  Duration                  Peripheral IV - Single Lumen 06/04/23 0735 22 G Posterior;Right Hand 1 day         Peripheral IV - Single Lumen 06/04/23 1150 20 G;Other (Comments) Anterior;Distal;Right Upper Arm 1 day         Peripheral IV - Single Lumen 06/04/23 1830 20 G Left;Posterior Wrist 1 day                    Significant Labs:    Lab Results   Component Value Date    WBC 8.10 06/06/2023    HGB 9.1 (L) 06/06/2023    HCT 25.4 (L) 06/06/2023    MCV 84.9 06/06/2023     (L) 06/06/2023         BMP  Lab Results   Component Value Date     06/06/2023    K 3.5 06/06/2023    CHLORIDE 106 06/06/2023    CO2 28  06/06/2023    BUN 13.8 06/06/2023    CREATININE 0.62 06/06/2023    CALCIUM 8.6 06/06/2023    AGAP 10.0 06/05/2023    EGFRNONAA >60 09/13/2021       ABG  Recent Labs   Lab 06/05/23  1515   PH 7.403   PO2 398*   PCO2 43.6   HCO3 27.2   BE 2         Mechanical Ventilation Support:  Vent Mode: A/C (06/06/23 0650)  Ventilator Initiated: Yes (06/05/23 1535)  Set Rate: 16 BPM (06/06/23 0650)  Vt Set: 400 mL (06/06/23 0650)  PEEP/CPAP: 5 cmH20 (06/06/23 0650)  Oxygen Concentration (%): 40 (06/06/23 0650)  Peak Airway Pressure: 20 cmH20 (06/06/23 0650)  Total Ve: 5.9 L/m (06/06/23 0650)  F/VT Ratio<105 (RSBI): (!) 45.85 (06/06/23 0650)    Significant Imaging:  I have reviewed the pertinent imaging within the past 24 hours.        Assessment/Plan:     Assessment  SBO s/p QUINTEN   Hemoperitoneum   Anemia 2/2 post-operative hemorrhage       Plan  Remained endotracheally intubated following surgery yesterday, saturating well on minimal ventilator settings  Hemoglobin stable overnight, wound VAC in place; hold chemical anticoagulation due to intraabdominal bleeding, INR 1.55 this AM   Per operative note, plan for possible relook to ensure adequate hemostasis obtained--> will discuss with surgery this morning on rounds  Remain intubated pending further operative planning      DVT Prophylaxis: SCDs  GI Prophylaxis: pantoprazole 40 mg      I spent 32 minutes providing critical care services to this patient.  This does not include time spent for separately billed procedures.       Jimbo Traore MD  Pulmonary Critical Care Medicine, PGY1  [facility] - 8th Floor Med Surg

## 2023-06-06 NOTE — NURSING
Nurses Note -- 4 Eyes      6/4/2023   20:30 (late entry)    Skin assessed during: Admit      [] No Altered Skin Integrity Present    []Prevention Measures Documented      [x] Yes- Altered Skin Integrity Present or Discovered   [] LDA Added if Not in Epic (Describe Wound)   [x] New Altered Skin Integrity was Present on Admit and Documented in LDA   [] Wound Image Taken    Wound Care Consulted? No    Attending Nurse:  Jessica Sprague RN     Second RN/Staff Member: Myra Campos

## 2023-06-06 NOTE — PLAN OF CARE
06/06/23 1052   Discharge Assessment   Assessment Type Discharge Planning Assessment   Confirmed/corrected address, phone number and insurance Yes  (Physical Address: [address] [place] [address])   Confirmed Demographics Correct on Facesheet   Source of Information family   When was your last doctors appointment?   (Patient's spouse reports last month.)   Communicated LUISA with patient/caregiver Yes   Reason For Admission Small Bowel Obstruction   People in Home spouse   Do you expect to return to your current living situation? Yes   Do you have help at home or someone to help you manage your care at home? Yes   Who are your caregiver(s) and their phone number(s)? Spouse: Luis choudhary: [phone]   Prior to hospitilization cognitive status: Unable to Assess   Current cognitive status: Alert/Oriented   Walking or Climbing Stairs ambulation difficulty, requires equipment   Home Accessibility wheelchair accessible   Home Layout Able to live on 1st floor   Equipment Currently Used at Home cane, straight;glucometer;CPAP;walker, rolling   Readmission within 30 days? No   Patient currently being followed by outpatient case management? No   Do you currently have service(s) that help you manage your care at home? No   Do you take prescription medications? Yes   Do you have prescription coverage? Yes   Coverage [employer] HMO   Do you have any problems affording any of your prescribed medications? No   Is the patient taking medications as prescribed? yes   Who is going to help you get home at discharge? Spouse   How do you get to doctors appointments? car, drives self   Are you on dialysis? No   Do you take coumadin? Yes   Who monitors your labs? Patient's spouse was unsure ([place] vs JUANA)   Discharge Plan A Home with family   Discharge Plan B Home   DME Needed Upon Discharge  none   Discharge Plan discussed with: Spouse/sig other   Name(s) and Number(s) Luis Choudhary: [phone]   Transition of  Care Barriers None   OTHER   Name(s) of People in Home Patient resides with her spouse       Patient's PCP is Alex Treviño.  Patient's spouse reports patient is on Coumadin and Plavix.  Patient's spouse inquired about contacting [place] to have his son, Arnel Raza/: 1969 and wife, Mara Raza be notified  and be able to visit patient at the hospital.

## 2023-06-07 ENCOUNTER — ANESTHESIA (OUTPATIENT)
Dept: SURGERY | Facility: HOSPITAL | Age: 73
DRG: 335 | End: 2023-06-07
Payer: MEDICARE

## 2023-06-07 ENCOUNTER — TELEPHONE (OUTPATIENT)
Dept: INTERNAL MEDICINE | Facility: CLINIC | Age: 73
End: 2023-06-07
Payer: MEDICARE

## 2023-06-07 LAB
ABO + RH BLD: NORMAL
ABO + RH BLD: NORMAL
ALBUMIN SERPL-MCNC: 2.4 G/DL (ref 3.4–4.8)
ALBUMIN/GLOB SERPL: 1.1 RATIO (ref 1.1–2)
ALP SERPL-CCNC: 40 UNIT/L (ref 40–150)
ALT SERPL-CCNC: 239 UNIT/L (ref 0–55)
AST SERPL-CCNC: 177 UNIT/L (ref 5–34)
BASOPHILS # BLD AUTO: 0.01 X10(3)/MCL
BASOPHILS NFR BLD AUTO: 0.1 %
BILIRUBIN DIRECT+TOT PNL SERPL-MCNC: 0.7 MG/DL
BLD PROD TYP BPU: NORMAL
BLD PROD TYP BPU: NORMAL
BLOOD UNIT EXPIRATION DATE: NORMAL
BLOOD UNIT EXPIRATION DATE: NORMAL
BLOOD UNIT TYPE CODE: 6200
BLOOD UNIT TYPE CODE: 6200
BUN SERPL-MCNC: 13.3 MG/DL (ref 9.8–20.1)
CALCIUM SERPL-MCNC: 8 MG/DL (ref 8.4–10.2)
CHLORIDE SERPL-SCNC: 108 MMOL/L (ref 98–107)
CO2 SERPL-SCNC: 28 MMOL/L (ref 23–31)
CREAT SERPL-MCNC: 0.62 MG/DL (ref 0.55–1.02)
CROSSMATCH INTERPRETATION: NORMAL
CROSSMATCH INTERPRETATION: NORMAL
DISPENSE STATUS: NORMAL
DISPENSE STATUS: NORMAL
EOSINOPHIL # BLD AUTO: 0.08 X10(3)/MCL (ref 0–0.9)
EOSINOPHIL NFR BLD AUTO: 0.9 %
ERYTHROCYTE [DISTWIDTH] IN BLOOD BY AUTOMATED COUNT: 17.2 % (ref 11.5–17)
GFR SERPLBLD CREATININE-BSD FMLA CKD-EPI: >60 MLS/MIN/1.73/M2
GLOBULIN SER-MCNC: 2.2 GM/DL (ref 2.4–3.5)
GLUCOSE SERPL-MCNC: 88 MG/DL (ref 82–115)
HCT VFR BLD AUTO: 23 % (ref 37–47)
HCT VFR BLD AUTO: 36.9 % (ref 37–47)
HGB BLD-MCNC: 11.5 G/DL (ref 12–16)
HGB BLD-MCNC: 7.6 G/DL (ref 12–16)
IMM GRANULOCYTES # BLD AUTO: 0.04 X10(3)/MCL (ref 0–0.04)
IMM GRANULOCYTES NFR BLD AUTO: 0.5 %
INR BLD: 1.28 (ref 0–1.3)
LYMPHOCYTES # BLD AUTO: 1.14 X10(3)/MCL (ref 0.6–4.6)
LYMPHOCYTES NFR BLD AUTO: 13 %
MAGNESIUM SERPL-MCNC: 1.9 MG/DL (ref 1.6–2.6)
MCH RBC QN AUTO: 29.8 PG (ref 27–31)
MCHC RBC AUTO-ENTMCNC: 33 G/DL (ref 33–36)
MCV RBC AUTO: 90.2 FL (ref 80–94)
MONOCYTES # BLD AUTO: 0.6 X10(3)/MCL (ref 0.1–1.3)
MONOCYTES NFR BLD AUTO: 6.8 %
NEUTROPHILS # BLD AUTO: 6.93 X10(3)/MCL (ref 2.1–9.2)
NEUTROPHILS NFR BLD AUTO: 78.7 %
NRBC BLD AUTO-RTO: 0 %
PHOSPHATE SERPL-MCNC: 3.4 MG/DL (ref 2.3–4.7)
PLATELET # BLD AUTO: 157 X10(3)/MCL (ref 130–400)
PMV BLD AUTO: 10.9 FL (ref 7.4–10.4)
POCT GLUCOSE: 77 MG/DL (ref 70–110)
POCT GLUCOSE: 89 MG/DL (ref 70–110)
POCT GLUCOSE: 90 MG/DL (ref 70–110)
POCT GLUCOSE: 91 MG/DL (ref 70–110)
POCT GLUCOSE: 96 MG/DL (ref 70–110)
POCT GLUCOSE: 97 MG/DL (ref 70–110)
POTASSIUM SERPL-SCNC: 3.6 MMOL/L (ref 3.5–5.1)
PROT SERPL-MCNC: 4.6 GM/DL (ref 5.8–7.6)
PROTHROMBIN TIME: 15.9 SECONDS (ref 12.5–14.5)
PSYCHE PATHOLOGY RESULT: NORMAL
RBC # BLD AUTO: 2.55 X10(6)/MCL (ref 4.2–5.4)
SODIUM SERPL-SCNC: 142 MMOL/L (ref 136–145)
UNIT NUMBER: NORMAL
UNIT NUMBER: NORMAL
WBC # SPEC AUTO: 8.8 X10(3)/MCL (ref 4.5–11.5)

## 2023-06-07 PROCEDURE — 36000706: Performed by: SURGERY

## 2023-06-07 PROCEDURE — C9113 INJ PANTOPRAZOLE SODIUM, VIA: HCPCS | Performed by: INTERNAL MEDICINE

## 2023-06-07 PROCEDURE — 85025 COMPLETE CBC W/AUTO DIFF WBC: CPT | Performed by: STUDENT IN AN ORGANIZED HEALTH CARE EDUCATION/TRAINING PROGRAM

## 2023-06-07 PROCEDURE — 82962 GLUCOSE BLOOD TEST: CPT | Performed by: SURGERY

## 2023-06-07 PROCEDURE — 80053 COMPREHEN METABOLIC PANEL: CPT | Performed by: STUDENT IN AN ORGANIZED HEALTH CARE EDUCATION/TRAINING PROGRAM

## 2023-06-07 PROCEDURE — 63600175 PHARM REV CODE 636 W HCPCS: Performed by: STUDENT IN AN ORGANIZED HEALTH CARE EDUCATION/TRAINING PROGRAM

## 2023-06-07 PROCEDURE — 63600175 PHARM REV CODE 636 W HCPCS: Performed by: ANESTHESIOLOGY

## 2023-06-07 PROCEDURE — 25000003 PHARM REV CODE 250: Performed by: INTERNAL MEDICINE

## 2023-06-07 PROCEDURE — 85610 PROTHROMBIN TIME: CPT | Performed by: STUDENT IN AN ORGANIZED HEALTH CARE EDUCATION/TRAINING PROGRAM

## 2023-06-07 PROCEDURE — 63600175 PHARM REV CODE 636 W HCPCS

## 2023-06-07 PROCEDURE — 36000707: Performed by: SURGERY

## 2023-06-07 PROCEDURE — D9220A PRA ANESTHESIA: ICD-10-PCS | Mod: CRNA,,,

## 2023-06-07 PROCEDURE — 25000003 PHARM REV CODE 250

## 2023-06-07 PROCEDURE — 21400001 HC TELEMETRY ROOM

## 2023-06-07 PROCEDURE — 63600175 PHARM REV CODE 636 W HCPCS: Performed by: INTERNAL MEDICINE

## 2023-06-07 PROCEDURE — 85014 HEMATOCRIT: CPT | Performed by: SURGERY

## 2023-06-07 PROCEDURE — 83735 ASSAY OF MAGNESIUM: CPT | Performed by: STUDENT IN AN ORGANIZED HEALTH CARE EDUCATION/TRAINING PROGRAM

## 2023-06-07 PROCEDURE — P9016 RBC LEUKOCYTES REDUCED: HCPCS | Performed by: SURGERY

## 2023-06-07 PROCEDURE — D9220A PRA ANESTHESIA: ICD-10-PCS | Mod: ANES,,, | Performed by: ANESTHESIOLOGY

## 2023-06-07 PROCEDURE — 27000221 HC OXYGEN, UP TO 24 HOURS

## 2023-06-07 PROCEDURE — D9220A PRA ANESTHESIA: Mod: ANES,,, | Performed by: ANESTHESIOLOGY

## 2023-06-07 PROCEDURE — 37000009 HC ANESTHESIA EA ADD 15 MINS: Performed by: SURGERY

## 2023-06-07 PROCEDURE — 37000008 HC ANESTHESIA 1ST 15 MINUTES: Performed by: SURGERY

## 2023-06-07 PROCEDURE — 71000039 HC RECOVERY, EACH ADD'L HOUR: Performed by: SURGERY

## 2023-06-07 PROCEDURE — D9220A PRA ANESTHESIA: Mod: CRNA,,,

## 2023-06-07 PROCEDURE — 71000033 HC RECOVERY, INTIAL HOUR: Performed by: SURGERY

## 2023-06-07 PROCEDURE — 84100 ASSAY OF PHOSPHORUS: CPT | Performed by: STUDENT IN AN ORGANIZED HEALTH CARE EDUCATION/TRAINING PROGRAM

## 2023-06-07 RX ORDER — LIDOCAINE HYDROCHLORIDE 20 MG/ML
INJECTION, SOLUTION EPIDURAL; INFILTRATION; INTRACAUDAL; PERINEURAL
Status: DISCONTINUED | OUTPATIENT
Start: 2023-06-07 | End: 2023-06-07

## 2023-06-07 RX ORDER — ROCURONIUM BROMIDE 10 MG/ML
INJECTION, SOLUTION INTRAVENOUS
Status: DISCONTINUED | OUTPATIENT
Start: 2023-06-07 | End: 2023-06-07

## 2023-06-07 RX ORDER — FENTANYL CITRATE 50 UG/ML
INJECTION, SOLUTION INTRAMUSCULAR; INTRAVENOUS
Status: DISCONTINUED | OUTPATIENT
Start: 2023-06-07 | End: 2023-06-07

## 2023-06-07 RX ORDER — SODIUM CHLORIDE 9 MG/ML
INJECTION, SOLUTION INTRAVENOUS CONTINUOUS
Status: DISCONTINUED | OUTPATIENT
Start: 2023-06-07 | End: 2023-06-08

## 2023-06-07 RX ORDER — HYDROMORPHONE HYDROCHLORIDE 2 MG/ML
0.4 INJECTION, SOLUTION INTRAMUSCULAR; INTRAVENOUS; SUBCUTANEOUS EVERY 5 MIN PRN
Status: COMPLETED | OUTPATIENT
Start: 2023-06-07 | End: 2023-06-07

## 2023-06-07 RX ORDER — SODIUM CHLORIDE, SODIUM LACTATE, POTASSIUM CHLORIDE, CALCIUM CHLORIDE 600; 310; 30; 20 MG/100ML; MG/100ML; MG/100ML; MG/100ML
INJECTION, SOLUTION INTRAVENOUS CONTINUOUS
Status: DISCONTINUED | OUTPATIENT
Start: 2023-06-07 | End: 2023-06-08

## 2023-06-07 RX ORDER — METHOCARBAMOL 100 MG/ML
1000 INJECTION, SOLUTION INTRAMUSCULAR; INTRAVENOUS ONCE
Status: COMPLETED | OUTPATIENT
Start: 2023-06-07 | End: 2023-06-07

## 2023-06-07 RX ORDER — MIDAZOLAM HYDROCHLORIDE 1 MG/ML
2 INJECTION INTRAMUSCULAR; INTRAVENOUS ONCE AS NEEDED
Status: DISCONTINUED | OUTPATIENT
Start: 2023-06-07 | End: 2023-06-07 | Stop reason: HOSPADM

## 2023-06-07 RX ORDER — HYDROMORPHONE HYDROCHLORIDE 2 MG/ML
INJECTION, SOLUTION INTRAMUSCULAR; INTRAVENOUS; SUBCUTANEOUS
Status: COMPLETED
Start: 2023-06-07 | End: 2023-06-07

## 2023-06-07 RX ORDER — GLYCOPYRROLATE 0.2 MG/ML
INJECTION INTRAMUSCULAR; INTRAVENOUS
Status: DISCONTINUED | OUTPATIENT
Start: 2023-06-07 | End: 2023-06-07

## 2023-06-07 RX ORDER — ONDANSETRON HYDROCHLORIDE 2 MG/ML
INJECTION, SOLUTION INTRAMUSCULAR; INTRAVENOUS
Status: DISCONTINUED | OUTPATIENT
Start: 2023-06-07 | End: 2023-06-07

## 2023-06-07 RX ORDER — DEXAMETHASONE SODIUM PHOSPHATE 4 MG/ML
INJECTION, SOLUTION INTRA-ARTICULAR; INTRALESIONAL; INTRAMUSCULAR; INTRAVENOUS; SOFT TISSUE
Status: DISCONTINUED | OUTPATIENT
Start: 2023-06-07 | End: 2023-06-07

## 2023-06-07 RX ORDER — SODIUM CHLORIDE, SODIUM GLUCONATE, SODIUM ACETATE, POTASSIUM CHLORIDE AND MAGNESIUM CHLORIDE 30; 37; 368; 526; 502 MG/100ML; MG/100ML; MG/100ML; MG/100ML; MG/100ML
INJECTION, SOLUTION INTRAVENOUS CONTINUOUS
Status: DISCONTINUED | OUTPATIENT
Start: 2023-06-07 | End: 2023-06-08

## 2023-06-07 RX ORDER — KETAMINE HYDROCHLORIDE 100 MG/ML
INJECTION, SOLUTION INTRAMUSCULAR; INTRAVENOUS
Status: DISCONTINUED | OUTPATIENT
Start: 2023-06-07 | End: 2023-06-07

## 2023-06-07 RX ORDER — PROPOFOL 10 MG/ML
VIAL (ML) INTRAVENOUS
Status: DISCONTINUED | OUTPATIENT
Start: 2023-06-07 | End: 2023-06-07

## 2023-06-07 RX ORDER — SODIUM CHLORIDE 0.9 % (FLUSH) 0.9 %
10 SYRINGE (ML) INJECTION
Status: DISCONTINUED | OUTPATIENT
Start: 2023-06-07 | End: 2023-06-14

## 2023-06-07 RX ORDER — METHOCARBAMOL 100 MG/ML
INJECTION, SOLUTION INTRAMUSCULAR; INTRAVENOUS
Status: COMPLETED
Start: 2023-06-07 | End: 2023-06-07

## 2023-06-07 RX ORDER — PHENYLEPHRINE HCL IN 0.9% NACL 1 MG/10 ML
SYRINGE (ML) INTRAVENOUS
Status: DISCONTINUED | OUTPATIENT
Start: 2023-06-07 | End: 2023-06-07

## 2023-06-07 RX ADMIN — PROPOFOL 130 MG: 10 INJECTION, EMULSION INTRAVENOUS at 07:06

## 2023-06-07 RX ADMIN — DEXAMETHASONE SODIUM PHOSPHATE 4 MG: 4 INJECTION, SOLUTION INTRA-ARTICULAR; INTRALESIONAL; INTRAMUSCULAR; INTRAVENOUS; SOFT TISSUE at 07:06

## 2023-06-07 RX ADMIN — PROPOFOL 70 MG: 10 INJECTION, EMULSION INTRAVENOUS at 07:06

## 2023-06-07 RX ADMIN — PANTOPRAZOLE SODIUM 40 MG: 40 INJECTION, POWDER, FOR SOLUTION INTRAVENOUS at 10:06

## 2023-06-07 RX ADMIN — HYDROMORPHONE HYDROCHLORIDE 0.4 MG: 2 INJECTION, SOLUTION INTRAMUSCULAR; INTRAVENOUS; SUBCUTANEOUS at 09:06

## 2023-06-07 RX ADMIN — HYDROMORPHONE HYDROCHLORIDE 1 MG: 2 INJECTION INTRAMUSCULAR; INTRAVENOUS; SUBCUTANEOUS at 09:06

## 2023-06-07 RX ADMIN — MUPIROCIN: 20 OINTMENT TOPICAL at 10:06

## 2023-06-07 RX ADMIN — HYDROMORPHONE HYDROCHLORIDE 1 MG: 2 INJECTION INTRAMUSCULAR; INTRAVENOUS; SUBCUTANEOUS at 10:06

## 2023-06-07 RX ADMIN — FENTANYL CITRATE 50 MCG: 50 INJECTION, SOLUTION INTRAMUSCULAR; INTRAVENOUS at 07:06

## 2023-06-07 RX ADMIN — POTASSIUM CHLORIDE 10 MEQ: 7.46 INJECTION, SOLUTION INTRAVENOUS at 10:06

## 2023-06-07 RX ADMIN — SODIUM CHLORIDE, POTASSIUM CHLORIDE, SODIUM LACTATE AND CALCIUM CHLORIDE: 600; 310; 30; 20 INJECTION, SOLUTION INTRAVENOUS at 05:06

## 2023-06-07 RX ADMIN — METHOCARBAMOL 1000 MG: 100 INJECTION, SOLUTION INTRAMUSCULAR; INTRAVENOUS at 09:06

## 2023-06-07 RX ADMIN — GLYCOPYRROLATE 0.4 MG: 0.2 INJECTION INTRAMUSCULAR; INTRAVENOUS at 07:06

## 2023-06-07 RX ADMIN — ACETAMINOPHEN 1000 MG: 10 INJECTION, SOLUTION INTRAVENOUS at 05:06

## 2023-06-07 RX ADMIN — POTASSIUM CHLORIDE 10 MEQ: 7.46 INJECTION, SOLUTION INTRAVENOUS at 09:06

## 2023-06-07 RX ADMIN — ROCURONIUM BROMIDE 40 MG: 10 SOLUTION INTRAVENOUS at 07:06

## 2023-06-07 RX ADMIN — Medication 100 MCG: at 07:06

## 2023-06-07 RX ADMIN — ONDANSETRON HYDROCHLORIDE 4 MG: 2 INJECTION, SOLUTION INTRAMUSCULAR; INTRAVENOUS at 07:06

## 2023-06-07 RX ADMIN — TIMOLOL MALEATE 1 DROP: 5 SOLUTION/ DROPS OPHTHALMIC at 05:06

## 2023-06-07 RX ADMIN — METHOCARBAMOL 500 MG: 100 INJECTION INTRAMUSCULAR; INTRAVENOUS at 05:06

## 2023-06-07 RX ADMIN — HYDROMORPHONE HYDROCHLORIDE 1 MG: 2 INJECTION INTRAMUSCULAR; INTRAVENOUS; SUBCUTANEOUS at 05:06

## 2023-06-07 RX ADMIN — KETAMINE HYDROCHLORIDE 15 MG: 100 INJECTION, SOLUTION, CONCENTRATE INTRAMUSCULAR; INTRAVENOUS at 07:06

## 2023-06-07 RX ADMIN — KETAMINE HYDROCHLORIDE 35 MG: 100 INJECTION, SOLUTION, CONCENTRATE INTRAMUSCULAR; INTRAVENOUS at 07:06

## 2023-06-07 RX ADMIN — SUGAMMADEX 200 MG: 100 INJECTION, SOLUTION INTRAVENOUS at 08:06

## 2023-06-07 RX ADMIN — MUPIROCIN: 20 OINTMENT TOPICAL at 09:06

## 2023-06-07 RX ADMIN — HYDROMORPHONE HYDROCHLORIDE 1 MG: 2 INJECTION INTRAMUSCULAR; INTRAVENOUS; SUBCUTANEOUS at 02:06

## 2023-06-07 RX ADMIN — LIDOCAINE HYDROCHLORIDE 80 MG: 20 INJECTION, SOLUTION EPIDURAL; INFILTRATION; INTRACAUDAL; PERINEURAL at 07:06

## 2023-06-07 RX ADMIN — SODIUM CHLORIDE, SODIUM GLUCONATE, SODIUM ACETATE, POTASSIUM CHLORIDE AND MAGNESIUM CHLORIDE: 526; 502; 368; 37; 30 INJECTION, SOLUTION INTRAVENOUS at 07:06

## 2023-06-07 RX ADMIN — METHOCARBAMOL 500 MG: 100 INJECTION INTRAMUSCULAR; INTRAVENOUS at 02:06

## 2023-06-07 RX ADMIN — METHOCARBAMOL 500 MG: 100 INJECTION INTRAMUSCULAR; INTRAVENOUS at 09:06

## 2023-06-07 RX ADMIN — HYDROMORPHONE HYDROCHLORIDE 1 MG: 2 INJECTION INTRAMUSCULAR; INTRAVENOUS; SUBCUTANEOUS at 06:06

## 2023-06-07 NOTE — PLAN OF CARE
Problem: Adult Inpatient Plan of Care  Goal: Plan of Care Review  Outcome: Ongoing, Progressing  Goal: Patient-Specific Goal (Individualized)  Outcome: Ongoing, Progressing  Goal: Absence of Hospital-Acquired Illness or Injury  Outcome: Ongoing, Progressing  Goal: Optimal Comfort and Wellbeing  Outcome: Ongoing, Progressing  Goal: Readiness for Transition of Care  Outcome: Ongoing, Progressing     Problem: Diabetes Comorbidity  Goal: Blood Glucose Level Within Targeted Range  Outcome: Ongoing, Progressing     Problem: Infection  Goal: Absence of Infection Signs and Symptoms  Outcome: Ongoing, Progressing     Problem: Fall Injury Risk  Goal: Absence of Fall and Fall-Related Injury  Outcome: Ongoing, Progressing     Problem: Impaired Wound Healing  Goal: Optimal Wound Healing  Outcome: Ongoing, Progressing     Problem: Communication Impairment (Mechanical Ventilation, Invasive)  Goal: Effective Communication  Outcome: Ongoing, Progressing     Problem: Device-Related Complication Risk (Mechanical Ventilation, Invasive)  Goal: Optimal Device Function  Outcome: Ongoing, Progressing     Problem: Inability to Wean (Mechanical Ventilation, Invasive)  Goal: Mechanical Ventilation Liberation  Outcome: Ongoing, Progressing     Problem: Nutrition Impairment (Mechanical Ventilation, Invasive)  Goal: Optimal Nutrition Delivery  Outcome: Ongoing, Progressing     Problem: Skin and Tissue Injury (Mechanical Ventilation, Invasive)  Goal: Absence of Device-Related Skin and Tissue Injury  Outcome: Ongoing, Progressing     Problem: Ventilator-Induced Lung Injury (Mechanical Ventilation, Invasive)  Goal: Absence of Ventilator-Induced Lung Injury  Outcome: Ongoing, Progressing     Problem: Communication Impairment (Artificial Airway)  Goal: Effective Communication  Outcome: Ongoing, Progressing     Problem: Device-Related Complication Risk (Artificial Airway)  Goal: Optimal Device Function  Outcome: Ongoing, Progressing     Problem:  Skin and Tissue Injury (Artificial Airway)  Goal: Absence of Device-Related Skin or Tissue Injury  Outcome: Ongoing, Progressing     Problem: Noninvasive Ventilation Acute  Goal: Effective Unassisted Ventilation and Oxygenation  Outcome: Ongoing, Progressing     Problem: Skin Injury Risk Increased  Goal: Skin Health and Integrity  Outcome: Ongoing, Progressing

## 2023-06-07 NOTE — PLAN OF CARE
Jarred staffed with Dr. Traore to obtain necessary information requested by [facility]. Following, JARRED called the [employer] ([phone]: Case #[id number]), spoke with Jason and provided the information obtained.       The following information was requested by [employer] to determine if a case would be open etc.    Patient's demographics  Diagnosis  Prognosis   Current Condition  Life expectancy concerns  If medical staff recommends the presence of family members.  Demographics for both patient's son, Arnel Raza and his spouse, Mara Raza.      Jarred was informed the obtained information would be reviewed and a note will be dispatched and someone will notify Luis Uschon, patient's spouse. No barriers to discharge at this time.

## 2023-06-07 NOTE — NURSING
Nurses Note -- 4 Eyes      6/7/2023   4:23 PM      Skin assessed during: Daily Assessment      [] No Altered Skin Integrity Present    [x]Prevention Measures Documented      [x] Yes- Altered Skin Integrity Present or Discovered   [] LDA Added if Not in Epic (Describe Wound)   [] New Altered Skin Integrity was Present on Admit and Documented in LDA   [] Wound Image Taken    Wound Care Consulted? No    Attending Nurse:  Jyoti Soto RN     Second RN/Staff Member:  RADHA Grant

## 2023-06-07 NOTE — TRANSFER OF CARE
"Anesthesia Transfer of Care Note    Patient: Alejandrina Rodriguez    Procedure(s) Performed: Procedure(s) (LRB):  LAPAROTOMY, EXPLORATORY (N/A)    Patient location: PACU    Anesthesia Type: general    Transport from OR: Transported from OR on room air with adequate spontaneous ventilation    Post pain: adequate analgesia    Post assessment: no apparent anesthetic complications and tolerated procedure well    Post vital signs: stable    Level of consciousness: sedated and responds to stimulation    Nausea/Vomiting: no nausea/vomiting    Complications: none    Transfer of care protocol was followed      Last vitals:   Visit Vitals  /60   Pulse (!) 52   Temp 36.9 °C (98.5 °F)   Resp 11   Ht 5' 4" (1.626 m)   Wt 67.1 kg (147 lb 14.9 oz)   SpO2 100%   BMI 25.39 kg/m²     "

## 2023-06-07 NOTE — TELEPHONE ENCOUNTER
----- Message from Mara Frias sent at 6/7/2023 12:52 PM CDT -----  Regarding: on call msg sent  Type:  Needs Medical Advice    Who Called: ketan in ICU @ [facility]    Best Call Back Number: [phone]  Additional Information: pt has been downgraded sent on call msg to pcp

## 2023-06-07 NOTE — PROGRESS NOTES
Acute Care Surgery   Progress Note  Admit Date: 6/4/2023  HD#3  POD#Day of Surgery    Subjective:   Interval history:  NAEON   Extubated yesterday without issues   Npo with NGT to LIWS - 50cc output over 24 hours   Abthera in place with 675cc output over 24 hours       Home Meds:  Current Outpatient Medications   Medication Instructions    azelastine (ASTELIN) 137 mcg, Nasal, 2 times daily    B-complex with vitamin C (Z-BEC OR EQUIV) tablet 1 tablet, Oral, Daily    baclofen (LIORESAL) 10 mg, Oral, 4 times daily, PRN muscle spasms    betamethasone dipropionate 0.05 % cream 1 application, Topical (Top), 2 times daily    celecoxib (CELEBREX) 100 MG capsule TAKE ONE CAPSULE BY MOUTH ONCE DAILY WITH FOOD AS NEEDED FOR PAIN    clopidogreL (PLAVIX) 75 mg tablet 1 tablet, Oral, Daily    famotidine (PEPCID) 20 MG tablet TAKE 1 TABLET TWICE DAILY    hydroCHLOROthiazide (HYDRODIURIL) 25 MG tablet TAKE 1 TABLET EVERY DAY    LEXAPRO 10 mg tablet TAKE 1 TABLET BY MOUTH ONCE DAILY AS DIRECTED FOR MOOD/CHRONIC PAIN AS DIRECTED    LIDOcaine (LIDODERM) 5 % 1 patch, Transdermal, Every 24 hours (non-standard times), Remove & Discard patch within 12 hours or as directed by MD    metFORMIN (GLUCOPHAGE) 500 mg, Oral, 2 times daily    morphine (MSIR) 15 MG tablet 1.5 tablets, Oral, 2 times daily PRN    nitroGLYCERIN (NITROSTAT) 0.4 MG SL tablet PLEASE SEE ATTACHED FOR DETAILED DIRECTIONS    OMEGA-3-EPA-FISH OIL ORAL 1 capsule, Oral, Daily    omeprazole (PRILOSEC) 40 MG capsule TAKE 1 CAPSULE EVERY DAY    ondansetron (ZOFRAN-ODT) 8 mg, Oral, 2 times daily    peg 400-propylene glycol (SYSTANE, PROPYLENE GLYCOL,) 0.4-0.3 % Drop 1 drop, Ophthalmic    potassium chloride SA (K-DUR,KLOR-CON) 20 MEQ tablet TAKE 1 TABLET ONE TIME DAILY    pravastatin (PRAVACHOL) 40 MG tablet TAKE 1 TABLET EVERY DAY    timolol maleate 0.5% (TIMOPTIC) 0.5 % Drop 1 drop, Both Eyes, Daily    vitamin D (VITAMIN D3) 5,000 Units, Oral, Daily    warfarin (COUMADIN) 4 MG  "tablet TAKE 1 TABLET EVERY DAY      Scheduled Meds:   methocarbamoL  500 mg Intravenous Q8H    mupirocin   Nasal BID    pantoprazole  40 mg Intravenous Daily    phytonadione ((AQUA-MEPHYTON) IVPB  10 mg Intravenous Once    potassium chloride in water  10 mEq Intravenous BID    timolol maleate 0.5%  1 drop Both Eyes Daily     Continuous Infusions:   sodium chloride 0.9%      electrolyte-A      lactated ringers      lactated ringers 125 mL/hr at 06/07/23 0535     PRN Meds:sodium chloride, sodium chloride, sodium chloride, sodium chloride, sodium chloride, bisacodyL, dextrose 10%, dextrose 10%, glucagon (human recombinant), HYDROmorphone, HYDROmorphone, insulin aspart U-100, labetalol, midazolam, nitroGLYCERIN     Objective:     VITAL SIGNS: 24 HR MIN & MAX LAST   Temp  Min: 97.8 °F (36.6 °C)  Max: 98.7 °F (37.1 °C)  98.5 °F (36.9 °C)   BP  Min: 92/48  Max: 174/69  117/60    Pulse  Min: 48  Max: 75  (!) 52    Resp  Min: 10  Max: 25  11    SpO2  Min: 93 %  Max: 100 %  100 %      HT: 5' 4" (162.6 cm)  WT: 67.1 kg (147 lb 14.9 oz)  BMI: 25.4     Intake/output:  Intake/Output - Last 3 Shifts         06/05 0700  06/06 0659 06/06 0700  06/07 0659 06/07 0700  06/08 0659    P.O. 0      I.V. (mL/kg) 1840.4 (30.1) 4145 (61.8)     Blood 1398      NG/GT 50      IV Piggyback 700 200     Total Intake(mL/kg) 3988.4 (65.2) 4345 (64.8)     Urine (mL/kg/hr) 1580 (1.1) 690 (0.4)     Drains 400 50     Other 950 675     Stool 0      Blood 3000      Total Output 5930 1415     Net -1941.6 +2930            Urine Occurrence 0 x      Stool Occurrence 0 x              Intake/Output Summary (Last 24 hours) at 6/7/2023 0701  Last data filed at 6/7/2023 0655  Gross per 24 hour   Intake 4345 ml   Output 1415 ml   Net 2930 ml           Lines/drains/airway:       Peripheral IV - Single Lumen 06/04/23 0735 22 G Posterior;Right Hand (Active)   Site Assessment Clean;Dry;Intact 06/06/23 0400   Extremity Assessment Distal to IV No abnormal discoloration " 06/06/23 0400   Line Status Infusing 06/06/23 0400   Dressing Status Clean;Dry;Intact 06/06/23 0400   Dressing Intervention Integrity maintained 06/06/23 0400   Number of days: 1            Peripheral IV - Single Lumen 06/04/23 1150 20 G;Other (Comments) Anterior;Distal;Right Upper Arm (Active)   Site Assessment Clean;Dry;Intact 06/06/23 0400   Extremity Assessment Distal to IV No abnormal discoloration 06/06/23 0400   Line Status Infusing 06/06/23 0400   Dressing Status Clean;Dry;Intact 06/06/23 0400   Dressing Intervention Integrity maintained 06/06/23 0400   Number of days: 1            Peripheral IV - Single Lumen 06/04/23 1830 20 G Left;Posterior Wrist (Active)   Site Assessment Clean;Dry;Intact 06/06/23 0400   Extremity Assessment Distal to IV No abnormal discoloration 06/06/23 0400   Line Status Flushed;Saline locked 06/06/23 0400   Dressing Status Clean;Dry;Intact 06/06/23 0400   Dressing Intervention Integrity maintained 06/06/23 0400   Number of days: 1       Arterial Line 06/05/23 1355 Right Radial (Active)   Site Assessment Clean;Dry;Intact 06/06/23 0400   Line Status Pulsatile blood flow 06/06/23 0400   Art Line Waveform Appropriate 06/06/23 0400   Arterial Line Interventions Zeroed and calibrated;Leveled;Connections checked and tightened;Flushed per protocol 06/06/23 0400   Color/Movement/Sensation Capillary refill less than 3 sec 06/06/23 0400   Dressing Type Central line dressing 06/06/23 0400   Dressing Status Clean;Dry;Intact 06/06/23 0400   Dressing Intervention Integrity maintained 06/06/23 0400   Number of days: 0            NG/OG Tube 06/04/23 1435 nasogastric 16 Fr. Left nostril (Active)   Placement Check placement verified by aspirate characteristics;placement verified by distal tube length measurement 06/06/23 0400   Distal Tube Length (cm) 55 06/06/23 0400   Tolerance no signs/symptoms of discomfort 06/06/23 0400   Securement secured to nostril center w/ adhesive device 06/06/23 0400  "  Clamp Status/Tolerance no abdominal discomfort;no abdominal distention;no emesis;no nausea 06/05/23 0800   Suction Setting/Drainage Method suction at;low;intermittent setting 06/06/23 0400   Insertion Site Appearance no redness, warmth, tenderness, skin breakdown, drainage 06/06/23 0400   Drainage Bile 06/06/23 0400   Water Bolus (mL) 50 mL 06/05/23 2200   Tube Output(mL)(Include Discarded Residual) 0 mL 06/06/23 0530   Number of days: 1            Urethral Catheter 06/04/23 1653 16 Fr. (Active)   Site Assessment Clean;Intact;Dry 06/06/23 0400   Collection Container Urimeter 06/06/23 0400   Securement Method secured to top of thigh w/ adhesive device 06/06/23 0400   Catheter Care Performed yes 06/06/23 0400   Reason for Continuing Urinary Catheterization Critically ill in ICU and requiring hourly monitoring of intake/output 06/06/23 0400   CAUTI Prevention Bundle Securement Device in place with 1" slack;Intact seal between catheter & drainage tubing;Drainage bag/urimeter off the floor;Sheeting clip in use;No dependent loops or kinks;Drainage bag/urimeter not overfilled (<2/3 full);Drainage bag/urimeter below bladder 06/05/23 2000   Output (mL) 60 mL 06/06/23 0530   Number of days: 1       Physical examination:  [facility] opens eyes spontaneously   HEENT: Normocephalic, atrauamtic  CV: RRR  Resp: NWOB  Abd: soft, attp following surgery, abthera in place with 675 serosanguinous output in cannister   Ext: moving all extremities spontaneously and purposefully  Neuro: CN II-XII grossly intact      Labs:  Renal:  Recent Labs     06/05/23  1146 06/05/23  1559 06/06/23  0210 06/07/23  0158   BUN 12.9 13.5 13.8 13.3   CREATININE 0.87 0.72 0.62 0.62     Recent Labs     06/04/23  0906 06/05/23  1559 06/05/23  2006   LACTIC 1.3 3.7* 1.7     FENGI:  Recent Labs     06/04/23  0739 06/05/23  0840 06/05/23  1146 06/05/23  1559 06/06/23  0210 06/07/23  0158    138 138 139 141 142   K 3.4* 3.0* 3.6 3.0* " 3.5 3.6   CO2 29 27 22* 24 28 28   CALCIUM 9.8 7.2* 7.2* 9.3 8.6 8.0*   MG  --   --   --  1.90  --  1.90   PHOS  --   --   --  2.9  --  3.4   ALBUMIN 4.2 2.6*  --   --  2.8* 2.4*   BILITOT 1.6* 1.3  --   --  1.7* 0.7   AST 24 14  --   --  676* 177*   ALKPHOS 101 36*  --   --  44 40   ALT 17 7  --   --  467* 239*     Heme:  Recent Labs     06/04/23  1201 06/05/23  0840 06/05/23  1144 06/05/23  1559 06/05/23 2006 06/06/23 0210 06/06/23 1831 06/07/23  0158   HGB  --  7.9*   < > 9.0* 9.4* 9.1* 8.3* 7.6*   HCT  --  24.6*   < > 25.8* 26.7* 25.4* 24.6* 23.0*   PLT  --  176  --  110* 126* 125* 168 157   PTT 28.1  --   --   --   --   --   --   --    INR 1.59* 2.56*  --  1.54*  --  1.55*  --  1.28    < > = values in this interval not displayed.     ID:  Recent Labs     06/05/23 2006 06/06/23 0210 06/06/23 1831 06/07/23  0158   WBC 7.76 8.10 10.42 8.80     CBG:  Recent Labs     06/05/23  1146 06/05/23  1559 06/06/23 0210 06/07/23  0158   GLUCOSE 308* 171* 112 88      Cardiovascular:  No results for input(s): TROPONINI, CKTOTAL, CKMB, BNP in the last 168 hours.  ABG:  Recent Labs   Lab 06/05/23  1515 06/05/23  1645   PH 7.403 7.620*   PO2 398* 69.0*   PCO2 43.6  --    HCO3 27.2 28.8   BE 2  --       I have reviewed all pertinent lab results within the past 24 hours.    Imaging:  XR Gastric tube check, non-radiologist performed   Final Result      Enteric tube side port about 3.5 cm below the GE junction.         Electronically signed by: Sunil Peralta   Date:    06/04/2023   Time:    14:49      XR Gastric tube check, non-radiologist performed   Final Result      NG tube projects over the body of the stomach.         Electronically signed by: Evin Manzo   Date:    06/04/2023   Time:    10:47      CT Abdomen Pelvis  Without Contrast   Final Result      Suspected small-bowel obstruction with transition point in the right lower abdomen.         Electronically signed by: Sunil Peralta   Date:    06/04/2023    Time:    09:05         I have reviewed all pertinent imaging results/findings within the past 24 hours.    Micro/Path/Other:  Microbiology Results (last 7 days)       ** No results found for the last 168 hours. **           Specimen (168h ago, onward)       Start     Ordered    06/05/23 1449  Specimen to Pathology  RELEASE UPON ORDERING        References:    Click here for ordering Quick Tip   Question:  Release to patient  Answer:  Immediate    06/05/23 1449                     Assessment & Plan:   72 y/o w/ hx ex lap several decades ago with h/o colostomy s/p reversal, Gtube and Jtube s/p removal, and chronic back pain on home morphine, now admitted 6/4 with SBO. Pt s/p exlap with lysis of adhesions 6/4. Taken back to the OR 6/5 for hypotension and post-op hemorrhage with evacuation of approximately 3L of blood and old clot, placement of temporary abdominal closure. Extubated yesterday by MICU.     - vitals, labs, and images reviewed   - Doing well this AM   - INR 1.28, platelets 157 following resuscitation yesterday   - To OR today for second look laparotomy - hopefully will be able to close the abdomen today   - NPO with NGT to LIWS   - Leave swanson in place due to marginal UOP and need for accurate Is and Os   -H/H: 7.6, 23 this AM - may need some   - LFTs downtrending. AST/ALT: 177,239 today as hypotension has resolved   - Continue mIVF   - Multimodal pain control   - dvt ppx: SCDs    Martine Bridges MD   [employer] General Surgery PGy4

## 2023-06-07 NOTE — ANESTHESIA POSTPROCEDURE EVALUATION
Anesthesia Post Evaluation    Patient: Alejandrina Rodriguez    Procedure(s) Performed: Procedure(s) (LRB):  LAPAROTOMY, EXPLORATORY (N/A)    Final Anesthesia Type: general      Patient location during evaluation: PACU  Patient participation: Yes- Able to Participate  Level of consciousness: awake and alert  Post-procedure vital signs: reviewed and stable  Pain management: adequate  Airway patency: patent      Anesthetic complications: no      Cardiovascular status: hemodynamically stable  Respiratory status: unassisted  Hydration status: euvolemic  Follow-up not needed.          Vitals Value Taken Time   /67 06/07/23 0952   Temp 36.3 °C (97.3 °F) 06/07/23 0840   Pulse 59 06/07/23 1012   Resp 8 06/07/23 1012   SpO2 96 % 06/07/23 1012   Vitals shown include unvalidated device data.      Event Time   Out of Recovery 06/07/2023 09:53:00         Pain/Jalil Score: Pain Rating Prior to Med Admin: 5 (6/7/2023  5:17 AM)  Pain Rating Post Med Admin: 4 (6/7/2023  5:46 AM)  Jalil Score: 8 (6/7/2023  9:53 AM)

## 2023-06-07 NOTE — NURSING
Blood glucose per glucometer on 6/6 at 2130 was 97.    Blood glucose per glucometer on 6/7 at 0526 was 89.

## 2023-06-07 NOTE — ANESTHESIA PROCEDURE NOTES
Intubation    Date/Time: 6/7/2023 7:13 AM  Performed by: Logan Padilla CRNA  Authorized by: Logan Padilla CRNA     Intubation:     Induction:  Intravenous    Intubated:  Postinduction    Mask Ventilation:  Easy mask    Attempts:  1    Attempted By:  CRNA    Method of Intubation:  Direct    Blade:  Colten 3    Laryngeal View Grade: Grade I - full view of cords      Difficult Airway Encountered?: No      Complications:  None    Airway Device:  Oral endotracheal tube    Airway Device Size:  7.5    Style/Cuff Inflation:  Cuffed (inflated to minimal occlusive pressure)    Tube secured:  23    Secured at:  The lips    Placement Verified By:  Capnometry    Complicating Factors:  None    Findings Post-Intubation:  Atraumatic/condition of teeth unchanged and BS equal bilateral

## 2023-06-07 NOTE — PLAN OF CARE
Problem: Adult Inpatient Plan of Care  Goal: Absence of Hospital-Acquired Illness or Injury  Outcome: Ongoing, Progressing  Goal: Optimal Comfort and Wellbeing  Outcome: Ongoing, Progressing  Goal: Readiness for Transition of Care  Outcome: Ongoing, Progressing     Problem: Diabetes Comorbidity  Goal: Blood Glucose Level Within Targeted Range  Outcome: Ongoing, Progressing     Problem: Infection  Goal: Absence of Infection Signs and Symptoms  Outcome: Ongoing, Progressing     Problem: Fall Injury Risk  Goal: Absence of Fall and Fall-Related Injury  Outcome: Ongoing, Progressing     Problem: Impaired Wound Healing  Goal: Optimal Wound Healing  Outcome: Ongoing, Progressing     Problem: Skin Injury Risk Increased  Goal: Skin Health and Integrity  Outcome: Ongoing, Progressing

## 2023-06-07 NOTE — PROGRESS NOTES
[facility] - 8th Floor Med Surg  Pulmonary Critical Care Note    Patient Name: Alejandrina Rodriguez  MRN: [id number]  Admission Date: 6/4/2023  Hospital Length of Stay: 3 days  Code Status: Prior  Attending Provider: ANTHONY Sims MD  Primary Care Provider: Alex Treviño MD     Subjective:     HPI:   Alejandrina Reyes is a 73 y.o. female with PMH of ex-lap several decades ago, prior DVT with protein C deficiency w/ L iliac stent on plavix and coumadin at home, PVD, HTN, and DM. Patient was sent to the ED from clinic after presenting with several days of abdominal distention, nausea, and vomiting concerning for a small bowel obstruction. She was taken to the OR by surgery for lysis of adhesions and was found to have a large stool ball proximal to a twisted loop of bowel which was broken up. All bowel was pink and viable per OR note. Overnight she became hypotensive to 80s-90s systolic which dropped to the 60s-70s mid morning. Her abdomen was distended and her pain worsened and her AM hemoglobin was 6.9 from 16.4 yesterday for which she was transfused with multiple blood products. On AM labs her PT was 27 and INR was 2.56. Surgery plans to take her for a repeat ex-lap this afternoon with during which further adhesions were lysed, however, no bowel injury was found. The majority of her greater omentum was removed and her proximal and distal inferior epigastric arteries on the right were ligated and an abthera was used for closure with plans to return the OR later this week.       Hospital Course/Significant events:  6/4 - ex-lap with lysis of adhesions 2/2 SBO   6/5 - ex-lap for evacuation of hemoperitoneum, with approximately 1.5L mixture of clotted and fresh blood removed      Interim:  Successfully extubated yesterday to room air, OR this morning with General surgery for abdominal closure.  No significant bleeding or blood clots identified, abdomen successfully closed.  Extubated following procedure  and returned to the intensive care unit.  On no hemodynamic support, abdominal pain well controlled and improved from yesterday.      14 point ROS reviewed and negative unless documented in the history of present illness.      Past Medical History:   Diagnosis Date    Abnormal blood smear     Acid reflux     Anxiety and depression     Bilateral carotid artery disease     Cervical spondylosis with radiculopathy     Chronic lumbar pain     Diabetes mellitus     History of continuous positive airway pressure (CPAP) therapy at home     HLD (hyperlipidemia)     Hypertension     Iron deficiency anemia, unspecified     Neuropathy     On anticoagulant therapy     Osteopenia     Personal history of colonic polyps 07/14/2014    Protein C deficiency     Recurrent deep vein thrombosis (DVT) 6/21/2022    Sleep apnea, unspecified     Unspecified osteoarthritis, unspecified site        Past Surgical History:   Procedure Laterality Date    COLONOSCOPY  07/14/2014    COLONOSCOPY W/ POLYPECTOMY  08/29/2022    Humberto Chopra/ Follow up pending pathology results    LAPAROTOMY, EXPLORATORY N/A 6/4/2023    Procedure: LAPAROTOMY, EXPLORATORY;  Surgeon: Sd Conway Jr., MD;  Location: [facility] OR;  Service: General;  Laterality: N/A;    LAPAROTOMY, EXPLORATORY N/A 6/5/2023    Procedure: LAPAROTOMY, EXPLORATORY;  Surgeon: Camilo Valadez MD;  Location: [facility] OR;  Service: General;  Laterality: N/A;    LYSIS OF ADHESIONS N/A 6/4/2023    Procedure: LYSIS, ADHESIONS;  Surgeon: Sd Conway Jr., MD;  Location: [facility] OR;  Service: General;  Laterality: N/A;  detorsion of small bowel volvulus    SPINAL CORD STIMULATOR IMPLANT         Social History     Socioeconomic History    Marital status:    Tobacco Use    Smoking status: Never    Smokeless tobacco: Never   Substance and Sexual Activity    Alcohol use: Never    Drug use: Never    Sexual activity: Yes     Partners: Male           Current Outpatient Medications   Medication Instructions     azelastine (ASTELIN) 137 mcg, Nasal, 2 times daily    B-complex with vitamin C (Z-BEC OR EQUIV) tablet 1 tablet, Oral, Daily    baclofen (LIORESAL) 10 mg, Oral, 4 times daily, PRN muscle spasms    betamethasone dipropionate 0.05 % cream 1 application, Topical (Top), 2 times daily    celecoxib (CELEBREX) 100 MG capsule TAKE ONE CAPSULE BY MOUTH ONCE DAILY WITH FOOD AS NEEDED FOR PAIN    clopidogreL (PLAVIX) 75 mg tablet 1 tablet, Oral, Daily    famotidine (PEPCID) 20 MG tablet TAKE 1 TABLET TWICE DAILY    hydroCHLOROthiazide (HYDRODIURIL) 25 MG tablet TAKE 1 TABLET EVERY DAY    LEXAPRO 10 mg tablet TAKE 1 TABLET BY MOUTH ONCE DAILY AS DIRECTED FOR MOOD/CHRONIC PAIN AS DIRECTED    LIDOcaine (LIDODERM) 5 % 1 patch, Transdermal, Every 24 hours (non-standard times), Remove & Discard patch within 12 hours or as directed by MD    metFORMIN (GLUCOPHAGE) 500 mg, Oral, 2 times daily    morphine (MSIR) 15 MG tablet 1.5 tablets, Oral, 2 times daily PRN    nitroGLYCERIN (NITROSTAT) 0.4 MG SL tablet PLEASE SEE ATTACHED FOR DETAILED DIRECTIONS    OMEGA-3-EPA-FISH OIL ORAL 1 capsule, Oral, Daily    omeprazole (PRILOSEC) 40 MG capsule TAKE 1 CAPSULE EVERY DAY    ondansetron (ZOFRAN-ODT) 8 mg, Oral, 2 times daily    peg 400-propylene glycol (SYSTANE, PROPYLENE GLYCOL,) 0.4-0.3 % Drop 1 drop, Ophthalmic    potassium chloride SA (K-DUR,KLOR-CON) 20 MEQ tablet TAKE 1 TABLET ONE TIME DAILY    pravastatin (PRAVACHOL) 40 MG tablet TAKE 1 TABLET EVERY DAY    timolol maleate 0.5% (TIMOPTIC) 0.5 % Drop 1 drop, Both Eyes, Daily    vitamin D (VITAMIN D3) 5,000 Units, Oral, Daily    warfarin (COUMADIN) 4 MG tablet TAKE 1 TABLET EVERY DAY       Current Inpatient Medications   methocarbamoL  500 mg Intravenous Q8H    mupirocin   Nasal BID    pantoprazole  40 mg Intravenous Daily    phytonadione ((AQUA-MEPHYTON) IVPB  10 mg Intravenous Once    potassium chloride in water  10 mEq Intravenous BID    timolol maleate 0.5%  1 drop Both Eyes Daily        Current Intravenous Infusions   sodium chloride 0.9%      electrolyte-A      lactated ringers      lactated ringers 125 mL/hr at 06/07/23 0535           Objective:       Intake/Output Summary (Last 24 hours) at 6/7/2023 1141  Last data filed at 6/7/2023 1000  Gross per 24 hour   Intake 5545 ml   Output 1715 ml   Net 3830 ml         Vital Signs (Most Recent):  Temp: 97.4 °F (36.3 °C) (06/07/23 1000)  Pulse: (!) 56 (06/07/23 1045)  Resp: 10 (06/07/23 1045)  BP: 106/61 (06/07/23 1045)  SpO2: 95 % (06/07/23 1045)  Body mass index is 25.39 kg/m².  Weight: 67.1 kg (147 lb 14.9 oz) Vital Signs (24h Range):  Temp:  [97.3 °F (36.3 °C)-98.7 °F (37.1 °C)] 97.4 °F (36.3 °C)  Pulse:  [52-75] 56  Resp:  [10-25] 10  SpO2:  [94 %-100 %] 95 %  BP: ()/(48-83) 106/61       Physical exam:  Gen- intubated, sedated but rouses to voice and tactile stimulation  CV- sinus bradycardia  Resp- CTAB, oxygen saturations 96% on 2 L nasal cannula  Abd- soft, mildly tender to palpation diffusely, nondistended; dressing clean, dry, intact  MSK- WWP, no LE edema  Neuro- awake, alert, oriented, moves all extremities spontaneously      Lines/Drains/Airways       Drain  Duration                  NG/OG Tube 06/04/23 1435 nasogastric 16 Fr. Left nostril 2 days         Urethral Catheter 06/04/23 1653 16 Fr. 2 days              Peripheral Intravenous Line  Duration                  Peripheral IV - Single Lumen 18 G;Other (Comments) Left Antecubital -- days         Peripheral IV - Single Lumen 06/04/23 1150 20 G;Other (Comments) Anterior;Distal;Right Upper Arm 2 days                    Significant Labs:    Lab Results   Component Value Date    WBC 8.80 06/07/2023    HGB 11.5 (L) 06/07/2023    HCT 36.9 (L) 06/07/2023    MCV 90.2 06/07/2023     06/07/2023         BMP  Lab Results   Component Value Date     06/07/2023    K 3.6 06/07/2023    CHLORIDE 108 (H) 06/07/2023    CO2 28 06/07/2023    BUN 13.3 06/07/2023    CREATININE 0.62  06/07/2023    CALCIUM 8.0 (L) 06/07/2023    AGAP 10.0 06/05/2023    EGFRNONAA >60 09/13/2021       ABG  Recent Labs   Lab 06/05/23  1515 06/05/23  1645   PH 7.403 7.620*   PO2 398* 69.0*   PCO2 43.6  --    HCO3 27.2 28.8   BE 2  --          Mechanical Ventilation Support:  Vent Mode: CPAP / PSV (06/06/23 0900)  Ventilator Initiated: Yes (06/05/23 1535)  Set Rate: 12 BPM (06/06/23 0845)  Vt Set: 400 mL (06/06/23 0845)  Pressure Support: 8 cmH20 (06/06/23 0900)  PEEP/CPAP: 5 cmH20 (06/06/23 0900)  Oxygen Concentration (%): 40 (06/06/23 0845)  Peak Airway Pressure: 20 cmH20 (06/06/23 0650)  Total Ve: 5 L/m (06/06/23 0900)  F/VT Ratio<105 (RSBI): (!) 29.27 (06/06/23 0900)            Assessment/Plan:     Assessment  SBO s/p QUINTEN   Hemoperitoneum   Anemia 2/2 post-operative hemorrhage       Plan  Successfully extubated yesterday, saturating well on 2 L nasal cannula this morning  OR today with abdominal exploration demonstrating no evidence of recurrent bleeding, successfully closed  Advance diet per General surgery recommendations  Instructed patient to notify nursing immediately with any worsening abdominal pain or distention      DVT Prophylaxis: SCDs  GI Prophylaxis: pantoprazole 40 mg      Stable for downgrade to floor.       Jimbo Traore MD  Pulmonary Critical Care Medicine, PGY1  [facility] - 8th Floor Med Surg

## 2023-06-07 NOTE — TELEPHONE ENCOUNTER
FYI:Patient will be moving out of ICU  later this afternoon.  Patient will not have a bed or the room number until tonight.. I did advise them to notify the on -call physician after hours

## 2023-06-07 NOTE — NURSING
Nurses Note -- 4 Eyes      6/7/2023   5:38 AM      Skin assessed during: Daily Assessment      [] No Altered Skin Integrity Present    []Prevention Measures Documented      [x] Yes- Altered Skin Integrity Present or Discovered   [] LDA Added if Not in Epic (Describe Wound)   [] New Altered Skin Integrity was Present on Admit and Documented in LDA   [] Wound Image Taken    Wound Care Consulted? No    Attending Nurse:  Jose Alfredo Mckeon RN     Second RN/Staff Member:  Aziza Florentino RN

## 2023-06-08 PROBLEM — M70.70 HIP BURSITIS: Status: RESOLVED | Noted: 2022-06-21 | Resolved: 2023-06-08

## 2023-06-08 PROBLEM — E87.6 HYPOKALEMIA: Status: RESOLVED | Noted: 2022-06-21 | Resolved: 2023-06-08

## 2023-06-08 PROBLEM — I77.9 BILATERAL CAROTID ARTERY DISEASE: Status: RESOLVED | Noted: 2023-03-01 | Resolved: 2023-06-08

## 2023-06-08 PROBLEM — B35.1 NAIL FUNGUS: Status: RESOLVED | Noted: 2022-08-16 | Resolved: 2023-06-08

## 2023-06-08 PROBLEM — Z79.01 ON ANTICOAGULANT THERAPY: Status: RESOLVED | Noted: 2023-03-01 | Resolved: 2023-06-08

## 2023-06-08 PROBLEM — M47.22 CERVICAL SPONDYLOSIS WITH RADICULOPATHY: Status: RESOLVED | Noted: 2023-03-01 | Resolved: 2023-06-08

## 2023-06-08 PROBLEM — M54.9 BACK PAIN: Status: RESOLVED | Noted: 2023-03-01 | Resolved: 2023-06-08

## 2023-06-08 PROBLEM — G62.9 NEUROPATHY: Status: RESOLVED | Noted: 2023-03-01 | Resolved: 2023-06-08

## 2023-06-08 PROBLEM — M85.80 OSTEOPENIA: Status: RESOLVED | Noted: 2023-03-01 | Resolved: 2023-06-08

## 2023-06-08 PROBLEM — L71.9 ROSACEA: Status: RESOLVED | Noted: 2022-08-16 | Resolved: 2023-06-08

## 2023-06-08 PROBLEM — D50.9 IRON DEFICIENCY ANEMIA, UNSPECIFIED: Status: RESOLVED | Noted: 2023-03-01 | Resolved: 2023-06-08

## 2023-06-08 PROBLEM — F32.A ANXIETY AND DEPRESSION: Status: RESOLVED | Noted: 2023-03-01 | Resolved: 2023-06-08

## 2023-06-08 PROBLEM — F41.9 ANXIETY AND DEPRESSION: Status: RESOLVED | Noted: 2023-03-01 | Resolved: 2023-06-08

## 2023-06-08 LAB
ALBUMIN SERPL-MCNC: 2.5 G/DL (ref 3.4–4.8)
ALBUMIN/GLOB SERPL: 1 RATIO (ref 1.1–2)
ALP SERPL-CCNC: 49 UNIT/L (ref 40–150)
ALT SERPL-CCNC: 173 UNIT/L (ref 0–55)
AST SERPL-CCNC: 92 UNIT/L (ref 5–34)
BASOPHILS # BLD AUTO: 0.02 X10(3)/MCL
BASOPHILS NFR BLD AUTO: 0.2 %
BILIRUBIN DIRECT+TOT PNL SERPL-MCNC: 0.6 MG/DL
BUN SERPL-MCNC: 13 MG/DL (ref 9.8–20.1)
CALCIUM SERPL-MCNC: 8.1 MG/DL (ref 8.4–10.2)
CHLORIDE SERPL-SCNC: 110 MMOL/L (ref 98–107)
CO2 SERPL-SCNC: 24 MMOL/L (ref 23–31)
CREAT SERPL-MCNC: 0.55 MG/DL (ref 0.55–1.02)
EOSINOPHIL # BLD AUTO: 0.01 X10(3)/MCL (ref 0–0.9)
EOSINOPHIL NFR BLD AUTO: 0.1 %
ERYTHROCYTE [DISTWIDTH] IN BLOOD BY AUTOMATED COUNT: 16.4 % (ref 11.5–17)
GFR SERPLBLD CREATININE-BSD FMLA CKD-EPI: >60 MLS/MIN/1.73/M2
GLOBULIN SER-MCNC: 2.4 GM/DL (ref 2.4–3.5)
GLUCOSE SERPL-MCNC: 99 MG/DL (ref 82–115)
GROUP & RH: NORMAL
HCT VFR BLD AUTO: 33.8 % (ref 37–47)
HGB BLD-MCNC: 10.9 G/DL (ref 12–16)
IMM GRANULOCYTES # BLD AUTO: 0.06 X10(3)/MCL (ref 0–0.04)
IMM GRANULOCYTES NFR BLD AUTO: 0.5 %
INDIRECT COOMBS GEL: NORMAL
LYMPHOCYTES # BLD AUTO: 1.06 X10(3)/MCL (ref 0.6–4.6)
LYMPHOCYTES NFR BLD AUTO: 9.3 %
MCH RBC QN AUTO: 30.7 PG (ref 27–31)
MCHC RBC AUTO-ENTMCNC: 32.2 G/DL (ref 33–36)
MCV RBC AUTO: 95.2 FL (ref 80–94)
MONOCYTES # BLD AUTO: 0.72 X10(3)/MCL (ref 0.1–1.3)
MONOCYTES NFR BLD AUTO: 6.3 %
NEUTROPHILS # BLD AUTO: 9.57 X10(3)/MCL (ref 2.1–9.2)
NEUTROPHILS NFR BLD AUTO: 83.6 %
NRBC BLD AUTO-RTO: 0 %
PLATELET # BLD AUTO: 153 X10(3)/MCL (ref 130–400)
PMV BLD AUTO: 10.8 FL (ref 7.4–10.4)
POCT GLUCOSE: 106 MG/DL (ref 70–110)
POCT GLUCOSE: 108 MG/DL (ref 70–110)
POCT GLUCOSE: 151 MG/DL (ref 70–110)
POCT GLUCOSE: 69 MG/DL (ref 70–110)
POCT GLUCOSE: 74 MG/DL (ref 70–110)
POCT GLUCOSE: 92 MG/DL (ref 70–110)
POTASSIUM SERPL-SCNC: 4.5 MMOL/L (ref 3.5–5.1)
PROT SERPL-MCNC: 4.9 GM/DL (ref 5.8–7.6)
RBC # BLD AUTO: 3.55 X10(6)/MCL (ref 4.2–5.4)
SODIUM SERPL-SCNC: 143 MMOL/L (ref 136–145)
SPECIMEN OUTDATE: NORMAL
WBC # SPEC AUTO: 11.44 X10(3)/MCL (ref 4.5–11.5)

## 2023-06-08 PROCEDURE — 63600175 PHARM REV CODE 636 W HCPCS: Performed by: INTERNAL MEDICINE

## 2023-06-08 PROCEDURE — 27000221 HC OXYGEN, UP TO 24 HOURS

## 2023-06-08 PROCEDURE — 11000001 HC ACUTE MED/SURG PRIVATE ROOM

## 2023-06-08 PROCEDURE — 25000003 PHARM REV CODE 250: Performed by: INTERNAL MEDICINE

## 2023-06-08 PROCEDURE — 63600175 PHARM REV CODE 636 W HCPCS: Performed by: STUDENT IN AN ORGANIZED HEALTH CARE EDUCATION/TRAINING PROGRAM

## 2023-06-08 PROCEDURE — 86900 BLOOD TYPING SEROLOGIC ABO: CPT | Performed by: SURGERY

## 2023-06-08 PROCEDURE — 99233 SBSQ HOSP IP/OBS HIGH 50: CPT | Mod: 95,,, | Performed by: INTERNAL MEDICINE

## 2023-06-08 PROCEDURE — 85025 COMPLETE CBC W/AUTO DIFF WBC: CPT | Performed by: INTERNAL MEDICINE

## 2023-06-08 PROCEDURE — C9113 INJ PANTOPRAZOLE SODIUM, VIA: HCPCS | Performed by: INTERNAL MEDICINE

## 2023-06-08 PROCEDURE — 97162 PT EVAL MOD COMPLEX 30 MIN: CPT

## 2023-06-08 PROCEDURE — 99233 PR SUBSEQUENT HOSPITAL CARE,LEVL III: ICD-10-PCS | Mod: 95,,, | Performed by: INTERNAL MEDICINE

## 2023-06-08 PROCEDURE — 94761 N-INVAS EAR/PLS OXIMETRY MLT: CPT

## 2023-06-08 PROCEDURE — 80053 COMPREHEN METABOLIC PANEL: CPT | Performed by: INTERNAL MEDICINE

## 2023-06-08 RX ORDER — ENOXAPARIN SODIUM 100 MG/ML
40 INJECTION SUBCUTANEOUS EVERY 24 HOURS
Status: DISCONTINUED | OUTPATIENT
Start: 2023-06-09 | End: 2023-06-10

## 2023-06-08 RX ORDER — HYDRALAZINE HYDROCHLORIDE 20 MG/ML
10 INJECTION INTRAMUSCULAR; INTRAVENOUS EVERY 6 HOURS PRN
Status: DISCONTINUED | OUTPATIENT
Start: 2023-06-08 | End: 2023-06-19 | Stop reason: HOSPADM

## 2023-06-08 RX ORDER — DEXTROSE, SODIUM CHLORIDE, SODIUM LACTATE, POTASSIUM CHLORIDE, AND CALCIUM CHLORIDE 5; .6; .31; .03; .02 G/100ML; G/100ML; G/100ML; G/100ML; G/100ML
INJECTION, SOLUTION INTRAVENOUS CONTINUOUS
Status: DISCONTINUED | OUTPATIENT
Start: 2023-06-08 | End: 2023-06-09

## 2023-06-08 RX ORDER — ENALAPRILAT 1.25 MG/ML
1.25 INJECTION INTRAVENOUS EVERY 6 HOURS
Status: DISCONTINUED | OUTPATIENT
Start: 2023-06-08 | End: 2023-06-19 | Stop reason: HOSPADM

## 2023-06-08 RX ADMIN — DEXTROSE MONOHYDRATE 125 ML: 100 INJECTION, SOLUTION INTRAVENOUS at 12:06

## 2023-06-08 RX ADMIN — HYDROMORPHONE HYDROCHLORIDE 1 MG: 2 INJECTION INTRAMUSCULAR; INTRAVENOUS; SUBCUTANEOUS at 10:06

## 2023-06-08 RX ADMIN — MUPIROCIN: 20 OINTMENT TOPICAL at 08:06

## 2023-06-08 RX ADMIN — METHOCARBAMOL 500 MG: 100 INJECTION INTRAMUSCULAR; INTRAVENOUS at 01:06

## 2023-06-08 RX ADMIN — ENALAPRILAT 1.25 MG: 2.5 INJECTION INTRAVENOUS at 12:06

## 2023-06-08 RX ADMIN — METHOCARBAMOL 500 MG: 100 INJECTION INTRAMUSCULAR; INTRAVENOUS at 05:06

## 2023-06-08 RX ADMIN — HYDROMORPHONE HYDROCHLORIDE 1 MG: 2 INJECTION INTRAMUSCULAR; INTRAVENOUS; SUBCUTANEOUS at 06:06

## 2023-06-08 RX ADMIN — METHOCARBAMOL 500 MG: 100 INJECTION INTRAMUSCULAR; INTRAVENOUS at 10:06

## 2023-06-08 RX ADMIN — HYDROMORPHONE HYDROCHLORIDE 1 MG: 2 INJECTION INTRAMUSCULAR; INTRAVENOUS; SUBCUTANEOUS at 04:06

## 2023-06-08 RX ADMIN — DEXTROSE MONOHYDRATE 125 ML: 100 INJECTION, SOLUTION INTRAVENOUS at 05:06

## 2023-06-08 RX ADMIN — SODIUM CHLORIDE, POTASSIUM CHLORIDE, SODIUM LACTATE AND CALCIUM CHLORIDE: 600; 310; 30; 20 INJECTION, SOLUTION INTRAVENOUS at 01:06

## 2023-06-08 RX ADMIN — HYDROMORPHONE HYDROCHLORIDE 1 MG: 2 INJECTION INTRAMUSCULAR; INTRAVENOUS; SUBCUTANEOUS at 01:06

## 2023-06-08 RX ADMIN — ENALAPRILAT 1.25 MG: 2.5 INJECTION INTRAVENOUS at 05:06

## 2023-06-08 RX ADMIN — TIMOLOL MALEATE 1 DROP: 5 SOLUTION/ DROPS OPHTHALMIC at 05:06

## 2023-06-08 RX ADMIN — SODIUM CHLORIDE, SODIUM LACTATE, POTASSIUM CHLORIDE, CALCIUM CHLORIDE AND DEXTROSE MONOHYDRATE: 5; 600; 310; 30; 20 INJECTION, SOLUTION INTRAVENOUS at 08:06

## 2023-06-08 RX ADMIN — PANTOPRAZOLE SODIUM 40 MG: 40 INJECTION, POWDER, FOR SOLUTION INTRAVENOUS at 08:06

## 2023-06-08 NOTE — NURSING
Nurses Note -- 4 Eyes      6/8/2023   1:30 PM      Skin assessed during: Transfer      [] No Altered Skin Integrity Present    []Prevention Measures Documented      [x] Yes- Altered Skin Integrity Present or Discovered   [] LDA Added if Not in Epic (Describe Wound)   [] New Altered Skin Integrity was Present on Admit and Documented in LDA   [] Wound Image Taken    Wound Care Consulted? No, normal post-op altered skin integrity    Attending Nurse:  Domenica Garcia RN     Second RN/Staff Member:    Kostas Stanton RN

## 2023-06-08 NOTE — NURSING
Nurses Note -- 4 Eyes      6/8/2023   12:52 AM      Skin assessed during: Daily Assessment      [] No Altered Skin Integrity Present    []Prevention Measures Documented      [x] Yes- Altered Skin Integrity Present or Discovered   [] LDA Added if Not in Epic (Describe Wound)   [] New Altered Skin Integrity was Present on Admit and Documented in LDA   [] Wound Image Taken    Wound Care Consulted? No    Attending Nurse:  Jose Alfredo Mckeon RN     Second RN/Staff Member: Samanta Florentino RN

## 2023-06-08 NOTE — PROGRESS NOTES
[facility] Progress Note        Chief Complaint: Inpatient Follow-up for ex lap for SBO with resultant finding of intraabdominal adhesions s/p lysis, s/p evacuation of blood, s/p abdominal closure.    HPI: see full H and P written by Dr NILS Betancourt Hx:   Patient is status post exploratory lap for lysis of adhesions;  2 days after surgery she was taken back to the OR secondary to falling H&H there was a lot of oozing and over a L of blood was evacuated from the belly in the belly was left open she then went back to the OR on 06/07 for abdominal closure.  Downgraded from the ICU on 06/07.  Patient is awake alert and oriented this morning; no acute complaints.    Objective/physical exam:  General: In no acute distress, afebrile  Chest: Clear to auscultation bilaterally   Heart: RRR, +S1, S2, no appreciable murmur; some trace edema to the dorsum of both hands  Abdomen: Soft, midline dressing in place which is dry, nasogastric tube in place with 125 cc of output over the past 24 hours  MSK: Warm, no clubbing or cyanosis  Neurologic: Alert and oriented x4, Cranial nerve II-XII intact, Strength 5/5 in all 4 extremities    VITAL SIGNS: 24 HRS MIN & MAX LAST   Temp  Min: 97.3 °F (36.3 °C)  Max: 98.6 °F (37 °C) 97.5 °F (36.4 °C)   BP  Min: 106/61  Max: 167/73 (!) 154/65   Pulse  Min: 49  Max: 75  (!) 51   Resp  Min: 9  Max: 26 16   SpO2  Min: 94 %  Max: 100 % 99 %       Recent Labs   Lab 06/06/23  1831 06/07/23  0158 06/07/23  0950 06/08/23  0141   WBC 10.42 8.80  --  11.44   RBC 2.77* 2.55*  --  3.55*   HGB 8.3* 7.6* 11.5* 10.9*   HCT 24.6* 23.0* 36.9* 33.8*   MCV 88.8 90.2  --  95.2*   MCH 30.0 29.8  --  30.7   MCHC 33.7 33.0  --  32.2*   RDW 17.2* 17.2*  --  16.4    157  --  153   MPV 10.4 10.9*  --  10.8*       Recent Labs   Lab 06/05/23  1515 06/05/23  1559 06/05/23  1645 06/06/23  0210 06/07/23  0158 06/08/23  0141   NA  --  139  --  141 142 143   K  --  3.0*  --   3.5 3.6 4.5   CO2  --  24  --  28 28 24   BUN  --  13.5  --  13.8 13.3 13.0   CREATININE  --  0.72  --  0.62 0.62 0.55   CALCIUM  --  9.3  --  8.6 8.0* 8.1*   PH 7.403  --  7.620*  --   --   --    MG  --  1.90  --   --  1.90  --    ALBUMIN  --   --   --  2.8* 2.4* 2.5*   ALKPHOS  --   --   --  44 40 49   ALT  --   --   --  467* 239* 173*   AST  --   --   --  676* 177* 92*   BILITOT  --   --   --  1.7* 0.7 0.6          Microbiology Results (last 7 days)       ** No results found for the last 168 hours. **             See below for Radiology    Scheduled Med:   enalaprilat  1.25 mg Intravenous Q6H    methocarbamoL  500 mg Intravenous Q8H    mupirocin   Nasal BID    pantoprazole  40 mg Intravenous Daily    phytonadione ((AQUA-MEPHYTON) IVPB  10 mg Intravenous Once    timolol maleate 0.5%  1 drop Both Eyes Daily        Continuous Infusions:   sodium chloride 0.9%      dextrose 5% lactated ringers          PRN Meds:  sodium chloride, sodium chloride, sodium chloride, sodium chloride, bisacodyL, dextrose 10%, dextrose 10%, glucagon (human recombinant), HYDROmorphone, HYDROmorphone, insulin aspart U-100, labetalol, nitroGLYCERIN, sodium chloride 0.9%       Assessment/Plan:  Patient Active Problem List   Diagnosis    Diabetes mellitus    Protein C deficiency    Recurrent deep vein thrombosis (DVT)    Acid reflux    Chronic lumbar pain    Hypertension    HLD (hyperlipidemia)    History of continuous positive airway pressure (CPAP) therapy at home    SBO (small bowel obstruction)     Continue flutter valve, add incentive spirometry Q 2  Up and ambulatory with PT today  No gas yet; hypoactive bowel sounds noted  P.r.n. Vasotec ordered; on HCTZ at home. Patient is euvolemic. NGT output is minimal. UOP is appropriate.   Change IV fluids from normal saline to D5LR, recheck electrolytes in the morning may have to switch her to D5 half normal saline tomorrow; monitor sodium  Patient with protein C deficiency and history of DVT  will resume anticoagulation once okay with surgery.    Patient condition:  Stable    Anticipated discharge and Disposition: Home with family and HH      All diagnosis and differential diagnosis have been reviewed; assessment and plan has been documented; I have personally reviewed the labs and test results that are presently available; I have reviewed the patients medication list; I have reviewed the consulting providers response and recommendations. I have reviewed or attempted to review medical records based upon their availability    All of the patient's questions have been  addressed and answered. Patient's is agreeable to the above stated plan. I will continue to monitor closely and make adjustments to medical management as needed.      Nutrition Status:      XR Gastric tube check, non-radiologist performed  Narrative: EXAMINATION:  XR GASTRIC TUBE CHECK, NON-RADIOLOGIST PERFORMED    CLINICAL HISTORY:  placement;    TECHNIQUE:  Frontal view of the lower chest and upper abdomen    COMPARISON:  Radiography from earlier today    FINDINGS:  Side port for the enteric tube lies is 3.5 cm below the GE junction.  Impression: Enteric tube side port about 3.5 cm below the GE junction.    Electronically signed by: Sunil Peralta  Date:    06/04/2023  Time:    14:49  XR Gastric tube check, non-radiologist performed  Narrative: EXAMINATION:  XR GASTRIC TUBE CHECK, NON-RADIOLOGIST PERFORMED    CLINICAL HISTORY:  NG Tube placement;    TECHNIQUE:  Single view of the abdomen.    COMPARISON:  None    FINDINGS:  NG tube projects over the body of the stomach.  Impression: NG tube projects over the body of the stomach.    Electronically signed by: Evin Manzo  Date:    06/04/2023  Time:    10:47  CT Abdomen Pelvis  Without Contrast  Narrative: EXAMINATION:  CT ABDOMEN PELVIS WITHOUT CONTRAST    CLINICAL HISTORY:  Abdominal pain, acute, nonlocalized;    TECHNIQUE:  CT imaging of the abdomen and pelvis without intravenous contrast.  Axial, coronal and sagittal reformatted images reviewed. Dose length product is 460 mGycm. Automatic exposure control, adjustment of mA/kV or iterative reconstruction technique used to limit radiation dose.    COMPARISON:  CTA 07/08/2022    FINDINGS:  Assessment of the visceral organs and vasculature is limited by the lack of IV contrast.    Liver/biliary: No concerning hepatic findings. No radiodense gallstone or biliary dilatation appreciated.    Pancreas: Normal.    Spleen: Normal.    Adrenals: Normal.    Genitourinary: No defined urinary stone or hydronephrosis.  Bladder within normal limits.    Stomach/bowel: Proximal and mid segments of small bowel are fluid-filled and dilated, measuring up to about 4 cm.  Suspected transition point in the right lower abdomen (series 2, images 70 through 84).  Appendix not confidently seen.    Lymph nodes and peritoneum: No pathologically enlarged lymph node identified with noncontrast technique. No pneumoperitoneum or significant ascites.    Vasculature: Small caliber labial varices.  Left external/common iliac venous stent.    Abdominal wall: Subcutaneous generator in the right buttock with lead extending to the central canal.    Lung bases: No consolidation or pleural effusion.    Bones: No acute osseous findings.  Impression: Suspected small-bowel obstruction with transition point in the right lower abdomen.    Electronically signed by: Sunil Peralta  Date:    06/04/2023  Time:    09:05      Alex Treviño MD   06/08/2023

## 2023-06-08 NOTE — PROGRESS NOTES
Inpatient Nutrition Assessment    Admit Date: 6/4/2023   Total duration of encounter: 4 days     Nutrition Recommendation/Prescription     Advance to low fiber diet when medically feasible. If advanced to clear or full liquids, recommend adding oral supplements.     Monitor for need for PN if not able to advance diet within 7 days LOS.    Communication of Recommendations: reviewed with nurse    Nutrition Assessment     Malnutrition Assessment/Nutrition-Focused Physical Exam       Malnutrition Level: other (see comments) (Does not meet criteria at this time)  Energy Intake (Malnutrition): other (see comments) (does not meet criteria at this time (4 days))  Weight Loss (Malnutrition): other (see comments) (intentional weight loss)                                                  A minimum of two characteristics is recommended for diagnosis of either severe or non-severe malnutrition.    Chart Review    Reason Seen: continuous nutrition monitoring    Malnutrition Screening Tool Results   Have you recently lost weight without trying?: No  Have you been eating poorly because of a decreased appetite?: No   MST Score: 0     Diagnosis:  Small Bowel Obstruction    Relevant Medical History: h/o colostomy s/p reversal, Gtube and Jtube s/p removal, and chronic back pain on home morphine, s/p exlap with lysis of adhesions 6/4    Nutrition-Related Medications: D5-LR @ 125ml/hr, SSI  Calorie Containing IV Medications: no significant kcals from medications at this time    Nutrition-Related Labs:  6/5/23: Glucose 194 & 308, Ca 7.2, HGB/HCT 6.9/21.4  6/8 Cl 110    Diet/PN Order: Diet NPO  Oral Supplement Order: none  Tube Feeding Order: none  Appetite/Oral Intake: NPO/NPO  Factors Affecting Nutritional Intake: NPO  Food/Nondenominational/Cultural Preferences: none reported  Food Allergies: none reported    Skin Integrity: incision  Wound(s):   none    Comments  6/5/23: Pt reports good appetite and no issues until 6/2/23 when she lost her  "appetite due to nausea, vomiting, and abdominal pain which continue through . Chronic constipation for the past 20 years (on medications to have bowel movements). Back pain improved, abdominal pain persists. Pt purposefully lost 25 lbs (160 lbs to 135 lbs) over the past 6 months using portion control and starting probiotics, endorses feeling better at lower weight. Per EMR weight consistent over past three months 138 to 135 lbs. No signs of muscle or fat pad wasting. Endorsed abdominal pain during interview, will follow up for diet education.     23: Pt still NPO at this time. Awaiting return of bowel function prior to diet advancement (awaiting flatus.)    Anthropometrics    Height: 5' 4" (162.6 cm)    Last Weight: 67.1 kg (147 lb 14.9 oz) (23 0520) Weight Method: Standard Scale  BMI (Calculated): 25.4  BMI Classification: normal (BMI 18.5-24.9)     Ideal Body Weight (IBW), Female: 120 lb     % Ideal Body Weight, Female (lb): 112.5 %                    Usual Body Weight (UBW), k.6 kg (Intentional weight loss through dieting over past 6 months-year)  % Usual Body Weight: 84.52     Usual Weight Provided By: patient    Wt Readings from Last 5 Encounters:   23 67.1 kg (147 lb 14.9 oz)   23 62.6 kg (138 lb)   23 62.6 kg (138 lb)   22 67.6 kg (149 lb)   22 70.7 kg (155 lb 12.8 oz)     Weight Change(s) Since Admission:  Admit Weight: 61.2 kg (135 lb) (23 0714)  Weight consistent over past 3 months.   23: 67.1kg    Estimated Needs    Weight Used For Calorie Calculations: 61.2 kg (134 lb 14.7 oz)  Energy Calorie Requirements (kcal): 3122-6634  Energy Need Method: Kcal/kg (25-30)  Weight Used For Protein Calculations: 61.2 kg (134 lb 14.7 oz)  Protein Requirements: 73-86gm (1.2-1.4g/kg)  Fluid Requirements (mL): 1530-1836ml (1ml/kcal)  Temp (24hrs), Av °F (36.7 °C), Min:97.5 °F (36.4 °C), Max:98.6 °F (37 °C)       Enteral Nutrition    Patient not receiving " enteral nutrition at this time.    Parenteral Nutrition    Patient not receiving parenteral nutrition support at this time.    Evaluation of Received Nutrient Intake    Calories: not meeting estimated needs  Protein: not meeting estimated needs    Patient Education    Not applicable.    Nutrition Diagnosis     PES: Inadequate energy intake related to  NPO due to abdominal pain and s/p exlap with lysis of adhesions 6/4 as evidenced by <25% of usual intake 6/2-3 and NPO 6/4-5. (continues)    Interventions/Goals     Intervention(s): modified composition of meals/snacks and commercial beverage  Goal: Meet greater than 75% of nutritional needs by follow-up. (goal progressing)    Monitoring & Evaluation     Dietitian will monitor food and beverage intake, weight change, and gastrointestinal profile.  Nutrition Risk/Follow-Up: moderate (follow-up in 3-5 days)   Please consult if re-assessment needed sooner.

## 2023-06-08 NOTE — PT/OT/SLP EVAL
Physical Therapy Evaluation    Patient Name:  Alejandrina Rodriguez   MRN:  [id number]    Recommendations:     Discharge Recommendations: home with home health   Discharge Equipment Recommendations: none   Barriers to discharge:  medical dx, impaired mobility     Assessment:     Alejandrina Rodriguez is a 73 y.o. female admitted with a medical diagnosis of SBO (small bowel obstruction) s/p ex-lap with lysis of adhesions on 6/4, then s/p ex-lap for evacuation of hemoperitoneum on 6/5. Pt with a hx of chronic back pain.  She presents with the following impairments/functional limitations: weakness, gait instability, impaired endurance, impaired balance, decreased lower extremity function, impaired self care skills, impaired functional mobility.    Rehab Prognosis: Good; patient would benefit from acute skilled PT services to address these deficits and reach maximum level of function.    Recent Surgery: Procedure(s) (LRB):  LAPAROTOMY, EXPLORATORY (N/A)  WASHOUT (N/A) 1 Day Post-Op    Plan:     During this hospitalization, patient to be seen 6 x/week to address the identified rehab impairments via gait training, therapeutic activities, therapeutic exercises and progress toward the following goals:    Plan of Care Expires:  07/08/23    Subjective     Chief Complaint: generalized weakness  Patient/Family Comments/goals: to return PLOF  Pain/Comfort:  Pain Rating 1: 2/10  Location 1: abdomen    Patients cultural, spiritual, Druze conflicts given the current situation: no    Living Environment:  Pt lives with spouse in a [facility]; ramp entrance that she uses.  Prior to admission, patients level of function was independent.    Equipment used/owned at home: cane, straight, rollator.    Upon discharge, patient will have assistance from spouse and family.    Objective:     Communicated with NSG prior to session.  Patient found HOB elevated with blood pressure cuff, pulse ox (continuous), telemetry, peripheral IV, NG tube, oxygen  upon PT  entry to room.    General Precautions: Standard, fall  Orthopedic Precautions:N/A   Braces: N/A  Respiratory Status: Nasal cannula, flow 2 L/min  Blood Pressure: 160/70 semi supine, 157/76 sitting EOB with complaints of dizziness      Exams:  Cognitive Exam:  Patient is oriented to Person, Place, Time, and Situation  RLE Strength: grossly 4/5  Skin integrity: Visible skin intact      Functional Mobility:  Bed Mobility:     Supine to Sit: minimum assistance  For comfort pt held on to pillow while performing  Transfers:     Sit to Stand:  minimum assistance with rolling walker  For comfort pt held on to pillow for comfort  Bed to Chair: minimum assistance with  rolling walker  using  Step Transfer      Patient and family provided with verbal education regarding POC, mobility, safety.  Understanding was verbalized.     Patient left up in chair with all lines intact, call button in reach, RN notified, and family present.    GOALS:   Multidisciplinary Problems       Physical Therapy Goals          Problem: Physical Therapy    Goal Priority Disciplines Outcome Goal Variances Interventions   Physical Therapy Goal     PT, PT/OT Ongoing, Progressing     Description: Goals to be met by: 23     Patient will increase functional independence with mobility by performin. Supine to sit with Stand-by Assistance  2. Sit to supine with Stand-by Assistance  3. Sit to stand transfer with Stand-by Assistance  4. Gait  x 150 feet with Stand-by Assistance using Rolling Walker.                          History:     Past Medical History:   Diagnosis Date    Abnormal blood smear     Acid reflux     Anxiety and depression     Bilateral carotid artery disease     Cervical spondylosis with radiculopathy     Chronic lumbar pain     Diabetes mellitus     History of continuous positive airway pressure (CPAP) therapy at home     HLD (hyperlipidemia)     Hypertension     Iron deficiency anemia, unspecified     Neuropathy     On  anticoagulant therapy     Osteopenia     Personal history of colonic polyps 07/14/2014    Protein C deficiency     Recurrent deep vein thrombosis (DVT) 6/21/2022    Sleep apnea, unspecified     Unspecified osteoarthritis, unspecified site        Past Surgical History:   Procedure Laterality Date    COLONOSCOPY  07/14/2014    COLONOSCOPY W/ POLYPECTOMY  08/29/2022    Humberto Nav/ Follow up pending pathology results    LAPAROTOMY, EXPLORATORY N/A 6/4/2023    Procedure: LAPAROTOMY, EXPLORATORY;  Surgeon: Sd Conway Jr., MD;  Location: [facility] OR;  Service: General;  Laterality: N/A;    LAPAROTOMY, EXPLORATORY N/A 6/5/2023    Procedure: LAPAROTOMY, EXPLORATORY;  Surgeon: Camilo Valadez MD;  Location: [facility] OR;  Service: General;  Laterality: N/A;    LAPAROTOMY, EXPLORATORY N/A 6/7/2023    Procedure: LAPAROTOMY, EXPLORATORY;  Surgeon: Camilo Valadez MD;  Location: [facility] OR;  Service: General;  Laterality: N/A;  CLOSURE OF WOUND     LYSIS OF ADHESIONS N/A 6/4/2023    Procedure: LYSIS, ADHESIONS;  Surgeon: Sd Conway Jr., MD;  Location: [facility] OR;  Service: General;  Laterality: N/A;  detorsion of small bowel volvulus    SPINAL CORD STIMULATOR IMPLANT      WASHOUT N/A 6/7/2023    Procedure: WASHOUT;  Surgeon: Camilo Valadez MD;  Location: [facility] OR;  Service: General;  Laterality: N/A;  WASHOUT OF ABDOMEN        Time Tracking:     PT Received On:    PT Start Time: 1046     PT Stop Time: 1109  PT Total Time (min): 23 min     Billable Minutes: Evaluation mod      06/08/2023

## 2023-06-08 NOTE — PROGRESS NOTES
Acute Care Surgery   Progress Note  Admit Date: 6/4/2023  HD#4  POD#1 Day Post-Op    Subjective:   Interval history:  POD 1 s/p abthera removal, washout, fascial closure  AF, bradyacrdic with HR 50-60, HTN -160s  Remains on 2L NC  NPO with NGT to LIWS - 200cc bilious output over 24 hours   Vasquez in place with 1.1L UOP  Hypoglycemic overnight requiring dextrose bolus twice    Home Meds:  Current Outpatient Medications   Medication Instructions    azelastine (ASTELIN) 137 mcg, Nasal, 2 times daily    B-complex with vitamin C (Z-BEC OR EQUIV) tablet 1 tablet, Oral, Daily    baclofen (LIORESAL) 10 mg, Oral, 4 times daily, PRN muscle spasms    betamethasone dipropionate 0.05 % cream 1 application, Topical (Top), 2 times daily    celecoxib (CELEBREX) 100 MG capsule TAKE ONE CAPSULE BY MOUTH ONCE DAILY WITH FOOD AS NEEDED FOR PAIN    clopidogreL (PLAVIX) 75 mg tablet 1 tablet, Oral, Daily    famotidine (PEPCID) 20 MG tablet TAKE 1 TABLET TWICE DAILY    hydroCHLOROthiazide (HYDRODIURIL) 25 MG tablet TAKE 1 TABLET EVERY DAY    LEXAPRO 10 mg tablet TAKE 1 TABLET BY MOUTH ONCE DAILY AS DIRECTED FOR MOOD/CHRONIC PAIN AS DIRECTED    LIDOcaine (LIDODERM) 5 % 1 patch, Transdermal, Every 24 hours (non-standard times), Remove & Discard patch within 12 hours or as directed by MD    metFORMIN (GLUCOPHAGE) 500 mg, Oral, 2 times daily    morphine (MSIR) 15 MG tablet 1.5 tablets, Oral, 2 times daily PRN    nitroGLYCERIN (NITROSTAT) 0.4 MG SL tablet PLEASE SEE ATTACHED FOR DETAILED DIRECTIONS    OMEGA-3-EPA-FISH OIL ORAL 1 capsule, Oral, Daily    omeprazole (PRILOSEC) 40 MG capsule TAKE 1 CAPSULE EVERY DAY    ondansetron (ZOFRAN-ODT) 8 mg, Oral, 2 times daily    peg 400-propylene glycol (SYSTANE, PROPYLENE GLYCOL,) 0.4-0.3 % Drop 1 drop, Ophthalmic    potassium chloride SA (K-DUR,KLOR-CON) 20 MEQ tablet TAKE 1 TABLET ONE TIME DAILY    pravastatin (PRAVACHOL) 40 MG tablet TAKE 1 TABLET EVERY DAY    timolol maleate 0.5% (TIMOPTIC)  "0.5 % Drop 1 drop, Both Eyes, Daily    vitamin D (VITAMIN D3) 5,000 Units, Oral, Daily    warfarin (COUMADIN) 4 MG tablet TAKE 1 TABLET EVERY DAY      Scheduled Meds:   enalaprilat  1.25 mg Intravenous Q6H    methocarbamoL  500 mg Intravenous Q8H    mupirocin   Nasal BID    pantoprazole  40 mg Intravenous Daily    timolol maleate 0.5%  1 drop Both Eyes Daily     Continuous Infusions:   dextrose 5% lactated ringers 125 mL/hr at 06/08/23 0844     PRN Meds:sodium chloride, sodium chloride, sodium chloride, sodium chloride, bisacodyL, dextrose 10%, dextrose 10%, glucagon (human recombinant), hydrALAZINE, HYDROmorphone, insulin aspart U-100, nitroGLYCERIN, sodium chloride 0.9%     Objective:     VITAL SIGNS: 24 HR MIN & MAX LAST   Temp  Min: 97.5 °F (36.4 °C)  Max: 98.6 °F (37 °C)  98.1 °F (36.7 °C)   BP  Min: 106/61  Max: 167/73  (!) 147/66    Pulse  Min: 49  Max: 75  64    Resp  Min: 9  Max: 26  17    SpO2  Min: 95 %  Max: 100 %  100 %      HT: 5' 4" (162.6 cm)  WT: 67.1 kg (147 lb 14.9 oz)  BMI: 25.4     Intake/output:  Intake/Output - Last 3 Shifts         06/06 0700  06/07 0659 06/07 0700  06/08 0659 06/08 0700  06/09 0659    P.O.       I.V. (mL/kg) 4145 (61.8) 2585 (38.5)     Blood  200     NG/GT       IV Piggyback 300 1850     Total Intake(mL/kg) 4445 (66.2) 4635 (69.1)     Urine (mL/kg/hr) 690 (0.4) 1210 (0.8) 85 (0.4)    Drains 50 150     Other 675      Stool       Blood       Total Output 1415 1360 85    Net +3030 +3275 -85                   Intake/Output Summary (Last 24 hours) at 6/8/2023 1011  Last data filed at 6/8/2023 0800  Gross per 24 hour   Intake 3435 ml   Output 1145 ml   Net 2290 ml           Lines/drains/airway:       Peripheral IV - Single Lumen 06/04/23 0735 22 G Posterior;Right Hand (Active)   Site Assessment Clean;Dry;Intact 06/06/23 0400   Extremity Assessment Distal to IV No abnormal discoloration 06/06/23 0400   Line Status Infusing 06/06/23 0400   Dressing Status Clean;Dry;Intact 06/06/23 " 0400   Dressing Intervention Integrity maintained 06/06/23 0400   Number of days: 1            Peripheral IV - Single Lumen 06/04/23 1150 20 G;Other (Comments) Anterior;Distal;Right Upper Arm (Active)   Site Assessment Clean;Dry;Intact 06/06/23 0400   Extremity Assessment Distal to IV No abnormal discoloration 06/06/23 0400   Line Status Infusing 06/06/23 0400   Dressing Status Clean;Dry;Intact 06/06/23 0400   Dressing Intervention Integrity maintained 06/06/23 0400   Number of days: 1            Peripheral IV - Single Lumen 06/04/23 1830 20 G Left;Posterior Wrist (Active)   Site Assessment Clean;Dry;Intact 06/06/23 0400   Extremity Assessment Distal to IV No abnormal discoloration 06/06/23 0400   Line Status Flushed;Saline locked 06/06/23 0400   Dressing Status Clean;Dry;Intact 06/06/23 0400   Dressing Intervention Integrity maintained 06/06/23 0400   Number of days: 1       Arterial Line 06/05/23 1355 Right Radial (Active)   Site Assessment Clean;Dry;Intact 06/06/23 0400   Line Status Pulsatile blood flow 06/06/23 0400   Art Line Waveform Appropriate 06/06/23 0400   Arterial Line Interventions Zeroed and calibrated;Leveled;Connections checked and tightened;Flushed per protocol 06/06/23 0400   Color/Movement/Sensation Capillary refill less than 3 sec 06/06/23 0400   Dressing Type Central line dressing 06/06/23 0400   Dressing Status Clean;Dry;Intact 06/06/23 0400   Dressing Intervention Integrity maintained 06/06/23 0400   Number of days: 0            NG/OG Tube 06/04/23 1435 nasogastric 16 Fr. Left nostril (Active)   Placement Check placement verified by aspirate characteristics;placement verified by distal tube length measurement 06/06/23 0400   Distal Tube Length (cm) 55 06/06/23 0400   Tolerance no signs/symptoms of discomfort 06/06/23 0400   Securement secured to nostril center w/ adhesive device 06/06/23 0400   Clamp Status/Tolerance no abdominal discomfort;no abdominal distention;no emesis;no nausea 06/05/23  "0800   Suction Setting/Drainage Method suction at;low;intermittent setting 06/06/23 0400   Insertion Site Appearance no redness, warmth, tenderness, skin breakdown, drainage 06/06/23 0400   Drainage Bile 06/06/23 0400   Water Bolus (mL) 50 mL 06/05/23 2200   Tube Output(mL)(Include Discarded Residual) 0 mL 06/06/23 0530   Number of days: 1            Urethral Catheter 06/04/23 1653 16 Fr. (Active)   Site Assessment Clean;Intact;Dry 06/06/23 0400   Collection Container Urimeter 06/06/23 0400   Securement Method secured to top of thigh w/ adhesive device 06/06/23 0400   Catheter Care Performed yes 06/06/23 0400   Reason for Continuing Urinary Catheterization Critically ill in ICU and requiring hourly monitoring of intake/output 06/06/23 0400   CAUTI Prevention Bundle Securement Device in place with 1" slack;Intact seal between catheter & drainage tubing;Drainage bag/urimeter off the floor;Sheeting clip in use;No dependent loops or kinks;Drainage bag/urimeter not overfilled (<2/3 full);Drainage bag/urimeter below bladder 06/05/23 2000   Output (mL) 60 mL 06/06/23 0530   Number of days: 1       Physical examination:  Gen: NAD, resting comfortably  HEENT: Normocephalic, atrauamtic, NGT in place with bilious output  CV: bradycardic, hypertensive  Resp: NWOB  Abd: soft, attp following surgery   Ext: moving all extremities spontaneously and purposefully  Neuro: CN II-XII grossly intact      Labs:  Renal:  Recent Labs     06/05/23  1559 06/06/23 0210 06/07/23 0158 06/08/23  0141   BUN 13.5 13.8 13.3 13.0   CREATININE 0.72 0.62 0.62 0.55     Recent Labs     06/05/23  1559 06/05/23  2006   LACTIC 3.7* 1.7     FENGI:  Recent Labs     06/05/23  1559 06/06/23 0210 06/07/23  0158 06/08/23  0141    141 142 143   K 3.0* 3.5 3.6 4.5   CO2 24 28 28 24   CALCIUM 9.3 8.6 8.0* 8.1*   MG 1.90  --  1.90  --    PHOS 2.9  --  3.4  --    ALBUMIN  --  2.8* 2.4* 2.5*   BILITOT  --  1.7* 0.7 0.6   AST  --  676* 177* 92*   ALKPHOS  --  " 44 40 49   ALT  --  467* 239* 173*     Heme:  Recent Labs     06/05/23  1559 06/05/23  2006 06/06/23  0210 06/06/23  1831 06/07/23  0158 06/07/23  0950 06/08/23  0141   HGB 9.0*   < > 9.1* 8.3* 7.6* 11.5* 10.9*   HCT 25.8*   < > 25.4* 24.6* 23.0* 36.9* 33.8*   *   < > 125* 168 157  --  153   INR 1.54*  --  1.55*  --  1.28  --   --     < > = values in this interval not displayed.     ID:  Recent Labs     06/06/23  0210 06/06/23  1831 06/07/23  0158 06/08/23  0141   WBC 8.10 10.42 8.80 11.44     CBG:  Recent Labs     06/05/23  1559 06/06/23  0210 06/07/23  0158 06/08/23  0141   GLUCOSE 171* 112 88 99      Cardiovascular:  No results for input(s): TROPONINI, CKTOTAL, CKMB, BNP in the last 168 hours.  ABG:  Recent Labs   Lab 06/05/23  1515 06/05/23  1645   PH 7.403 7.620*   PO2 398* 69.0*   PCO2 43.6  --    HCO3 27.2 28.8   BE 2  --       I have reviewed all pertinent lab results within the past 24 hours.    Imaging:  XR Gastric tube check, non-radiologist performed   Final Result      Enteric tube side port about 3.5 cm below the GE junction.         Electronically signed by: Sunil Peralta   Date:    06/04/2023   Time:    14:49      XR Gastric tube check, non-radiologist performed   Final Result      NG tube projects over the body of the stomach.         Electronically signed by: Evin Manzo   Date:    06/04/2023   Time:    10:47      CT Abdomen Pelvis  Without Contrast   Final Result      Suspected small-bowel obstruction with transition point in the right lower abdomen.         Electronically signed by: Sunil Peralta   Date:    06/04/2023   Time:    09:05         I have reviewed all pertinent imaging results/findings within the past 24 hours.    Micro/Path/Other:  Microbiology Results (last 7 days)       ** No results found for the last 168 hours. **           Specimen (168h ago, onward)       Start     Ordered    06/05/23 1449  Specimen to Pathology  RELEASE UPON ORDERING        References:    Click here  for ordering Quick Tip   Question:  Release to patient  Answer:  Immediate    06/05/23 9585                     Assessment & Plan:   74 y/o w/ hx ex lap several decades ago with h/o colostomy s/p reversal, Gtube and Jtube s/p removal, and chronic back pain on home morphine, now admitted 6/4 with SBO. Pt s/p exlap with lysis of adhesions 6/4. Taken back to the OR 6/5 for hypotension and post-op hemorrhage with evacuation of approximately 3L of blood and old clot, placement of temporary abdominal closure. S/p abdominal closure 6/7. Extubated post-op.     - vitals, labs, and images reviewed   - NPO with NGT to LIWS   - switch mIVF to D5LR  - Leave swanson in place due to marginal UOP and need for accurate Is and Os   - H/H stable  - LFTs downtrending  - Continue mIVF   - Multimodal pain control   - dvt ppx: SCDs  - awaiting floor bed  - PT/OT    Tenisha Brand MD   [employer] General Surgery

## 2023-06-08 NOTE — ANESTHESIA PREPROCEDURE EVALUATION
06/08/2023  Alejandrina Rodriguez is a 73 y.o., female.  status post exploratory laparotomy for lysis of adhesions, 2 days after her surgery she was taken back to the operating room for decreasing hemoglobin and hypotension, there was a lot of oozing over a L and a half of blood was evacuated from the belly in the belly was left open with an ABThera    Today 2 days after subsequent operation she is ready for abdominal closure     Pre-op Assessment    I have reviewed the Patient Summary Reports.     I have reviewed the Nursing Notes. I have reviewed the NPO Status.   I have reviewed the Medications.     Review of Systems      Physical Exam  General: Well nourished, Cooperative, Alert and Oriented    Airway:  Mallampati: II   Mouth Opening: Normal  TM Distance: Normal  Tongue: Normal  Neck ROM: Normal ROM    Dental:  Intact        Anesthesia Plan  Type of Anesthesia, risks & benefits discussed:    Anesthesia Type: Gen ETT  Intra-op Monitoring Plan: Standard ASA Monitors  Post Op Pain Control Plan: multimodal analgesia  Induction:  IV  Airway Plan: Direct, Post-Induction  Informed Consent: Informed consent signed with the Patient and all parties understand the risks and agree with anesthesia plan.  All questions answered. Patient consented to blood products? Yes  ASA Score: 3  Day of Surgery Review of History & Physical: H&P Update referred to the surgeon/provider.    Ready For Surgery From Anesthesia Perspective.     .

## 2023-06-08 NOTE — PLAN OF CARE
Problem: Physical Therapy  Goal: Physical Therapy Goal  Description: Goals to be met by: 23     Patient will increase functional independence with mobility by performin. Supine to sit with Stand-by Assistance  2. Sit to supine with Stand-by Assistance  3. Sit to stand transfer with Stand-by Assistance  4. Gait  x 150 feet with Stand-by Assistance using Rolling Walker.     Outcome: Ongoing, Progressing

## 2023-06-09 LAB
ALBUMIN SERPL-MCNC: 2.2 G/DL (ref 3.4–4.8)
ALBUMIN/GLOB SERPL: 0.8 RATIO (ref 1.1–2)
ALP SERPL-CCNC: 47 UNIT/L (ref 40–150)
ALT SERPL-CCNC: 93 UNIT/L (ref 0–55)
AST SERPL-CCNC: 36 UNIT/L (ref 5–34)
BASOPHILS # BLD AUTO: 0.01 X10(3)/MCL
BASOPHILS NFR BLD AUTO: 0.1 %
BILIRUBIN DIRECT+TOT PNL SERPL-MCNC: 0.6 MG/DL
BUN SERPL-MCNC: 8.5 MG/DL (ref 9.8–20.1)
CALCIUM SERPL-MCNC: 7.8 MG/DL (ref 8.4–10.2)
CHLORIDE SERPL-SCNC: 107 MMOL/L (ref 98–107)
CO2 SERPL-SCNC: 31 MMOL/L (ref 23–31)
CREAT SERPL-MCNC: 0.53 MG/DL (ref 0.55–1.02)
EOSINOPHIL # BLD AUTO: 0.28 X10(3)/MCL (ref 0–0.9)
EOSINOPHIL NFR BLD AUTO: 2.8 %
ERYTHROCYTE [DISTWIDTH] IN BLOOD BY AUTOMATED COUNT: 15.8 % (ref 11.5–17)
GFR SERPLBLD CREATININE-BSD FMLA CKD-EPI: >60 MLS/MIN/1.73/M2
GLOBULIN SER-MCNC: 2.6 GM/DL (ref 2.4–3.5)
GLUCOSE SERPL-MCNC: 147 MG/DL (ref 82–115)
HCT VFR BLD AUTO: 32.5 % (ref 37–47)
HGB BLD-MCNC: 10.5 G/DL (ref 12–16)
IMM GRANULOCYTES # BLD AUTO: 0.06 X10(3)/MCL (ref 0–0.04)
IMM GRANULOCYTES NFR BLD AUTO: 0.6 %
LYMPHOCYTES # BLD AUTO: 1.44 X10(3)/MCL (ref 0.6–4.6)
LYMPHOCYTES NFR BLD AUTO: 14.6 %
MCH RBC QN AUTO: 29.8 PG (ref 27–31)
MCHC RBC AUTO-ENTMCNC: 32.3 G/DL (ref 33–36)
MCV RBC AUTO: 92.3 FL (ref 80–94)
MONOCYTES # BLD AUTO: 0.78 X10(3)/MCL (ref 0.1–1.3)
MONOCYTES NFR BLD AUTO: 7.9 %
NEUTROPHILS # BLD AUTO: 7.29 X10(3)/MCL (ref 2.1–9.2)
NEUTROPHILS NFR BLD AUTO: 74 %
NRBC BLD AUTO-RTO: 0 %
PLATELET # BLD AUTO: 164 X10(3)/MCL (ref 130–400)
PMV BLD AUTO: 11.1 FL (ref 7.4–10.4)
POCT GLUCOSE: 112 MG/DL (ref 70–110)
POCT GLUCOSE: 115 MG/DL (ref 70–110)
POCT GLUCOSE: 140 MG/DL (ref 70–110)
POTASSIUM SERPL-SCNC: 3.5 MMOL/L (ref 3.5–5.1)
PROT SERPL-MCNC: 4.8 GM/DL (ref 5.8–7.6)
RBC # BLD AUTO: 3.52 X10(6)/MCL (ref 4.2–5.4)
SODIUM SERPL-SCNC: 144 MMOL/L (ref 136–145)
WBC # SPEC AUTO: 9.86 X10(3)/MCL (ref 4.5–11.5)

## 2023-06-09 PROCEDURE — 25000003 PHARM REV CODE 250: Performed by: STUDENT IN AN ORGANIZED HEALTH CARE EDUCATION/TRAINING PROGRAM

## 2023-06-09 PROCEDURE — 11000001 HC ACUTE MED/SURG PRIVATE ROOM

## 2023-06-09 PROCEDURE — 80053 COMPREHEN METABOLIC PANEL: CPT | Performed by: INTERNAL MEDICINE

## 2023-06-09 PROCEDURE — C9113 INJ PANTOPRAZOLE SODIUM, VIA: HCPCS | Performed by: INTERNAL MEDICINE

## 2023-06-09 PROCEDURE — 63600175 PHARM REV CODE 636 W HCPCS: Performed by: STUDENT IN AN ORGANIZED HEALTH CARE EDUCATION/TRAINING PROGRAM

## 2023-06-09 PROCEDURE — 85025 COMPLETE CBC W/AUTO DIFF WBC: CPT | Performed by: INTERNAL MEDICINE

## 2023-06-09 PROCEDURE — 25000003 PHARM REV CODE 250: Performed by: INTERNAL MEDICINE

## 2023-06-09 PROCEDURE — 99233 SBSQ HOSP IP/OBS HIGH 50: CPT | Mod: 95,,, | Performed by: INTERNAL MEDICINE

## 2023-06-09 PROCEDURE — 99233 PR SUBSEQUENT HOSPITAL CARE,LEVL III: ICD-10-PCS | Mod: 95,,, | Performed by: INTERNAL MEDICINE

## 2023-06-09 PROCEDURE — 63600175 PHARM REV CODE 636 W HCPCS: Performed by: INTERNAL MEDICINE

## 2023-06-09 PROCEDURE — 97116 GAIT TRAINING THERAPY: CPT | Mod: CQ

## 2023-06-09 PROCEDURE — 94761 N-INVAS EAR/PLS OXIMETRY MLT: CPT

## 2023-06-09 RX ORDER — CLOPIDOGREL BISULFATE 75 MG/1
75 TABLET ORAL DAILY
Status: DISCONTINUED | OUTPATIENT
Start: 2023-06-09 | End: 2023-06-19 | Stop reason: HOSPADM

## 2023-06-09 RX ADMIN — METHOCARBAMOL 500 MG: 100 INJECTION INTRAMUSCULAR; INTRAVENOUS at 05:06

## 2023-06-09 RX ADMIN — ENALAPRILAT 1.25 MG: 2.5 INJECTION INTRAVENOUS at 04:06

## 2023-06-09 RX ADMIN — CLOPIDOGREL BISULFATE 75 MG: 75 TABLET ORAL at 11:06

## 2023-06-09 RX ADMIN — ENALAPRILAT 1.25 MG: 2.5 INJECTION INTRAVENOUS at 11:06

## 2023-06-09 RX ADMIN — ENALAPRILAT 1.25 MG: 2.5 INJECTION INTRAVENOUS at 05:06

## 2023-06-09 RX ADMIN — ENOXAPARIN SODIUM 40 MG: 40 INJECTION SUBCUTANEOUS at 04:06

## 2023-06-09 RX ADMIN — HYDROMORPHONE HYDROCHLORIDE 1 MG: 2 INJECTION INTRAMUSCULAR; INTRAVENOUS; SUBCUTANEOUS at 08:06

## 2023-06-09 RX ADMIN — HYDROMORPHONE HYDROCHLORIDE 1 MG: 2 INJECTION INTRAMUSCULAR; INTRAVENOUS; SUBCUTANEOUS at 04:06

## 2023-06-09 RX ADMIN — HYDROMORPHONE HYDROCHLORIDE 1 MG: 2 INJECTION INTRAMUSCULAR; INTRAVENOUS; SUBCUTANEOUS at 12:06

## 2023-06-09 RX ADMIN — PANTOPRAZOLE SODIUM 40 MG: 40 INJECTION, POWDER, FOR SOLUTION INTRAVENOUS at 08:06

## 2023-06-09 RX ADMIN — ENALAPRILAT 1.25 MG: 2.5 INJECTION INTRAVENOUS at 12:06

## 2023-06-09 RX ADMIN — HYDROMORPHONE HYDROCHLORIDE 1 MG: 2 INJECTION INTRAMUSCULAR; INTRAVENOUS; SUBCUTANEOUS at 09:06

## 2023-06-09 RX ADMIN — TIMOLOL MALEATE 1 DROP: 5 SOLUTION/ DROPS OPHTHALMIC at 04:06

## 2023-06-09 RX ADMIN — SODIUM CHLORIDE, SODIUM LACTATE, POTASSIUM CHLORIDE, CALCIUM CHLORIDE AND DEXTROSE MONOHYDRATE: 5; 600; 310; 30; 20 INJECTION, SOLUTION INTRAVENOUS at 02:06

## 2023-06-09 RX ADMIN — MUPIROCIN: 20 OINTMENT TOPICAL at 11:06

## 2023-06-09 NOTE — PLAN OF CARE
Discharge planning discussed with patient and . FOC obtained for Nursing Specialties. Referral sent via care port.

## 2023-06-09 NOTE — PHYSICIAN QUERY
PT Name: Alejandrina Rodriguez  MR #: [id number]    DOCUMENTATION CLARIFICATION      CDS/: Saumya Louis RN, CDS              Contact information: [email or web address]      This form is a permanent document in the medical record.      Query Date: June 9, 2023    By submitting this query, we are merely seeking further clarification of documentation. Please utilize your independent clinical judgment when addressing the question(s) below.    The Medical Record contains the following:   Indicators  Supporting Clinical Findings Location in Medical Record   x Anemia documented Anemia 2/2 post-operative hemorrhage    Critical Care H&P 6/5    x H&H AM hemoglobin was 6.9 from 16.4 yesterday for which she was transfused with multiple blood products   Critical Care H&P 6/5    x BP                    HR HR WNL in 60s, BP borderline 90-100s/50s    she became hypotensive to 80s-90s systolic which dropped to the 60s-70s mid morning   Surgery PN 6/5     Critical Care H&P 6/5    x Bleeding Findings:  Patient had about a L and half of old and fresh blood clots    it seemed that she mostly was oozing from surgical surfaces, the omentum had of the greater omentum and several areas of bleeding  Op Note 6/5       Op Note 6/5     Procedure/Surgery Performed/EBL     x Transfusion(s) Transfuse RBC 2 Units    Transfuse RBC 2 Units     Transfuse Fresh Frozen Plasma 2 Units    Transfuse Platelets 1 Dose     Orders 6/5 @ 1200    Orders 6/5 @ 1444     Orders 6/5     Orders 6/5    x Acute/Chronic illness S/p exlap with QUINTEN for SBO 6/4    She is postop day 1, her abdomen is quite distended, she says she is passing flatus however her blood pressure was in the 80s to 90s systolic this a.m., after rounds her pressure dropped even more     We will take her emergently to the operating room for exploratory laparotomy to assess if there is bowel perforation, bowel necrosis, excessive bleeding or any other surgically correctable condition    Taken back  to the OR 6/5 for hypotension and post-op hemorrhage with evacuation of approximately 3L of blood and old clot,   Surgery PN 6/5     Surgery PN 6/5           Surgery PN 6/5           Surgery PN 6/6    x Treatments lactated ringers bolus 1,000 mL    lactated ringers bolus 1,000 mL MAR 6/4    MAR 6/5     Other       Provider, please further specify the diagnosis of __Anemia 2/2 post-operative hemorrhage__    [   ] Acute blood loss anemia    [  x ] Acute blood loss anemia expected post-operatively    [   ] Other Hematological Diagnosis (please specify): _________________              Please document in your progress notes daily for the duration of treatment, until resolved, and include in your discharge summary.    Form No. [id number]

## 2023-06-09 NOTE — PROGRESS NOTES
Acute Care Surgery   Progress Note  Admit Date: 6/4/2023  HD#5  POD#2 Days Post-Op    Subjective:   Interval history:  AF, bradyacrdic with HR 50-60, HTN -160s  NPO with NGT to LIWS - 150cc bilious output over 24 hours   Vasquez removed yesterday, pt voiding without issue  Reports having had 1 small BM yesterday  Ambulating to restroom  BG controlled overnight    Home Meds:  Current Outpatient Medications   Medication Instructions    azelastine (ASTELIN) 137 mcg, Nasal, 2 times daily    B-complex with vitamin C (Z-BEC OR EQUIV) tablet 1 tablet, Oral, Daily    baclofen (LIORESAL) 10 mg, Oral, 4 times daily, PRN muscle spasms    betamethasone dipropionate 0.05 % cream 1 application, Topical (Top), 2 times daily    celecoxib (CELEBREX) 100 MG capsule TAKE ONE CAPSULE BY MOUTH ONCE DAILY WITH FOOD AS NEEDED FOR PAIN    clopidogreL (PLAVIX) 75 mg tablet 1 tablet, Oral, Daily    famotidine (PEPCID) 20 MG tablet TAKE 1 TABLET TWICE DAILY    hydroCHLOROthiazide (HYDRODIURIL) 25 MG tablet TAKE 1 TABLET EVERY DAY    LEXAPRO 10 mg tablet TAKE 1 TABLET BY MOUTH ONCE DAILY AS DIRECTED FOR MOOD/CHRONIC PAIN AS DIRECTED    LIDOcaine (LIDODERM) 5 % 1 patch, Transdermal, Every 24 hours (non-standard times), Remove & Discard patch within 12 hours or as directed by MD    metFORMIN (GLUCOPHAGE) 500 mg, Oral, 2 times daily    morphine (MSIR) 15 MG tablet 1.5 tablets, Oral, 2 times daily PRN    nitroGLYCERIN (NITROSTAT) 0.4 MG SL tablet PLEASE SEE ATTACHED FOR DETAILED DIRECTIONS    OMEGA-3-EPA-FISH OIL ORAL 1 capsule, Oral, Daily    omeprazole (PRILOSEC) 40 MG capsule TAKE 1 CAPSULE EVERY DAY    ondansetron (ZOFRAN-ODT) 8 mg, Oral, 2 times daily    peg 400-propylene glycol (SYSTANE, PROPYLENE GLYCOL,) 0.4-0.3 % Drop 1 drop, Ophthalmic    potassium chloride SA (K-DUR,KLOR-CON) 20 MEQ tablet TAKE 1 TABLET ONE TIME DAILY    pravastatin (PRAVACHOL) 40 MG tablet TAKE 1 TABLET EVERY DAY    timolol maleate 0.5% (TIMOPTIC) 0.5 % Drop 1  "drop, Both Eyes, Daily    vitamin D (VITAMIN D3) 5,000 Units, Oral, Daily    warfarin (COUMADIN) 4 MG tablet TAKE 1 TABLET EVERY DAY      Scheduled Meds:   enalaprilat  1.25 mg Intravenous Q6H    enoxparin  40 mg Subcutaneous Q24H (prophylaxis, 1700)    mupirocin   Nasal BID    pantoprazole  40 mg Intravenous Daily    timolol maleate 0.5%  1 drop Both Eyes Daily     Continuous Infusions:   dextrose 5% lactated ringers 125 mL/hr at 06/09/23 0201     PRN Meds:sodium chloride, sodium chloride, sodium chloride, sodium chloride, bisacodyL, dextrose 10%, dextrose 10%, glucagon (human recombinant), hydrALAZINE, HYDROmorphone, insulin aspart U-100, nitroGLYCERIN, sodium chloride 0.9%     Objective:     VITAL SIGNS: 24 HR MIN & MAX LAST   Temp  Min: 98.1 °F (36.7 °C)  Max: 98.5 °F (36.9 °C)  98.2 °F (36.8 °C)   BP  Min: 119/67  Max: 159/80  (!) 159/80    Pulse  Min: 56  Max: 73  67    Resp  Min: 12  Max: 22  18    SpO2  Min: 93 %  Max: 100 %  98 %      HT: 5' 4" (162.6 cm)  WT: 67.1 kg (147 lb 14.9 oz)  BMI: 25.4     Intake/output:  Intake/Output - Last 3 Shifts         06/07 0700  06/08 0659 06/08 0700  06/09 0659 06/09 0700  06/10 0659    P.O.  0     I.V. (mL/kg) 2585 (38.5)      Blood 200      IV Piggyback 1850      Total Intake(mL/kg) 4635 (69.1) 0 (0)     Urine (mL/kg/hr) 1210 (0.8) 760 (0.5)     Drains 150 150     Other       Stool  0     Total Output 1360 910     Net +3275 -910            Stool Occurrence  0 x             Intake/Output Summary (Last 24 hours) at 6/9/2023 0718  Last data filed at 6/9/2023 0600  Gross per 24 hour   Intake 0 ml   Output 910 ml   Net -910 ml           Physical examination:  Gen: NAD, resting comfortably  HEENT: Normocephalic, atrauamtic, NGT in place with minimal bilious output  CV: bradycardic, hypertensive  Resp: NWOB  Abd: soft, attp following surgery, incision well approximated, no drainage, no erythema   Ext: moving all extremities spontaneously and purposefully  Neuro: CN II-XII " grossly intact      Labs:  Renal:  Recent Labs     06/07/23  0158 06/08/23  0141 06/09/23  0448   BUN 13.3 13.0 8.5*   CREATININE 0.62 0.55 0.53*     No results for input(s): LACTIC in the last 72 hours.    FENGI:  Recent Labs     06/07/23  0158 06/08/23  0141 06/09/23  0448    143 144   K 3.6 4.5 3.5   CO2 28 24 31   CALCIUM 8.0* 8.1* 7.8*   MG 1.90  --   --    PHOS 3.4  --   --    ALBUMIN 2.4* 2.5* 2.2*   BILITOT 0.7 0.6 0.6   * 92* 36*   ALKPHOS 40 49 47   * 173* 93*     Heme:  Recent Labs     06/06/23  1831 06/07/23  0158 06/07/23  0950 06/08/23  0141 06/09/23  0448   HGB 8.3* 7.6* 11.5* 10.9* 10.5*   HCT 24.6* 23.0* 36.9* 33.8* 32.5*    157  --  153 164   INR  --  1.28  --   --   --      ID:  Recent Labs     06/06/23  1831 06/07/23  0158 06/08/23  0141 06/09/23  0448   WBC 10.42 8.80 11.44 9.86     CBG:  Recent Labs     06/07/23  0158 06/08/23  0141 06/09/23  0448   GLUCOSE 88 99 147*      Cardiovascular:  No results for input(s): TROPONINI, CKTOTAL, CKMB, BNP in the last 168 hours.  ABG:  Recent Labs   Lab 06/05/23  1515 06/05/23  1645   PH 7.403 7.620*   PO2 398* 69.0*   PCO2 43.6  --    HCO3 27.2 28.8   BE 2  --       I have reviewed all pertinent lab results within the past 24 hours.    Imaging:  XR Gastric tube check, non-radiologist performed   Final Result      Enteric tube side port about 3.5 cm below the GE junction.         Electronically signed by: Sunil Peralta   Date:    06/04/2023   Time:    14:49      XR Gastric tube check, non-radiologist performed   Final Result      NG tube projects over the body of the stomach.         Electronically signed by: Evin Manzo   Date:    06/04/2023   Time:    10:47      CT Abdomen Pelvis  Without Contrast   Final Result      Suspected small-bowel obstruction with transition point in the right lower abdomen.         Electronically signed by: Sunil Peralta   Date:    06/04/2023   Time:    09:05         I have reviewed all pertinent  imaging results/findings within the past 24 hours.    Micro/Path/Other:  Microbiology Results (last 7 days)       ** No results found for the last 168 hours. **           Specimen (168h ago, onward)       Start     Ordered    06/05/23 1449  Specimen to Pathology  RELEASE UPON ORDERING        References:    Click here for ordering Quick Tip   Question:  Release to patient  Answer:  Immediate    06/05/23 1449                     Assessment & Plan:   72 y/o w/ hx ex lap several decades ago with h/o colostomy s/p reversal, Gtube and Jtube s/p removal, and chronic back pain on home morphine, now admitted 6/4 with SBO. Pt s/p exlap with lysis of adhesions 6/4. Taken back to the OR 6/5 for hypotension and post-op hemorrhage with evacuation of approximately 3L of blood and old clot, placement of temporary abdominal closure. S/p abdominal closure 6/7. Extubated post-op.     - vitals, labs, and images reviewed   - consider removing NG today  - continue D5LR  - H/H stable  - LFTs downtrending   - Multimodal pain control   - dvt ppx: SCDs, start lovenox ppx, therapeutic tomorrow  - PT/OT    Tenisha Brand MD   [employer] General Surgery

## 2023-06-09 NOTE — PROGRESS NOTES
[facility] Medicine Progress Note        Chief Complaint: Inpatient Follow-up for ex lap for SBO with resultant finding of intraabdominal adhesions s/p lysis, s/p evacuation of blood, s/p abdominal closure.    HPI: see full H and P written by Dr NILS Betancourt Hx:   Patient is status post exploratory lap for lysis of adhesions;  2 days after surgery she was taken back to the OR secondary to falling H&H there was a lot of oozing and over a L of blood was evacuated from the belly in the belly was left open she then went back to the OR on 06/07 for abdominal closure.  Downgraded from the ICU on 06/07.      Patient is awake alert and oriented this morning; no acute complaints.  She was up and ambulatory with physical therapy yesterday.  She reports having a small bowel movement this morning still hypoactive bowel sounds on exam.    Objective/physical exam:  General: In no acute distress, afebrile  Chest: Clear to auscultation bilaterally   Heart: RRR, +S1, S2, no appreciable murmur; some trace edema to the dorsum of both hands  Abdomen: Soft, midline dressing in place which is dry, nasogastric tube in place   MSK: Warm, no clubbing or cyanosis  Neurologic: Alert and oriented x4, Cranial nerve II-XII intact, Strength 5/5 in all 4 extremities    VITAL SIGNS: 24 HRS MIN & MAX LAST   Temp  Min: 98.1 °F (36.7 °C)  Max: 98.8 °F (37.1 °C) 98.8 °F (37.1 °C)   BP  Min: 119/67  Max: 159/80 127/64   Pulse  Min: 56  Max: 73  63   Resp  Min: 12  Max: 22 18   SpO2  Min: 93 %  Max: 99 % 97 %       Recent Labs   Lab 06/07/23  0158 06/07/23  0950 06/08/23  0141 06/09/23  0448   WBC 8.80  --  11.44 9.86   RBC 2.55*  --  3.55* 3.52*   HGB 7.6* 11.5* 10.9* 10.5*   HCT 23.0* 36.9* 33.8* 32.5*   MCV 90.2  --  95.2* 92.3   MCH 29.8  --  30.7 29.8   MCHC 33.0  --  32.2* 32.3*   RDW 17.2*  --  16.4 15.8     --  153 164   MPV 10.9*  --  10.8* 11.1*       Recent Labs   Lab 06/05/23  1747  06/05/23  1559 06/05/23  1645 06/06/23  0210 06/07/23  0158 06/08/23  0141 06/09/23  0448   NA  --  139  --    < > 142 143 144   K  --  3.0*  --    < > 3.6 4.5 3.5   CO2  --  24  --    < > 28 24 31   BUN  --  13.5  --    < > 13.3 13.0 8.5*   CREATININE  --  0.72  --    < > 0.62 0.55 0.53*   CALCIUM  --  9.3  --    < > 8.0* 8.1* 7.8*   PH 7.403  --  7.620*  --   --   --   --    MG  --  1.90  --   --  1.90  --   --    ALBUMIN  --   --   --    < > 2.4* 2.5* 2.2*   ALKPHOS  --   --   --    < > 40 49 47   ALT  --   --   --    < > 239* 173* 93*   AST  --   --   --    < > 177* 92* 36*   BILITOT  --   --   --    < > 0.7 0.6 0.6    < > = values in this interval not displayed.          Microbiology Results (last 7 days)       ** No results found for the last 168 hours. **             See below for Radiology    Scheduled Med:   enalaprilat  1.25 mg Intravenous Q6H    enoxparin  40 mg Subcutaneous Q24H (prophylaxis, 1700)    mupirocin   Nasal BID    pantoprazole  40 mg Intravenous Daily    timolol maleate 0.5%  1 drop Both Eyes Daily        Continuous Infusions:   dextrose 5% lactated ringers 125 mL/hr at 06/09/23 0201        PRN Meds:  sodium chloride, sodium chloride, sodium chloride, sodium chloride, bisacodyL, dextrose 10%, dextrose 10%, glucagon (human recombinant), hydrALAZINE, HYDROmorphone, insulin aspart U-100, nitroGLYCERIN, sodium chloride 0.9%       Assessment/Plan:  Patient Active Problem List   Diagnosis    Diabetes mellitus    Protein C deficiency    Recurrent deep vein thrombosis (DVT)    Acid reflux    Chronic lumbar pain    Hypertension    HLD (hyperlipidemia)    History of continuous positive airway pressure (CPAP) therapy at home    SBO (small bowel obstruction)     Continue flutter valve, incentive spirometry Q 2  Up and ambulatory with PT today  Small BM this morning  Patient with protein C deficiency and history of DVT   Surgery plans to resume LMWH this AM     Patient condition:  Stable    Anticipated  discharge and Disposition: Home with       All diagnosis and differential diagnosis have been reviewed; assessment and plan has been documented; I have personally reviewed the labs and test results that are presently available; I have reviewed the patients medication list; I have reviewed the consulting providers response and recommendations. I have reviewed or attempted to review medical records based upon their availability    All of the patient's questions have been  addressed and answered. Patient's is agreeable to the above stated plan. I will continue to monitor closely and make adjustments to medical management as needed.      Nutrition Status:      XR Gastric tube check, non-radiologist performed  Narrative: EXAMINATION:  XR GASTRIC TUBE CHECK, NON-RADIOLOGIST PERFORMED    CLINICAL HISTORY:  placement;    TECHNIQUE:  Frontal view of the lower chest and upper abdomen    COMPARISON:  Radiography from earlier today    FINDINGS:  Side port for the enteric tube lies is 3.5 cm below the GE junction.  Impression: Enteric tube side port about 3.5 cm below the GE junction.    Electronically signed by: Sunil Peralta  Date:    06/04/2023  Time:    14:49  XR Gastric tube check, non-radiologist performed  Narrative: EXAMINATION:  XR GASTRIC TUBE CHECK, NON-RADIOLOGIST PERFORMED    CLINICAL HISTORY:  NG Tube placement;    TECHNIQUE:  Single view of the abdomen.    COMPARISON:  None    FINDINGS:  NG tube projects over the body of the stomach.  Impression: NG tube projects over the body of the stomach.    Electronically signed by: Evin Manzo  Date:    06/04/2023  Time:    10:47  CT Abdomen Pelvis  Without Contrast  Narrative: EXAMINATION:  CT ABDOMEN PELVIS WITHOUT CONTRAST    CLINICAL HISTORY:  Abdominal pain, acute, nonlocalized;    TECHNIQUE:  CT imaging of the abdomen and pelvis without intravenous contrast. Axial, coronal and sagittal reformatted images reviewed. Dose length product is 460 mGycm. Automatic  exposure control, adjustment of mA/kV or iterative reconstruction technique used to limit radiation dose.    COMPARISON:  CTA 07/08/2022    FINDINGS:  Assessment of the visceral organs and vasculature is limited by the lack of IV contrast.    Liver/biliary: No concerning hepatic findings. No radiodense gallstone or biliary dilatation appreciated.    Pancreas: Normal.    Spleen: Normal.    Adrenals: Normal.    Genitourinary: No defined urinary stone or hydronephrosis.  Bladder within normal limits.    Stomach/bowel: Proximal and mid segments of small bowel are fluid-filled and dilated, measuring up to about 4 cm.  Suspected transition point in the right lower abdomen (series 2, images 70 through 84).  Appendix not confidently seen.    Lymph nodes and peritoneum: No pathologically enlarged lymph node identified with noncontrast technique. No pneumoperitoneum or significant ascites.    Vasculature: Small caliber labial varices.  Left external/common iliac venous stent.    Abdominal wall: Subcutaneous generator in the right buttock with lead extending to the central canal.    Lung bases: No consolidation or pleural effusion.    Bones: No acute osseous findings.  Impression: Suspected small-bowel obstruction with transition point in the right lower abdomen.    Electronically signed by: Sunil Peralta  Date:    06/04/2023  Time:    09:05      Alex Treviño MD   06/09/2023

## 2023-06-09 NOTE — PT/OT/SLP PROGRESS
Physical Therapy Treatment    Patient Name:  Alejandrina Rodriguez   MRN:  [id number]    Recommendations:     Discharge Recommendations: home with home health, home health PT  Discharge Equipment Recommendations: none  Barriers to discharge: None    Assessment:     Alejandrina Rodriguez is a 73 y.o. female admitted with a medical diagnosis of SBO (small bowel obstruction).  She presents with the following impairments/functional limitations: weakness, gait instability, impaired endurance, impaired balance, decreased lower extremity function, impaired self care skills, impaired functional mobility .  Pt okay to d/c home with  PT and assist from family once medically stable.     Rehab Prognosis: Good; patient would benefit from acute skilled PT services to address these deficits and reach maximum level of function.    Recent Surgery: Procedure(s) (LRB):  LAPAROTOMY, EXPLORATORY (N/A)  WASHOUT (N/A) 2 Days Post-Op    Plan:     During this hospitalization, patient to be seen 6 x/week to address the identified rehab impairments via gait training, therapeutic activities, therapeutic exercises and progress toward the following goals:    Plan of Care Expires:  07/08/23    Subjective     Chief Complaint: pain/anxiety   Patient/Family Comments/goals:   Pain/Comfort:  Location 1: abdomen  Pain Addressed 1: Reposition, Distraction  Location - Side 2: Right  Location 2: knee  Pain Addressed 2: Reposition, Distraction      Objective:     Communicated with NSG prior to session.  Patient found up in chair with telemetry, peripheral IV upon PT entry to room.     General Precautions: Standard, fall  Orthopedic Precautions: N/A  Braces: N/A  Respiratory Status: Room air  Blood Pressure: 126/70 with HR of 69 (pt had an episode of dizziness at beginning of tx session)  Skin Integrity: Visible skin intact      Functional Mobility:  Transfers:     Sit to Stand:  contact guard assistance with rolling walker and 3 trials from bedside chair.   Gait: pt  amb 12ft/20ft/36ft with RW CGA. Pt required extensive time and rest between each trial.     Patient left up in chair with all lines intact and call button in reach..    GOALS:   Multidisciplinary Problems       Physical Therapy Goals          Problem: Physical Therapy    Goal Priority Disciplines Outcome Goal Variances Interventions   Physical Therapy Goal     PT, PT/OT Ongoing, Progressing     Description: Goals to be met by: 23     Patient will increase functional independence with mobility by performin. Supine to sit with Stand-by Assistance  2. Sit to supine with Stand-by Assistance  3. Sit to stand transfer with Stand-by Assistance  4. Gait  x 150 feet with Stand-by Assistance using Rolling Walker.                          Time Tracking:     PT Received On: 23  PT Start Time: 908     PT Stop Time: 935  PT Total Time (min): 27 min     Billable Minutes: Gait Training 27    Treatment Type: Treatment  PT/PTA: PTA     Number of PTA visits since last PT visit: 2023

## 2023-06-09 NOTE — PLAN OF CARE
Problem: Adult Inpatient Plan of Care  Goal: Plan of Care Review  Outcome: Ongoing, Progressing  Goal: Patient-Specific Goal (Individualized)  Outcome: Ongoing, Progressing  Goal: Absence of Hospital-Acquired Illness or Injury  Outcome: Ongoing, Progressing  Goal: Optimal Comfort and Wellbeing  Outcome: Ongoing, Progressing  Goal: Readiness for Transition of Care  Outcome: Ongoing, Progressing     Problem: Diabetes Comorbidity  Goal: Blood Glucose Level Within Targeted Range  Outcome: Ongoing, Progressing     Problem: Infection  Goal: Absence of Infection Signs and Symptoms  Outcome: Ongoing, Progressing     Problem: Fall Injury Risk  Goal: Absence of Fall and Fall-Related Injury  Outcome: Ongoing, Progressing     Problem: Impaired Wound Healing  Goal: Optimal Wound Healing  Outcome: Ongoing, Progressing     Problem: Skin Injury Risk Increased  Goal: Skin Health and Integrity  Outcome: Ongoing, Progressing

## 2023-06-10 LAB
ERYTHROCYTE [DISTWIDTH] IN BLOOD BY AUTOMATED COUNT: 15.4 % (ref 11.5–17)
HCT VFR BLD AUTO: 35.1 % (ref 37–47)
HGB BLD-MCNC: 11.4 G/DL (ref 12–16)
MCH RBC QN AUTO: 30.6 PG (ref 27–31)
MCHC RBC AUTO-ENTMCNC: 32.5 G/DL (ref 33–36)
MCV RBC AUTO: 94.1 FL (ref 80–94)
NRBC BLD AUTO-RTO: 0 %
PLATELET # BLD AUTO: 189 X10(3)/MCL (ref 130–400)
PMV BLD AUTO: 10.3 FL (ref 7.4–10.4)
POCT GLUCOSE: 65 MG/DL (ref 70–110)
POCT GLUCOSE: 67 MG/DL (ref 70–110)
POCT GLUCOSE: 77 MG/DL (ref 70–110)
POCT GLUCOSE: 86 MG/DL (ref 70–110)
RBC # BLD AUTO: 3.73 X10(6)/MCL (ref 4.2–5.4)
WBC # SPEC AUTO: 9.13 X10(3)/MCL (ref 4.5–11.5)

## 2023-06-10 PROCEDURE — 25000003 PHARM REV CODE 250: Performed by: STUDENT IN AN ORGANIZED HEALTH CARE EDUCATION/TRAINING PROGRAM

## 2023-06-10 PROCEDURE — 99233 PR SUBSEQUENT HOSPITAL CARE,LEVL III: ICD-10-PCS | Mod: 95,,, | Performed by: INTERNAL MEDICINE

## 2023-06-10 PROCEDURE — 63600175 PHARM REV CODE 636 W HCPCS: Performed by: INTERNAL MEDICINE

## 2023-06-10 PROCEDURE — 36410 VNPNXR 3YR/> PHY/QHP DX/THER: CPT

## 2023-06-10 PROCEDURE — 11000001 HC ACUTE MED/SURG PRIVATE ROOM

## 2023-06-10 PROCEDURE — 63600175 PHARM REV CODE 636 W HCPCS: Performed by: STUDENT IN AN ORGANIZED HEALTH CARE EDUCATION/TRAINING PROGRAM

## 2023-06-10 PROCEDURE — 85027 COMPLETE CBC AUTOMATED: CPT | Performed by: STUDENT IN AN ORGANIZED HEALTH CARE EDUCATION/TRAINING PROGRAM

## 2023-06-10 PROCEDURE — 94761 N-INVAS EAR/PLS OXIMETRY MLT: CPT

## 2023-06-10 PROCEDURE — 25000003 PHARM REV CODE 250: Performed by: INTERNAL MEDICINE

## 2023-06-10 PROCEDURE — C1751 CATH, INF, PER/CENT/MIDLINE: HCPCS

## 2023-06-10 PROCEDURE — C9113 INJ PANTOPRAZOLE SODIUM, VIA: HCPCS | Performed by: INTERNAL MEDICINE

## 2023-06-10 PROCEDURE — 99233 SBSQ HOSP IP/OBS HIGH 50: CPT | Mod: 95,,, | Performed by: INTERNAL MEDICINE

## 2023-06-10 RX ORDER — METHOCARBAMOL 500 MG/1
500 TABLET, FILM COATED ORAL 4 TIMES DAILY
Status: DISCONTINUED | OUTPATIENT
Start: 2023-06-10 | End: 2023-06-19 | Stop reason: HOSPADM

## 2023-06-10 RX ORDER — SODIUM CHLORIDE 0.9 % (FLUSH) 0.9 %
10 SYRINGE (ML) INJECTION EVERY 6 HOURS
Status: DISCONTINUED | OUTPATIENT
Start: 2023-06-11 | End: 2023-06-19 | Stop reason: HOSPADM

## 2023-06-10 RX ORDER — DEXTROSE 40 %
15 GEL (GRAM) ORAL
Status: DISCONTINUED | OUTPATIENT
Start: 2023-06-10 | End: 2023-06-10

## 2023-06-10 RX ORDER — DEXTROSE MONOHYDRATE AND SODIUM CHLORIDE 5; .9 G/100ML; G/100ML
INJECTION, SOLUTION INTRAVENOUS CONTINUOUS
Status: DISCONTINUED | OUTPATIENT
Start: 2023-06-10 | End: 2023-06-11

## 2023-06-10 RX ORDER — SENNOSIDES 8.6 MG/1
8.6 TABLET ORAL DAILY PRN
Status: DISCONTINUED | OUTPATIENT
Start: 2023-06-10 | End: 2023-06-13

## 2023-06-10 RX ORDER — IBUPROFEN 200 MG
16 TABLET ORAL
Status: DISCONTINUED | OUTPATIENT
Start: 2023-06-10 | End: 2023-06-19 | Stop reason: HOSPADM

## 2023-06-10 RX ORDER — SODIUM CHLORIDE 0.9 % (FLUSH) 0.9 %
10 SYRINGE (ML) INJECTION
Status: DISCONTINUED | OUTPATIENT
Start: 2023-06-10 | End: 2023-06-19 | Stop reason: HOSPADM

## 2023-06-10 RX ORDER — OXYCODONE HYDROCHLORIDE 10 MG/1
10 TABLET ORAL EVERY 6 HOURS PRN
Status: DISCONTINUED | OUTPATIENT
Start: 2023-06-10 | End: 2023-06-13

## 2023-06-10 RX ORDER — OXYCODONE HYDROCHLORIDE 5 MG/1
5 TABLET ORAL EVERY 6 HOURS PRN
Status: DISCONTINUED | OUTPATIENT
Start: 2023-06-10 | End: 2023-06-12

## 2023-06-10 RX ORDER — ENOXAPARIN SODIUM 100 MG/ML
1 INJECTION SUBCUTANEOUS EVERY 12 HOURS
Status: DISCONTINUED | OUTPATIENT
Start: 2023-06-10 | End: 2023-06-14

## 2023-06-10 RX ADMIN — Medication 16 G: at 05:06

## 2023-06-10 RX ADMIN — DEXTROSE AND SODIUM CHLORIDE: 5; 900 INJECTION, SOLUTION INTRAVENOUS at 12:06

## 2023-06-10 RX ADMIN — ENALAPRILAT 1.25 MG: 2.5 INJECTION INTRAVENOUS at 05:06

## 2023-06-10 RX ADMIN — METHOCARBAMOL 500 MG: 500 TABLET ORAL at 08:06

## 2023-06-10 RX ADMIN — METHOCARBAMOL 500 MG: 500 TABLET ORAL at 09:06

## 2023-06-10 RX ADMIN — CLOPIDOGREL BISULFATE 75 MG: 75 TABLET ORAL at 09:06

## 2023-06-10 RX ADMIN — OXYCODONE HYDROCHLORIDE 10 MG: 10 TABLET ORAL at 06:06

## 2023-06-10 RX ADMIN — METHOCARBAMOL 500 MG: 500 TABLET ORAL at 05:06

## 2023-06-10 RX ADMIN — METHOCARBAMOL 500 MG: 500 TABLET ORAL at 02:06

## 2023-06-10 RX ADMIN — METHOCARBAMOL 500 MG: 500 TABLET ORAL at 12:06

## 2023-06-10 RX ADMIN — OXYCODONE HYDROCHLORIDE 10 MG: 10 TABLET ORAL at 12:06

## 2023-06-10 RX ADMIN — OXYCODONE HYDROCHLORIDE 10 MG: 10 TABLET ORAL at 04:06

## 2023-06-10 RX ADMIN — ENOXAPARIN SODIUM 60 MG: 80 INJECTION SUBCUTANEOUS at 06:06

## 2023-06-10 RX ADMIN — ENALAPRILAT 1.25 MG: 2.5 INJECTION INTRAVENOUS at 12:06

## 2023-06-10 RX ADMIN — PANTOPRAZOLE SODIUM 40 MG: 40 INJECTION, POWDER, FOR SOLUTION INTRAVENOUS at 09:06

## 2023-06-10 RX ADMIN — TIMOLOL MALEATE 1 DROP: 5 SOLUTION/ DROPS OPHTHALMIC at 05:06

## 2023-06-10 NOTE — PROGRESS NOTES
Acute Care Surgery   Progress Note  Admit Date: 6/4/2023  HD#6  POD#3 Days Post-Op    Subjective:   Interval history:  AF, HTN -150s, HR WNL  Tolerated cld yesterday, without nausea or emesis. However, having more belching this AM.   Passing flatus but not yet having another BM  Continues voiding without issue  Ambulating around room    Home Meds:  Current Outpatient Medications   Medication Instructions    azelastine (ASTELIN) 137 mcg, Nasal, 2 times daily    B-complex with vitamin C (Z-BEC OR EQUIV) tablet 1 tablet, Oral, Daily    baclofen (LIORESAL) 10 mg, Oral, 4 times daily, PRN muscle spasms    betamethasone dipropionate 0.05 % cream 1 application, Topical (Top), 2 times daily    celecoxib (CELEBREX) 100 MG capsule TAKE ONE CAPSULE BY MOUTH ONCE DAILY WITH FOOD AS NEEDED FOR PAIN    clopidogreL (PLAVIX) 75 mg tablet 1 tablet, Oral, Daily    famotidine (PEPCID) 20 MG tablet TAKE 1 TABLET TWICE DAILY    hydroCHLOROthiazide (HYDRODIURIL) 25 MG tablet TAKE 1 TABLET EVERY DAY    LEXAPRO 10 mg tablet TAKE 1 TABLET BY MOUTH ONCE DAILY AS DIRECTED FOR MOOD/CHRONIC PAIN AS DIRECTED    LIDOcaine (LIDODERM) 5 % 1 patch, Transdermal, Every 24 hours (non-standard times), Remove & Discard patch within 12 hours or as directed by MD    metFORMIN (GLUCOPHAGE) 500 mg, Oral, 2 times daily    morphine (MSIR) 15 MG tablet 1.5 tablets, Oral, 2 times daily PRN    nitroGLYCERIN (NITROSTAT) 0.4 MG SL tablet PLEASE SEE ATTACHED FOR DETAILED DIRECTIONS    OMEGA-3-EPA-FISH OIL ORAL 1 capsule, Oral, Daily    omeprazole (PRILOSEC) 40 MG capsule TAKE 1 CAPSULE EVERY DAY    ondansetron (ZOFRAN-ODT) 8 mg, Oral, 2 times daily    peg 400-propylene glycol (SYSTANE, PROPYLENE GLYCOL,) 0.4-0.3 % Drop 1 drop, Ophthalmic    potassium chloride SA (K-DUR,KLOR-CON) 20 MEQ tablet TAKE 1 TABLET ONE TIME DAILY    pravastatin (PRAVACHOL) 40 MG tablet TAKE 1 TABLET EVERY DAY    timolol maleate 0.5% (TIMOPTIC) 0.5 % Drop 1 drop, Both Eyes, Daily     "vitamin D (VITAMIN D3) 5,000 Units, Oral, Daily    warfarin (COUMADIN) 4 MG tablet TAKE 1 TABLET EVERY DAY      Scheduled Meds:   clopidogreL  75 mg Oral Daily    enalaprilat  1.25 mg Intravenous Q6H    enoxparin  40 mg Subcutaneous Q24H (prophylaxis, 1700)    methocarbamoL  500 mg Oral QID    pantoprazole  40 mg Intravenous Daily    timolol maleate 0.5%  1 drop Both Eyes Daily     Continuous Infusions:   dextrose 5 % and 0.9 % NaCl       PRN Meds:sodium chloride, sodium chloride, sodium chloride, sodium chloride, bisacodyL, dextrose 10%, dextrose 10%, glucagon (human recombinant), hydrALAZINE, insulin aspart U-100, nitroGLYCERIN, oxyCODONE, oxyCODONE, sodium chloride 0.9%     Objective:     VITAL SIGNS: 24 HR MIN & MAX LAST   Temp  Min: 97.3 °F (36.3 °C)  Max: 99.2 °F (37.3 °C)  98.9 °F (37.2 °C)   BP  Min: 123/76  Max: 156/69  (!) 144/77    Pulse  Min: 58  Max: 80  (!) 58    Resp  Min: 18  Max: 19  18    SpO2  Min: 95 %  Max: 97 %  97 %      HT: 5' 4" (162.6 cm)  WT: 67.1 kg (147 lb 14.9 oz)  BMI: 25.4     Intake/output:  Intake/Output - Last 3 Shifts         06/08 0700  06/09 0659 06/09 0700  06/10 0659 06/10 0700  06/11 0659    P.O. 0 895     I.V. (mL/kg)       Blood       IV Piggyback       Total Intake(mL/kg) 0 (0) 895 (13.3)     Urine (mL/kg/hr) 760 (0.5) 1200 (0.7)     Drains 150      Stool 0 0     Total Output 910 1200     Net -910 -305            Urine Occurrence  0 x     Stool Occurrence 0 x 0 x             Intake/Output Summary (Last 24 hours) at 6/10/2023 1036  Last data filed at 6/10/2023 0620  Gross per 24 hour   Intake 895 ml   Output 1200 ml   Net -305 ml           Physical examination:  Gen: NAD, resting comfortably  HEENT: Normocephalic, atrauamtic  CV: RRR, hypertensive  Resp: NWOB  Abd: soft, mildly distended, attp following surgery, incision well approximated, no drainage, no erythema   Ext: moving all extremities spontaneously and purposefully  Neuro: CN II-XII grossly " intact      Labs:  Renal:  Recent Labs     06/08/23  0141 06/09/23  0448   BUN 13.0 8.5*   CREATININE 0.55 0.53*     No results for input(s): LACTIC in the last 72 hours.    FENGI:  Recent Labs     06/08/23  0141 06/09/23  0448    144   K 4.5 3.5   CO2 24 31   CALCIUM 8.1* 7.8*   ALBUMIN 2.5* 2.2*   BILITOT 0.6 0.6   AST 92* 36*   ALKPHOS 49 47   * 93*     Heme:  Recent Labs     06/08/23  0141 06/09/23  0448 06/10/23  0959   HGB 10.9* 10.5* 11.4*   HCT 33.8* 32.5* 35.1*    164 189     ID:  Recent Labs     06/08/23  0141 06/09/23  0448 06/10/23  0959   WBC 11.44 9.86 9.13     CBG:  Recent Labs     06/08/23 0141 06/09/23  0448   GLUCOSE 99 147*      Cardiovascular:  No results for input(s): TROPONINI, CKTOTAL, CKMB, BNP in the last 168 hours.  ABG:  Recent Labs   Lab 06/05/23  1515 06/05/23  1645   PH 7.403 7.620*   PO2 398* 69.0*   PCO2 43.6  --    HCO3 27.2 28.8   BE 2  --       I have reviewed all pertinent lab results within the past 24 hours.    Imaging:  XR Gastric tube check, non-radiologist performed   Final Result      Enteric tube side port about 3.5 cm below the GE junction.         Electronically signed by: Sunil Peralta   Date:    06/04/2023   Time:    14:49      XR Gastric tube check, non-radiologist performed   Final Result      NG tube projects over the body of the stomach.         Electronically signed by: Evin Manzo   Date:    06/04/2023   Time:    10:47      CT Abdomen Pelvis  Without Contrast   Final Result      Suspected small-bowel obstruction with transition point in the right lower abdomen.         Electronically signed by: Sunil Peralta   Date:    06/04/2023   Time:    09:05      X-Ray Abdomen AP 1 View    (Results Pending)      I have reviewed all pertinent imaging results/findings within the past 24 hours.    Micro/Path/Other:  Microbiology Results (last 7 days)       ** No results found for the last 168 hours. **           Specimen (168h ago, onward)       Start      Ordered    06/05/23 1449  Specimen to Pathology  RELEASE UPON ORDERING        References:    Click here for ordering Quick Tip   Question:  Release to patient  Answer:  Immediate    06/05/23 0205                     Assessment & Plan:   74 y/o w/ hx ex lap several decades ago with h/o colostomy s/p reversal, Gtube and Jtube s/p removal, and chronic back pain on home morphine, now admitted 6/4 with SBO. Pt s/p exlap with lysis of adhesions 6/4. Taken back to the OR 6/5 for hypotension and post-op hemorrhage with evacuation of approximately 3L of blood and old clot, placement of temporary abdominal closure. S/p abdominal closure 6/7. Extubated post-op. Recovering slowly. NG removed 6/9.    - vitals, labs, and images reviewed   - will make pt NPO given new belching and gas cramps, will relace NG if pt begins having emesis  - KUB to eval gas pattern   - continue D5LR  - H/H stable, may start therapeutic lovenox  - Multimodal pain control   - PT/OT    Tenisha Brand MD   [employer] [facility]

## 2023-06-10 NOTE — PROGRESS NOTES
[facility] Medicine Progress Note        Chief Complaint: Inpatient Follow-up for ex lap for SBO with resultant finding of intraabdominal adhesions s/p lysis, s/p evacuation of blood, s/p abdominal closure.    HPI: Patient is status post exploratory lap for lysis of adhesions;  2 days after surgery she was taken back to the OR secondary to falling H&H there was a lot of oozing and over a L of blood was evacuated from the belly in the belly was left open she then went back to the OR on 06/07 for abdominal closure.  Downgraded from the ICU on 06/07.         Interval Hx:   Patient with complaints of pain behind the right knee this morning  Primary team increase her Lovenox to therapeutic; dose today prophylactic dose Lovenox yesterday.  She has a IV to the right upper extremity right upper extremity with redness and warmth around the IV site right upper extremity is swollen when compared to the left    Objective/physical exam:  General: In no acute distress, afebrile  Chest: Clear to auscultation bilaterally  Heart: RRR, +S1, S2, no appreciable murmur  Abdomen: Soft, nontender, BS +  MSK: Warm, Cordero positive on the right LE, right upper extremity with redness and warmth around the IV site, right upper extremity is notably swollen when compared to the left  Neurologic: Alert and oriented x4, Cranial nerve II-XII intact, Strength 5/5 in all 4 extremities    VITAL SIGNS: 24 HRS MIN & MAX LAST   Temp  Min: 98.5 °F (36.9 °C)  Max: 99.2 °F (37.3 °C) 98.9 °F (37.2 °C)   BP  Min: 144/77  Max: 156/69 (!) 144/77   Pulse  Min: 58  Max: 80  (!) 58   Resp  Min: 16  Max: 19 16   SpO2  Min: 95 %  Max: 97 % 97 %       Recent Labs   Lab 06/08/23  0141 06/09/23  0448 06/10/23  0959   WBC 11.44 9.86 9.13   RBC 3.55* 3.52* 3.73*   HGB 10.9* 10.5* 11.4*   HCT 33.8* 32.5* 35.1*   MCV 95.2* 92.3 94.1*   MCH 30.7 29.8 30.6   MCHC 32.2* 32.3* 32.5*   RDW 16.4 15.8 15.4    164 189   MPV 10.8*  11.1* 10.3       Recent Labs   Lab 06/05/23  1515 06/05/23  1559 06/05/23  1645 06/06/23  0210 06/07/23  0158 06/08/23  0141 06/09/23  0448   NA  --  139  --    < > 142 143 144   K  --  3.0*  --    < > 3.6 4.5 3.5   CO2  --  24  --    < > 28 24 31   BUN  --  13.5  --    < > 13.3 13.0 8.5*   CREATININE  --  0.72  --    < > 0.62 0.55 0.53*   CALCIUM  --  9.3  --    < > 8.0* 8.1* 7.8*   PH 7.403  --  7.620*  --   --   --   --    MG  --  1.90  --   --  1.90  --   --    ALBUMIN  --   --   --    < > 2.4* 2.5* 2.2*   ALKPHOS  --   --   --    < > 40 49 47   ALT  --   --   --    < > 239* 173* 93*   AST  --   --   --    < > 177* 92* 36*   BILITOT  --   --   --    < > 0.7 0.6 0.6    < > = values in this interval not displayed.          Microbiology Results (last 7 days)       ** No results found for the last 168 hours. **             See below for Radiology    Scheduled Med:   clopidogreL  75 mg Oral Daily    enalaprilat  1.25 mg Intravenous Q6H    enoxparin  1 mg/kg (Dosing Weight) Subcutaneous Q12H (treatment, non-standard time)    methocarbamoL  500 mg Oral QID    pantoprazole  40 mg Intravenous Daily    timolol maleate 0.5%  1 drop Both Eyes Daily        Continuous Infusions:   dextrose 5 % and 0.9 % NaCl 100 mL/hr at 06/10/23 1207        PRN Meds:  sodium chloride, sodium chloride, sodium chloride, sodium chloride, bisacodyL, dextrose 10%, dextrose 10%, glucagon (human recombinant), hydrALAZINE, insulin aspart U-100, nitroGLYCERIN, oxyCODONE, oxyCODONE, sodium chloride 0.9%       Assessment/Plan:  Patient Active Problem List   Diagnosis    Diabetes mellitus    Protein C deficiency    Recurrent deep vein thrombosis (DVT)    Acid reflux    Chronic lumbar pain    Hypertension    HLD (hyperlipidemia)    History of continuous positive airway pressure (CPAP) therapy at home    SBO (small bowel obstruction)     Ultrasound NIVA right lower extremity and right upper arm  Continue full-dose Lovenox  Cont IVF  NGT out but NPO  Xray  with no acute findings, good bowel sounds. Patient complains of gas distention and pain, no gas or bowel movement  Will ask if patient can have stool softener or Senokot and maybe some Gas-X    Patient condition:  Stable        All diagnosis and differential diagnosis have been reviewed; assessment and plan has been documented; I have personally reviewed the labs and test results that are presently available; I have reviewed the patients medication list; I have reviewed the consulting providers response and recommendations. I have reviewed or attempted to review medical records based upon their availability    All of the patient's questions have been  addressed and answered. Patient's is agreeable to the above stated plan. I will continue to monitor closely and make adjustments to medical management as needed.        X-Ray Abdomen AP 1 View  Narrative: EXAMINATION:  XR ABDOMEN AP 1 VIEW    CLINICAL HISTORY:  post op ileus;    TECHNIQUE:  AP View(s) of the abdomen was performed.    COMPARISON:  06/04/2023    FINDINGS:  Bibasilar atelectatic changes lungs with small effusion on the left.  Enteric tube is been removed.    Nonobstructive bowel gas pattern.    Postsurgical changes lower lumbar spine.  Vascular stent is identified on the left.  Thoracic nerve stimulator on the right.  Impression: Enteric tube has been removed.  Nonobstructive bowel gas pattern.  Small effusion on the left.    Electronically signed by: Luis Reeves MD  Date:    06/10/2023  Time:    11:25      Alex Treviño MD   06/10/2023

## 2023-06-11 LAB
ALBUMIN SERPL-MCNC: 2.4 G/DL (ref 3.4–4.8)
ALBUMIN/GLOB SERPL: 1 RATIO (ref 1.1–2)
ALP SERPL-CCNC: 45 UNIT/L (ref 40–150)
ALT SERPL-CCNC: 46 UNIT/L (ref 0–55)
AST SERPL-CCNC: 27 UNIT/L (ref 5–34)
BILIRUBIN DIRECT+TOT PNL SERPL-MCNC: 1.3 MG/DL
BUN SERPL-MCNC: 4 MG/DL (ref 9.8–20.1)
CALCIUM SERPL-MCNC: 7.7 MG/DL (ref 8.4–10.2)
CHLORIDE SERPL-SCNC: 105 MMOL/L (ref 98–107)
CO2 SERPL-SCNC: 26 MMOL/L (ref 23–31)
CREAT SERPL-MCNC: 0.55 MG/DL (ref 0.55–1.02)
ERYTHROCYTE [DISTWIDTH] IN BLOOD BY AUTOMATED COUNT: 15.6 % (ref 11.5–17)
GFR SERPLBLD CREATININE-BSD FMLA CKD-EPI: >60 MLS/MIN/1.73/M2
GLOBULIN SER-MCNC: 2.4 GM/DL (ref 2.4–3.5)
GLUCOSE SERPL-MCNC: 134 MG/DL (ref 82–115)
HCT VFR BLD AUTO: 32.4 % (ref 37–47)
HGB BLD-MCNC: 10.8 G/DL (ref 12–16)
MCH RBC QN AUTO: 30.8 PG (ref 27–31)
MCHC RBC AUTO-ENTMCNC: 33.3 G/DL (ref 33–36)
MCV RBC AUTO: 92.3 FL (ref 80–94)
NRBC BLD AUTO-RTO: 0 %
PLATELET # BLD AUTO: 200 X10(3)/MCL (ref 130–400)
PMV BLD AUTO: 10.5 FL (ref 7.4–10.4)
POCT GLUCOSE: 114 MG/DL (ref 70–110)
POCT GLUCOSE: 119 MG/DL (ref 70–110)
POCT GLUCOSE: 143 MG/DL (ref 70–110)
POTASSIUM SERPL-SCNC: 3.2 MMOL/L (ref 3.5–5.1)
PROT SERPL-MCNC: 4.8 GM/DL (ref 5.8–7.6)
RBC # BLD AUTO: 3.51 X10(6)/MCL (ref 4.2–5.4)
SODIUM SERPL-SCNC: 141 MMOL/L (ref 136–145)
WBC # SPEC AUTO: 7.65 X10(3)/MCL (ref 4.5–11.5)

## 2023-06-11 PROCEDURE — 11000001 HC ACUTE MED/SURG PRIVATE ROOM

## 2023-06-11 PROCEDURE — 25000003 PHARM REV CODE 250: Performed by: INTERNAL MEDICINE

## 2023-06-11 PROCEDURE — C9113 INJ PANTOPRAZOLE SODIUM, VIA: HCPCS | Performed by: INTERNAL MEDICINE

## 2023-06-11 PROCEDURE — 63600175 PHARM REV CODE 636 W HCPCS: Performed by: STUDENT IN AN ORGANIZED HEALTH CARE EDUCATION/TRAINING PROGRAM

## 2023-06-11 PROCEDURE — 25000003 PHARM REV CODE 250: Performed by: STUDENT IN AN ORGANIZED HEALTH CARE EDUCATION/TRAINING PROGRAM

## 2023-06-11 PROCEDURE — 99233 SBSQ HOSP IP/OBS HIGH 50: CPT | Mod: 95,,, | Performed by: INTERNAL MEDICINE

## 2023-06-11 PROCEDURE — 99233 PR SUBSEQUENT HOSPITAL CARE,LEVL III: ICD-10-PCS | Mod: 95,,, | Performed by: INTERNAL MEDICINE

## 2023-06-11 PROCEDURE — 63600175 PHARM REV CODE 636 W HCPCS: Performed by: INTERNAL MEDICINE

## 2023-06-11 PROCEDURE — A4216 STERILE WATER/SALINE, 10 ML: HCPCS | Performed by: STUDENT IN AN ORGANIZED HEALTH CARE EDUCATION/TRAINING PROGRAM

## 2023-06-11 PROCEDURE — 85027 COMPLETE CBC AUTOMATED: CPT | Performed by: STUDENT IN AN ORGANIZED HEALTH CARE EDUCATION/TRAINING PROGRAM

## 2023-06-11 PROCEDURE — 80053 COMPREHEN METABOLIC PANEL: CPT | Performed by: STUDENT IN AN ORGANIZED HEALTH CARE EDUCATION/TRAINING PROGRAM

## 2023-06-11 PROCEDURE — 97116 GAIT TRAINING THERAPY: CPT | Mod: CQ

## 2023-06-11 RX ORDER — DEXTROSE, SODIUM CHLORIDE, SODIUM LACTATE, POTASSIUM CHLORIDE, AND CALCIUM CHLORIDE 5; .6; .31; .03; .02 G/100ML; G/100ML; G/100ML; G/100ML; G/100ML
INJECTION, SOLUTION INTRAVENOUS CONTINUOUS
Status: DISCONTINUED | OUTPATIENT
Start: 2023-06-11 | End: 2023-06-12

## 2023-06-11 RX ORDER — DEXTROSE, SODIUM CHLORIDE, SODIUM LACTATE, POTASSIUM CHLORIDE, AND CALCIUM CHLORIDE 5; .6; .31; .03; .02 G/100ML; G/100ML; G/100ML; G/100ML; G/100ML
INJECTION, SOLUTION INTRAVENOUS CONTINUOUS
Status: DISCONTINUED | OUTPATIENT
Start: 2023-06-11 | End: 2023-06-11

## 2023-06-11 RX ADMIN — SODIUM CHLORIDE, PRESERVATIVE FREE 10 ML: 5 INJECTION INTRAVENOUS at 11:06

## 2023-06-11 RX ADMIN — ENALAPRILAT 1.25 MG: 2.5 INJECTION INTRAVENOUS at 11:06

## 2023-06-11 RX ADMIN — ENOXAPARIN SODIUM 60 MG: 80 INJECTION SUBCUTANEOUS at 05:06

## 2023-06-11 RX ADMIN — SODIUM CHLORIDE, SODIUM LACTATE, POTASSIUM CHLORIDE, CALCIUM CHLORIDE AND DEXTROSE MONOHYDRATE: 5; 600; 310; 30; 20 INJECTION, SOLUTION INTRAVENOUS at 11:06

## 2023-06-11 RX ADMIN — SODIUM CHLORIDE, PRESERVATIVE FREE 10 ML: 5 INJECTION INTRAVENOUS at 07:06

## 2023-06-11 RX ADMIN — METHOCARBAMOL 500 MG: 500 TABLET ORAL at 05:06

## 2023-06-11 RX ADMIN — OXYCODONE HYDROCHLORIDE 10 MG: 10 TABLET ORAL at 12:06

## 2023-06-11 RX ADMIN — METHOCARBAMOL 500 MG: 500 TABLET ORAL at 09:06

## 2023-06-11 RX ADMIN — SODIUM CHLORIDE, PRESERVATIVE FREE 10 ML: 5 INJECTION INTRAVENOUS at 12:06

## 2023-06-11 RX ADMIN — CLOPIDOGREL BISULFATE 75 MG: 75 TABLET ORAL at 09:06

## 2023-06-11 RX ADMIN — METHOCARBAMOL 500 MG: 500 TABLET ORAL at 12:06

## 2023-06-11 RX ADMIN — PANTOPRAZOLE SODIUM 40 MG: 40 INJECTION, POWDER, FOR SOLUTION INTRAVENOUS at 09:06

## 2023-06-11 RX ADMIN — ENOXAPARIN SODIUM 60 MG: 80 INJECTION SUBCUTANEOUS at 06:06

## 2023-06-11 RX ADMIN — ENALAPRILAT 1.25 MG: 2.5 INJECTION INTRAVENOUS at 05:06

## 2023-06-11 RX ADMIN — OXYCODONE HYDROCHLORIDE 10 MG: 10 TABLET ORAL at 06:06

## 2023-06-11 RX ADMIN — SODIUM CHLORIDE, PRESERVATIVE FREE 10 ML: 5 INJECTION INTRAVENOUS at 05:06

## 2023-06-11 RX ADMIN — ENALAPRILAT 1.25 MG: 2.5 INJECTION INTRAVENOUS at 06:06

## 2023-06-11 RX ADMIN — DEXTROSE AND SODIUM CHLORIDE: 5; 900 INJECTION, SOLUTION INTRAVENOUS at 06:06

## 2023-06-11 RX ADMIN — SENNOSIDES 8.6 MG: 8.6 TABLET, FILM COATED ORAL at 10:06

## 2023-06-11 RX ADMIN — ENALAPRILAT 1.25 MG: 2.5 INJECTION INTRAVENOUS at 12:06

## 2023-06-11 NOTE — PROGRESS NOTES
[facility] Medicine Progress Note        Chief Complaint: Inpatient Follow-up for ex lap for SBO with resultant finding of intraabdominal adhesions s/p lysis, s/p evacuation of blood, s/p abdominal closure.    HPI: Patient is status post exploratory lap for lysis of adhesions;  2 days after surgery she was taken back to the OR secondary to falling H&H there was a lot of oozing and over a L of blood was evacuated from the belly in the belly was left open she then went back to the OR on 06/07 for abdominal closure.  Downgraded from the ICU on 06/07.            Interval Hx:   Patient resumed on full-dose Lovenox yesterday history of protein C deficiency.  She does have a DVT to the right upper extremity in the area of the right distal brachial vein.  Ultrasound of the right lower extremity was negative.  She complains of pain just generalized to her entire abdomen.  There is some bright red blood noted from the incision.  Still no bowel movement.  Labs pending    Objective/physical exam:  General: In no acute distress, afebrile  Chest: Clear to auscultation bilaterally  Heart: RRR, +S1, S2, no appreciable murmur  Abdomen: Soft, +tender, BS +,  trace BRB noted to the center of her incision  MSK: Warm,  right upper extremity with redness and warmth,  right upper extremity is notably swollen when compared to the left  Neurologic: Alert and oriented x4, Cranial nerve II-XII intact, Strength 5/5 in all 4 extremities    VITAL SIGNS: 24 HRS MIN & MAX LAST   Temp  Min: 98.6 °F (37 °C)  Max: 99.3 °F (37.4 °C) 99 °F (37.2 °C)   BP  Min: 144/77  Max: 169/74 (!) 153/83   Pulse  Min: 74  Max: 87  86   Resp  Min: 16  Max: 18 18   SpO2  Min: 95 %  Max: 96 % 96 %       Recent Labs   Lab 06/08/23  0141 06/09/23  0448 06/10/23  0959   WBC 11.44 9.86 9.13   RBC 3.55* 3.52* 3.73*   HGB 10.9* 10.5* 11.4*   HCT 33.8* 32.5* 35.1*   MCV 95.2* 92.3 94.1*   MCH 30.7 29.8 30.6   MCHC 32.2* 32.3* 32.5*    RDW 16.4 15.8 15.4    164 189   MPV 10.8* 11.1* 10.3       Recent Labs   Lab 06/05/23  1515 06/05/23  1559 06/05/23  1645 06/06/23  0210 06/07/23  0158 06/08/23  0141 06/09/23  0448   NA  --  139  --    < > 142 143 144   K  --  3.0*  --    < > 3.6 4.5 3.5   CO2  --  24  --    < > 28 24 31   BUN  --  13.5  --    < > 13.3 13.0 8.5*   CREATININE  --  0.72  --    < > 0.62 0.55 0.53*   CALCIUM  --  9.3  --    < > 8.0* 8.1* 7.8*   PH 7.403  --  7.620*  --   --   --   --    MG  --  1.90  --   --  1.90  --   --    ALBUMIN  --   --   --    < > 2.4* 2.5* 2.2*   ALKPHOS  --   --   --    < > 40 49 47   ALT  --   --   --    < > 239* 173* 93*   AST  --   --   --    < > 177* 92* 36*   BILITOT  --   --   --    < > 0.7 0.6 0.6    < > = values in this interval not displayed.          Microbiology Results (last 7 days)       ** No results found for the last 168 hours. **             See below for Radiology    Scheduled Med:   clopidogreL  75 mg Oral Daily    enalaprilat  1.25 mg Intravenous Q6H    enoxparin  1 mg/kg (Dosing Weight) Subcutaneous Q12H (treatment, non-standard time)    methocarbamoL  500 mg Oral QID    pantoprazole  40 mg Intravenous Daily    sodium chloride 0.9%  10 mL Intravenous Q6H    timolol maleate 0.5%  1 drop Both Eyes Daily        Continuous Infusions:   dextrose 5% lactated ringers          PRN Meds:  sodium chloride, sodium chloride, sodium chloride, sodium chloride, bisacodyL, dextrose 10%, dextrose 10%, glucagon (human recombinant), glucose, hydrALAZINE, insulin aspart U-100, nitroGLYCERIN, oxyCODONE, oxyCODONE, senna, sodium chloride 0.9%, Flushing PICC Protocol **AND** sodium chloride 0.9% **AND** sodium chloride 0.9%       Assessment/Plan:  Patient Active Problem List   Diagnosis    Diabetes mellitus    Protein C deficiency    Recurrent deep vein thrombosis (DVT)    Acid reflux    Chronic lumbar pain    Hypertension    HLD (hyperlipidemia)    History of continuous positive airway pressure (CPAP)  therapy at home    SBO (small bowel obstruction)      Remains NPO, will start PPN @50  Labs are still pending this morning, patient still has not had a bowel movement she asked for Senokot while I was in the room.  Follow-up surgery recommendations  Full dose Lovenox  Labs in am ordered    We will continue to monitor for any encephalopathic changes, hemodynamic parameters, oxygenation, supplement oxygen as needed, fecal/ urinary output, electrolytes, H/H, and transfuse as needed. Images and images' reports have been reviewed; labs have been reviewed.      Patient condition:  Stable      All diagnosis and differential diagnosis have been reviewed; assessment and plan has been documented; I have personally reviewed the labs and test results that are presently available; I have reviewed the patients medication list; I have reviewed the consulting providers response and recommendations. I have reviewed or attempted to review medical records based upon their availability    All of the patient's questions have been  addressed and answered. Patient's is agreeable to the above stated plan. I will continue to monitor closely and make adjustments to medical management as needed.        CV Ultrasound doppler venous arm right  Positive deep vein thrombosis identified in the right distal brachial   vein.  All other vessels compressed and augmented well.  CV Ultrasound doppler venous legs bilat  There was no evidence of deep or superficial vein thrombosis in bilateral   lower extremities.  X-Ray Abdomen AP 1 View  Narrative: EXAMINATION:  XR ABDOMEN AP 1 VIEW    CLINICAL HISTORY:  post op ileus;    TECHNIQUE:  AP View(s) of the abdomen was performed.    COMPARISON:  06/04/2023    FINDINGS:  Bibasilar atelectatic changes lungs with small effusion on the left.  Enteric tube is been removed.    Nonobstructive bowel gas pattern.    Postsurgical changes lower lumbar spine.  Vascular stent is identified on the left.  Thoracic nerve  stimulator on the right.  Impression: Enteric tube has been removed.  Nonobstructive bowel gas pattern.  Small effusion on the left.    Electronically signed by: Luis Reeves MD  Date:    06/10/2023  Time:    11:25      Alex Treviño MD   06/11/2023

## 2023-06-11 NOTE — PROGRESS NOTES
Surgery Progress Note    S:  NAEO  AF -160  Reports belching improved with bowel rest, no emesis or nausea  Ongoing pain, moderately well controlled  Pt ambulating around room  Voiding without issue  Passing some flatus, has not had any more BMs    O:  Temp:  [98.6 °F (37 °C)-99.3 °F (37.4 °C)] 99 °F (37.2 °C)  Pulse:  [74-87] 86  Resp:  [16-18] 18  SpO2:  [95 %-96 %] 96 %  BP: (144-169)/(74-83) 153/83    Physical Exam:  Gen: NAD, resting comfortably  HEENT: Normocephalic, atrauamtic  CV: RRR, hypertensive  Resp: NWOB  Abd: soft, mildly distended, attp following surgery, incision well approximated, no drainage, no erythema   Ext: moving all extremities spontaneously and purposefully. RUE with some edema and erythema at previous IV site  Neuro: CN II-XII grossly intact    Labs:  CBC pending  CMP pending    A/P:  72 y/o w/ hx ex lap several decades ago with h/o colostomy s/p reversal, Gtube and Jtube s/p removal, and chronic back pain on home morphine, now admitted 6/4 with SBO. Pt s/p exlap with lysis of adhesions 6/4. Taken back to the OR 6/5 for hypotension and post-op hemorrhage with evacuation of approximately 3L of blood and old clot, placement of temporary abdominal closure. S/p abdominal closure 6/7. Extubated post-op. Recovering slowly. NG removed 6/9.     - vitals, labs, and images reviewed   - will discuss BP management with medicine team  - trial CLD, sips  - continue D5LR, had some hypoglycemia overnight  - CBC pending, will follow H/H. continue therapeutic lovenox pending CBC  - Multimodal pain control   - PT/OT     Tenisha Brand MD   [employer] [facility] Surgery

## 2023-06-11 NOTE — PT/OT/SLP PROGRESS
Physical Therapy Treatment    Patient Name:  Alejandrina Rodriguez   MRN:  [id number]    Recommendations:     Discharge Recommendations: home with home health, home health PT  Discharge Equipment Recommendations: none  Barriers to discharge: None    Assessment:     Alejandrina Rodriguez is a 73 y.o. female admitted with a medical diagnosis of SBO (small bowel obstruction).  She presents with the following impairments/functional limitations: weakness, gait instability, impaired endurance, impaired balance, decreased lower extremity function, impaired self care skills, impaired functional mobility.    Rehab Prognosis: Good; patient would benefit from acute skilled PT services to address these deficits and reach maximum level of function.    Recent Surgery: Procedure(s) (LRB):  LAPAROTOMY, EXPLORATORY (N/A)  WASHOUT (N/A) 4 Days Post-Op    Plan:     During this hospitalization, patient to be seen 6 x/week to address the identified rehab impairments via gait training, therapeutic activities, therapeutic exercises and progress toward the following goals:    Plan of Care Expires:  07/08/23    Subjective     Chief Complaint: Pt states her stomach is hurting and not due for pain med yet. Despite this, willing to participate with therapy.   Pain/Comfort:  7/10      Objective:     Communicated with NSG prior to session.  Patient found up in chair upon PT entry to room.     General Precautions: Standard, fall  Orthopedic Precautions: N/A  Braces: N/A  Respiratory Status: Room air  Skin Integrity: Visible skin intact      Functional Mobility:  Transfers:  CGA  Gait: 40ft RW CGA  Pt performed seated ex UIC all mm groups prior to mobility. Pt ambulated 40ft with RW and CGA. Pt stated she is hurting and returned to sit UIC. Pt left UIC with all needs in reach, CNA entering room, and  present.        GOALS:   Multidisciplinary Problems       Physical Therapy Goals          Problem: Physical Therapy    Goal Priority Disciplines Outcome  Goal Variances Interventions   Physical Therapy Goal     PT, PT/OT Ongoing, Progressing     Description: Goals to be met by: 23     Patient will increase functional independence with mobility by performin. Supine to sit with Stand-by Assistance  2. Sit to supine with Stand-by Assistance  3. Sit to stand transfer with Stand-by Assistance  4. Gait  x 150 feet with Stand-by Assistance using Rolling Walker.                          Time Tracking:     Billable Minutes: Gait Training 17    Treatment Type: Treatment  PT/PTA: PTA     Number of PTA visits since last PT visit: 2     2023

## 2023-06-11 NOTE — PLAN OF CARE
Problem: Adult Inpatient Plan of Care  Goal: Plan of Care Review  6/11/2023 1651 by Willi Joya RN  Outcome: Ongoing, Progressing  6/11/2023 1651 by Willi Joya RN  Outcome: Ongoing, Progressing  6/11/2023 0752 by Willi Joya RN  Outcome: Ongoing, Progressing  Goal: Patient-Specific Goal (Individualized)  6/11/2023 1651 by Willi Joya RN  Outcome: Ongoing, Progressing  6/11/2023 1651 by Willi Joya RN  Outcome: Ongoing, Progressing  6/11/2023 0752 by Willi Joya RN  Outcome: Ongoing, Progressing  Goal: Absence of Hospital-Acquired Illness or Injury  6/11/2023 1651 by Willi Joya RN  Outcome: Ongoing, Progressing  6/11/2023 1651 by Willi Joya RN  Outcome: Ongoing, Progressing  6/11/2023 0752 by Willi Joya RN  Outcome: Ongoing, Progressing  Goal: Optimal Comfort and Wellbeing  6/11/2023 1651 by Willi Joya RN  Outcome: Ongoing, Progressing  6/11/2023 1651 by Willi Joya RN  Outcome: Ongoing, Progressing  6/11/2023 0752 by Willi Joya RN  Outcome: Ongoing, Progressing  Goal: Readiness for Transition of Care  6/11/2023 1651 by Willi Joya RN  Outcome: Ongoing, Progressing  6/11/2023 1651 by Willi Joya RN  Outcome: Ongoing, Progressing  6/11/2023 0752 by Willi Joya RN  Outcome: Ongoing, Progressing     Problem: Diabetes Comorbidity  Goal: Blood Glucose Level Within Targeted Range  6/11/2023 1651 by Willi Joya RN  Outcome: Ongoing, Progressing  6/11/2023 1651 by Willi Joya RN  Outcome: Ongoing, Progressing  6/11/2023 0752 by Willi Joya RN  Outcome: Ongoing, Progressing     Problem: Infection  Goal: Absence of Infection Signs and Symptoms  6/11/2023 1651 by Willi Joya RN  Outcome: Ongoing, Progressing  6/11/2023 1651 by Willi Joya RN  Outcome: Ongoing, Progressing  6/11/2023 0752 by Willi Joya RN  Outcome: Ongoing, Progressing     Problem: Fall Injury Risk  Goal: Absence of Fall and  Fall-Related Injury  6/11/2023 1651 by Willi Joya RN  Outcome: Ongoing, Progressing  6/11/2023 1651 by Willi Joya RN  Outcome: Ongoing, Progressing  6/11/2023 0752 by Willi Joya RN  Outcome: Ongoing, Progressing     Problem: Impaired Wound Healing  Goal: Optimal Wound Healing  6/11/2023 1651 by Willi Joya RN  Outcome: Ongoing, Progressing  6/11/2023 1651 by Willi Joya RN  Outcome: Ongoing, Progressing  6/11/2023 0752 by Willi Joya RN  Outcome: Ongoing, Progressing     Problem: Communication Impairment (Mechanical Ventilation, Invasive)  Goal: Effective Communication  Outcome: Ongoing, Progressing     Problem: Device-Related Complication Risk (Mechanical Ventilation, Invasive)  Goal: Optimal Device Function  Outcome: Ongoing, Progressing     Problem: Inability to Wean (Mechanical Ventilation, Invasive)  Goal: Mechanical Ventilation Liberation  Outcome: Ongoing, Progressing     Problem: Nutrition Impairment (Mechanical Ventilation, Invasive)  Goal: Optimal Nutrition Delivery  Outcome: Ongoing, Progressing     Problem: Skin and Tissue Injury (Mechanical Ventilation, Invasive)  Goal: Absence of Device-Related Skin and Tissue Injury  Outcome: Ongoing, Progressing

## 2023-06-11 NOTE — PLAN OF CARE
Problem: Adult Inpatient Plan of Care  Goal: Plan of Care Review  6/11/2023 0752 by Willi Joya RN  Outcome: Ongoing, Progressing  6/10/2023 1949 by Willi Joya RN  Outcome: Ongoing, Progressing  Goal: Patient-Specific Goal (Individualized)  6/11/2023 0752 by Willi Joya RN  Outcome: Ongoing, Progressing  6/10/2023 1949 by Willi Joya RN  Outcome: Ongoing, Progressing  Goal: Absence of Hospital-Acquired Illness or Injury  6/11/2023 0752 by Willi Joya RN  Outcome: Ongoing, Progressing  6/10/2023 1949 by Willi Joya RN  Outcome: Ongoing, Progressing  Goal: Optimal Comfort and Wellbeing  6/11/2023 0752 by Willi Joya RN  Outcome: Ongoing, Progressing  6/10/2023 1949 by Willi Joya RN  Outcome: Ongoing, Progressing  Goal: Readiness for Transition of Care  6/11/2023 0752 by Willi Joya RN  Outcome: Ongoing, Progressing  6/10/2023 1949 by Willi Joya RN  Outcome: Ongoing, Progressing     Problem: Diabetes Comorbidity  Goal: Blood Glucose Level Within Targeted Range  6/11/2023 0752 by Willi Joya RN  Outcome: Ongoing, Progressing  6/10/2023 1949 by Willi Joya RN  Outcome: Ongoing, Progressing     Problem: Infection  Goal: Absence of Infection Signs and Symptoms  6/11/2023 0752 by Willi Joya RN  Outcome: Ongoing, Progressing  6/10/2023 1949 by Willi Joya RN  Outcome: Ongoing, Progressing     Problem: Fall Injury Risk  Goal: Absence of Fall and Fall-Related Injury  6/11/2023 0752 by Willi Joya RN  Outcome: Ongoing, Progressing  6/10/2023 1949 by Willi Joya RN  Outcome: Ongoing, Progressing     Problem: Impaired Wound Healing  Goal: Optimal Wound Healing  6/11/2023 0752 by Willi Joya RN  Outcome: Ongoing, Progressing  6/10/2023 1949 by Willi Joya RN  Outcome: Ongoing, Progressing     Problem: Communication Impairment (Mechanical Ventilation, Invasive)  Goal: Effective Communication  Outcome: Ongoing,  Progressing     Problem: Device-Related Complication Risk (Mechanical Ventilation, Invasive)  Goal: Optimal Device Function  Outcome: Ongoing, Progressing     Problem: Inability to Wean (Mechanical Ventilation, Invasive)  Goal: Mechanical Ventilation Liberation  Outcome: Ongoing, Progressing     Problem: Nutrition Impairment (Mechanical Ventilation, Invasive)  Goal: Optimal Nutrition Delivery  Outcome: Ongoing, Progressing     Problem: Skin and Tissue Injury (Mechanical Ventilation, Invasive)  Goal: Absence of Device-Related Skin and Tissue Injury  Outcome: Ongoing, Progressing     Problem: Ventilator-Induced Lung Injury (Mechanical Ventilation, Invasive)  Goal: Absence of Ventilator-Induced Lung Injury  Outcome: Ongoing, Progressing     Problem: Communication Impairment (Artificial Airway)  Goal: Effective Communication  Outcome: Ongoing, Progressing     Problem: Device-Related Complication Risk (Artificial Airway)  Goal: Optimal Device Function  Outcome: Ongoing, Progressing     Problem: Skin and Tissue Injury (Artificial Airway)  Goal: Absence of Device-Related Skin or Tissue Injury  Outcome: Ongoing, Progressing     Problem: Noninvasive Ventilation Acute  Goal: Effective Unassisted Ventilation and Oxygenation  Outcome: Ongoing, Progressing     Problem: Skin Injury Risk Increased  Goal: Skin Health and Integrity  Outcome: Ongoing, Progressing

## 2023-06-12 LAB
ALBUMIN SERPL-MCNC: 2.3 G/DL (ref 3.4–4.8)
ALBUMIN/GLOB SERPL: 1 RATIO (ref 1.1–2)
ALP SERPL-CCNC: 41 UNIT/L (ref 40–150)
ALT SERPL-CCNC: 37 UNIT/L (ref 0–55)
ANION GAP SERPL CALC-SCNC: 9 MEQ/L
APPEARANCE UR: CLEAR
APTT PPP: 33.2 SECONDS (ref 23.2–33.7)
AST SERPL-CCNC: 21 UNIT/L (ref 5–34)
BACTERIA #/AREA URNS AUTO: ABNORMAL /HPF
BASOPHILS # BLD AUTO: 0.02 X10(3)/MCL
BASOPHILS NFR BLD AUTO: 0.3 %
BILIRUB UR QL STRIP.AUTO: NEGATIVE MG/DL
BILIRUBIN DIRECT+TOT PNL SERPL-MCNC: 1.2 MG/DL
BUN SERPL-MCNC: 3.4 MG/DL (ref 9.8–20.1)
BUN SERPL-MCNC: 5.5 MG/DL (ref 9.8–20.1)
CALCIUM SERPL-MCNC: 7.8 MG/DL (ref 8.4–10.2)
CALCIUM SERPL-MCNC: 8.6 MG/DL (ref 8.4–10.2)
CHLORIDE SERPL-SCNC: 105 MMOL/L (ref 98–107)
CHLORIDE SERPL-SCNC: 106 MMOL/L (ref 98–107)
CO2 SERPL-SCNC: 28 MMOL/L (ref 23–31)
CO2 SERPL-SCNC: 28 MMOL/L (ref 23–31)
COLOR UR: YELLOW
CREAT SERPL-MCNC: 0.55 MG/DL (ref 0.55–1.02)
CREAT SERPL-MCNC: 0.58 MG/DL (ref 0.55–1.02)
CREAT/UREA NIT SERPL: 9
EOSINOPHIL # BLD AUTO: 0.12 X10(3)/MCL (ref 0–0.9)
EOSINOPHIL NFR BLD AUTO: 2.1 %
ERYTHROCYTE [DISTWIDTH] IN BLOOD BY AUTOMATED COUNT: 15.5 % (ref 11.5–17)
GFR SERPLBLD CREATININE-BSD FMLA CKD-EPI: >60 MLS/MIN/1.73/M2
GFR SERPLBLD CREATININE-BSD FMLA CKD-EPI: >60 MLS/MIN/1.73/M2
GLOBULIN SER-MCNC: 2.2 GM/DL (ref 2.4–3.5)
GLUCOSE SERPL-MCNC: 105 MG/DL (ref 82–115)
GLUCOSE SERPL-MCNC: 131 MG/DL (ref 82–115)
GLUCOSE UR QL STRIP.AUTO: NEGATIVE MG/DL
HCT VFR BLD AUTO: 28.7 % (ref 37–47)
HGB BLD-MCNC: 9.5 G/DL (ref 12–16)
IMM GRANULOCYTES # BLD AUTO: 0.04 X10(3)/MCL (ref 0–0.04)
IMM GRANULOCYTES NFR BLD AUTO: 0.7 %
INR BLD: 1.55 (ref 0–1.3)
KETONES UR QL STRIP.AUTO: NEGATIVE MG/DL
LEUKOCYTE ESTERASE UR QL STRIP.AUTO: ABNORMAL UNIT/L
LYMPHOCYTES # BLD AUTO: 0.88 X10(3)/MCL (ref 0.6–4.6)
LYMPHOCYTES NFR BLD AUTO: 15.1 %
MCH RBC QN AUTO: 30.5 PG (ref 27–31)
MCHC RBC AUTO-ENTMCNC: 33.1 G/DL (ref 33–36)
MCV RBC AUTO: 92.3 FL (ref 80–94)
MONOCYTES # BLD AUTO: 0.78 X10(3)/MCL (ref 0.1–1.3)
MONOCYTES NFR BLD AUTO: 13.4 %
NEUTROPHILS # BLD AUTO: 3.99 X10(3)/MCL (ref 2.1–9.2)
NEUTROPHILS NFR BLD AUTO: 68.4 %
NITRITE UR QL STRIP.AUTO: NEGATIVE
NRBC BLD AUTO-RTO: 0 %
PH UR STRIP.AUTO: 8.5 [PH]
PLATELET # BLD AUTO: 180 X10(3)/MCL (ref 130–400)
PMV BLD AUTO: 10.6 FL (ref 7.4–10.4)
POCT GLUCOSE: 128 MG/DL (ref 70–110)
POCT GLUCOSE: 140 MG/DL (ref 70–110)
POCT GLUCOSE: 96 MG/DL (ref 70–110)
POTASSIUM SERPL-SCNC: 3.1 MMOL/L (ref 3.5–5.1)
POTASSIUM SERPL-SCNC: 4 MMOL/L (ref 3.5–5.1)
PROT SERPL-MCNC: 4.5 GM/DL (ref 5.8–7.6)
PROT UR QL STRIP.AUTO: ABNORMAL MG/DL
PROTHROMBIN TIME: 18.4 SECONDS (ref 12.5–14.5)
RBC # BLD AUTO: 3.11 X10(6)/MCL (ref 4.2–5.4)
RBC #/AREA URNS AUTO: ABNORMAL /HPF
RBC UR QL AUTO: NEGATIVE UNIT/L
SODIUM SERPL-SCNC: 141 MMOL/L (ref 136–145)
SODIUM SERPL-SCNC: 142 MMOL/L (ref 136–145)
SP GR UR STRIP.AUTO: 1.01 (ref 1–1.03)
SQUAMOUS #/AREA URNS AUTO: <5 /HPF
UROBILINOGEN UR STRIP-ACNC: 1 MG/DL
WBC # SPEC AUTO: 5.83 X10(3)/MCL (ref 4.5–11.5)
WBC #/AREA URNS AUTO: 7 /HPF

## 2023-06-12 PROCEDURE — C1751 CATH, INF, PER/CENT/MIDLINE: HCPCS

## 2023-06-12 PROCEDURE — 11000001 HC ACUTE MED/SURG PRIVATE ROOM

## 2023-06-12 PROCEDURE — 25000003 PHARM REV CODE 250: Performed by: STUDENT IN AN ORGANIZED HEALTH CARE EDUCATION/TRAINING PROGRAM

## 2023-06-12 PROCEDURE — 63600175 PHARM REV CODE 636 W HCPCS: Performed by: INTERNAL MEDICINE

## 2023-06-12 PROCEDURE — 99233 SBSQ HOSP IP/OBS HIGH 50: CPT | Mod: 95,,, | Performed by: INTERNAL MEDICINE

## 2023-06-12 PROCEDURE — 85610 PROTHROMBIN TIME: CPT | Performed by: INTERNAL MEDICINE

## 2023-06-12 PROCEDURE — 80053 COMPREHEN METABOLIC PANEL: CPT | Performed by: INTERNAL MEDICINE

## 2023-06-12 PROCEDURE — 36569 INSJ PICC 5 YR+ W/O IMAGING: CPT

## 2023-06-12 PROCEDURE — 25000003 PHARM REV CODE 250: Performed by: SURGERY

## 2023-06-12 PROCEDURE — 85730 THROMBOPLASTIN TIME PARTIAL: CPT | Performed by: INTERNAL MEDICINE

## 2023-06-12 PROCEDURE — 63600175 PHARM REV CODE 636 W HCPCS: Performed by: STUDENT IN AN ORGANIZED HEALTH CARE EDUCATION/TRAINING PROGRAM

## 2023-06-12 PROCEDURE — C9113 INJ PANTOPRAZOLE SODIUM, VIA: HCPCS | Performed by: INTERNAL MEDICINE

## 2023-06-12 PROCEDURE — A4216 STERILE WATER/SALINE, 10 ML: HCPCS | Performed by: STUDENT IN AN ORGANIZED HEALTH CARE EDUCATION/TRAINING PROGRAM

## 2023-06-12 PROCEDURE — 99233 PR SUBSEQUENT HOSPITAL CARE,LEVL III: ICD-10-PCS | Mod: 95,,, | Performed by: INTERNAL MEDICINE

## 2023-06-12 PROCEDURE — 63600175 PHARM REV CODE 636 W HCPCS: Performed by: SURGERY

## 2023-06-12 PROCEDURE — 85025 COMPLETE CBC W/AUTO DIFF WBC: CPT | Performed by: INTERNAL MEDICINE

## 2023-06-12 PROCEDURE — 94761 N-INVAS EAR/PLS OXIMETRY MLT: CPT

## 2023-06-12 PROCEDURE — 97530 THERAPEUTIC ACTIVITIES: CPT | Mod: CQ

## 2023-06-12 PROCEDURE — 81001 URINALYSIS AUTO W/SCOPE: CPT | Performed by: STUDENT IN AN ORGANIZED HEALTH CARE EDUCATION/TRAINING PROGRAM

## 2023-06-12 PROCEDURE — 25000003 PHARM REV CODE 250: Performed by: INTERNAL MEDICINE

## 2023-06-12 PROCEDURE — 97116 GAIT TRAINING THERAPY: CPT | Mod: CQ

## 2023-06-12 RX ORDER — POTASSIUM CHLORIDE 14.9 MG/ML
20 INJECTION INTRAVENOUS
Status: COMPLETED | OUTPATIENT
Start: 2023-06-12 | End: 2023-06-12

## 2023-06-12 RX ORDER — POTASSIUM CHLORIDE 7.45 MG/ML
10 INJECTION INTRAVENOUS
Status: DISCONTINUED | OUTPATIENT
Start: 2023-06-12 | End: 2023-06-12

## 2023-06-12 RX ORDER — DOCUSATE SODIUM 100 MG/1
100 CAPSULE, LIQUID FILLED ORAL DAILY
Status: DISCONTINUED | OUTPATIENT
Start: 2023-06-12 | End: 2023-06-19 | Stop reason: HOSPADM

## 2023-06-12 RX ORDER — ACETAMINOPHEN 325 MG/1
650 TABLET ORAL EVERY 6 HOURS PRN
Status: DISCONTINUED | OUTPATIENT
Start: 2023-06-12 | End: 2023-06-19 | Stop reason: HOSPADM

## 2023-06-12 RX ORDER — FUROSEMIDE 10 MG/ML
20 INJECTION INTRAMUSCULAR; INTRAVENOUS ONCE
Status: COMPLETED | OUTPATIENT
Start: 2023-06-12 | End: 2023-06-12

## 2023-06-12 RX ADMIN — ENALAPRILAT 1.25 MG: 2.5 INJECTION INTRAVENOUS at 12:06

## 2023-06-12 RX ADMIN — Medication 10 ML: at 12:06

## 2023-06-12 RX ADMIN — ASCORBIC ACID, VITAMIN A PALMITATE, CHOLECALCIFEROL, THIAMINE HYDROCHLORIDE, RIBOFLAVIN-5 PHOSPHATE SODIUM, PYRIDOXINE HYDROCHLORIDE, NIACINAMIDE, DEXPANTHENOL, ALPHA-TOCOPHEROL ACETATE, VITAMIN K1, FOLIC ACID, BIOTIN, CYANOCOBALAMIN: 200; 3300; 200; 6; 3.6; 6; 40; 15; 10; 150; 600; 60; 5 INJECTION, SOLUTION INTRAVENOUS at 05:06

## 2023-06-12 RX ADMIN — OXYCODONE HYDROCHLORIDE 10 MG: 10 TABLET ORAL at 01:06

## 2023-06-12 RX ADMIN — METHOCARBAMOL 500 MG: 500 TABLET ORAL at 08:06

## 2023-06-12 RX ADMIN — PANTOPRAZOLE SODIUM 40 MG: 40 INJECTION, POWDER, FOR SOLUTION INTRAVENOUS at 09:06

## 2023-06-12 RX ADMIN — SODIUM CHLORIDE, PRESERVATIVE FREE 10 ML: 5 INJECTION INTRAVENOUS at 11:06

## 2023-06-12 RX ADMIN — FUROSEMIDE 20 MG: 10 INJECTION, SOLUTION INTRAMUSCULAR; INTRAVENOUS at 12:06

## 2023-06-12 RX ADMIN — CLOPIDOGREL BISULFATE 75 MG: 75 TABLET ORAL at 09:06

## 2023-06-12 RX ADMIN — ENALAPRILAT 1.25 MG: 2.5 INJECTION INTRAVENOUS at 06:06

## 2023-06-12 RX ADMIN — POTASSIUM BICARBONATE 25 MEQ: 977.5 TABLET, EFFERVESCENT ORAL at 12:06

## 2023-06-12 RX ADMIN — SODIUM CHLORIDE, PRESERVATIVE FREE 10 ML: 5 INJECTION INTRAVENOUS at 06:06

## 2023-06-12 RX ADMIN — SODIUM CHLORIDE, PRESERVATIVE FREE 10 ML: 5 INJECTION INTRAVENOUS at 05:06

## 2023-06-12 RX ADMIN — ENALAPRILAT 1.25 MG: 2.5 INJECTION INTRAVENOUS at 05:06

## 2023-06-12 RX ADMIN — ACETAMINOPHEN 650 MG: 325 TABLET, FILM COATED ORAL at 01:06

## 2023-06-12 RX ADMIN — OXYCODONE HYDROCHLORIDE 10 MG: 10 TABLET ORAL at 06:06

## 2023-06-12 RX ADMIN — OXYCODONE HYDROCHLORIDE 10 MG: 10 TABLET ORAL at 12:06

## 2023-06-12 RX ADMIN — METHOCARBAMOL 500 MG: 500 TABLET ORAL at 09:06

## 2023-06-12 RX ADMIN — OXYCODONE HYDROCHLORIDE 10 MG: 10 TABLET ORAL at 08:06

## 2023-06-12 RX ADMIN — DOCUSATE SODIUM 100 MG: 100 CAPSULE, LIQUID FILLED ORAL at 12:06

## 2023-06-12 RX ADMIN — POTASSIUM CHLORIDE 20 MEQ: 14.9 INJECTION, SOLUTION INTRAVENOUS at 09:06

## 2023-06-12 RX ADMIN — METHOCARBAMOL 500 MG: 500 TABLET ORAL at 12:06

## 2023-06-12 RX ADMIN — POTASSIUM CHLORIDE 20 MEQ: 14.9 INJECTION, SOLUTION INTRAVENOUS at 06:06

## 2023-06-12 RX ADMIN — ENOXAPARIN SODIUM 60 MG: 80 INJECTION SUBCUTANEOUS at 07:06

## 2023-06-12 RX ADMIN — ENOXAPARIN SODIUM 60 MG: 80 INJECTION SUBCUTANEOUS at 05:06

## 2023-06-12 RX ADMIN — METHYLNALTREXONE BROMIDE 12 MG: 12 INJECTION, SOLUTION SUBCUTANEOUS at 12:06

## 2023-06-12 RX ADMIN — SODIUM CHLORIDE, PRESERVATIVE FREE 10 ML: 5 INJECTION INTRAVENOUS at 12:06

## 2023-06-12 RX ADMIN — METHOCARBAMOL 500 MG: 500 TABLET ORAL at 05:06

## 2023-06-12 NOTE — PROGRESS NOTES
[facility] Medicine Progress Note        Chief Complaint: Inpatient Follow-up for ex lap for SBO with resultant finding of intraabdominal adhesions s/p lysis, s/p evacuation of blood, s/p abdominal closure.    HPI: Patient is status post exploratory lap for lysis of adhesions;  2 days after surgery she was taken back to the OR secondary to falling H&H there was a lot of oozing and over a L of blood was evacuated from the belly in the belly was left open she then went back to the OR on 06/07 for abdominal closure.  Downgraded from the ICU on 06/07.      6-11-23: Patient resumed on full-dose Lovenox yesterday history of protein C deficiency.  She does have a DVT to the right upper extremity in the area of the right distal brachial vein.  Ultrasound of the right lower extremity was negative.  She complains of pain just generalized to her entire abdomen.  There is some bright red blood noted from the incision.  Still no bowel movement.  Labs pending    Interval Hx:   Patient complains of pain this morning mostly to the right hip and right lower quadrant.  She has not had a substantial bowel movement yet, she is passing gas.  Taking Percocet 10 every 6 hours.  She normally takes morphine 2 mg p.o. twice a day at home for chronic back pain.    Objective/physical exam:  General: In no acute distress, afebrile  Chest: Clear to auscultation bilaterally  Heart: RRR, +S1, S2, no appreciable murmur  Abdomen: Soft, +tender, distended, BS +  MSK: Warm,  right upper extremity with redness and warmth- improved,  right upper extremity is notably swollen when compared to the left  Neurologic: Alert and oriented;  no FND      VITAL SIGNS: 24 HRS MIN & MAX LAST   Temp  Min: 97.9 °F (36.6 °C)  Max: 101.8 °F (38.8 °C) 97.9 °F (36.6 °C)   BP  Min: 139/87  Max: 154/76 (!) 153/69   Pulse  Min: 68  Max: 88  68   Resp  Min: 18  Max: 18 18   SpO2  Min: 95 %  Max: 98 % 97 %       Recent Labs   Lab  06/10/23  0959 06/11/23  0943 06/12/23  0404   WBC 9.13 7.65 5.83   RBC 3.73* 3.51* 3.11*   HGB 11.4* 10.8* 9.5*   HCT 35.1* 32.4* 28.7*   MCV 94.1* 92.3 92.3   MCH 30.6 30.8 30.5   MCHC 32.5* 33.3 33.1   RDW 15.4 15.6 15.5    200 180   MPV 10.3 10.5* 10.6*       Recent Labs   Lab 06/05/23  1515 06/05/23  1559 06/05/23  1645 06/06/23  0210 06/07/23  0158 06/08/23  0141 06/09/23  0448 06/11/23 0943 06/12/23  0404   NA  --  139  --    < > 142   < > 144 141 141   K  --  3.0*  --    < > 3.6   < > 3.5 3.2* 3.1*   CO2  --  24  --    < > 28   < > 31 26 28   BUN  --  13.5  --    < > 13.3   < > 8.5* 4.0* 3.4*   CREATININE  --  0.72  --    < > 0.62   < > 0.53* 0.55 0.55   CALCIUM  --  9.3  --    < > 8.0*   < > 7.8* 7.7* 7.8*   PH 7.403  --  7.620*  --   --   --   --   --   --    MG  --  1.90  --   --  1.90  --   --   --   --    ALBUMIN  --   --   --    < > 2.4*   < > 2.2* 2.4* 2.3*   ALKPHOS  --   --   --    < > 40   < > 47 45 41   ALT  --   --   --    < > 239*   < > 93* 46 37   AST  --   --   --    < > 177*   < > 36* 27 21   BILITOT  --   --   --    < > 0.7   < > 0.6 1.3 1.2    < > = values in this interval not displayed.          Microbiology Results (last 7 days)       ** No results found for the last 168 hours. **             See below for Radiology    Scheduled Med:   clopidogreL  75 mg Oral Daily    enalaprilat  1.25 mg Intravenous Q6H    enoxparin  1 mg/kg (Dosing Weight) Subcutaneous Q12H (treatment, non-standard time)    methocarbamoL  500 mg Oral QID    pantoprazole  40 mg Intravenous Daily    potassium chloride  20 mEq Intravenous Q2H    sodium chloride 0.9%  10 mL Intravenous Q6H    timolol maleate 0.5%  1 drop Both Eyes Daily        Continuous Infusions:   dextrose 5% lactated ringers 75 mL/hr at 06/11/23 1127        PRN Meds:  sodium chloride, sodium chloride, sodium chloride, sodium chloride, acetaminophen, bisacodyL, dextrose 10%, dextrose 10%, glucagon (human recombinant), glucose, hydrALAZINE,  insulin aspart U-100, nitroGLYCERIN, oxyCODONE, oxyCODONE, senna, sodium chloride 0.9%, Flushing PICC Protocol **AND** sodium chloride 0.9% **AND** sodium chloride 0.9%       Assessment/Plan:  Patient Active Problem List   Diagnosis    Diabetes mellitus    Protein C deficiency    Recurrent deep vein thrombosis (DVT)    Acid reflux    Chronic lumbar pain    Hypertension    HLD (hyperlipidemia)    History of continuous positive airway pressure (CPAP) therapy at home    SBO (small bowel obstruction)      Receiving IV potassium this morning complaining of lot of pain and burning to that left arm where the potassium is infusing.  Consideration for potassium effervescent  Also no substantial bowel movement passing some gas bili is a little distended, KUB and chest x-ray pending.    Colace daily  Relistor x 1   Tolerating minimal clears she has no nausea; good BS on exam  Continue Lovenox will bridge back to Coumadin when okay with surgery  Will continue to follow along  Continued PT and OT    Patient condition:  Stable    All diagnosis and differential diagnosis have been reviewed; assessment and plan has been documented; I have personally reviewed the labs and test results that are presently available; I have reviewed the patients medication list; I have reviewed the consulting providers response and recommendations. I have reviewed or attempted to review medical records based upon their availability    All of the patient's questions have been  addressed and answered. Patient's is agreeable to the above stated plan. I will continue to monitor closely and make adjustments to medical management as needed.      CV Ultrasound doppler venous arm right  Positive deep vein thrombosis identified in the right distal brachial   vein.  All other vessels compressed and augmented well.  CV Ultrasound doppler venous legs bilat  There was no evidence of deep or superficial vein thrombosis in bilateral   lower extremities.  X-Ray  Abdomen AP 1 View  Narrative: EXAMINATION:  XR ABDOMEN AP 1 VIEW    CLINICAL HISTORY:  post op ileus;    TECHNIQUE:  AP View(s) of the abdomen was performed.    COMPARISON:  06/04/2023    FINDINGS:  Bibasilar atelectatic changes lungs with small effusion on the left.  Enteric tube is been removed.    Nonobstructive bowel gas pattern.    Postsurgical changes lower lumbar spine.  Vascular stent is identified on the left.  Thoracic nerve stimulator on the right.  Impression: Enteric tube has been removed.  Nonobstructive bowel gas pattern.  Small effusion on the left.    Electronically signed by: Luis Reeves MD  Date:    06/10/2023  Time:    11:25      Alex Treviño MD   06/12/2023

## 2023-06-12 NOTE — PROGRESS NOTES
Surgery Progress Note    S:  NAEO  AF -160  Ongoing pain, moderately well controlled  Voiding without issue  Passing some flatus, has not had any more BMs  No emesis, nausea with CLD  No CP, SOB    O:  Temp:  [97.9 °F (36.6 °C)-101.8 °F (38.8 °C)] 98.4 °F (36.9 °C)  Pulse:  [68-88] 68  Resp:  [18] 18  SpO2:  [95 %-98 %] 97 %  BP: (139-157)/(69-89) 157/80    Physical Exam:  Gen: NAD, resting comfortably  HEENT: Normocephalic, atrauamtic  CV: RRR, hypertensive  Resp: NWOB  Abd: soft, mildly distended, attp following surgery, incision well approximated, no drainage, no erythema   Ext: moving all extremities spontaneously and purposefully. RUE with some pain, edema, and erythema at previous IV site, resolving. Some erythema below L PICC site, minimal swelling, no pain  Neuro: CN II-XII grossly intact    Labs:  WBC 5 (7)  Hgb 9.5 (10.8 <-- 11.4)  Plt 180 (200)  INR 1.55    K 3.1  BUN 3.4  Cr 0.55    A/P:  72 y/o w/ hx ex lap several decades ago with h/o colostomy s/p reversal, Gtube and Jtube s/p removal, and chronic back pain on home morphine, now admitted 6/4 with SBO. Pt s/p exlap with lysis of adhesions 6/4. Taken back to the OR 6/5 for hypotension and post-op hemorrhage with evacuation of approximately 3L of blood and old clot, placement of temporary abdominal closure. S/p abdominal closure 6/7. Extubated post-op. Recovering slowly. NG removed 6/9.     - diurese today  - replace lytes  - febrile to 101.8, possibly due to known DVT but will order CXR and KUB to rule out other causes  - UA  - Multimodal pain control   - PT/OT     Tenisha Brand MD   [employer] [facility]

## 2023-06-12 NOTE — PROCEDURES
"Alejandrina Rodriguez is a 73 y.o. female patient.    Temp: 98.6 °F (37 °C) (06/12/23 1105)  Pulse: 71 (06/12/23 1105)  Resp: 18 (06/12/23 1338)  BP: (!) 159/86 (06/12/23 1227)  SpO2: 97 % (06/12/23 0733)  Weight: 65.5 kg (144 lb 8 oz) (06/12/23 1415)  Height: 5' 4" (162.6 cm) (06/04/23 0714)    PICC  Time out: Immediately prior to procedure a time out was called to verify the correct patient, procedure, equipment, support staff and site/side marked as required  Indications: med administration  Preparation: skin prepped with ChloraPrep  Skin prep agent dried: skin prep agent completely dried prior to procedure  Sterile barriers: all five maximum sterile barriers used - cap, mask, sterile gown, sterile gloves, and large sterile sheet  Hand hygiene: hand hygiene performed prior to central venous catheter insertion  Location details: left basilic  Catheter type: double lumen  Catheter size: 5 Fr  Catheter Length: 41cm    no        Name rosana  6/12/2023    "

## 2023-06-12 NOTE — PROGRESS NOTES
Inpatient Nutrition Assessment    Admit Date: 6/4/2023   Total duration of encounter: 8 days     Nutrition Recommendation/Prescription     Advance to low fiber diet when medically feasible. If advanced to clear or full liquids, recommend adding oral supplements.     Patient has been NPO/Clear liquids for 7 days. Surgical team, patient, and RD agreed to initiate parenteral nutrition. Monitor for signs and symptoms of refeedings syndrome (Hypophosphatemia, hypomagnesemia, hypokalemia, hyperglycemia). Ordered appropriate labs.   Standard Formula: Clinimix E 4.25/5  Custom Formula: not applicable  Additives: multivitamin with vitamin K and trace elements (Zn, Cu, Mn, Se)  Rate/Volume: 75 mL/hr  Lipids: none (starts tomorrow)  Total Nutrition Provided by Parenteral Nutrition Day 1:  Calories Provided  612 kcal/d, 44.6% needs   Protein Provided  77 g/d, 100% needs   Dextrose Provided  90 g/d, GIR 1.01 mg CHO/kg/min   Fluid Provided  1800 ml/d, 100% needs     When PICC line is placed and if patient is not showing signs of refeeding. Advance to Day 2:  Standard Formula: Clinimix E 5/15  Custom Formula: not applicable  Additives: multivitamin with vitamin K, trace elements (Zn, Cu, Mn, Se), folic acid, and thiamin  Rate/Volume: 55 mL/hr  Lipids: 250 ml 20% IV lipid emulsion three times per week  Total Nutrition Provided by Parenteral Nutrition:  Calories Provided  1151 kcal/d, 63-75% needs   Protein Provided  66 g/d, 77-90% needs   Dextrose Provided  198 g/d, GIR 2.25 mg CHO/kg/min   Fluid Provided  1320 ml/d, 72-86% needs     If patient showing electrolyte abnormalities; place consult for custom TPN goal:  Amino Acids 88 g  Dextrose 286-376 g dextrose  250 ml 20% IV lipid emulsion three times per week  100% of protein/calorie needs    Communication of Recommendations: reviewed with nurse    Nutrition Assessment     Malnutrition Assessment/Nutrition-Focused Physical Exam    Malnutrition Context: acute illness or  injury  Malnutrition Level: severe  Energy Intake (Malnutrition): less than or equal to 50% for greater than or equal to 5 days  Weight Loss (Malnutrition): other (see comments) (intentional weight loss)                                         Fluid Accumulation (Malnutrition): moderate (+2 right arm, +1 generalized)        A minimum of two characteristics is recommended for diagnosis of either severe or non-severe malnutrition.    Chart Review    Reason Seen: continuous nutrition monitoring    Malnutrition Screening Tool Results   Have you recently lost weight without trying?: No  Have you been eating poorly because of a decreased appetite?: No   MST Score: 0     Diagnosis:  Small Bowel Obstruction    Relevant Medical History: h/o colostomy s/p reversal, Gtube and Jtube s/p removal, and chronic back pain on home morphine, s/p exlap with lysis of adhesions 6/4    Nutrition-Related Medications: D5-LR @ 125ml/hr, SSI  Calorie Containing IV Medications: no significant kcals from medications at this time    Nutrition-Related Labs:  6/5/23: Glucose 194 & 308, Ca 7.2, HGB/HCT 6.9/21.4  6/8 Cl 110  Component      Latest Ref Rng & Units 6/12/2023           4:04 AM   Sodium      136 - 145 mmol/L 141   Potassium      3.5 - 5.1 mmol/L 3.1 (L)   Chloride      98 - 107 mmol/L 106   CO2      23 - 31 mmol/L 28   Glucose      82 - 115 mg/dL 131 (H)   BUN      9.8 - 20.1 mg/dL 3.4 (L)   Creatinine      0.55 - 1.02 mg/dL 0.55   Calcium      8.4 - 10.2 mg/dL 7.8 (L)   PROTEIN TOTAL      5.8 - 7.6 gm/dL 4.5 (L)   Albumin      3.4 - 4.8 g/dL 2.3 (L)   Globulin, Total      2.4 - 3.5 gm/dL 2.2 (L)   Albumin/Globulin Ratio      1.1 - 2.0 ratio 1.0 (L)   BILIRUBIN TOTAL      <=1.5 mg/dL 1.2   Alkaline Phosphatase      40 - 150 unit/L 41   ALT      0 - 55 unit/L 37   AST      5 - 34 unit/L 21   eGFR      mls/min/1.73/m2 >60       Diet/PN Order: Diet clear liquid  Amino acid 4.25% - dextrose 5% (CLINIMIX-E) solution (1L provides 42.5 gm AA,  "50 gm CHO (170 kcal/L dextrose), Na 35, K 30, Mg 5, Ca 4.5, Acetate 70, Cl 39, Phos 15)  Oral Supplement Order: none  Tube Feeding Order: none  Appetite/Oral Intake: NPO/NPO  Factors Affecting Nutritional Intake: NPO  Food/Lutheran/Cultural Preferences: none reported  Food Allergies: none reported    Skin Integrity: incision  Wound(s):   none    Comments  23: Pt reports good appetite and no issues until 23 when she lost her appetite due to nausea, vomiting, and abdominal pain which continue through . Chronic constipation for the past 20 years (on medications to have bowel movements). Back pain improved, abdominal pain persists. Pt purposefully lost 25 lbs (160 lbs to 135 lbs) over the past 6 months using portion control and starting probiotics, endorses feeling better at lower weight. Per EMR weight consistent over past three months 138 to 135 lbs. No signs of muscle or fat pad wasting. Endorsed abdominal pain during interview, will follow up for diet education.     23: Pt still NPO at this time. Awaiting return of bowel function prior to diet advancement (awaiting flatus.)    23: Pt tolerating clear liquids with mild nausea. Had first bowel movement today; pebbled solid and liquid. Denies vomiting. Patient has been NPO/Clear liquids for 7 days. Physical exam, +2 pitting edema in right arm, +1 generalized edema. Updated weight 144.5 lbs.  Surgical team, patient, and RD agreed to initiate parenteral nutrition.     Anthropometrics    Height: 5' 4" (162.6 cm)    Last Weight: 65.5 kg (144 lb 8 oz) (23 1415) Weight Method: Standard Scale  BMI (Calculated): 24.8  BMI Classification: normal (BMI 18.5-24.9)     Ideal Body Weight (IBW), Female: 120 lb     % Ideal Body Weight, Female (lb): 112.5 %                    Usual Body Weight (UBW), k.6 kg (for the past few months, previously 72.6 kg UBW but pt had intentional [facility]138 [facility])  % Usual Body Weight: " 104.92  % Weight Change From Usual Weight: 4.7 %  Usual Weight Provided By: patient    Wt Readings from Last 5 Encounters:   23 65.5 kg (144 lb 8 oz)   23 62.6 kg (138 lb)   23 62.6 kg (138 lb)   22 67.6 kg (149 lb)   22 70.7 kg (155 lb 12.8 oz)     Weight Change(s) Since Admission:  Admit Weight: 61.2 kg (135 lb) (23 0714)  Weight consistent over past 3 months.   23: 67.1kg    Estimated Needs    Weight Used For Calorie Calculations: 61.2 kg (134 lb 14.7 oz)  Energy Calorie Requirements (kcal): 9401-0340  Energy Need Method: Kcal/kg (25-30)  Weight Used For Protein Calculations: 61.2 kg (134 lb 14.7 oz)  Protein Requirements: 73-86gm (1.2-1.4g/kg)  Fluid Requirements (mL): 1530-1836ml (1ml/kcal)  Temp (24hrs), Av.3 °F (37.4 °C), Min:97.9 °F (36.6 °C), Max:101.8 °F (38.8 °C)       Enteral Nutrition    Patient not receiving enteral nutrition at this time.    Parenteral Nutrition    Patient not receiving parenteral nutrition support at this time.    Evaluation of Received Nutrient Intake    Calories: not meeting estimated needs  Protein: not meeting estimated needs    Patient Education    Education Provided:  parenteral  Teaching Method: explanation  Comprehension: verbalizes understanding  Barriers to Learning: none evident  Expected Compliance: good  Comments: All questions were answered and dietitian's contact information was provided.     Nutrition Diagnosis     PES: Inadequate energy intake related to  NPO due to abdominal pain and s/p exlap with lysis of adhesions 6/4 as evidenced by <25% of usual intake /2-3 and NPO /4-5. (continues)    PES: Malnutrition related to acute illness as evidenced by less than or equal to 50% needs met for greater than or equal to 5 days and edema. (new)      Interventions/Goals     Intervention(s): modified composition of meals/snacks and commercial beverage  Goal: Meet greater than 75% of nutritional needs by follow-up. (goal not  met)    Monitoring & Evaluation     Dietitian will monitor food and beverage intake, weight change, and gastrointestinal profile.  Nutrition Risk/Follow-Up: moderate (follow-up in 3-5 days)   Please consult if re-assessment needed sooner.

## 2023-06-12 NOTE — PT/OT/SLP PROGRESS
Physical Therapy Treatment    Patient Name:  Alejandrina Rodriguez   MRN:  [id number]    Recommendations:     Discharge Recommendations: home with home health  Discharge Equipment Recommendations: none  Barriers to discharge: None    Assessment:     Alejandrina Rodriguez is a 73 y.o. female admitted with a medical diagnosis of SBO (small bowel obstruction).  She presents with the following impairments/functional limitations: weakness, gait instability, impaired endurance, impaired balance, decreased lower extremity function, impaired functional mobility .    Rehab Prognosis: Good; patient would benefit from acute skilled PT services to address these deficits and reach maximum level of function.    Recent Surgery: Procedure(s) (LRB):  LAPAROTOMY, EXPLORATORY (N/A)  WASHOUT (N/A) 5 Days Post-Op    Plan:     During this hospitalization, patient to be seen 6 x/week to address the identified rehab impairments via gait training, therapeutic activities, therapeutic exercises and progress toward the following goals:    Plan of Care Expires:  07/08/23    Subjective     Chief Complaint: none   Patient/Family Comments/goals: Pt wants to return to PLOF  Pain/Comfort:  Pain Rating 1: 3/10  Location 1: abdomen  Pain Addressed 1: Reposition      Objective:     Communicated with NSG prior to session.  Patient found HOB elevated with blood pressure cuff, PRAFO, telemetry, peripheral IV upon PT entry to room.     General Precautions: Standard, fall  Orthopedic Precautions: N/A  Braces: N/A  Respiratory Status: Room air  Skin Integrity: Visible skin intact      Functional Mobility:  Bed Mobility:     Scooting: contact guard assistance and minimum assistance  Supine to Sit: contact guard assistance and minimum assistance  Sit to Supine: minimum assistance  Transfers:     Sit to Stand:  contact guard assistance with rolling walker  Bed to Chair: contact guard assistance with  rolling walker  using  Step Transfer  Toilet Transfer: contact guard  assistance with  rolling walker  using  Step Transfer  Gait: The pt ambulated 50 ft and short distances in pt room w/ RW CGA and verbal cues to improve motor planning for obstacle  and doorway negotion, turning in small spaces and safety awareness.     Therapeutic Activities/Exercises:  Pt performed sit to stand from various surfaces, working on loading legs and use of momentum to rise to stand.     Education:  Patient and spouse provided with verbal and demonstration education regarding safety with transfers, bed mobility strategies and safety during ambulation.  Understanding was verbalized, however additional teaching warranted.     Patient left HOB elevated with all lines intact, call button in reach, and spouse present..    GOALS:   Multidisciplinary Problems       Physical Therapy Goals          Problem: Physical Therapy    Goal Priority Disciplines Outcome Goal Variances Interventions   Physical Therapy Goal     PT, PT/OT Ongoing, Progressing     Description: Goals to be met by: 23     Patient will increase functional independence with mobility by performin. Supine to sit with Stand-by Assistance  2. Sit to supine with Stand-by Assistance  3. Sit to stand transfer with Stand-by Assistance  4. Gait  x 150 feet with Stand-by Assistance using Rolling Walker.                          Time Tracking:     PT Received On: 23  PT Start Time: 1358     PT Stop Time: 1422  PT Total Time (min): 24 min     Billable Minutes: Gait Training 12 and Therapeutic Activity 12    Treatment Type: Treatment  PT/PTA: PTA     Number of PTA visits since last PT visit: 3     2023

## 2023-06-13 LAB
ANION GAP SERPL CALC-SCNC: 7 MEQ/L
BUN SERPL-MCNC: 8.6 MG/DL (ref 9.8–20.1)
CALCIUM SERPL-MCNC: 8.1 MG/DL (ref 8.4–10.2)
CHLORIDE SERPL-SCNC: 106 MMOL/L (ref 98–107)
CO2 SERPL-SCNC: 29 MMOL/L (ref 23–31)
CREAT SERPL-MCNC: 0.54 MG/DL (ref 0.55–1.02)
CREAT/UREA NIT SERPL: 16
ERYTHROCYTE [DISTWIDTH] IN BLOOD BY AUTOMATED COUNT: 15.6 % (ref 11.5–17)
GFR SERPLBLD CREATININE-BSD FMLA CKD-EPI: >60 MLS/MIN/1.73/M2
GLUCOSE SERPL-MCNC: 110 MG/DL (ref 82–115)
HCT VFR BLD AUTO: 32.2 % (ref 37–47)
HGB BLD-MCNC: 10.3 G/DL (ref 12–16)
INR BLD: 1.32 (ref 0–1.3)
MAGNESIUM SERPL-MCNC: 1.8 MG/DL (ref 1.6–2.6)
MCH RBC QN AUTO: 29.9 PG (ref 27–31)
MCHC RBC AUTO-ENTMCNC: 32 G/DL (ref 33–36)
MCV RBC AUTO: 93.6 FL (ref 80–94)
NRBC BLD AUTO-RTO: 0 %
PHOSPHATE SERPL-MCNC: 3.1 MG/DL (ref 2.3–4.7)
PLATELET # BLD AUTO: 206 X10(3)/MCL (ref 130–400)
PMV BLD AUTO: 10.5 FL (ref 7.4–10.4)
POCT GLUCOSE: 103 MG/DL (ref 70–110)
POCT GLUCOSE: 114 MG/DL (ref 70–110)
POTASSIUM SERPL-SCNC: 3.5 MMOL/L (ref 3.5–5.1)
PREALB SERPL-MCNC: <3 MG/DL (ref 14–37)
PROTHROMBIN TIME: 16.2 SECONDS (ref 12.5–14.5)
RBC # BLD AUTO: 3.44 X10(6)/MCL (ref 4.2–5.4)
SODIUM SERPL-SCNC: 142 MMOL/L (ref 136–145)
TRIGL SERPL-MCNC: 80 MG/DL (ref 37–140)
WBC # SPEC AUTO: 6.54 X10(3)/MCL (ref 4.5–11.5)

## 2023-06-13 PROCEDURE — B4185 PARENTERAL SOL 10 GM LIPIDS: HCPCS | Performed by: STUDENT IN AN ORGANIZED HEALTH CARE EDUCATION/TRAINING PROGRAM

## 2023-06-13 PROCEDURE — 25000003 PHARM REV CODE 250: Performed by: INTERNAL MEDICINE

## 2023-06-13 PROCEDURE — A4216 STERILE WATER/SALINE, 10 ML: HCPCS | Performed by: STUDENT IN AN ORGANIZED HEALTH CARE EDUCATION/TRAINING PROGRAM

## 2023-06-13 PROCEDURE — 84100 ASSAY OF PHOSPHORUS: CPT | Performed by: STUDENT IN AN ORGANIZED HEALTH CARE EDUCATION/TRAINING PROGRAM

## 2023-06-13 PROCEDURE — 83735 ASSAY OF MAGNESIUM: CPT | Performed by: STUDENT IN AN ORGANIZED HEALTH CARE EDUCATION/TRAINING PROGRAM

## 2023-06-13 PROCEDURE — 99233 SBSQ HOSP IP/OBS HIGH 50: CPT | Mod: 95,,, | Performed by: INTERNAL MEDICINE

## 2023-06-13 PROCEDURE — 25000003 PHARM REV CODE 250: Performed by: STUDENT IN AN ORGANIZED HEALTH CARE EDUCATION/TRAINING PROGRAM

## 2023-06-13 PROCEDURE — 25000003 PHARM REV CODE 250: Performed by: SURGERY

## 2023-06-13 PROCEDURE — 85027 COMPLETE CBC AUTOMATED: CPT | Performed by: STUDENT IN AN ORGANIZED HEALTH CARE EDUCATION/TRAINING PROGRAM

## 2023-06-13 PROCEDURE — 84478 ASSAY OF TRIGLYCERIDES: CPT | Performed by: STUDENT IN AN ORGANIZED HEALTH CARE EDUCATION/TRAINING PROGRAM

## 2023-06-13 PROCEDURE — 63600175 PHARM REV CODE 636 W HCPCS: Performed by: STUDENT IN AN ORGANIZED HEALTH CARE EDUCATION/TRAINING PROGRAM

## 2023-06-13 PROCEDURE — 94761 N-INVAS EAR/PLS OXIMETRY MLT: CPT

## 2023-06-13 PROCEDURE — 63600175 PHARM REV CODE 636 W HCPCS: Performed by: INTERNAL MEDICINE

## 2023-06-13 PROCEDURE — 11000001 HC ACUTE MED/SURG PRIVATE ROOM

## 2023-06-13 PROCEDURE — 80048 BASIC METABOLIC PNL TOTAL CA: CPT | Performed by: STUDENT IN AN ORGANIZED HEALTH CARE EDUCATION/TRAINING PROGRAM

## 2023-06-13 PROCEDURE — 99233 PR SUBSEQUENT HOSPITAL CARE,LEVL III: ICD-10-PCS | Mod: 95,,, | Performed by: INTERNAL MEDICINE

## 2023-06-13 PROCEDURE — 84134 ASSAY OF PREALBUMIN: CPT | Performed by: STUDENT IN AN ORGANIZED HEALTH CARE EDUCATION/TRAINING PROGRAM

## 2023-06-13 PROCEDURE — C9113 INJ PANTOPRAZOLE SODIUM, VIA: HCPCS | Performed by: INTERNAL MEDICINE

## 2023-06-13 PROCEDURE — 85610 PROTHROMBIN TIME: CPT | Performed by: STUDENT IN AN ORGANIZED HEALTH CARE EDUCATION/TRAINING PROGRAM

## 2023-06-13 RX ORDER — POTASSIUM CHLORIDE 20 MEQ/1
20 TABLET, EXTENDED RELEASE ORAL 2 TIMES DAILY
Status: DISPENSED | OUTPATIENT
Start: 2023-06-13 | End: 2023-06-15

## 2023-06-13 RX ORDER — POLYETHYLENE GLYCOL 3350 17 G/17G
17 POWDER, FOR SOLUTION ORAL DAILY
Status: DISCONTINUED | OUTPATIENT
Start: 2023-06-13 | End: 2023-06-15

## 2023-06-13 RX ORDER — MORPHINE SULFATE 1 MG/ML
INJECTION INTRAVENOUS CONTINUOUS
Status: DISCONTINUED | OUTPATIENT
Start: 2023-06-13 | End: 2023-06-16

## 2023-06-13 RX ORDER — SENNOSIDES 8.6 MG/1
8.6 TABLET ORAL DAILY
Status: DISCONTINUED | OUTPATIENT
Start: 2023-06-14 | End: 2023-06-16

## 2023-06-13 RX ORDER — MAGNESIUM SULFATE HEPTAHYDRATE 40 MG/ML
2 INJECTION, SOLUTION INTRAVENOUS ONCE
Status: COMPLETED | OUTPATIENT
Start: 2023-06-13 | End: 2023-06-13

## 2023-06-13 RX ORDER — NALOXONE HCL 0.4 MG/ML
0.02 VIAL (ML) INJECTION
Status: DISCONTINUED | OUTPATIENT
Start: 2023-06-13 | End: 2023-06-19 | Stop reason: HOSPADM

## 2023-06-13 RX ADMIN — METHOCARBAMOL 500 MG: 500 TABLET ORAL at 09:06

## 2023-06-13 RX ADMIN — SODIUM CHLORIDE, PRESERVATIVE FREE 10 ML: 5 INJECTION INTRAVENOUS at 05:06

## 2023-06-13 RX ADMIN — POTASSIUM CHLORIDE 20 MEQ: 1500 TABLET, EXTENDED RELEASE ORAL at 09:06

## 2023-06-13 RX ADMIN — METHOCARBAMOL 500 MG: 500 TABLET ORAL at 08:06

## 2023-06-13 RX ADMIN — CLOPIDOGREL BISULFATE 75 MG: 75 TABLET ORAL at 08:06

## 2023-06-13 RX ADMIN — MORPHINE SULFATE: 1 INJECTION INTRAVENOUS at 11:06

## 2023-06-13 RX ADMIN — SODIUM CHLORIDE, PRESERVATIVE FREE 10 ML: 5 INJECTION INTRAVENOUS at 01:06

## 2023-06-13 RX ADMIN — ASCORBIC ACID, VITAMIN A PALMITATE, CHOLECALCIFEROL, THIAMINE HYDROCHLORIDE, RIBOFLAVIN-5 PHOSPHATE SODIUM, PYRIDOXINE HYDROCHLORIDE, NIACINAMIDE, DEXPANTHENOL, ALPHA-TOCOPHEROL ACETATE, VITAMIN K1, FOLIC ACID, BIOTIN, CYANOCOBALAMIN: 200; 3300; 200; 6; 3.6; 6; 40; 15; 10; 150; 600; 60; 5 INJECTION, SOLUTION INTRAVENOUS at 09:06

## 2023-06-13 RX ADMIN — OXYCODONE HYDROCHLORIDE 10 MG: 10 TABLET ORAL at 08:06

## 2023-06-13 RX ADMIN — MAGNESIUM SULFATE HEPTAHYDRATE 2 G: 40 INJECTION, SOLUTION INTRAVENOUS at 11:06

## 2023-06-13 RX ADMIN — TIMOLOL MALEATE 1 DROP: 5 SOLUTION/ DROPS OPHTHALMIC at 05:06

## 2023-06-13 RX ADMIN — ENALAPRILAT 1.25 MG: 2.5 INJECTION INTRAVENOUS at 01:06

## 2023-06-13 RX ADMIN — ENOXAPARIN SODIUM 60 MG: 80 INJECTION SUBCUTANEOUS at 05:06

## 2023-06-13 RX ADMIN — ENALAPRILAT 1.25 MG: 2.5 INJECTION INTRAVENOUS at 05:06

## 2023-06-13 RX ADMIN — POTASSIUM BICARBONATE 25 MEQ: 977.5 TABLET, EFFERVESCENT ORAL at 08:06

## 2023-06-13 RX ADMIN — I.V. FAT EMULSION 250 ML: 20 EMULSION INTRAVENOUS at 09:06

## 2023-06-13 RX ADMIN — POLYETHYLENE GLYCOL 3350 17 G: 17 POWDER, FOR SOLUTION ORAL at 05:06

## 2023-06-13 RX ADMIN — PANTOPRAZOLE SODIUM 40 MG: 40 INJECTION, POWDER, FOR SOLUTION INTRAVENOUS at 08:06

## 2023-06-13 RX ADMIN — ACETAMINOPHEN 650 MG: 325 TABLET, FILM COATED ORAL at 07:06

## 2023-06-13 RX ADMIN — METHOCARBAMOL 500 MG: 500 TABLET ORAL at 01:06

## 2023-06-13 RX ADMIN — DOCUSATE SODIUM 100 MG: 100 CAPSULE, LIQUID FILLED ORAL at 08:06

## 2023-06-13 RX ADMIN — METHYLNALTREXONE BROMIDE 12 MG: 12 INJECTION, SOLUTION SUBCUTANEOUS at 05:06

## 2023-06-13 RX ADMIN — OXYCODONE HYDROCHLORIDE 10 MG: 10 TABLET ORAL at 02:06

## 2023-06-13 RX ADMIN — METHOCARBAMOL 500 MG: 500 TABLET ORAL at 05:06

## 2023-06-13 RX ADMIN — ASCORBIC ACID, VITAMIN A PALMITATE, CHOLECALCIFEROL, THIAMINE HYDROCHLORIDE, RIBOFLAVIN-5 PHOSPHATE SODIUM, PYRIDOXINE HYDROCHLORIDE, NIACINAMIDE, DEXPANTHENOL, ALPHA-TOCOPHEROL ACETATE, VITAMIN K1, FOLIC ACID, BIOTIN, CYANOCOBALAMIN: 200; 3300; 200; 6; 3.6; 6; 40; 15; 10; 150; 600; 60; 5 INJECTION, SOLUTION INTRAVENOUS at 08:06

## 2023-06-13 NOTE — PROGRESS NOTES
Surgery Progress Note    S:  NAEO  AF -160  Ongoing pain, better controlled  Voiding without issue  Passing some flatus, had a small BM last night  No emesis, nausea with CLD  No CP, SOB    O:  Temp:  [98.1 °F (36.7 °C)-98.6 °F (37 °C)] 98.2 °F (36.8 °C)  Pulse:  [67-76] 76  Resp:  [18-20] 18  SpO2:  [95 %-97 %] 95 %  BP: (157-163)/(78-89) 160/82    Physical Exam:  Gen: NAD, resting comfortably  HEENT: Normocephalic, atrauamtic  CV: RRR, hypertensive  Resp: NWOB  Abd: soft, mildly distended but improved from previous exam, attp following surgery, incision well approximated, no drainage, no erythema   Ext: moving all extremities spontaneously and purposefully. RUE with minimal pain, edema, and erythema at previous IV site, resolving. Some erythema below L PICC site, minimal swelling, no pain  Neuro: CN II-XII grossly intact    Labs:  Prealb 3  WBC 6  H/H 10.3/32    A/P:  74 y/o w/ hx ex lap several decades ago with h/o colostomy s/p reversal, Gtube and Jtube s/p removal, and chronic back pain on home morphine, now admitted 6/4 with SBO. Pt s/p exlap with lysis of adhesions 6/4. Taken back to the OR 6/5 for hypotension and post-op hemorrhage with evacuation of approximately 3L of blood and old clot, placement of temporary abdominal closure. S/p abdominal closure 6/7. Extubated post-op. Recovering slowly. NG removed 6/9.     - remains afebrile  - will start TPN for nutritional supplementation  - ok to start coumadin 6/14 (POD 7)  - PCA for pain control  - continue bowel reg  - Multimodal pain control   - PT/OT     Tenisha Brand MD   [employer]

## 2023-06-13 NOTE — PT/OT/SLP PROGRESS
Attempted to see pt for PT tx. Pt declined tx session at this time 2/2 having increased pain/fatigue. Pt stating she has been mobilizing with NSG staff and family members to bathroom. Pt nurse and family there to confirm this. PT to f/u as schedule allows.

## 2023-06-13 NOTE — PT/OT/SLP PROGRESS
Attempted to see pt again for PT tx. Pt stating she already amb in hallway with family assist. Spoke with supervising PT in regards to singing off pt's case 2/2 pt mobilizing multiple times a day with family and NSG assist. Pt family in agreement of this. time spent: [id number].

## 2023-06-13 NOTE — PROGRESS NOTES
[facility] Medicine Progress Note        Chief Complaint: Inpatient Follow-up for  postop, SBO    HPI:  see full H&P    Interval Hx:   No acute issues overnight patient had a bowel movement yesterday after her Relistor injection.  Her only complaint is pain    Objective/physical exam:  General: In no acute distress, afebrile  Chest: Clear to auscultation bilaterally  Heart: RRR, +S1, S2, no appreciable murmur  Abdomen: Soft, nontender, BS +,  midline incision is healing well  MSK: Warm, no lower extremity edema, no clubbing or cyanosis  Neurologic: Alert and oriented,  no focal neurological deficit      VITAL SIGNS: 24 HRS MIN & MAX LAST   Temp  Min: 98.1 °F (36.7 °C)  Max: 98.6 °F (37 °C) 98.4 °F (36.9 °C)   BP  Min: 148/81  Max: 163/89 (!) 148/81   Pulse  Min: 67  Max: 76  74   Resp  Min: 18  Max: 20 18   SpO2  Min: 94 %  Max: 98 % 98 %       Recent Labs   Lab 06/11/23  0943 06/12/23  0404 06/13/23  0629   WBC 7.65 5.83 6.54   RBC 3.51* 3.11* 3.44*   HGB 10.8* 9.5* 10.3*   HCT 32.4* 28.7* 32.2*   MCV 92.3 92.3 93.6   MCH 30.8 30.5 29.9   MCHC 33.3 33.1 32.0*   RDW 15.6 15.5 15.6    180 206   MPV 10.5* 10.6* 10.5*       Recent Labs   Lab 06/07/23  0158 06/08/23  0141 06/09/23  0448 06/11/23  0943 06/12/23  0404 06/12/23  1453 06/13/23  0409 06/13/23  0629      < > 144 141 141 142  --  142   K 3.6   < > 3.5 3.2* 3.1* 4.0  --  3.5   CO2 28   < > 31 26 28 28  --  29   BUN 13.3   < > 8.5* 4.0* 3.4* 5.5*  --  8.6*   CREATININE 0.62   < > 0.53* 0.55 0.55 0.58  --  0.54*   CALCIUM 8.0*   < > 7.8* 7.7* 7.8* 8.6  --  8.1*   MG 1.90  --   --   --   --   --  1.80  --    ALBUMIN 2.4*   < > 2.2* 2.4* 2.3*  --   --   --    ALKPHOS 40   < > 47 45 41  --   --   --    *   < > 93* 46 37  --   --   --    *   < > 36* 27 21  --   --   --    BILITOT 0.7   < > 0.6 1.3 1.2  --   --   --     < > = values in this interval not displayed.          Microbiology Results (last 7  days)       ** No results found for the last 168 hours. **             See below for Radiology    Scheduled Med:   clopidogreL  75 mg Oral Daily    docusate sodium  100 mg Oral Daily    enalaprilat  1.25 mg Intravenous Q6H    enoxparin  1 mg/kg (Dosing Weight) Subcutaneous Q12H (treatment, non-standard time)    fat emulsion 20%  250 mL Intravenous Every Tues, Thurs, Sat    methocarbamoL  500 mg Oral QID    pantoprazole  40 mg Intravenous Daily    potassium bicarbonate  25 mEq Oral Daily    sodium chloride 0.9%  10 mL Intravenous Q6H    timolol maleate 0.5%  1 drop Both Eyes Daily        Continuous Infusions:   Amino acid 4.25% - dextrose 5% (CLINIMIX-E) solution (1L provides 42.5 gm AA, 50 gm CHO (170 kcal/L dextrose), Na 35, K 30, Mg 5, Ca 4.5, Acetate 70, Cl 39, Phos 15) 75 mL/hr at 06/12/23 1708        PRN Meds:  sodium chloride, sodium chloride, sodium chloride, sodium chloride, acetaminophen, bisacodyL, dextrose 10%, dextrose 10%, glucagon (human recombinant), glucose, hydrALAZINE, insulin aspart U-100, nitroGLYCERIN, oxyCODONE, senna, sodium chloride 0.9%, Flushing PICC Protocol **AND** sodium chloride 0.9% **AND** sodium chloride 0.9%       Assessment/Plan:  Patient Active Problem List   Diagnosis    Diabetes mellitus    Protein C deficiency    Recurrent deep vein thrombosis (DVT)    Acid reflux    Chronic lumbar pain    Hypertension    HLD (hyperlipidemia)    History of continuous positive airway pressure (CPAP) therapy at home    SBO (small bowel obstruction)     I would resume her morphine 2 mg p.o. b.I.d. and then she can have the Percocet twice a day for breakthrough- of course we are not the attending so I will default this to surgery but that would be my suggestion.  Otherwise advance her diet as tolerated and continue good bowel habits and bowel regimen and hopefully she can be out of here in the next few days    Patient condition:  Stable    All diagnosis and differential diagnosis have been  reviewed; assessment and plan has been documented; I have personally reviewed the labs and test results that are presently available; I have reviewed the patients medication list; I have reviewed the consulting providers response and recommendations. I have reviewed or attempted to review medical records based upon their availability    All of the patient's questions have been  addressed and answered. Patient's is agreeable to the above stated plan. I will continue to monitor closely and make adjustments to medical management as needed.      Nutrition Status:      X-Ray Chest 1 View  Narrative: EXAMINATION:  XR CHEST 1 VIEW    CLINICAL HISTORY:  picc;    TECHNIQUE:  AP chest    COMPARISON:  Chest x-ray dated 06/12/2023    FINDINGS:  Left-sided PICC line has its tip over the superior vena cava.  The heart is normal in size.  There are small bilateral pleural effusions.  There is no definite visible pneumothorax.  Impression: Left-sided PICC line with tip over the superior vena cava.    Electronically signed by: Ana M Ward  Date:    06/12/2023  Time:    16:50  X-Ray Abdomen AP 1 View  Narrative: EXAMINATION:  XR ABDOMEN AP 1 VIEW    CLINICAL HISTORY:  ileus;    TECHNIQUE:  AP X-RAY OF THE ABDOMEN:    CPT 03126    FINDINGS:  Examination reveals some residual feces throughout the colon gas pattern is nonspecific with no clear evidence of ileus or obstruction no abnormal masses or calcifications identified no significant change as compared with the previous exam of Mely 10, 2023.    Simulated device with leads identified projecting over the lower thoracic spine.    There are surgical changes of the lumbosacral spine and a vascular stent identified in the left hemipelvis  Impression: Nonspecific gas pattern.    No significant change    Electronically signed by: Enio Chamorro  Date:    06/12/2023  Time:    10:49  X-Ray Chest 1 View  Narrative: EXAMINATION:  XR CHEST 1 VIEW    CPT 97712    CLINICAL  HISTORY:  fever;    COMPARISON:  May 20, 2022    FINDINGS:  Examination reveals mediastinal silhouette to be within normal limits cardiac silhouette is not enlarged right lung field is clear and free of gross infiltrates atelectasis or effusions.  There is blunting of the left costophrenic angle indicating the presence of left-sided pleural effusion.    Midline is identified tip in the axillary region  Impression: Changes suggestive of left-sided pleural effusion.    Midline as above    Electronically signed by: Enio Chamorro  Date:    06/12/2023  Time:    08:52      Alex Treviño MD   06/13/2023

## 2023-06-13 NOTE — PROGRESS NOTES
[facility] Electrolyte Monitoring     Recent Labs   Lab 06/07/23  0158 06/08/23  0141 06/09/23  0448 06/11/23  0943 06/12/23  0404 06/12/23  1453 06/13/23  0409 06/13/23  0629    143 144 141 141 142  --  142   K 3.6 4.5 3.5 3.2* 3.1* 4.0  --  3.5   CALCIUM 8.0* 8.1* 7.8* 7.7* 7.8* 8.6  --  8.1*   PHOS 3.4  --   --   --   --   --  3.1  --    MG 1.90  --   --   --   --   --  1.80  --    CREATININE 0.62 0.55 0.53* 0.55 0.55 0.58  --  0.54*   CO2 28 24 31 26 28 28  --  29   BUN 13.3 13.0 8.5* 4.0* 3.4* 5.5*  --  8.6*   GLUCOSE 88 99 147* 134* 131* 105  --  110   ALBUMIN 2.4* 2.5* 2.2* 2.4* 2.3*  --   --   --    BILITOT 0.7 0.6 0.6 1.3 1.2  --   --   --    ALKPHOS 40 49 47 45 41  --   --   --    * 173* 93* 46 37  --   --   --    * 92* 36* 27 21  --   --   --        TPN rate: 55 mL/hr    Lipids: 3x per week (TTa)    Supplemental electrolytes  N/A    Notes  Daily CMP, Mag, and Phos labs ordered and active till 6/19

## 2023-06-14 LAB
ALBUMIN SERPL-MCNC: 2.3 G/DL (ref 3.4–4.8)
ALBUMIN/GLOB SERPL: 0.8 RATIO (ref 1.1–2)
ALP SERPL-CCNC: 44 UNIT/L (ref 40–150)
ALT SERPL-CCNC: 27 UNIT/L (ref 0–55)
AST SERPL-CCNC: 20 UNIT/L (ref 5–34)
BILIRUBIN DIRECT+TOT PNL SERPL-MCNC: 0.7 MG/DL
BUN SERPL-MCNC: 10.2 MG/DL (ref 9.8–20.1)
CALCIUM SERPL-MCNC: 7.7 MG/DL (ref 8.4–10.2)
CHLORIDE SERPL-SCNC: 111 MMOL/L (ref 98–107)
CO2 SERPL-SCNC: 24 MMOL/L (ref 23–31)
CREAT SERPL-MCNC: 0.6 MG/DL (ref 0.55–1.02)
GFR SERPLBLD CREATININE-BSD FMLA CKD-EPI: >60 MLS/MIN/1.73/M2
GLOBULIN SER-MCNC: 3 GM/DL (ref 2.4–3.5)
GLUCOSE SERPL-MCNC: 149 MG/DL (ref 82–115)
INR BLD: 1.12 (ref 0–1.3)
MAGNESIUM SERPL-MCNC: 2.2 MG/DL (ref 1.6–2.6)
PHOSPHATE SERPL-MCNC: 3.2 MG/DL (ref 2.3–4.7)
POCT GLUCOSE: 119 MG/DL (ref 70–110)
POCT GLUCOSE: 154 MG/DL (ref 70–110)
POCT GLUCOSE: 95 MG/DL (ref 70–110)
POTASSIUM SERPL-SCNC: 3.8 MMOL/L (ref 3.5–5.1)
PROT SERPL-MCNC: 5.3 GM/DL (ref 5.8–7.6)
PROTHROMBIN TIME: 14.3 SECONDS (ref 12.5–14.5)
SODIUM SERPL-SCNC: 143 MMOL/L (ref 136–145)

## 2023-06-14 PROCEDURE — 63600175 PHARM REV CODE 636 W HCPCS: Performed by: STUDENT IN AN ORGANIZED HEALTH CARE EDUCATION/TRAINING PROGRAM

## 2023-06-14 PROCEDURE — 25000003 PHARM REV CODE 250: Performed by: INTERNAL MEDICINE

## 2023-06-14 PROCEDURE — 63600175 PHARM REV CODE 636 W HCPCS: Performed by: INTERNAL MEDICINE

## 2023-06-14 PROCEDURE — 99233 PR SUBSEQUENT HOSPITAL CARE,LEVL III: ICD-10-PCS | Mod: 95,,, | Performed by: INTERNAL MEDICINE

## 2023-06-14 PROCEDURE — 25000003 PHARM REV CODE 250: Performed by: STUDENT IN AN ORGANIZED HEALTH CARE EDUCATION/TRAINING PROGRAM

## 2023-06-14 PROCEDURE — 84100 ASSAY OF PHOSPHORUS: CPT | Performed by: STUDENT IN AN ORGANIZED HEALTH CARE EDUCATION/TRAINING PROGRAM

## 2023-06-14 PROCEDURE — 85610 PROTHROMBIN TIME: CPT | Performed by: INTERNAL MEDICINE

## 2023-06-14 PROCEDURE — C9113 INJ PANTOPRAZOLE SODIUM, VIA: HCPCS | Performed by: INTERNAL MEDICINE

## 2023-06-14 PROCEDURE — 11000001 HC ACUTE MED/SURG PRIVATE ROOM

## 2023-06-14 PROCEDURE — 99233 SBSQ HOSP IP/OBS HIGH 50: CPT | Mod: 95,,, | Performed by: INTERNAL MEDICINE

## 2023-06-14 PROCEDURE — 80053 COMPREHEN METABOLIC PANEL: CPT | Performed by: SURGERY

## 2023-06-14 PROCEDURE — 94761 N-INVAS EAR/PLS OXIMETRY MLT: CPT

## 2023-06-14 PROCEDURE — A4216 STERILE WATER/SALINE, 10 ML: HCPCS | Performed by: STUDENT IN AN ORGANIZED HEALTH CARE EDUCATION/TRAINING PROGRAM

## 2023-06-14 PROCEDURE — 83735 ASSAY OF MAGNESIUM: CPT | Performed by: STUDENT IN AN ORGANIZED HEALTH CARE EDUCATION/TRAINING PROGRAM

## 2023-06-14 RX ORDER — ENOXAPARIN SODIUM 100 MG/ML
1 INJECTION SUBCUTANEOUS EVERY 12 HOURS
Status: DISCONTINUED | OUTPATIENT
Start: 2023-06-14 | End: 2023-06-19 | Stop reason: HOSPADM

## 2023-06-14 RX ORDER — ENOXAPARIN SODIUM 100 MG/ML
1 INJECTION SUBCUTANEOUS EVERY 12 HOURS
Status: DISCONTINUED | OUTPATIENT
Start: 2023-06-14 | End: 2023-06-14

## 2023-06-14 RX ORDER — WARFARIN 2 MG/1
4 TABLET ORAL DAILY
Status: DISCONTINUED | OUTPATIENT
Start: 2023-06-14 | End: 2023-06-16

## 2023-06-14 RX ADMIN — TIMOLOL MALEATE 1 DROP: 5 SOLUTION/ DROPS OPHTHALMIC at 04:06

## 2023-06-14 RX ADMIN — ENALAPRILAT 1.25 MG: 2.5 INJECTION INTRAVENOUS at 05:06

## 2023-06-14 RX ADMIN — ENOXAPARIN SODIUM 60 MG: 80 INJECTION SUBCUTANEOUS at 05:06

## 2023-06-14 RX ADMIN — PANTOPRAZOLE SODIUM 40 MG: 40 INJECTION, POWDER, FOR SOLUTION INTRAVENOUS at 10:06

## 2023-06-14 RX ADMIN — MORPHINE SULFATE: 1 INJECTION INTRAVENOUS at 03:06

## 2023-06-14 RX ADMIN — SODIUM CHLORIDE, PRESERVATIVE FREE 10 ML: 5 INJECTION INTRAVENOUS at 12:06

## 2023-06-14 RX ADMIN — ENOXAPARIN SODIUM 60 MG: 80 INJECTION SUBCUTANEOUS at 09:06

## 2023-06-14 RX ADMIN — SENNOSIDES 8.6 MG: 8.6 TABLET, FILM COATED ORAL at 10:06

## 2023-06-14 RX ADMIN — Medication 10 ML: at 12:06

## 2023-06-14 RX ADMIN — WARFARIN SODIUM 4 MG: 2 TABLET ORAL at 04:06

## 2023-06-14 RX ADMIN — ENALAPRILAT 1.25 MG: 2.5 INJECTION INTRAVENOUS at 12:06

## 2023-06-14 RX ADMIN — METHYLNALTREXONE BROMIDE 12 MG: 12 INJECTION, SOLUTION SUBCUTANEOUS at 10:06

## 2023-06-14 RX ADMIN — BISACODYL 10 MG: 10 SUPPOSITORY RECTAL at 09:06

## 2023-06-14 RX ADMIN — CLOPIDOGREL BISULFATE 75 MG: 75 TABLET ORAL at 10:06

## 2023-06-14 RX ADMIN — METHOCARBAMOL 500 MG: 500 TABLET ORAL at 08:06

## 2023-06-14 RX ADMIN — DOCUSATE SODIUM 100 MG: 100 CAPSULE, LIQUID FILLED ORAL at 10:06

## 2023-06-14 RX ADMIN — POTASSIUM CHLORIDE 20 MEQ: 1500 TABLET, EXTENDED RELEASE ORAL at 08:06

## 2023-06-14 RX ADMIN — POTASSIUM CHLORIDE 20 MEQ: 1500 TABLET, EXTENDED RELEASE ORAL at 10:06

## 2023-06-14 RX ADMIN — METHOCARBAMOL 500 MG: 500 TABLET ORAL at 04:06

## 2023-06-14 RX ADMIN — SODIUM CHLORIDE, PRESERVATIVE FREE 10 ML: 5 INJECTION INTRAVENOUS at 05:06

## 2023-06-14 RX ADMIN — METHOCARBAMOL 500 MG: 500 TABLET ORAL at 12:06

## 2023-06-14 RX ADMIN — POLYETHYLENE GLYCOL 3350 17 G: 17 POWDER, FOR SOLUTION ORAL at 10:06

## 2023-06-14 RX ADMIN — METHOCARBAMOL 500 MG: 500 TABLET ORAL at 10:06

## 2023-06-14 NOTE — PLAN OF CARE
Problem: Physical Therapy  Goal: Physical Therapy Goal  Description: Goals to be met by: 23     Patient will increase functional independence with mobility by performin. Supine to sit with Stand-by Assistance  2. Sit to supine with Stand-by Assistance  3. Sit to stand transfer with Stand-by Assistance  4. Gait  x 150 feet with Stand-by Assistance using Rolling Walker.     Outcome: Met

## 2023-06-14 NOTE — PROGRESS NOTES
[facility] Medicine Progress Note        Chief Complaint: Inpatient Follow-up for ex lap for SBO with resultant finding of intraabdominal adhesions s/p lysis, s/p evacuation of blood, s/p abdominal closure.    HPI:   Patient is status post exploratory lap for lysis of adhesions;  2 days after surgery she was taken back to the OR secondary to falling H&H there was a lot of oozing and over a L of blood was evacuated from the belly in the belly was left open she then went back to the OR on 06/07 for abdominal closure.  Downgraded from the ICU on 06/07.       6-11-23: Patient resumed on full-dose Lovenox yesterday history of protein C deficiency.  She does have a DVT to the right upper extremity in the area of the right distal brachial vein.  Ultrasound of the right lower extremity was negative.  She complains of pain just generalized to her entire abdomen.  There is some bright red blood noted from the incision.  Still no bowel movement.  Labs pending     6-11-23 Patient complains of pain this morning mostly to the right hip and right lower quadrant.  She has not had a substantial bowel movement yet, she is passing gas.  Taking Percocet 10 every 6 hours.     6-13-23: BM with relistor      Interval Hx:   Pain is well controlled on morphine PCA  Patient takes chronic Morphine 15mg- 1/2 tablet twice a day at home for chornic back pain.  Had a good BM yesterday  No acute issues    Objective/physical exam:  General: In no acute distress, afebrile  Chest: Clear to auscultation bilaterally  Heart: RRR, +S1, S2, no appreciable murmur  Abdomen: Soft, +tender, distended, BS +  MSK: Warm,  right upper extremity with redness and warmth- improved,    Neurologic: Alert and oriented;  no FND    VITAL SIGNS: 24 HRS MIN & MAX LAST   Temp  Min: 98.2 °F (36.8 °C)  Max: 99.3 °F (37.4 °C) 98.2 °F (36.8 °C)   BP  Min: 124/76  Max: 148/81 125/78   Pulse  Min: 64  Max: 81  81   Resp  Min: 16  Max: 20 20    SpO2  Min: 97 %  Max: 98 % 98 %       Recent Labs   Lab 06/11/23  0943 06/12/23  0404 06/13/23  0629   WBC 7.65 5.83 6.54   RBC 3.51* 3.11* 3.44*   HGB 10.8* 9.5* 10.3*   HCT 32.4* 28.7* 32.2*   MCV 92.3 92.3 93.6   MCH 30.8 30.5 29.9   MCHC 33.3 33.1 32.0*   RDW 15.6 15.5 15.6    180 206   MPV 10.5* 10.6* 10.5*       Recent Labs   Lab 06/11/23  0943 06/12/23  0404 06/12/23  1453 06/13/23  0409 06/13/23  0629 06/14/23  0401    141 142  --  142 143   K 3.2* 3.1* 4.0  --  3.5 3.8   CO2 26 28 28  --  29 24   BUN 4.0* 3.4* 5.5*  --  8.6* 10.2   CREATININE 0.55 0.55 0.58  --  0.54* 0.60   CALCIUM 7.7* 7.8* 8.6  --  8.1* 7.7*   MG  --   --   --  1.80  --  2.20   ALBUMIN 2.4* 2.3*  --   --   --  2.3*   ALKPHOS 45 41  --   --   --  44   ALT 46 37  --   --   --  27   AST 27 21  --   --   --  20   BILITOT 1.3 1.2  --   --   --  0.7          Microbiology Results (last 7 days)       ** No results found for the last 168 hours. **             See below for Radiology    Scheduled Med:   clopidogreL  75 mg Oral Daily    docusate sodium  100 mg Oral Daily    enalaprilat  1.25 mg Intravenous Q6H    fat emulsion 20%  250 mL Intravenous Every Tues, Thurs, Sat    methocarbamoL  500 mg Oral QID    methylnaltrexone  12 mg Subcutaneous Daily    pantoprazole  40 mg Intravenous Daily    polyethylene glycol  17 g Oral Daily    potassium chloride  20 mEq Oral BID    senna  8.6 mg Oral Daily    sodium chloride 0.9%  10 mL Intravenous Q6H    timolol maleate 0.5%  1 drop Both Eyes Daily    warfarin  4 mg Oral Daily        Continuous Infusions:   amino acid 5 % in 15% dextrose 55 mL/hr at 06/13/23 2140    morphine          PRN Meds:  sodium chloride, sodium chloride, sodium chloride, sodium chloride, acetaminophen, bisacodyL, dextrose 10%, dextrose 10%, glucagon (human recombinant), glucose, hydrALAZINE, insulin aspart U-100, naloxone, nitroGLYCERIN, Flushing PICC Protocol **AND** sodium chloride 0.9% **AND** sodium chloride 0.9%        Assessment/Plan:  Patient Active Problem List   Diagnosis    Diabetes mellitus    Protein C deficiency    Recurrent deep vein thrombosis (DVT)    Acid reflux    Chronic lumbar pain    Hypertension    HLD (hyperlipidemia)    History of continuous positive airway pressure (CPAP) therapy at home    SBO (small bowel obstruction)      Continue Clinimix  Wean Morphine PCA as tolerated, I would resume her home MS contin 1/2 tab of a 15mg every 12 hours and use the percocet for break through  Continue relistor; upon DC start Movantik along with the colace  Tolerating her grits this am   INR yesterday at 1.37; patient with active DVT, seems as though her Lovenox was discontinued and Coumadin started. She needs to remain on Lovenox until her INR is greater than 2. Will check INR this am   Will continue to follow along  Continued PT and OT    Patient condition:  Stable      All diagnosis and differential diagnosis have been reviewed; assessment and plan has been documented; I have personally reviewed the labs and test results that are presently available; I have reviewed the patients medication list; I have reviewed the consulting providers response and recommendations. I have reviewed or attempted to review medical records based upon their availability    All of the patient's questions have been  addressed and answered. Patient's is agreeable to the above stated plan. I will continue to monitor closely and make adjustments to medical management as needed.      Nutrition Status:      X-Ray Chest 1 View  Narrative: EXAMINATION:  XR CHEST 1 VIEW    CLINICAL HISTORY:  picc;    TECHNIQUE:  AP chest    COMPARISON:  Chest x-ray dated 06/12/2023    FINDINGS:  Left-sided PICC line has its tip over the superior vena cava.  The heart is normal in size.  There are small bilateral pleural effusions.  There is no definite visible pneumothorax.  Impression: Left-sided PICC line with tip over the superior vena cava.    Electronically  signed by: Ana M Ward  Date:    06/12/2023  Time:    16:50  X-Ray Abdomen AP 1 View  Narrative: EXAMINATION:  XR ABDOMEN AP 1 VIEW    CLINICAL HISTORY:  ileus;    TECHNIQUE:  AP X-RAY OF THE ABDOMEN:    CPT 25147    FINDINGS:  Examination reveals some residual feces throughout the colon gas pattern is nonspecific with no clear evidence of ileus or obstruction no abnormal masses or calcifications identified no significant change as compared with the previous exam of Mely 10, 2023.    Simulated device with leads identified projecting over the lower thoracic spine.    There are surgical changes of the lumbosacral spine and a vascular stent identified in the left hemipelvis  Impression: Nonspecific gas pattern.    No significant change    Electronically signed by: Enio Chamorro  Date:    06/12/2023  Time:    10:49  X-Ray Chest 1 View  Narrative: EXAMINATION:  XR CHEST 1 VIEW    CPT 79280    CLINICAL HISTORY:  fever;    COMPARISON:  May 20, 2022    FINDINGS:  Examination reveals mediastinal silhouette to be within normal limits cardiac silhouette is not enlarged right lung field is clear and free of gross infiltrates atelectasis or effusions.  There is blunting of the left costophrenic angle indicating the presence of left-sided pleural effusion.    Midline is identified tip in the axillary region  Impression: Changes suggestive of left-sided pleural effusion.    Midline as above    Electronically signed by: Enio Chamorro  Date:    06/12/2023  Time:    08:52      Alex Treviño MD   06/14/2023

## 2023-06-14 NOTE — PLAN OF CARE
Problem: Adult Inpatient Plan of Care  Goal: Plan of Care Review  Outcome: Ongoing, Progressing  Goal: Patient-Specific Goal (Individualized)  Outcome: Ongoing, Progressing  Goal: Absence of Hospital-Acquired Illness or Injury  Outcome: Ongoing, Progressing  Goal: Optimal Comfort and Wellbeing  Outcome: Ongoing, Progressing  Goal: Readiness for Transition of Care  Outcome: Ongoing, Progressing     Problem: Infection  Goal: Absence of Infection Signs and Symptoms  Outcome: Ongoing, Progressing     Problem: Fall Injury Risk  Goal: Absence of Fall and Fall-Related Injury  Outcome: Ongoing, Progressing     Problem: Impaired Wound Healing  Goal: Optimal Wound Healing  Outcome: Ongoing, Progressing     Problem: Skin and Tissue Injury (Artificial Airway)  Goal: Absence of Device-Related Skin or Tissue Injury  Outcome: Ongoing, Progressing     Problem: Skin Injury Risk Increased  Goal: Skin Health and Integrity  Outcome: Ongoing, Progressing

## 2023-06-14 NOTE — PROGRESS NOTES
Inpatient Nutrition Assessment    Admit Date: 6/4/2023   Total duration of encounter: 10 days     Nutrition Recommendation/Prescription     Change to low fiber diet as medically feasible.      Maintain TPN as below for another ~2 days while po intake increases and abdominal pain/spasms improve.   Standard Formula: Clinimix E 5/15  Custom Formula: not applicable  Additives: multivitamin with vitamin K, trace elements (Zn, Cu, Mn, Se), folic acid, and thiamin  Rate/Volume: 55 mL/hr  Lipids: 250 ml 20% IV lipid emulsion three times per week  Total Nutrition Provided by Parenteral Nutrition:  Calories Provided  1151 kcal/d, 63-75% needs   Protein Provided  66 g/d, 77-90% needs   Dextrose Provided  198 g/d, GIR 2.25 mg CHO/kg/min   Fluid Provided  1320 ml/d, 72-86% needs     If patient showing electrolyte abnormalities; place consult for custom TPN goal:  Amino Acids 88 g  Dextrose 286-376 g dextrose  250 ml 20% IV lipid emulsion three times per week  100% of protein/calorie needs    Communication of Recommendations: reviewed with nurse    Nutrition Assessment     Malnutrition Assessment/Nutrition-Focused Physical Exam    Malnutrition Context: acute illness or injury  Malnutrition Level: severe  Energy Intake (Malnutrition): less than or equal to 50% for greater than or equal to 5 days  Weight Loss (Malnutrition): other (see comments) (intentional weight loss)                                         Fluid Accumulation (Malnutrition): moderate (+2 right arm, +1 generalized)        A minimum of two characteristics is recommended for diagnosis of either severe or non-severe malnutrition.    Chart Review    Reason Seen: continuous nutrition monitoring    Malnutrition Screening Tool Results   Have you recently lost weight without trying?: No  Have you been eating poorly because of a decreased appetite?: No   MST Score: 0     Diagnosis:  Small Bowel Obstruction    Relevant Medical History: h/o colostomy s/p reversal, Gtube and  Jtube s/p removal, and chronic back pain on home morphine, s/p exlap with lysis of adhesions 6/4, DM?, Protein C deficiency    Nutrition-Related Medications: D5-LR @ 125ml/hr, SSI  Calorie Containing IV Medications: no significant kcals from medications at this time    Nutrition-Related Labs:  6/5/23: Glucose 194 & 308, Ca 7.2, HGB/HCT 6.9/21.4  6/8 Cl 110  Component      Latest Ref Rng & Units 6/12/2023           4:04 AM   Sodium      136 - 145 mmol/L 141   Potassium      3.5 - 5.1 mmol/L 3.1 (L)   Chloride      98 - 107 mmol/L 106   CO2      23 - 31 mmol/L 28   Glucose      82 - 115 mg/dL 131 (H)   BUN      9.8 - 20.1 mg/dL 3.4 (L)   Creatinine      0.55 - 1.02 mg/dL 0.55   Calcium      8.4 - 10.2 mg/dL 7.8 (L)   PROTEIN TOTAL      5.8 - 7.6 gm/dL 4.5 (L)   Albumin      3.4 - 4.8 g/dL 2.3 (L)   Globulin, Total      2.4 - 3.5 gm/dL 2.2 (L)   Albumin/Globulin Ratio      1.1 - 2.0 ratio 1.0 (L)   BILIRUBIN TOTAL      <=1.5 mg/dL 1.2   Alkaline Phosphatase      40 - 150 unit/L 41   ALT      0 - 55 unit/L 37   AST      5 - 34 unit/L 21   eGFR      mls/min/1.73/m2 >60   6/14: Cl 111, Glu 149, Ca 7.7, Alb 2.3     Diet/PN Order: amino acid 5% in 15% dextrose (CLINIMIX-E) solution (1L provides 50gm AA, 150gm CHO (510 kcal/L dextrose), Na 35, K 30, Mg 5, Ca 4.5, Acetate 80, Cl 39, Phos 15) (710 total kcal/L)  Diet Adult Regular Diabetic  Oral Supplement Order: none  Tube Feeding Order: none  Appetite/Oral Intake: fair/50-75% of meals closer to 50%  Factors Affecting Nutritional Intake: NPO  Food/Episcopalian/Cultural Preferences: none reported  Food Allergies: none reported    Skin Integrity: incision  Wound(s):   none    Comments  6/5/23: Pt reports good appetite and no issues until 6/2/23 when she lost her appetite due to nausea, vomiting, and abdominal pain which continue through Sunday. Chronic constipation for the past 20 years (on medications to have bowel movements). Back pain improved, abdominal pain persists. Pt  "purposefully lost 25 lbs (160 lbs to 135 lbs) over the past 6 months using portion control and starting probiotics, endorses feeling better at lower weight. Per EMR weight consistent over past three months 138 to 135 lbs. No signs of muscle or fat pad wasting. Endorsed abdominal pain during interview, will follow up for diet education.     23: Pt still NPO at this time. Awaiting return of bowel function prior to diet advancement (awaiting flatus.)    23: Pt tolerating clear liquids with mild nausea. Had first bowel movement today; pebbled solid and liquid. Denies vomiting. Patient has been NPO/Clear liquids for 7 days. Physical exam, +2 pitting edema in right arm, +1 generalized edema. Updated weight 144.5 lbs.  Surgical team, patient, and RD agreed to initiate parenteral nutrition.     23: Pt tolerating diabetic diet, only GI complaint is intermittent abdominal pain / spasm - improving over time, + BM yesterday, does not want ONS. TPN infusing @ 55 mL/hr, nurse says will likely dc soon, encouraged keeping TPN for another ~2 days while po intake improves. Lytes WNL other than Cl 111.     Anthropometrics    Height: 5' 4" (162.6 cm)    Last Weight: 65.5 kg (144 lb 8 oz) (23 1415) Weight Method: Standard Scale  BMI (Calculated): 24.8  BMI Classification: normal (BMI 18.5-24.9)     Ideal Body Weight (IBW), Female: 120 lb     % Ideal Body Weight, Female (lb): 112.5 %                    Usual Body Weight (UBW), k.6 kg (for the past few months, previously 72.6 kg UBW but pt had intentional wt [facility]138 [facility])  % Usual Body Weight: 104.92  % Weight Change From Usual Weight: 4.7 %  Usual Weight Provided By: patient    Wt Readings from Last 5 Encounters:   23 65.5 kg (144 lb 8 oz)   23 62.6 kg (138 lb)   23 62.6 kg (138 lb)   22 67.6 kg (149 lb)   22 70.7 kg (155 lb 12.8 oz)     Weight Change(s) Since Admission:  Admit Weight: 61.2 kg (135 lb) " (23 0714)  Weight consistent over past 3 months.   23: 67.1kg    Estimated Needs    Weight Used For Calorie Calculations: 61.2 kg (134 lb 14.7 oz)  Energy Calorie Requirements (kcal): 1267-3356  Energy Need Method: Kcal/kg (25-30)  Weight Used For Protein Calculations: 61.2 kg (134 lb 14.7 oz)  Protein Requirements: 73-86gm (1.2-1.4g/kg)  Fluid Requirements (mL): 1530-1836ml (1ml/kcal)  Temp (24hrs), Av.5 °F (36.9 °C), Min:98.2 °F (36.8 °C), Max:99.3 °F (37.4 °C)       Enteral Nutrition    Patient not receiving enteral nutrition at this time.    Parenteral Nutrition    Patient not receiving parenteral nutrition support at this time.    Evaluation of Received Nutrient Intake    Calories: meeting estimated needs between TPN and po  Protein: meeting estimated needs    Patient Education    Education Provided:  parenteral  Teaching Method: explanation  Comprehension: verbalizes understanding  Barriers to Learning: none evident  Expected Compliance: good  Comments: All questions were answered and dietitian's contact information was provided.     Nutrition Diagnosis     PES: Inadequate energy intake related to  NPO due to abdominal pain and s/p exlap with lysis of adhesions / as evidenced by <25% of usual intake -3 and NPO /-5. (continues)    PES: Malnutrition related to acute illness as evidenced by less than or equal to 50% needs met for greater than or equal to 5 days and edema. (new)      Interventions/Goals     Intervention(s): modified composition of meals/snacks and commercial beverage  Goal: Meet greater than 75% of nutritional needs by follow-up. (goal not met)    Monitoring & Evaluation     Dietitian will monitor food and beverage intake, weight change, and gastrointestinal profile.  Nutrition Risk/Follow-Up: moderate (follow-up in 3-5 days)   Please consult if re-assessment needed sooner.

## 2023-06-14 NOTE — PT/OT/SLP DISCHARGE
Physical Therapy Discharge Summary    Name: Alejandrina Rodriguez  MRN: [id number]   Principal Problem: SBO (small bowel obstruction)     Patient Discharged from acute Physical Therapy on 23.  Patient has been ambulating >150ft in hallways with family frequently.     Assessment:     PT/PTA conference held. PT discussed PT POC with patient and family who confirm she has been ambulating in hallway and around room with supervision from family members. PT encouraged patient to continue mobilizing at least 3x per day with supervision from family and staff. Patient denies need for further acute PT services. PT to sign off, please re-consult should patient status change.     Objective:     GOALS:   Multidisciplinary Problems       Physical Therapy Goals       Not on file              Multidisciplinary Problems (Resolved)          Problem: Physical Therapy    Goal Priority Disciplines Outcome Goal Variances Interventions   Physical Therapy Goal   (Resolved)     PT, PT/OT Met     Description: Goals to be met by: 23     Patient will increase functional independence with mobility by performin. Supine to sit with Stand-by Assistance  2. Sit to supine with Stand-by Assistance  3. Sit to stand transfer with Stand-by Assistance  4. Gait  x 150 feet with Stand-by Assistance using Rolling Walker.                          Reasons for Discontinuation of Therapy Services  Satisfactory goal achievement.      Plan:     Patient Discharged to: remains in-house at this time.     2023

## 2023-06-14 NOTE — PLAN OF CARE
Problem: Adult Inpatient Plan of Care  Goal: Plan of Care Review  Outcome: Ongoing, Progressing  Goal: Patient-Specific Goal (Individualized)  Outcome: Ongoing, Progressing  Goal: Absence of Hospital-Acquired Illness or Injury  Outcome: Ongoing, Progressing  Goal: Optimal Comfort and Wellbeing  Outcome: Ongoing, Progressing  Goal: Readiness for Transition of Care  Outcome: Ongoing, Progressing     Problem: Diabetes Comorbidity  Goal: Blood Glucose Level Within Targeted Range  Outcome: Ongoing, Progressing     Problem: Infection  Goal: Absence of Infection Signs and Symptoms  Outcome: Ongoing, Progressing     Problem: Fall Injury Risk  Goal: Absence of Fall and Fall-Related Injury  Outcome: Ongoing, Progressing     Problem: Impaired Wound Healing  Goal: Optimal Wound Healing  Outcome: Ongoing, Progressing     Problem: Inability to Wean (Mechanical Ventilation, Invasive)  Goal: Mechanical Ventilation Liberation  Outcome: Ongoing, Progressing     Problem: Nutrition Impairment (Mechanical Ventilation, Invasive)  Goal: Optimal Nutrition Delivery  Outcome: Ongoing, Progressing     Problem: Communication Impairment (Artificial Airway)  Goal: Effective Communication  Outcome: Ongoing, Progressing     Problem: Skin and Tissue Injury (Artificial Airway)  Goal: Absence of Device-Related Skin or Tissue Injury  Outcome: Ongoing, Progressing     Problem: Skin Injury Risk Increased  Goal: Skin Health and Integrity  Outcome: Ongoing, Progressing

## 2023-06-14 NOTE — PROGRESS NOTES
Surgery Progress Note    S:  NAEO  AF -160  Pain now well controlled with PCA  Voiding without issue  Having BMs  No emesis, nausea with CLD, wants to try solid food today  TPN started yesterday  No CP, SOB    O:  Temp:  [98.2 °F (36.8 °C)-99.3 °F (37.4 °C)] 98.2 °F (36.8 °C)  Pulse:  [64-80] 64  Resp:  [16-19] 16  SpO2:  [97 %-98 %] 98 %  BP: (124-148)/(67-82) 129/75    Physical Exam:  Gen: NAD, resting comfortably  HEENT: Normocephalic, atrauamtic  CV: RRR, hypertensive  Resp: NWOB  Abd: soft, mildly distended but improved from previous exam, attp following surgery, incision well approximated, no drainage, no erythema   Ext: moving all extremities spontaneously and purposefully. RUE pain, edema, and erythema resolved.  Neuro: CN II-XII grossly intact    Labs:  K 3.8    A/P:  74 y/o w/ hx ex lap several decades ago with h/o colostomy s/p reversal, Gtube and Jtube s/p removal, and chronic back pain on home morphine, now admitted 6/4 with SBO. Pt s/p exlap with lysis of adhesions 6/4. Taken back to the OR 6/5 for hypotension and post-op hemorrhage with evacuation of approximately 3L of blood and old clot, placement of temporary abdominal closure. S/p abdominal closure 6/7. Extubated post-op. Recovering slowly. NG removed 6/9.     - continue TPN  - start coumadin, continue lovenox bridge for 48 hours  - PCA for pain control  - continue bowel reg  - Multimodal pain control   - PT/OT     Tenisha Brand MD   [employer] [facility]

## 2023-06-15 LAB
ALBUMIN SERPL-MCNC: 2.7 G/DL (ref 3.4–4.8)
ALBUMIN/GLOB SERPL: 1.1 RATIO (ref 1.1–2)
ALP SERPL-CCNC: 48 UNIT/L (ref 40–150)
ALT SERPL-CCNC: 27 UNIT/L (ref 0–55)
AST SERPL-CCNC: 25 UNIT/L (ref 5–34)
BILIRUBIN DIRECT+TOT PNL SERPL-MCNC: 0.9 MG/DL
BUN SERPL-MCNC: 12.6 MG/DL (ref 9.8–20.1)
CALCIUM SERPL-MCNC: 8.2 MG/DL (ref 8.4–10.2)
CHLORIDE SERPL-SCNC: 110 MMOL/L (ref 98–107)
CO2 SERPL-SCNC: 24 MMOL/L (ref 23–31)
CREAT SERPL-MCNC: 0.59 MG/DL (ref 0.55–1.02)
GFR SERPLBLD CREATININE-BSD FMLA CKD-EPI: >60 MLS/MIN/1.73/M2
GLOBULIN SER-MCNC: 2.4 GM/DL (ref 2.4–3.5)
GLUCOSE SERPL-MCNC: 127 MG/DL (ref 82–115)
INR BLD: 1.11 (ref 0–1.3)
MAGNESIUM SERPL-MCNC: 1.9 MG/DL (ref 1.6–2.6)
PHOSPHATE SERPL-MCNC: 3.7 MG/DL (ref 2.3–4.7)
POCT GLUCOSE: 124 MG/DL (ref 70–110)
POCT GLUCOSE: 126 MG/DL (ref 70–110)
POCT GLUCOSE: 131 MG/DL (ref 70–110)
POCT GLUCOSE: 99 MG/DL (ref 70–110)
POTASSIUM SERPL-SCNC: 4.3 MMOL/L (ref 3.5–5.1)
PREALB SERPL-MCNC: 12.6 MG/DL (ref 14–37)
PROT SERPL-MCNC: 5.1 GM/DL (ref 5.8–7.6)
PROTHROMBIN TIME: 14.2 SECONDS (ref 12.5–14.5)
SODIUM SERPL-SCNC: 142 MMOL/L (ref 136–145)

## 2023-06-15 PROCEDURE — 99233 SBSQ HOSP IP/OBS HIGH 50: CPT | Mod: 95,,, | Performed by: INTERNAL MEDICINE

## 2023-06-15 PROCEDURE — 84134 ASSAY OF PREALBUMIN: CPT | Performed by: STUDENT IN AN ORGANIZED HEALTH CARE EDUCATION/TRAINING PROGRAM

## 2023-06-15 PROCEDURE — 83735 ASSAY OF MAGNESIUM: CPT | Performed by: STUDENT IN AN ORGANIZED HEALTH CARE EDUCATION/TRAINING PROGRAM

## 2023-06-15 PROCEDURE — 25000003 PHARM REV CODE 250: Performed by: INTERNAL MEDICINE

## 2023-06-15 PROCEDURE — 63600175 PHARM REV CODE 636 W HCPCS: Performed by: INTERNAL MEDICINE

## 2023-06-15 PROCEDURE — C9113 INJ PANTOPRAZOLE SODIUM, VIA: HCPCS | Performed by: INTERNAL MEDICINE

## 2023-06-15 PROCEDURE — 63600175 PHARM REV CODE 636 W HCPCS: Performed by: STUDENT IN AN ORGANIZED HEALTH CARE EDUCATION/TRAINING PROGRAM

## 2023-06-15 PROCEDURE — 85610 PROTHROMBIN TIME: CPT | Performed by: INTERNAL MEDICINE

## 2023-06-15 PROCEDURE — 84100 ASSAY OF PHOSPHORUS: CPT | Performed by: STUDENT IN AN ORGANIZED HEALTH CARE EDUCATION/TRAINING PROGRAM

## 2023-06-15 PROCEDURE — 25000003 PHARM REV CODE 250: Performed by: STUDENT IN AN ORGANIZED HEALTH CARE EDUCATION/TRAINING PROGRAM

## 2023-06-15 PROCEDURE — A4216 STERILE WATER/SALINE, 10 ML: HCPCS | Performed by: STUDENT IN AN ORGANIZED HEALTH CARE EDUCATION/TRAINING PROGRAM

## 2023-06-15 PROCEDURE — 11000001 HC ACUTE MED/SURG PRIVATE ROOM

## 2023-06-15 PROCEDURE — 99233 PR SUBSEQUENT HOSPITAL CARE,LEVL III: ICD-10-PCS | Mod: 95,,, | Performed by: INTERNAL MEDICINE

## 2023-06-15 PROCEDURE — 80053 COMPREHEN METABOLIC PANEL: CPT | Performed by: SURGERY

## 2023-06-15 RX ORDER — ONDANSETRON 4 MG/1
4 TABLET, ORALLY DISINTEGRATING ORAL EVERY 6 HOURS PRN
Status: DISCONTINUED | OUTPATIENT
Start: 2023-06-15 | End: 2023-06-19 | Stop reason: HOSPADM

## 2023-06-15 RX ORDER — WARFARIN SODIUM 5 MG/1
5 TABLET ORAL ONCE
Status: COMPLETED | OUTPATIENT
Start: 2023-06-15 | End: 2023-06-15

## 2023-06-15 RX ORDER — POLYETHYLENE GLYCOL 3350 17 G/17G
17 POWDER, FOR SOLUTION ORAL 2 TIMES DAILY
Status: DISCONTINUED | OUTPATIENT
Start: 2023-06-15 | End: 2023-06-19 | Stop reason: HOSPADM

## 2023-06-15 RX ADMIN — CLOPIDOGREL BISULFATE 75 MG: 75 TABLET ORAL at 09:06

## 2023-06-15 RX ADMIN — SODIUM CHLORIDE, PRESERVATIVE FREE 10 ML: 5 INJECTION INTRAVENOUS at 06:06

## 2023-06-15 RX ADMIN — SODIUM CHLORIDE, PRESERVATIVE FREE 10 ML: 5 INJECTION INTRAVENOUS at 05:06

## 2023-06-15 RX ADMIN — SODIUM CHLORIDE, PRESERVATIVE FREE 10 ML: 5 INJECTION INTRAVENOUS at 11:06

## 2023-06-15 RX ADMIN — ENALAPRILAT 1.25 MG: 2.5 INJECTION INTRAVENOUS at 06:06

## 2023-06-15 RX ADMIN — MORPHINE SULFATE: 1 INJECTION INTRAVENOUS at 06:06

## 2023-06-15 RX ADMIN — METHOCARBAMOL 500 MG: 500 TABLET ORAL at 02:06

## 2023-06-15 RX ADMIN — ENALAPRILAT 1.25 MG: 2.5 INJECTION INTRAVENOUS at 05:06

## 2023-06-15 RX ADMIN — POLYETHYLENE GLYCOL 3350 17 G: 17 POWDER, FOR SOLUTION ORAL at 09:06

## 2023-06-15 RX ADMIN — METHOCARBAMOL 500 MG: 500 TABLET ORAL at 10:06

## 2023-06-15 RX ADMIN — METHOCARBAMOL 500 MG: 500 TABLET ORAL at 06:06

## 2023-06-15 RX ADMIN — DOCUSATE SODIUM 100 MG: 100 CAPSULE, LIQUID FILLED ORAL at 09:06

## 2023-06-15 RX ADMIN — SENNOSIDES 8.6 MG: 8.6 TABLET, FILM COATED ORAL at 09:06

## 2023-06-15 RX ADMIN — WARFARIN SODIUM 5 MG: 5 TABLET ORAL at 11:06

## 2023-06-15 RX ADMIN — ASCORBIC ACID, VITAMIN A PALMITATE, CHOLECALCIFEROL, THIAMINE HYDROCHLORIDE, RIBOFLAVIN-5 PHOSPHATE SODIUM, PYRIDOXINE HYDROCHLORIDE, NIACINAMIDE, DEXPANTHENOL, ALPHA-TOCOPHEROL ACETATE, VITAMIN K1, FOLIC ACID, BIOTIN, CYANOCOBALAMIN: 200; 3300; 200; 6; 3.6; 6; 40; 15; 10; 150; 600; 60; 5 INJECTION, SOLUTION INTRAVENOUS at 09:06

## 2023-06-15 RX ADMIN — METHOCARBAMOL 500 MG: 500 TABLET ORAL at 09:06

## 2023-06-15 RX ADMIN — ENALAPRILAT 1.25 MG: 2.5 INJECTION INTRAVENOUS at 11:06

## 2023-06-15 RX ADMIN — ENALAPRILAT 1.25 MG: 2.5 INJECTION INTRAVENOUS at 12:06

## 2023-06-15 RX ADMIN — PANTOPRAZOLE SODIUM 40 MG: 40 INJECTION, POWDER, FOR SOLUTION INTRAVENOUS at 09:06

## 2023-06-15 RX ADMIN — WARFARIN SODIUM 4 MG: 2 TABLET ORAL at 06:06

## 2023-06-15 RX ADMIN — ENOXAPARIN SODIUM 60 MG: 80 INJECTION SUBCUTANEOUS at 11:06

## 2023-06-15 RX ADMIN — METHYLNALTREXONE BROMIDE 12 MG: 12 INJECTION, SOLUTION SUBCUTANEOUS at 09:06

## 2023-06-15 RX ADMIN — ENOXAPARIN SODIUM 60 MG: 80 INJECTION SUBCUTANEOUS at 10:06

## 2023-06-15 NOTE — PROGRESS NOTES
[facility] Medicine Progress Note        Chief Complaint: Inpatient Follow-up for SBO    HPI: see full H and P    Interval Hx:   And had a wonderful day yesterday she tolerated her full liquid diet she even had some grits yesterday she had some yogurt she was up with PT she had good pain control last night she was incredibly bloated and full she has been locked out of her PCA she is using the morphine so much.  She did have a small BM this morning that was soft, she does have bowel sounds this morning but they are little bit hypoactive as compared to yesterday.    Objective/physical exam:  General: In no acute distress, afebrile  Chest: Clear to auscultation bilaterally  Heart: RRR, +S1, S2, no appreciable murmur  Abdomen: Soft, nontender, midline incision is dry and well approximated.  She is a touch distended and bowel sounds are a little hypoactive  MSK: Warm, no lower extremity edema, no clubbing or cyanosis  Neurologic: Alert and oriented x4, Cranial nerve II-XII intact, Strength 5/5 in all 4 extremities    VITAL SIGNS: 24 HRS MIN & MAX LAST   Temp  Min: 98.2 °F (36.8 °C)  Max: 99.2 °F (37.3 °C) 98.5 °F (36.9 °C)   BP  Min: 126/80  Max: 155/77 126/80   Pulse  Min: 76  Max: 84  76   Resp  Min: 16  Max: 20 16   SpO2  Min: 94 %  Max: 98 % 97 %       Recent Labs   Lab 06/11/23  0943 06/12/23  0404 06/13/23  0629   WBC 7.65 5.83 6.54   RBC 3.51* 3.11* 3.44*   HGB 10.8* 9.5* 10.3*   HCT 32.4* 28.7* 32.2*   MCV 92.3 92.3 93.6   MCH 30.8 30.5 29.9   MCHC 33.3 33.1 32.0*   RDW 15.6 15.5 15.6    180 206   MPV 10.5* 10.6* 10.5*       Recent Labs   Lab 06/12/23  0404 06/12/23  1453 06/13/23  0409 06/13/23  0629 06/14/23  0401 06/15/23  0425      < >  --  142 143 142   K 3.1*   < >  --  3.5 3.8 4.3   CO2 28   < >  --  29 24 24   BUN 3.4*   < >  --  8.6* 10.2 12.6   CREATININE 0.55   < >  --  0.54* 0.60 0.59   CALCIUM 7.8*   < >  --  8.1* 7.7* 8.2*   MG  --   --  1.80  --   2.20 1.90   ALBUMIN 2.3*  --   --   --  2.3* 2.7*   ALKPHOS 41  --   --   --  44 48   ALT 37  --   --   --  27 27   AST 21  --   --   --  20 25   BILITOT 1.2  --   --   --  0.7 0.9    < > = values in this interval not displayed.          Microbiology Results (last 7 days)       ** No results found for the last 168 hours. **             See below for Radiology    Scheduled Med:   clopidogreL  75 mg Oral Daily    docusate sodium  100 mg Oral Daily    enalaprilat  1.25 mg Intravenous Q6H    enoxparin  1 mg/kg (Dosing Weight) Subcutaneous Q12H (treatment, non-standard time)    methocarbamoL  500 mg Oral QID    methylnaltrexone  12 mg Subcutaneous Daily    pantoprazole  40 mg Intravenous Daily    polyethylene glycol  17 g Oral BID    potassium chloride  20 mEq Oral BID    senna  8.6 mg Oral Daily    sodium chloride 0.9%  10 mL Intravenous Q6H    timolol maleate 0.5%  1 drop Both Eyes Daily    warfarin  4 mg Oral Daily        Continuous Infusions:   amino acid 5 % in 15% dextrose      morphine          PRN Meds:  sodium chloride, sodium chloride, sodium chloride, sodium chloride, acetaminophen, bisacodyL, dextrose 10%, dextrose 10%, glucagon (human recombinant), glucose, hydrALAZINE, insulin aspart U-100, naloxone, nitroGLYCERIN, Flushing PICC Protocol **AND** sodium chloride 0.9% **AND** sodium chloride 0.9%       Assessment/Plan:  Patient Active Problem List   Diagnosis    Diabetes mellitus    Protein C deficiency    Recurrent deep vein thrombosis (DVT)    Acid reflux    Chronic lumbar pain    Hypertension    HLD (hyperlipidemia)    History of continuous positive airway pressure (CPAP) therapy at home    SBO (small bowel obstruction)      Chronic morphine patient on morphine MS Contin 7.5 b.i.d. at home for chronic lumbar back pain as ordered by her pain management physician I think it is important that we resume that and discontinue her morphine PCA and have the Percocet available for breakthrough  Patient would prefer  some clears this morning she got a full tray of pancakes and jenkins and eggs and it is just too much too fast.  INR not at goal currently she is at 1.1 this morning initially her Lovenox was discontinued when the Coumadin was resumed, Coumadin continued will give an additional dose today continue Lovenox until goal INR of 2-3  Will continue to follow along  PT/OT    Patient condition:  Stable      All diagnosis and differential diagnosis have been reviewed; assessment and plan has been documented; I have personally reviewed the labs and test results that are presently available; I have reviewed the patients medication list; I have reviewed the consulting providers response and recommendations. I have reviewed or attempted to review medical records based upon their availability    All of the patient's questions have been  addressed and answered. Patient's is agreeable to the above stated plan. I will continue to monitor closely and make adjustments to medical management as needed.    X-Ray Chest 1 View  Narrative: EXAMINATION:  XR CHEST 1 VIEW    CLINICAL HISTORY:  picc;    TECHNIQUE:  AP chest    COMPARISON:  Chest x-ray dated 06/12/2023    FINDINGS:  Left-sided PICC line has its tip over the superior vena cava.  The heart is normal in size.  There are small bilateral pleural effusions.  There is no definite visible pneumothorax.  Impression: Left-sided PICC line with tip over the superior vena cava.    Electronically signed by: Ana M Ward  Date:    06/12/2023  Time:    16:50  X-Ray Abdomen AP 1 View  Narrative: EXAMINATION:  XR ABDOMEN AP 1 VIEW    CLINICAL HISTORY:  ileus;    TECHNIQUE:  AP X-RAY OF THE ABDOMEN:    CPT 33243    FINDINGS:  Examination reveals some residual feces throughout the colon gas pattern is nonspecific with no clear evidence of ileus or obstruction no abnormal masses or calcifications identified no significant change as compared with the previous exam of Mely 10, 2023.    Simulated  device with leads identified projecting over the lower thoracic spine.    There are surgical changes of the lumbosacral spine and a vascular stent identified in the left hemipelvis  Impression: Nonspecific gas pattern.    No significant change    Electronically signed by: Enio Chamorro  Date:    06/12/2023  Time:    10:49  X-Ray Chest 1 View  Narrative: EXAMINATION:  XR CHEST 1 VIEW    CPT 55533    CLINICAL HISTORY:  fever;    COMPARISON:  May 20, 2022    FINDINGS:  Examination reveals mediastinal silhouette to be within normal limits cardiac silhouette is not enlarged right lung field is clear and free of gross infiltrates atelectasis or effusions.  There is blunting of the left costophrenic angle indicating the presence of left-sided pleural effusion.    Midline is identified tip in the axillary region  Impression: Changes suggestive of left-sided pleural effusion.    Midline as above    Electronically signed by: Enio Chamorro  Date:    06/12/2023  Time:    08:52      Alex Treviño MD   06/15/2023

## 2023-06-15 NOTE — PROGRESS NOTES
Surgery Progress Note    S:  NAEO  AF HDS  Pain now well controlled with PCA  Voiding without issue  Had BM yesterday  No emesis or nausea with reg diet, wants food that is less seasoned  No CP, SOB    O:  Temp:  [98.2 °F (36.8 °C)-99.2 °F (37.3 °C)] 99.2 °F (37.3 °C)  Pulse:  [76-84] 81  Resp:  [16-20] 20  SpO2:  [94 %-98 %] 96 %  BP: (125-155)/(71-85) 147/71    Physical Exam:  Gen: NAD, resting comfortably  HEENT: Normocephalic, atrauamtic  CV: RRR, hypertensive  Resp: NWOB  Abd: soft, mildly distended but improved from previous exam, attp following surgery, incision well approximated, no drainage, no erythema   Ext: moving all extremities spontaneously and purposefully. RUE pain, edema, and erythema resolved.  Neuro: CN II-XII grossly intact    Labs:  K 4.3    A/P:  72 y/o w/ hx ex lap several decades ago with h/o colostomy s/p reversal, Gtube and Jtube s/p removal, and chronic back pain on home morphine, now admitted 6/4 with SBO. Pt s/p exlap with lysis of adhesions 6/4. Taken back to the OR 6/5 for hypotension and post-op hemorrhage with evacuation of approximately 3L of blood and old clot, placement of temporary abdominal closure. S/p abdominal closure 6/7. Extubated post-op. Recovering slowly. NG removed 6/9.    - low fiber diet   - continue TPN  - continue coumadin, continue lovenox bridge until INR at goal  - PCA for pain control, wean  - continue bowel reg  - Multimodal pain control   - PT/OT     Tenisha Brand MD   [employer] [facility]

## 2023-06-16 LAB
ALBUMIN SERPL-MCNC: 2.4 G/DL (ref 3.4–4.8)
ALBUMIN/GLOB SERPL: 0.8 RATIO (ref 1.1–2)
ALP SERPL-CCNC: 45 UNIT/L (ref 40–150)
ALT SERPL-CCNC: 25 UNIT/L (ref 0–55)
AST SERPL-CCNC: 23 UNIT/L (ref 5–34)
BILIRUBIN DIRECT+TOT PNL SERPL-MCNC: 0.7 MG/DL
BUN SERPL-MCNC: 12.3 MG/DL (ref 9.8–20.1)
CALCIUM SERPL-MCNC: 8.2 MG/DL (ref 8.4–10.2)
CHLORIDE SERPL-SCNC: 109 MMOL/L (ref 98–107)
CO2 SERPL-SCNC: 24 MMOL/L (ref 23–31)
CREAT SERPL-MCNC: 0.54 MG/DL (ref 0.55–1.02)
ERYTHROCYTE [DISTWIDTH] IN BLOOD BY AUTOMATED COUNT: 15.9 % (ref 11.5–17)
GFR SERPLBLD CREATININE-BSD FMLA CKD-EPI: >60 MLS/MIN/1.73/M2
GLOBULIN SER-MCNC: 2.9 GM/DL (ref 2.4–3.5)
GLUCOSE SERPL-MCNC: 127 MG/DL (ref 82–115)
HCT VFR BLD AUTO: 30.3 % (ref 37–47)
HGB BLD-MCNC: 9.3 G/DL (ref 12–16)
INR BLD: 1.2 (ref 0–1.3)
MAGNESIUM SERPL-MCNC: 1.8 MG/DL (ref 1.6–2.6)
MCH RBC QN AUTO: 30.4 PG (ref 27–31)
MCHC RBC AUTO-ENTMCNC: 30.7 G/DL (ref 33–36)
MCV RBC AUTO: 99 FL (ref 80–94)
NRBC BLD AUTO-RTO: 0 %
PHOSPHATE SERPL-MCNC: 3.3 MG/DL (ref 2.3–4.7)
PLATELET # BLD AUTO: 219 X10(3)/MCL (ref 130–400)
PMV BLD AUTO: 11 FL (ref 7.4–10.4)
POCT GLUCOSE: 130 MG/DL (ref 70–110)
POCT GLUCOSE: 138 MG/DL (ref 70–110)
POCT GLUCOSE: 86 MG/DL (ref 70–110)
POTASSIUM SERPL-SCNC: 3.8 MMOL/L (ref 3.5–5.1)
PROT SERPL-MCNC: 5.3 GM/DL (ref 5.8–7.6)
PROTHROMBIN TIME: 15.1 SECONDS (ref 12.5–14.5)
RBC # BLD AUTO: 3.06 X10(6)/MCL (ref 4.2–5.4)
SODIUM SERPL-SCNC: 140 MMOL/L (ref 136–145)
WBC # SPEC AUTO: 7.33 X10(3)/MCL (ref 4.5–11.5)

## 2023-06-16 PROCEDURE — 25000003 PHARM REV CODE 250: Performed by: STUDENT IN AN ORGANIZED HEALTH CARE EDUCATION/TRAINING PROGRAM

## 2023-06-16 PROCEDURE — 85027 COMPLETE CBC AUTOMATED: CPT | Performed by: STUDENT IN AN ORGANIZED HEALTH CARE EDUCATION/TRAINING PROGRAM

## 2023-06-16 PROCEDURE — 84100 ASSAY OF PHOSPHORUS: CPT | Performed by: STUDENT IN AN ORGANIZED HEALTH CARE EDUCATION/TRAINING PROGRAM

## 2023-06-16 PROCEDURE — C9113 INJ PANTOPRAZOLE SODIUM, VIA: HCPCS | Performed by: INTERNAL MEDICINE

## 2023-06-16 PROCEDURE — 63600175 PHARM REV CODE 636 W HCPCS: Performed by: INTERNAL MEDICINE

## 2023-06-16 PROCEDURE — 99233 PR SUBSEQUENT HOSPITAL CARE,LEVL III: ICD-10-PCS | Mod: 95,,, | Performed by: INTERNAL MEDICINE

## 2023-06-16 PROCEDURE — 11000001 HC ACUTE MED/SURG PRIVATE ROOM

## 2023-06-16 PROCEDURE — 83735 ASSAY OF MAGNESIUM: CPT | Performed by: STUDENT IN AN ORGANIZED HEALTH CARE EDUCATION/TRAINING PROGRAM

## 2023-06-16 PROCEDURE — 25000003 PHARM REV CODE 250: Performed by: INTERNAL MEDICINE

## 2023-06-16 PROCEDURE — 85610 PROTHROMBIN TIME: CPT | Performed by: INTERNAL MEDICINE

## 2023-06-16 PROCEDURE — 80053 COMPREHEN METABOLIC PANEL: CPT | Performed by: SURGERY

## 2023-06-16 PROCEDURE — A4216 STERILE WATER/SALINE, 10 ML: HCPCS | Performed by: STUDENT IN AN ORGANIZED HEALTH CARE EDUCATION/TRAINING PROGRAM

## 2023-06-16 PROCEDURE — 99233 SBSQ HOSP IP/OBS HIGH 50: CPT | Mod: 95,,, | Performed by: INTERNAL MEDICINE

## 2023-06-16 PROCEDURE — 63600175 PHARM REV CODE 636 W HCPCS: Performed by: STUDENT IN AN ORGANIZED HEALTH CARE EDUCATION/TRAINING PROGRAM

## 2023-06-16 RX ORDER — SENNOSIDES 8.6 MG/1
8.6 TABLET ORAL DAILY PRN
Status: DISCONTINUED | OUTPATIENT
Start: 2023-06-16 | End: 2023-06-19 | Stop reason: HOSPADM

## 2023-06-16 RX ORDER — WARFARIN SODIUM 5 MG/1
5 TABLET ORAL ONCE
Status: COMPLETED | OUTPATIENT
Start: 2023-06-16 | End: 2023-06-16

## 2023-06-16 RX ORDER — MORPHINE SULFATE 4 MG/ML
2 INJECTION, SOLUTION INTRAMUSCULAR; INTRAVENOUS EVERY 4 HOURS PRN
Status: DISCONTINUED | OUTPATIENT
Start: 2023-06-16 | End: 2023-06-18

## 2023-06-16 RX ORDER — MORPHINE SULFATE 15 MG/1
15 TABLET ORAL 2 TIMES DAILY
Status: DISCONTINUED | OUTPATIENT
Start: 2023-06-16 | End: 2023-06-19 | Stop reason: HOSPADM

## 2023-06-16 RX ORDER — GABAPENTIN 300 MG/1
300 CAPSULE ORAL 3 TIMES DAILY
Status: DISCONTINUED | OUTPATIENT
Start: 2023-06-16 | End: 2023-06-19 | Stop reason: HOSPADM

## 2023-06-16 RX ORDER — OXYCODONE AND ACETAMINOPHEN 5; 325 MG/1; MG/1
1 TABLET ORAL EVERY 6 HOURS PRN
Status: DISCONTINUED | OUTPATIENT
Start: 2023-06-16 | End: 2023-06-16

## 2023-06-16 RX ORDER — WARFARIN SODIUM 5 MG/1
5 TABLET ORAL DAILY
Status: DISCONTINUED | OUTPATIENT
Start: 2023-06-16 | End: 2023-06-19 | Stop reason: HOSPADM

## 2023-06-16 RX ORDER — MORPHINE SULFATE 15 MG/1
15 TABLET, FILM COATED, EXTENDED RELEASE ORAL 2 TIMES DAILY
Status: DISCONTINUED | OUTPATIENT
Start: 2023-06-16 | End: 2023-06-16

## 2023-06-16 RX ADMIN — MORPHINE SULFATE 15 MG: 15 TABLET ORAL at 10:06

## 2023-06-16 RX ADMIN — ENOXAPARIN SODIUM 60 MG: 80 INJECTION SUBCUTANEOUS at 10:06

## 2023-06-16 RX ADMIN — MORPHINE SULFATE 2 MG: 4 INJECTION INTRAVENOUS at 08:06

## 2023-06-16 RX ADMIN — DOCUSATE SODIUM 100 MG: 100 CAPSULE, LIQUID FILLED ORAL at 09:06

## 2023-06-16 RX ADMIN — ASCORBIC ACID, VITAMIN A PALMITATE, CHOLECALCIFEROL, THIAMINE HYDROCHLORIDE, RIBOFLAVIN-5 PHOSPHATE SODIUM, PYRIDOXINE HYDROCHLORIDE, NIACINAMIDE, DEXPANTHENOL, ALPHA-TOCOPHEROL ACETATE, VITAMIN K1, FOLIC ACID, BIOTIN, CYANOCOBALAMIN: 200; 3300; 200; 6; 3.6; 6; 40; 15; 10; 150; 600; 60; 5 INJECTION, SOLUTION INTRAVENOUS at 08:06

## 2023-06-16 RX ADMIN — GABAPENTIN 300 MG: 300 CAPSULE ORAL at 11:06

## 2023-06-16 RX ADMIN — SODIUM CHLORIDE, PRESERVATIVE FREE 10 ML: 5 INJECTION INTRAVENOUS at 05:06

## 2023-06-16 RX ADMIN — METHOCARBAMOL 500 MG: 500 TABLET ORAL at 09:06

## 2023-06-16 RX ADMIN — PANTOPRAZOLE SODIUM 40 MG: 40 INJECTION, POWDER, FOR SOLUTION INTRAVENOUS at 09:06

## 2023-06-16 RX ADMIN — ENALAPRILAT 1.25 MG: 2.5 INJECTION INTRAVENOUS at 12:06

## 2023-06-16 RX ADMIN — CLOPIDOGREL BISULFATE 75 MG: 75 TABLET ORAL at 09:06

## 2023-06-16 RX ADMIN — SODIUM CHLORIDE, PRESERVATIVE FREE 10 ML: 5 INJECTION INTRAVENOUS at 12:06

## 2023-06-16 RX ADMIN — WARFARIN SODIUM 5 MG: 5 TABLET ORAL at 05:06

## 2023-06-16 RX ADMIN — MORPHINE SULFATE 2 MG: 4 INJECTION INTRAVENOUS at 03:06

## 2023-06-16 RX ADMIN — POLYETHYLENE GLYCOL 3350 17 G: 17 POWDER, FOR SOLUTION ORAL at 09:06

## 2023-06-16 RX ADMIN — METHOCARBAMOL 500 MG: 500 TABLET ORAL at 12:06

## 2023-06-16 RX ADMIN — MORPHINE SULFATE 15 MG: 15 TABLET ORAL at 09:06

## 2023-06-16 RX ADMIN — WARFARIN SODIUM 5 MG: 5 TABLET ORAL at 12:06

## 2023-06-16 RX ADMIN — ENALAPRILAT 1.25 MG: 2.5 INJECTION INTRAVENOUS at 05:06

## 2023-06-16 RX ADMIN — SENNOSIDES 8.6 MG: 8.6 TABLET, FILM COATED ORAL at 09:06

## 2023-06-16 RX ADMIN — METHOCARBAMOL 500 MG: 500 TABLET ORAL at 05:06

## 2023-06-16 NOTE — PROGRESS NOTES
Surgery Progress Note    S:  NAEO  AF HDS  Pain well controlled  Voiding without issue  Had one bout of emesis and diarrhea at the same time yesterday but no issues since  No CP, SOB    O:  Temp:  [97.8 °F (36.6 °C)-99.1 °F (37.3 °C)] 98.1 °F (36.7 °C)  Pulse:  [75-90] 75  Resp:  [16-20] 16  SpO2:  [96 %-99 %] 98 %  BP: (115-143)/(70-82) 143/76    Physical Exam:  Gen: NAD, resting comfortably  HEENT: Normocephalic, atrauamtic  CV: RRR  Resp: NWOB  Abd: soft, nondistended, nontender, incision well approximated, no drainage, no erythema   Ext: moving all extremities spontaneously and purposefully. RUE pain, edema, and erythema resolved.  Neuro: CN II-XII grossly intact    Labs:  K 3.8    A/P:  72 y/o w/ hx ex lap several decades ago with h/o colostomy s/p reversal, Gtube and Jtube s/p removal, and chronic back pain on home morphine, now admitted 6/4 with SBO. Pt s/p exlap with lysis of adhesions 6/4. Taken back to the OR 6/5 for hypotension and post-op hemorrhage with evacuation of approximately 3L of blood and old clot, placement of temporary abdominal closure. S/p abdominal closure 6/7. Extubated post-op. Recovering slowly. NG removed 6/9.    - low fiber diet   - continue TPN  - continue coumadin, continue lovenox bridge until INR at goal, appreciate medicine team assistance with this  - discontinue PCA, restart home oral morphine  - continue bowel reg  - Multimodal pain control   - PT/OT     Tenisha Brand MD   [employer] [facility]

## 2023-06-16 NOTE — PROGRESS NOTES
[facility] Medicine Progress Note        Chief Complaint: Inpatient Follow-up for SBO    HPI: see full H and P    Interval Hx:   Patient sitting up in the chair this morning she reports some loose stools she has so far received 3 days of her LEs door she has great bowel movements.  She is off her morphine PCA and back on her home dose of MS Contin, Percocet for breakthrough.  She states she still feels weak.  She tolerated some grits this morning, no acute complaints    Objective/physical exam:  General: In no acute distress, afebrile  Chest: Clear to auscultation bilaterally  Heart: RRR, +S1, S2, no appreciable murmur  Abdomen: Soft, nontender, BS +  MSK: Warm, no lower extremity edema, no clubbing or cyanosis  Neurologic: Alert and oriented x4, Cranial nerve II-XII intact, Strength 5/5 in all 4 extremities    VITAL SIGNS: 24 HRS MIN & MAX LAST   Temp  Min: 97.8 °F (36.6 °C)  Max: 99.1 °F (37.3 °C) 98.1 °F (36.7 °C)   BP  Min: 115/70  Max: 143/76 (!) 143/76   Pulse  Min: 75  Max: 90  75   Resp  Min: 16  Max: 20 18   SpO2  Min: 96 %  Max: 99 % 98 %       Recent Labs   Lab 06/12/23  0404 06/13/23  0629 06/16/23  0406   WBC 5.83 6.54 7.33   RBC 3.11* 3.44* 3.06*   HGB 9.5* 10.3* 9.3*   HCT 28.7* 32.2* 30.3*   MCV 92.3 93.6 99.0*   MCH 30.5 29.9 30.4   MCHC 33.1 32.0* 30.7*   RDW 15.5 15.6 15.9    206 219   MPV 10.6* 10.5* 11.0*       Recent Labs   Lab 06/14/23  0401 06/15/23  0425 06/16/23  0406    142 140   K 3.8 4.3 3.8   CO2 24 24 24   BUN 10.2 12.6 12.3   CREATININE 0.60 0.59 0.54*   CALCIUM 7.7* 8.2* 8.2*   MG 2.20 1.90 1.80   ALBUMIN 2.3* 2.7* 2.4*   ALKPHOS 44 48 45   ALT 27 27 25   AST 20 25 23   BILITOT 0.7 0.9 0.7          Microbiology Results (last 7 days)       ** No results found for the last 168 hours. **             See below for Radiology    Scheduled Med:   clopidogreL  75 mg Oral Daily    docusate sodium  100 mg Oral Daily    enalaprilat  1.25 mg  Intravenous Q6H    enoxparin  1 mg/kg (Dosing Weight) Subcutaneous Q12H (treatment, non-standard time)    methocarbamoL  500 mg Oral QID    pantoprazole  40 mg Intravenous Daily    polyethylene glycol  17 g Oral BID    senna  8.6 mg Oral Daily    sodium chloride 0.9%  10 mL Intravenous Q6H    timolol maleate 0.5%  1 drop Both Eyes Daily    warfarin  4 mg Oral Daily        Continuous Infusions:       PRN Meds:  sodium chloride, sodium chloride, sodium chloride, sodium chloride, acetaminophen, bisacodyL, dextrose 10%, dextrose 10%, glucagon (human recombinant), glucose, hydrALAZINE, insulin aspart U-100, morphine, naloxone, nitroGLYCERIN, ondansetron, oxyCODONE-acetaminophen, Flushing PICC Protocol **AND** sodium chloride 0.9% **AND** sodium chloride 0.9%       Assessment/Plan:  Patient Active Problem List   Diagnosis    Diabetes mellitus    Protein C deficiency    Recurrent deep vein thrombosis (DVT)    Acid reflux    Chronic lumbar pain    Hypertension    HLD (hyperlipidemia)    History of continuous positive airway pressure (CPAP) therapy at home    SBO (small bowel obstruction)   Discontinue less store, continue MiraLax  Pain controlled  Tolerating her diet advised on small frequent meals and not to eat too much at 1 time  Up with OT and PT as tolerated  Continue Lovenox to goal INR of 2 she is currently 1.2 this morning patient with history of DVT secondary to history of protein C deficiency if discharge over the weekend please discharged home on full dose Lovenox and Coumadin with recheck of INR on Monday.  Will order additional dose of Coumadin today    Patient condition:  Stable      All diagnosis and differential diagnosis have been reviewed; assessment and plan has been documented; I have personally reviewed the labs and test results that are presently available; I have reviewed the patients medication list; I have reviewed the consulting providers response and recommendations. I have reviewed or attempted  to review medical records based upon their availability    All of the patient's questions have been  addressed and answered. Patient's is agreeable to the above stated plan. I will continue to monitor closely and make adjustments to medical management as needed.      X-Ray Chest 1 View  Narrative: EXAMINATION:  XR CHEST 1 VIEW    CLINICAL HISTORY:  picc;    TECHNIQUE:  AP chest    COMPARISON:  Chest x-ray dated 06/12/2023    FINDINGS:  Left-sided PICC line has its tip over the superior vena cava.  The heart is normal in size.  There are small bilateral pleural effusions.  There is no definite visible pneumothorax.  Impression: Left-sided PICC line with tip over the superior vena cava.    Electronically signed by: Ana M Ward  Date:    06/12/2023  Time:    16:50  X-Ray Abdomen AP 1 View  Narrative: EXAMINATION:  XR ABDOMEN AP 1 VIEW    CLINICAL HISTORY:  ileus;    TECHNIQUE:  AP X-RAY OF THE ABDOMEN:    CPT 45316    FINDINGS:  Examination reveals some residual feces throughout the colon gas pattern is nonspecific with no clear evidence of ileus or obstruction no abnormal masses or calcifications identified no significant change as compared with the previous exam of Mely 10, 2023.    Simulated device with leads identified projecting over the lower thoracic spine.    There are surgical changes of the lumbosacral spine and a vascular stent identified in the left hemipelvis  Impression: Nonspecific gas pattern.    No significant change    Electronically signed by: Enio Chamorro  Date:    06/12/2023  Time:    10:49  X-Ray Chest 1 View  Narrative: EXAMINATION:  XR CHEST 1 VIEW    CPT 32820    CLINICAL HISTORY:  fever;    COMPARISON:  May 20, 2022    FINDINGS:  Examination reveals mediastinal silhouette to be within normal limits cardiac silhouette is not enlarged right lung field is clear and free of gross infiltrates atelectasis or effusions.  There is blunting of the left costophrenic angle indicating the  presence of left-sided pleural effusion.    Midline is identified tip in the axillary region  Impression: Changes suggestive of left-sided pleural effusion.    Midline as above    Electronically signed by: Enio Chamorro  Date:    06/12/2023  Time:    08:52      Alex Treviño MD   06/16/2023

## 2023-06-16 NOTE — PLAN OF CARE
Problem: Adult Inpatient Plan of Care  Goal: Plan of Care Review  Outcome: Ongoing, Progressing  Goal: Patient-Specific Goal (Individualized)  Outcome: Ongoing, Progressing  Goal: Absence of Hospital-Acquired Illness or Injury  Outcome: Ongoing, Progressing  Goal: Optimal Comfort and Wellbeing  Outcome: Ongoing, Progressing  Goal: Readiness for Transition of Care  Outcome: Ongoing, Progressing     Problem: Diabetes Comorbidity  Goal: Blood Glucose Level Within Targeted Range  Outcome: Ongoing, Progressing     Problem: Infection  Goal: Absence of Infection Signs and Symptoms  Outcome: Ongoing, Progressing     Problem: Fall Injury Risk  Goal: Absence of Fall and Fall-Related Injury  Outcome: Ongoing, Progressing     Problem: Impaired Wound Healing  Goal: Optimal Wound Healing  Outcome: Ongoing, Progressing     Problem: Inability to Wean (Mechanical Ventilation, Invasive)  Goal: Mechanical Ventilation Liberation  Outcome: Ongoing, Progressing     Problem: Nutrition Impairment (Mechanical Ventilation, Invasive)  Goal: Optimal Nutrition Delivery  Outcome: Ongoing, Progressing

## 2023-06-17 LAB
ALBUMIN SERPL-MCNC: 2.8 G/DL (ref 3.4–4.8)
ALBUMIN/GLOB SERPL: 1.1 RATIO (ref 1.1–2)
ALP SERPL-CCNC: 51 UNIT/L (ref 40–150)
ALT SERPL-CCNC: 21 UNIT/L (ref 0–55)
AST SERPL-CCNC: 18 UNIT/L (ref 5–34)
BILIRUBIN DIRECT+TOT PNL SERPL-MCNC: 0.7 MG/DL
BUN SERPL-MCNC: 10.4 MG/DL (ref 9.8–20.1)
CALCIUM SERPL-MCNC: 8.5 MG/DL (ref 8.4–10.2)
CHLORIDE SERPL-SCNC: 109 MMOL/L (ref 98–107)
CO2 SERPL-SCNC: 24 MMOL/L (ref 23–31)
CREAT SERPL-MCNC: 0.57 MG/DL (ref 0.55–1.02)
ERYTHROCYTE [DISTWIDTH] IN BLOOD BY AUTOMATED COUNT: 15.9 % (ref 11.5–17)
GFR SERPLBLD CREATININE-BSD FMLA CKD-EPI: >60 MLS/MIN/1.73/M2
GLOBULIN SER-MCNC: 2.5 GM/DL (ref 2.4–3.5)
GLUCOSE SERPL-MCNC: 111 MG/DL (ref 82–115)
HCT VFR BLD AUTO: 29.3 % (ref 37–47)
HGB BLD-MCNC: 9.3 G/DL (ref 12–16)
INR BLD: 1.35 (ref 0–1.3)
MAGNESIUM SERPL-MCNC: 1.8 MG/DL (ref 1.6–2.6)
MCH RBC QN AUTO: 30.3 PG (ref 27–31)
MCHC RBC AUTO-ENTMCNC: 31.7 G/DL (ref 33–36)
MCV RBC AUTO: 95.4 FL (ref 80–94)
NRBC BLD AUTO-RTO: 0 %
PHOSPHATE SERPL-MCNC: 4 MG/DL (ref 2.3–4.7)
PLATELET # BLD AUTO: 228 X10(3)/MCL (ref 130–400)
PMV BLD AUTO: 11 FL (ref 7.4–10.4)
POCT GLUCOSE: 109 MG/DL (ref 70–110)
POCT GLUCOSE: 121 MG/DL (ref 70–110)
POTASSIUM SERPL-SCNC: 3.8 MMOL/L (ref 3.5–5.1)
PROT SERPL-MCNC: 5.3 GM/DL (ref 5.8–7.6)
PROTHROMBIN TIME: 16.6 SECONDS (ref 12.5–14.5)
RBC # BLD AUTO: 3.07 X10(6)/MCL (ref 4.2–5.4)
SODIUM SERPL-SCNC: 142 MMOL/L (ref 136–145)
WBC # SPEC AUTO: 5.37 X10(3)/MCL (ref 4.5–11.5)

## 2023-06-17 PROCEDURE — 99232 PR SUBSEQUENT HOSPITAL CARE,LEVL II: ICD-10-PCS | Mod: ,,, | Performed by: INTERNAL MEDICINE

## 2023-06-17 PROCEDURE — 25000003 PHARM REV CODE 250: Performed by: STUDENT IN AN ORGANIZED HEALTH CARE EDUCATION/TRAINING PROGRAM

## 2023-06-17 PROCEDURE — 25000003 PHARM REV CODE 250: Performed by: INTERNAL MEDICINE

## 2023-06-17 PROCEDURE — 85610 PROTHROMBIN TIME: CPT | Performed by: INTERNAL MEDICINE

## 2023-06-17 PROCEDURE — C9113 INJ PANTOPRAZOLE SODIUM, VIA: HCPCS | Performed by: INTERNAL MEDICINE

## 2023-06-17 PROCEDURE — 63600175 PHARM REV CODE 636 W HCPCS: Performed by: INTERNAL MEDICINE

## 2023-06-17 PROCEDURE — 83735 ASSAY OF MAGNESIUM: CPT | Performed by: STUDENT IN AN ORGANIZED HEALTH CARE EDUCATION/TRAINING PROGRAM

## 2023-06-17 PROCEDURE — 84100 ASSAY OF PHOSPHORUS: CPT | Performed by: STUDENT IN AN ORGANIZED HEALTH CARE EDUCATION/TRAINING PROGRAM

## 2023-06-17 PROCEDURE — 63600175 PHARM REV CODE 636 W HCPCS: Performed by: STUDENT IN AN ORGANIZED HEALTH CARE EDUCATION/TRAINING PROGRAM

## 2023-06-17 PROCEDURE — 99232 SBSQ HOSP IP/OBS MODERATE 35: CPT | Mod: ,,, | Performed by: INTERNAL MEDICINE

## 2023-06-17 PROCEDURE — 85027 COMPLETE CBC AUTOMATED: CPT | Performed by: STUDENT IN AN ORGANIZED HEALTH CARE EDUCATION/TRAINING PROGRAM

## 2023-06-17 PROCEDURE — B4185 PARENTERAL SOL 10 GM LIPIDS: HCPCS | Performed by: STUDENT IN AN ORGANIZED HEALTH CARE EDUCATION/TRAINING PROGRAM

## 2023-06-17 PROCEDURE — 11000001 HC ACUTE MED/SURG PRIVATE ROOM

## 2023-06-17 PROCEDURE — 25000003 PHARM REV CODE 250: Performed by: SURGERY

## 2023-06-17 PROCEDURE — 80053 COMPREHEN METABOLIC PANEL: CPT | Performed by: SURGERY

## 2023-06-17 PROCEDURE — A4216 STERILE WATER/SALINE, 10 ML: HCPCS | Performed by: STUDENT IN AN ORGANIZED HEALTH CARE EDUCATION/TRAINING PROGRAM

## 2023-06-17 RX ADMIN — GABAPENTIN 300 MG: 300 CAPSULE ORAL at 03:06

## 2023-06-17 RX ADMIN — ACETAMINOPHEN 650 MG: 325 TABLET, FILM COATED ORAL at 05:06

## 2023-06-17 RX ADMIN — SODIUM CHLORIDE, PRESERVATIVE FREE 10 ML: 5 INJECTION INTRAVENOUS at 05:06

## 2023-06-17 RX ADMIN — ENALAPRILAT 1.25 MG: 2.5 INJECTION INTRAVENOUS at 12:06

## 2023-06-17 RX ADMIN — SODIUM CHLORIDE, PRESERVATIVE FREE 10 ML: 5 INJECTION INTRAVENOUS at 12:06

## 2023-06-17 RX ADMIN — METHOCARBAMOL 500 MG: 500 TABLET ORAL at 08:06

## 2023-06-17 RX ADMIN — POLYETHYLENE GLYCOL 3350 17 G: 17 POWDER, FOR SOLUTION ORAL at 08:06

## 2023-06-17 RX ADMIN — ENOXAPARIN SODIUM 60 MG: 80 INJECTION SUBCUTANEOUS at 08:06

## 2023-06-17 RX ADMIN — ENOXAPARIN SODIUM 60 MG: 80 INJECTION SUBCUTANEOUS at 09:06

## 2023-06-17 RX ADMIN — ENALAPRILAT 1.25 MG: 2.5 INJECTION INTRAVENOUS at 04:06

## 2023-06-17 RX ADMIN — MORPHINE SULFATE 15 MG: 15 TABLET ORAL at 08:06

## 2023-06-17 RX ADMIN — MORPHINE SULFATE 2 MG: 4 INJECTION INTRAVENOUS at 05:06

## 2023-06-17 RX ADMIN — DOCUSATE SODIUM 100 MG: 100 CAPSULE, LIQUID FILLED ORAL at 08:06

## 2023-06-17 RX ADMIN — METHOCARBAMOL 500 MG: 500 TABLET ORAL at 04:06

## 2023-06-17 RX ADMIN — SODIUM CHLORIDE, PRESERVATIVE FREE 10 ML: 5 INJECTION INTRAVENOUS at 04:06

## 2023-06-17 RX ADMIN — MORPHINE SULFATE 2 MG: 4 INJECTION INTRAVENOUS at 01:06

## 2023-06-17 RX ADMIN — MORPHINE SULFATE 2 MG: 4 INJECTION INTRAVENOUS at 04:06

## 2023-06-17 RX ADMIN — ENALAPRILAT 1.25 MG: 2.5 INJECTION INTRAVENOUS at 06:06

## 2023-06-17 RX ADMIN — MORPHINE SULFATE 2 MG: 4 INJECTION INTRAVENOUS at 12:06

## 2023-06-17 RX ADMIN — METHOCARBAMOL 500 MG: 500 TABLET ORAL at 12:06

## 2023-06-17 RX ADMIN — WARFARIN SODIUM 5 MG: 5 TABLET ORAL at 04:06

## 2023-06-17 RX ADMIN — GABAPENTIN 300 MG: 300 CAPSULE ORAL at 08:06

## 2023-06-17 RX ADMIN — TIMOLOL MALEATE 1 DROP: 5 SOLUTION/ DROPS OPHTHALMIC at 04:06

## 2023-06-17 RX ADMIN — CLOPIDOGREL BISULFATE 75 MG: 75 TABLET ORAL at 08:06

## 2023-06-17 RX ADMIN — PANTOPRAZOLE SODIUM 40 MG: 40 INJECTION, POWDER, FOR SOLUTION INTRAVENOUS at 08:06

## 2023-06-17 RX ADMIN — I.V. FAT EMULSION 250 ML: 20 EMULSION INTRAVENOUS at 04:06

## 2023-06-17 RX ADMIN — ACETAMINOPHEN 650 MG: 325 TABLET, FILM COATED ORAL at 08:06

## 2023-06-17 NOTE — PROGRESS NOTES
[facility] - 8th Floor [facility] Medicine  Progress Note    Patient Name: Alejandrina Rodriguez  MRN: [id number]  Patient Class: IP- Inpatient   Admission Date: 6/4/2023  Length of Stay: 13 days  Attending Physician: Camilo Valadez MD  Primary Care Provider: Alex Treviño MD        Subjective:     Principal Problem:SBO (small bowel obstruction)        HPI:  No notes on file    Overview/Hospital Course:  No notes on file    Interval History: Patient without any new complaints.  She was having some neuropathic type pain that responded well to gabapentin.  She ate breakfast.    Review of Systems  Objective:     Vital Signs (Most Recent):  Temp: 97.6 °F (36.4 °C) (06/17/23 0810)  Pulse: 74 (06/17/23 0810)  Resp: 18 (06/17/23 0830)  BP: 129/80 (06/17/23 0810)  SpO2: 99 % (06/17/23 0810) Vital Signs (24h Range):  Temp:  [97.6 °F (36.4 °C)-98.9 °F (37.2 °C)] 97.6 °F (36.4 °C)  Pulse:  [74-83] 74  Resp:  [16-18] 18  SpO2:  [97 %-99 %] 99 %  BP: (115-155)/(72-91) 129/80     Weight: 65.5 kg (144 lb 8 oz)  Body mass index is 24.8 kg/m².    Intake/Output Summary (Last 24 hours) at 6/17/2023 1007  Last data filed at 6/17/2023 0534  Gross per 24 hour   Intake 1920 ml   Output 2820 ml   Net -900 ml         Physical Exam  Vitals reviewed.   Constitutional:       General: She is not in acute distress.     Appearance: Normal appearance. She is not ill-appearing or diaphoretic.   HENT:      Head: Normocephalic and atraumatic.   Pulmonary:      Effort: Pulmonary effort is normal.   Skin:     General: Skin is warm and dry.   Neurological:      General: No focal deficit present.      Mental Status: She is alert.   Psychiatric:         Mood and Affect: Mood normal.         Behavior: Behavior normal.         Thought Content: Thought content normal.         Judgment: Judgment normal.           Significant Labs: All pertinent labs within the past 24 hours have been reviewed.    Significant Imaging: I have reviewed  all pertinent imaging results/findings within the past 24 hours.    INR 1.35      Assessment/Plan:    Assessment/Plan:      Patient Active Problem List   Diagnosis    Diabetes mellitus    Protein C deficiency    Recurrent deep vein thrombosis (DVT)    Acid reflux    Chronic lumbar pain    Hypertension    HLD (hyperlipidemia)    History of continuous positive airway pressure (CPAP) therapy at home    SBO (small bowel obstruction)       She is eating better.  Up with OT and PT as tolerated  Continue Lovenox to goal INR of 2 she is currently 1.35 this morning patient with history of DVT secondary to history of protein C deficiency.  If discharge over the weekend please discharge home on full dose Lovenox and Coumadin with recheck of INR on Monday.    No other acute issues identified today.    VTE Risk Mitigation (From admission, onward)         Ordered     warfarin (COUMADIN) tablet 5 mg  Daily         06/16/23 0958     enoxaparin injection 60 mg  Every 12 hours         06/14/23 0948                Discharge Planning   LUISA:      Code Status: Prior   Is the patient medically ready for discharge?:     Reason for patient still in hospital (select all that apply): Patient trending condition  Discharge Plan A: Home with family                  Hawa Schwartz MD  Department of Hospital Medicine   [facility] - 8th Floor Med Surg

## 2023-06-17 NOTE — SUBJECTIVE & OBJECTIVE
Interval History: Patient without any new complaints.  She was having some neuropathic type pain that responded well to gabapentin.  She ate breakfast.    Review of Systems  Objective:     Vital Signs (Most Recent):  Temp: 97.6 °F (36.4 °C) (06/17/23 0810)  Pulse: 74 (06/17/23 0810)  Resp: 18 (06/17/23 0830)  BP: 129/80 (06/17/23 0810)  SpO2: 99 % (06/17/23 0810) Vital Signs (24h Range):  Temp:  [97.6 °F (36.4 °C)-98.9 °F (37.2 °C)] 97.6 °F (36.4 °C)  Pulse:  [74-83] 74  Resp:  [16-18] 18  SpO2:  [97 %-99 %] 99 %  BP: (115-155)/(72-91) 129/80     Weight: 65.5 kg (144 lb 8 oz)  Body mass index is 24.8 kg/m².    Intake/Output Summary (Last 24 hours) at 6/17/2023 1007  Last data filed at 6/17/2023 0534  Gross per 24 hour   Intake 1920 ml   Output 2820 ml   Net -900 ml         Physical Exam  Vitals reviewed.   Constitutional:       General: She is not in acute distress.     Appearance: Normal appearance. She is not ill-appearing or diaphoretic.   HENT:      Head: Normocephalic and atraumatic.   Pulmonary:      Effort: Pulmonary effort is normal.   Skin:     General: Skin is warm and dry.   Neurological:      General: No focal deficit present.      Mental Status: She is alert.   Psychiatric:         Mood and Affect: Mood normal.         Behavior: Behavior normal.         Thought Content: Thought content normal.         Judgment: Judgment normal.           Significant Labs: All pertinent labs within the past 24 hours have been reviewed.    Significant Imaging: I have reviewed all pertinent imaging results/findings within the past 24 hours.    INR 1.35

## 2023-06-17 NOTE — PROGRESS NOTES
Acute Care Surgery   Progress Note  Admit Date: 6/4/2023  HD#13  POD#10 Days Post-Op    Subjective:   Interval history:  KELLI   Continues to do well   Ate more solid food this AM for breakfast   Having bowel function   -n/-v  Urinating w/o issues       Home Meds:  Current Outpatient Medications   Medication Instructions    azelastine (ASTELIN) 137 mcg, Nasal, 2 times daily    B-complex with vitamin C (Z-BEC OR EQUIV) tablet 1 tablet, Oral, Daily    baclofen (LIORESAL) 10 mg, Oral, 4 times daily, PRN muscle spasms    betamethasone dipropionate 0.05 % cream 1 application, Topical (Top), 2 times daily    celecoxib (CELEBREX) 100 MG capsule TAKE ONE CAPSULE BY MOUTH ONCE DAILY WITH FOOD AS NEEDED FOR PAIN    clopidogreL (PLAVIX) 75 mg tablet 1 tablet, Oral, Daily    famotidine (PEPCID) 20 MG tablet TAKE 1 TABLET TWICE DAILY    hydroCHLOROthiazide (HYDRODIURIL) 25 MG tablet TAKE 1 TABLET EVERY DAY    LEXAPRO 10 mg tablet TAKE 1 TABLET BY MOUTH ONCE DAILY AS DIRECTED FOR MOOD/CHRONIC PAIN AS DIRECTED    LIDOcaine (LIDODERM) 5 % 1 patch, Transdermal, Every 24 hours (non-standard times), Remove & Discard patch within 12 hours or as directed by MD    metFORMIN (GLUCOPHAGE) 500 mg, Oral, 2 times daily    morphine (MSIR) 15 MG tablet 1.5 tablets, Oral, 2 times daily PRN    nitroGLYCERIN (NITROSTAT) 0.4 MG SL tablet PLEASE SEE ATTACHED FOR DETAILED DIRECTIONS    OMEGA-3-EPA-FISH OIL ORAL 1 capsule, Oral, Daily    omeprazole (PRILOSEC) 40 MG capsule TAKE 1 CAPSULE EVERY DAY    ondansetron (ZOFRAN-ODT) 8 mg, Oral, 2 times daily    peg 400-propylene glycol (SYSTANE, PROPYLENE GLYCOL,) 0.4-0.3 % Drop 1 drop, Ophthalmic    potassium chloride SA (K-DUR,KLOR-CON) 20 MEQ tablet TAKE 1 TABLET ONE TIME DAILY    pravastatin (PRAVACHOL) 40 MG tablet TAKE 1 TABLET EVERY DAY    timolol maleate 0.5% (TIMOPTIC) 0.5 % Drop 1 drop, Both Eyes, Daily    vitamin D (VITAMIN D3) 5,000 Units, Oral, Daily    warfarin (COUMADIN) 4 MG tablet TAKE 1  "TABLET EVERY DAY      Scheduled Meds:   clopidogreL  75 mg Oral Daily    docusate sodium  100 mg Oral Daily    enalaprilat  1.25 mg Intravenous Q6H    enoxparin  1 mg/kg (Dosing Weight) Subcutaneous Q12H (treatment, non-standard time)    fat emulsion 20%  250 mL Intravenous Every Mon, Wed, Fri    gabapentin  300 mg Oral TID    methocarbamoL  500 mg Oral QID    morphine  15 mg Oral BID    pantoprazole  40 mg Intravenous Daily    polyethylene glycol  17 g Oral BID    sodium chloride 0.9%  10 mL Intravenous Q6H    timolol maleate 0.5%  1 drop Both Eyes Daily    warfarin  5 mg Oral Daily     Continuous Infusions:   amino acid 5 % in 15% dextrose 55 mL/hr at 06/16/23 2001     PRN Meds:sodium chloride, sodium chloride, sodium chloride, sodium chloride, acetaminophen, bisacodyL, dextrose 10%, dextrose 10%, glucagon (human recombinant), glucose, hydrALAZINE, insulin aspart U-100, morphine, naloxone, nitroGLYCERIN, ondansetron, senna, Flushing PICC Protocol **AND** sodium chloride 0.9% **AND** sodium chloride 0.9%     Objective:     VITAL SIGNS: 24 HR MIN & MAX LAST   Temp  Min: 97.6 °F (36.4 °C)  Max: 98.9 °F (37.2 °C)  97.6 °F (36.4 °C)   BP  Min: 115/73  Max: 155/81  129/80    Pulse  Min: 74  Max: 83  74    Resp  Min: 16  Max: 18  18    SpO2  Min: 97 %  Max: 99 %  99 %      HT: 5' 4" (162.6 cm)  WT: 65.5 kg (144 lb 8 oz)  BMI: 24.8     Intake/output:  Intake/Output - Last 3 Shifts         06/15 0700  06/16 0659 06/16 0700  06/17 0659 06/17 0700  06/18 0659    P.O. 840 1920     Total Intake(mL/kg) 840 (12.8) 1920 (29.3)     Urine (mL/kg/hr) 850 (0.5) 2820 (1.8)     Emesis/NG output 0 0     Stool 0 0     Total Output 850 2820     Net -10 -900            Stool Occurrence 1 x 4 x     Emesis Occurrence 1 x 0 x             Intake/Output Summary (Last 24 hours) at 6/17/2023 0902  Last data filed at 6/17/2023 0534  Gross per 24 hour   Intake 1920 ml   Output 2820 ml   Net -900 ml           Lines/drains/airway:  PICC Double Lumen " 06/12/23 1555 left basilic (Active)   $ PICC Line Charges (Upon insertion) Catheter - Double Lumen (Supply);Bedside Insertion Performed Pt > 5 Years Old (no subq port/pump or image guidance) 06/12/23 1554   Line Necessity Review Longterm central access required 06/14/23 0800   Site Assessment No drainage;No redness;No swelling;No warmth 06/16/23 0800   Extremity Assessment Distal to IV No abnormal discoloration 06/16/23 0800   Line Securement Device Secured with sutureless device 06/16/23 0800   Dressing Type CHG impregnated dressing/sponge;Central line dressing 06/16/23 0800   Dressing Status Clean;Dry;Intact 06/16/23 0800   Dressing Intervention Integrity maintained 06/16/23 0800   Distal Patency/Care flushed w/o difficulty 06/16/23 0800   Proximal 1 Patency/Care flushed w/o difficulty 06/16/23 0800   Number of days: 4       Physical examination:  Gen: NAD, AAOx3, answering questions appropriately  HEENT: PERRL, EOMI   CV: RRR  Resp: NWOB  Abd: soft, attp following surgery, incisions c/d/I   Ext: moving all extremities spontaneously and purposefully  Neuro: CN II-XII grossly intact      Labs:  Renal:  Recent Labs     06/15/23  0425 06/16/23  0406 06/17/23  0632   BUN 12.6 12.3 10.4   CREATININE 0.59 0.54* 0.57     No results for input(s): LACTIC in the last 72 hours.  FENGI:  Recent Labs     06/15/23  0425 06/16/23  0406 06/17/23  0632    140 142   K 4.3 3.8 3.8   CO2 24 24 24   CALCIUM 8.2* 8.2* 8.5   MG 1.90 1.80 1.80   PHOS 3.7 3.3 4.0   ALBUMIN 2.7* 2.4* 2.8*   BILITOT 0.9 0.7 0.7   AST 25 23 18   ALKPHOS 48 45 51   ALT 27 25 21     Heme:  Recent Labs     06/14/23  1214 06/15/23  0425 06/16/23  0406 06/17/23  0632   HGB  --   --  9.3* 9.3*   HCT  --   --  30.3* 29.3*   PLT  --   --  219 228   INR 1.12 1.11 1.20 1.35*     ID:  Recent Labs     06/16/23  0406 06/17/23  0632   WBC 7.33 5.37     CBG:  Recent Labs     06/15/23  0425 06/16/23  0406 06/17/23  0632   GLUCOSE 127* 127* 111      Cardiovascular:  No  results for input(s): TROPONINI, CKTOTAL, CKMB, BNP in the last 168 hours.  ABG:  No results for input(s): PH, PO2, PCO2, HCO3, BE in the last 168 hours.   I have reviewed all pertinent lab results within the past 24 hours.    Imaging:  X-Ray Chest 1 View   Final Result      Left-sided PICC line with tip over the superior vena cava.         Electronically signed by: Ana M Ward   Date:    06/12/2023   Time:    16:50      X-Ray Chest 1 View   Final Result      Changes suggestive of left-sided pleural effusion.      Midline as above         Electronically signed by: Enio Chamorro   Date:    06/12/2023   Time:    08:52      X-Ray Abdomen AP 1 View   Final Result      Nonspecific gas pattern.      No significant change         Electronically signed by: Enio Chamorro   Date:    06/12/2023   Time:    10:49      X-Ray Abdomen AP 1 View   Final Result      Enteric tube has been removed.  Nonobstructive bowel gas pattern.  Small effusion on the left.         Electronically signed by: Luis Reeves MD   Date:    06/10/2023   Time:    11:25      XR Gastric tube check, non-radiologist performed   Final Result      Enteric tube side port about 3.5 cm below the GE junction.         Electronically signed by: Sunil Peralta   Date:    06/04/2023   Time:    14:49      XR Gastric tube check, non-radiologist performed   Final Result      NG tube projects over the body of the stomach.         Electronically signed by: Evin Manzo   Date:    06/04/2023   Time:    10:47      CT Abdomen Pelvis  Without Contrast   Final Result      Suspected small-bowel obstruction with transition point in the right lower abdomen.         Electronically signed by: Sunil Peralta   Date:    06/04/2023   Time:    09:05         I have reviewed all pertinent imaging results/findings within the past 24 hours.    Micro/Path/Other:  Microbiology Results (last 7 days)       ** No results found for the last 168 hours. **           Specimen (168h ago,  onward)      None             Assessment & Plan:   72 y/o w/ hx ex lap several decades ago with h/o colostomy s/p reversal, Gtube and Jtube s/p removal, and chronic back pain on home morphine, now admitted 6/4 with SBO. Pt s/p exlap with lysis of adhesions 6/4. Taken back to the OR 6/5 for hypotension and post-op hemorrhage with evacuation of approximately 3L of blood and old clot, placement of temporary abdominal closure. S/p abdominal closure 6/7. Extubated post-op. Recovering slowly. NG removed 6/9.    - vitals, labs and images reviewed   - Continue regular diet   - Will continue TPN while inpatient   - Currently being bridged with lovenox while coumadin has been restarted. Goal INR 2.0- it is 1.35 today   - Continue multimodal pain meds  - PT/OT   - She is ready for discharge from surgery standpoint once INR is therapeutic     Martine Bridges MD   [employer] General Surgery PGY4

## 2023-06-17 NOTE — PLAN OF CARE
Problem: Adult Inpatient Plan of Care  Goal: Plan of Care Review  6/17/2023 1717 by Hawa Prado LPN  Outcome: Ongoing, Progressing  6/17/2023 1524 by Hawa Prado LPN  Outcome: Ongoing, Progressing  Goal: Patient-Specific Goal (Individualized)  6/17/2023 1717 by Hawa Prado LPN  Outcome: Ongoing, Progressing  6/17/2023 1524 by Hawa Prado LPN  Outcome: Ongoing, Progressing  Goal: Absence of Hospital-Acquired Illness or Injury  6/17/2023 1717 by Hawa Prado LPN  Outcome: Ongoing, Progressing  6/17/2023 1524 by Hawa Prado LPN  Outcome: Ongoing, Progressing  Goal: Optimal Comfort and Wellbeing  6/17/2023 1717 by Hawa Prado LPN  Outcome: Ongoing, Progressing  6/17/2023 1524 by Hawa Prado LPN  Outcome: Ongoing, Progressing  Goal: Readiness for Transition of Care  6/17/2023 1717 by Hawa Prado LPN  Outcome: Ongoing, Progressing  6/17/2023 1524 by Hawa Prado LPN  Outcome: Ongoing, Progressing     Problem: Diabetes Comorbidity  Goal: Blood Glucose Level Within Targeted Range  6/17/2023 1717 by Hawa Prado LPN  Outcome: Ongoing, Progressing  6/17/2023 1524 by Hawa Prado LPN  Outcome: Ongoing, Progressing     Problem: Infection  Goal: Absence of Infection Signs and Symptoms  6/17/2023 1717 by Hawa Prado LPN  Outcome: Ongoing, Progressing  6/17/2023 1524 by Hawa Prado LPN  Outcome: Ongoing, Progressing     Problem: Fall Injury Risk  Goal: Absence of Fall and Fall-Related Injury  6/17/2023 1717 by Hawa Prado LPN  Outcome: Ongoing, Progressing  6/17/2023 1524 by Hawa Prado LPN  Outcome: Ongoing, Progressing     Problem: Impaired Wound Healing  Goal: Optimal Wound Healing  6/17/2023 1717 by Hawa Prado LPN  Outcome: Ongoing, Progressing  6/17/2023 1524 by Hawa Prado LPN  Outcome: Ongoing, Progressing     Problem: Inability to Wean (Mechanical Ventilation, Invasive)  Goal: Mechanical Ventilation  Liberation  6/17/2023 1717 by Hawa Prado LPN  Outcome: Ongoing, Progressing  6/17/2023 1524 by Hawa Prado LPN  Outcome: Ongoing, Progressing     Problem: Nutrition Impairment (Mechanical Ventilation, Invasive)  Goal: Optimal Nutrition Delivery  6/17/2023 1717 by Hawa Prado LPN  Outcome: Ongoing, Progressing  6/17/2023 1524 by Hawa Prado LPN  Outcome: Ongoing, Progressing     Problem: Communication Impairment (Artificial Airway)  Goal: Effective Communication  6/17/2023 1717 by Hawa Prado LPN  Outcome: Ongoing, Progressing  6/17/2023 1524 by Hawa Prado LPN  Outcome: Ongoing, Progressing     Problem: Skin and Tissue Injury (Artificial Airway)  Goal: Absence of Device-Related Skin or Tissue Injury  6/17/2023 1717 by Hawa Prado LPN  Outcome: Ongoing, Progressing  6/17/2023 1524 by Hawa Prado LPN  Outcome: Ongoing, Progressing     Problem: Skin Injury Risk Increased  Goal: Skin Health and Integrity  6/17/2023 1717 by Hawa Prado LPN  Outcome: Ongoing, Progressing  6/17/2023 1524 by Hawa Prado LPN  Outcome: Ongoing, Progressing

## 2023-06-18 LAB
ALBUMIN SERPL-MCNC: 2.8 G/DL (ref 3.4–4.8)
ALBUMIN/GLOB SERPL: 1 RATIO (ref 1.1–2)
ALP SERPL-CCNC: 67 UNIT/L (ref 40–150)
ALT SERPL-CCNC: 25 UNIT/L (ref 0–55)
AST SERPL-CCNC: 25 UNIT/L (ref 5–34)
BILIRUBIN DIRECT+TOT PNL SERPL-MCNC: 0.7 MG/DL
BUN SERPL-MCNC: 13.4 MG/DL (ref 9.8–20.1)
CALCIUM SERPL-MCNC: 8.4 MG/DL (ref 8.4–10.2)
CHLORIDE SERPL-SCNC: 109 MMOL/L (ref 98–107)
CO2 SERPL-SCNC: 26 MMOL/L (ref 23–31)
CREAT SERPL-MCNC: 0.55 MG/DL (ref 0.55–1.02)
GFR SERPLBLD CREATININE-BSD FMLA CKD-EPI: >60 MLS/MIN/1.73/M2
GLOBULIN SER-MCNC: 2.7 GM/DL (ref 2.4–3.5)
GLUCOSE SERPL-MCNC: 104 MG/DL (ref 82–115)
INR BLD: 1.51 (ref 0–1.3)
MAGNESIUM SERPL-MCNC: 1.9 MG/DL (ref 1.6–2.6)
PHOSPHATE SERPL-MCNC: 3.9 MG/DL (ref 2.3–4.7)
POCT GLUCOSE: 106 MG/DL (ref 70–110)
POCT GLUCOSE: 86 MG/DL (ref 70–110)
POCT GLUCOSE: 98 MG/DL (ref 70–110)
POTASSIUM SERPL-SCNC: 4 MMOL/L (ref 3.5–5.1)
PROT SERPL-MCNC: 5.5 GM/DL (ref 5.8–7.6)
PROTHROMBIN TIME: 18.1 SECONDS (ref 12.5–14.5)
SODIUM SERPL-SCNC: 144 MMOL/L (ref 136–145)

## 2023-06-18 PROCEDURE — 11000001 HC ACUTE MED/SURG PRIVATE ROOM

## 2023-06-18 PROCEDURE — 63600175 PHARM REV CODE 636 W HCPCS: Performed by: INTERNAL MEDICINE

## 2023-06-18 PROCEDURE — 83735 ASSAY OF MAGNESIUM: CPT | Performed by: STUDENT IN AN ORGANIZED HEALTH CARE EDUCATION/TRAINING PROGRAM

## 2023-06-18 PROCEDURE — 99232 SBSQ HOSP IP/OBS MODERATE 35: CPT | Mod: ,,, | Performed by: INTERNAL MEDICINE

## 2023-06-18 PROCEDURE — 80053 COMPREHEN METABOLIC PANEL: CPT | Performed by: SURGERY

## 2023-06-18 PROCEDURE — 84100 ASSAY OF PHOSPHORUS: CPT | Performed by: STUDENT IN AN ORGANIZED HEALTH CARE EDUCATION/TRAINING PROGRAM

## 2023-06-18 PROCEDURE — 25000003 PHARM REV CODE 250: Performed by: STUDENT IN AN ORGANIZED HEALTH CARE EDUCATION/TRAINING PROGRAM

## 2023-06-18 PROCEDURE — C9113 INJ PANTOPRAZOLE SODIUM, VIA: HCPCS | Performed by: INTERNAL MEDICINE

## 2023-06-18 PROCEDURE — 63600175 PHARM REV CODE 636 W HCPCS: Performed by: STUDENT IN AN ORGANIZED HEALTH CARE EDUCATION/TRAINING PROGRAM

## 2023-06-18 PROCEDURE — 94761 N-INVAS EAR/PLS OXIMETRY MLT: CPT

## 2023-06-18 PROCEDURE — 25000003 PHARM REV CODE 250: Performed by: INTERNAL MEDICINE

## 2023-06-18 PROCEDURE — 99232 PR SUBSEQUENT HOSPITAL CARE,LEVL II: ICD-10-PCS | Mod: ,,, | Performed by: INTERNAL MEDICINE

## 2023-06-18 PROCEDURE — 85610 PROTHROMBIN TIME: CPT | Performed by: INTERNAL MEDICINE

## 2023-06-18 PROCEDURE — A4216 STERILE WATER/SALINE, 10 ML: HCPCS | Performed by: STUDENT IN AN ORGANIZED HEALTH CARE EDUCATION/TRAINING PROGRAM

## 2023-06-18 PROCEDURE — 25000003 PHARM REV CODE 250: Performed by: SURGERY

## 2023-06-18 RX ORDER — SIMETHICONE 80 MG
1 TABLET,CHEWABLE ORAL 3 TIMES DAILY PRN
Status: DISCONTINUED | OUTPATIENT
Start: 2023-06-18 | End: 2023-06-19 | Stop reason: HOSPADM

## 2023-06-18 RX ORDER — OXYCODONE HYDROCHLORIDE 5 MG/1
5 TABLET ORAL EVERY 6 HOURS PRN
Status: DISCONTINUED | OUTPATIENT
Start: 2023-06-18 | End: 2023-06-19

## 2023-06-18 RX ADMIN — METHOCARBAMOL 500 MG: 500 TABLET ORAL at 12:06

## 2023-06-18 RX ADMIN — MORPHINE SULFATE 2 MG: 4 INJECTION INTRAVENOUS at 12:06

## 2023-06-18 RX ADMIN — MORPHINE SULFATE 2 MG: 4 INJECTION INTRAVENOUS at 05:06

## 2023-06-18 RX ADMIN — ENOXAPARIN SODIUM 60 MG: 80 INJECTION SUBCUTANEOUS at 08:06

## 2023-06-18 RX ADMIN — SODIUM CHLORIDE, PRESERVATIVE FREE 10 ML: 5 INJECTION INTRAVENOUS at 05:06

## 2023-06-18 RX ADMIN — METHOCARBAMOL 500 MG: 500 TABLET ORAL at 08:06

## 2023-06-18 RX ADMIN — SODIUM CHLORIDE, PRESERVATIVE FREE 10 ML: 5 INJECTION INTRAVENOUS at 06:06

## 2023-06-18 RX ADMIN — PANTOPRAZOLE SODIUM 40 MG: 40 INJECTION, POWDER, FOR SOLUTION INTRAVENOUS at 09:06

## 2023-06-18 RX ADMIN — MORPHINE SULFATE 15 MG: 15 TABLET ORAL at 09:06

## 2023-06-18 RX ADMIN — ENOXAPARIN SODIUM 60 MG: 80 INJECTION SUBCUTANEOUS at 09:06

## 2023-06-18 RX ADMIN — TIMOLOL MALEATE 1 DROP: 5 SOLUTION/ DROPS OPHTHALMIC at 05:06

## 2023-06-18 RX ADMIN — GABAPENTIN 300 MG: 300 CAPSULE ORAL at 09:06

## 2023-06-18 RX ADMIN — MORPHINE SULFATE 2 MG: 4 INJECTION INTRAVENOUS at 01:06

## 2023-06-18 RX ADMIN — ACETAMINOPHEN 650 MG: 325 TABLET, FILM COATED ORAL at 02:06

## 2023-06-18 RX ADMIN — WARFARIN SODIUM 5 MG: 5 TABLET ORAL at 05:06

## 2023-06-18 RX ADMIN — SODIUM CHLORIDE, PRESERVATIVE FREE 10 ML: 5 INJECTION INTRAVENOUS at 01:06

## 2023-06-18 RX ADMIN — GABAPENTIN 300 MG: 300 CAPSULE ORAL at 02:06

## 2023-06-18 RX ADMIN — CLOPIDOGREL BISULFATE 75 MG: 75 TABLET ORAL at 09:06

## 2023-06-18 RX ADMIN — MORPHINE SULFATE 15 MG: 15 TABLET ORAL at 08:06

## 2023-06-18 RX ADMIN — POLYETHYLENE GLYCOL 3350 17 G: 17 POWDER, FOR SOLUTION ORAL at 09:06

## 2023-06-18 RX ADMIN — GABAPENTIN 300 MG: 300 CAPSULE ORAL at 08:06

## 2023-06-18 RX ADMIN — SODIUM CHLORIDE, PRESERVATIVE FREE 10 ML: 5 INJECTION INTRAVENOUS at 12:06

## 2023-06-18 RX ADMIN — METHOCARBAMOL 500 MG: 500 TABLET ORAL at 09:06

## 2023-06-18 RX ADMIN — DOCUSATE SODIUM 100 MG: 100 CAPSULE, LIQUID FILLED ORAL at 09:06

## 2023-06-18 RX ADMIN — POLYETHYLENE GLYCOL 3350 17 G: 17 POWDER, FOR SOLUTION ORAL at 08:06

## 2023-06-18 RX ADMIN — ENALAPRILAT 1.25 MG: 2.5 INJECTION INTRAVENOUS at 12:06

## 2023-06-18 RX ADMIN — METHOCARBAMOL 500 MG: 500 TABLET ORAL at 05:06

## 2023-06-18 NOTE — SUBJECTIVE & OBJECTIVE
Interval History: Patient passing some gas.  She is tolerating some meals more than others.  She c/o some gas at times.  She is sitting up in a chair at the end of the bae this morning.  No other complaints.  She hasn't had a BM as of yet.    Review of Systems  Objective:     Vital Signs (Most Recent):  Temp: 98.4 °F (36.9 °C) (06/18/23 0810)  Pulse: 80 (06/18/23 0810)  Resp: 18 (06/18/23 0903)  BP: 128/77 (06/18/23 0810)  SpO2: 98 % (06/18/23 0810) Vital Signs (24h Range):  Temp:  [97.5 °F (36.4 °C)-98.4 °F (36.9 °C)] 98.4 °F (36.9 °C)  Pulse:  [63-89] 80  Resp:  [16-18] 18  SpO2:  [96 %-99 %] 98 %  BP: (103-128)/(67-77) 128/77     Weight: 65.5 kg (144 lb 8 oz)  Body mass index is 24.8 kg/m².    Intake/Output Summary (Last 24 hours) at 6/18/2023 1121  Last data filed at 6/18/2023 0623  Gross per 24 hour   Intake 300 ml   Output 1450 ml   Net -1150 ml         Physical Exam  Vitals reviewed.   Constitutional:       General: She is not in acute distress.     Appearance: Normal appearance. She is not ill-appearing or diaphoretic.   HENT:      Head: Normocephalic and atraumatic.   Pulmonary:      Effort: Pulmonary effort is normal.   Abdominal:      General: Abdomen is flat.      Palpations: Abdomen is soft.   Musculoskeletal:      Right lower leg: No edema.      Left lower leg: No edema.   Skin:     General: Skin is warm and dry.   Neurological:      General: No focal deficit present.      Mental Status: She is alert.   Psychiatric:         Mood and Affect: Mood normal.         Behavior: Behavior normal.         Thought Content: Thought content normal.         Judgment: Judgment normal.           Significant Labs: All pertinent labs within the past 24 hours have been reviewed.    Significant Imaging: I have reviewed all pertinent imaging results/findings within the past 24 hours.

## 2023-06-18 NOTE — PROGRESS NOTES
[facility] - 8th Floor [facility] Medicine  Progress Note    Patient Name: Alejandrina Rodriguez  MRN: [id number]  Patient Class: IP- Inpatient   Admission Date: 6/4/2023  Length of Stay: 14 days  Attending Physician: Camilo Valadez MD  Primary Care Provider: Alex Treviño MD        Subjective:     Principal Problem:SBO (small bowel obstruction)        HPI:  No notes on file    Overview/Hospital Course:  No notes on file    Interval History: Patient passing some gas.  She is tolerating some meals more than others.  She c/o some gas at times.  She is sitting up in a chair at the end of the bae this morning.  No other complaints.  She hasn't had a BM as of yet.    Review of Systems  Objective:     Vital Signs (Most Recent):  Temp: 98.4 °F (36.9 °C) (06/18/23 0810)  Pulse: 80 (06/18/23 0810)  Resp: 18 (06/18/23 0903)  BP: 128/77 (06/18/23 0810)  SpO2: 98 % (06/18/23 0810) Vital Signs (24h Range):  Temp:  [97.5 °F (36.4 °C)-98.4 °F (36.9 °C)] 98.4 °F (36.9 °C)  Pulse:  [63-89] 80  Resp:  [16-18] 18  SpO2:  [96 %-99 %] 98 %  BP: (103-128)/(67-77) 128/77     Weight: 65.5 kg (144 lb 8 oz)  Body mass index is 24.8 kg/m².    Intake/Output Summary (Last 24 hours) at 6/18/2023 1121  Last data filed at 6/18/2023 0623  Gross per 24 hour   Intake 300 ml   Output 1450 ml   Net -1150 ml         Physical Exam  Vitals reviewed.   Constitutional:       General: She is not in acute distress.     Appearance: Normal appearance. She is not ill-appearing or diaphoretic.   HENT:      Head: Normocephalic and atraumatic.   Pulmonary:      Effort: Pulmonary effort is normal.   Abdominal:      General: Abdomen is flat.      Palpations: Abdomen is soft.   Musculoskeletal:      Right lower leg: No edema.      Left lower leg: No edema.   Skin:     General: Skin is warm and dry.   Neurological:      General: No focal deficit present.      Mental Status: She is alert.   Psychiatric:         Mood and Affect: Mood normal.          Behavior: Behavior normal.         Thought Content: Thought content normal.         Judgment: Judgment normal.           Significant Labs: All pertinent labs within the past 24 hours have been reviewed.    Significant Imaging: I have reviewed all pertinent imaging results/findings within the past 24 hours.      Assessment/Plan:    Assessment/Plan:        Patient Active Problem List   Diagnosis    Diabetes mellitus    Protein C deficiency    Recurrent deep vein thrombosis (DVT)    Acid reflux    Chronic lumbar pain    Hypertension    HLD (hyperlipidemia)    History of continuous positive airway pressure (CPAP) therapy at home    SBO (small bowel obstruction)         She is eating -- digestion is a little slow, and her bowels haven't moved yet.  Up with OT and PT as tolerated  Continue Lovenox to goal INR of 2 she is currently 1.51 this morning patient with history of DVT secondary to history of protein C deficiency.  If discharge over the weekend please discharge home on full dose Lovenox and Coumadin with recheck of INR on Monday.        VTE Risk Mitigation (From admission, onward)         Ordered     warfarin (COUMADIN) tablet 5 mg  Daily         06/16/23 0958     enoxaparin injection 60 mg  Every 12 hours         06/14/23 0948                Discharge Planning   LUISA:      Code Status: Prior   Is the patient medically ready for discharge?:     Reason for patient still in hospital (select all that apply): Patient trending condition  Discharge Plan A: Home with family                  Hawa Schwartz MD  Department of Hospital Medicine   [facility] - 8th Floor Med Surg

## 2023-06-18 NOTE — PROGRESS NOTES
Acute Care Surgery   Progress Note  Admit Date: 6/4/2023  HD#14  POD#11 Days Post-Op    Subjective:   Interval history:  KELLI   Continues to do well   Did have some gas pains yesterday following lunch   Tolerating diet, -n/-v   Remains on TPN     Home Meds:  Current Outpatient Medications   Medication Instructions    azelastine (ASTELIN) 137 mcg, Nasal, 2 times daily    B-complex with vitamin C (Z-BEC OR EQUIV) tablet 1 tablet, Oral, Daily    baclofen (LIORESAL) 10 mg, Oral, 4 times daily, PRN muscle spasms    betamethasone dipropionate 0.05 % cream 1 application, Topical (Top), 2 times daily    celecoxib (CELEBREX) 100 MG capsule TAKE ONE CAPSULE BY MOUTH ONCE DAILY WITH FOOD AS NEEDED FOR PAIN    clopidogreL (PLAVIX) 75 mg tablet 1 tablet, Oral, Daily    famotidine (PEPCID) 20 MG tablet TAKE 1 TABLET TWICE DAILY    hydroCHLOROthiazide (HYDRODIURIL) 25 MG tablet TAKE 1 TABLET EVERY DAY    LEXAPRO 10 mg tablet TAKE 1 TABLET BY MOUTH ONCE DAILY AS DIRECTED FOR MOOD/CHRONIC PAIN AS DIRECTED    LIDOcaine (LIDODERM) 5 % 1 patch, Transdermal, Every 24 hours (non-standard times), Remove & Discard patch within 12 hours or as directed by MD    metFORMIN (GLUCOPHAGE) 500 mg, Oral, 2 times daily    morphine (MSIR) 15 MG tablet 1.5 tablets, Oral, 2 times daily PRN    nitroGLYCERIN (NITROSTAT) 0.4 MG SL tablet PLEASE SEE ATTACHED FOR DETAILED DIRECTIONS    OMEGA-3-EPA-FISH OIL ORAL 1 capsule, Oral, Daily    omeprazole (PRILOSEC) 40 MG capsule TAKE 1 CAPSULE EVERY DAY    ondansetron (ZOFRAN-ODT) 8 mg, Oral, 2 times daily    peg 400-propylene glycol (SYSTANE, PROPYLENE GLYCOL,) 0.4-0.3 % Drop 1 drop, Ophthalmic    potassium chloride SA (K-DUR,KLOR-CON) 20 MEQ tablet TAKE 1 TABLET ONE TIME DAILY    pravastatin (PRAVACHOL) 40 MG tablet TAKE 1 TABLET EVERY DAY    timolol maleate 0.5% (TIMOPTIC) 0.5 % Drop 1 drop, Both Eyes, Daily    vitamin D (VITAMIN D3) 5,000 Units, Oral, Daily    warfarin (COUMADIN) 4 MG tablet TAKE 1 TABLET  "EVERY DAY      Scheduled Meds:   clopidogreL  75 mg Oral Daily    docusate sodium  100 mg Oral Daily    enalaprilat  1.25 mg Intravenous Q6H    enoxparin  1 mg/kg (Dosing Weight) Subcutaneous Q12H (treatment, non-standard time)    gabapentin  300 mg Oral TID    methocarbamoL  500 mg Oral QID    morphine  15 mg Oral BID    pantoprazole  40 mg Intravenous Daily    polyethylene glycol  17 g Oral BID    sodium chloride 0.9%  10 mL Intravenous Q6H    timolol maleate 0.5%  1 drop Both Eyes Daily    warfarin  5 mg Oral Daily     Continuous Infusions:      PRN Meds:sodium chloride, sodium chloride, sodium chloride, sodium chloride, acetaminophen, bisacodyL, dextrose 10%, dextrose 10%, glucagon (human recombinant), glucose, hydrALAZINE, insulin aspart U-100, morphine, naloxone, nitroGLYCERIN, ondansetron, senna, Flushing PICC Protocol **AND** sodium chloride 0.9% **AND** sodium chloride 0.9%     Objective:     VITAL SIGNS: 24 HR MIN & MAX LAST   Temp  Min: 97.5 °F (36.4 °C)  Max: 98.4 °F (36.9 °C)  97.5 °F (36.4 °C)   BP  Min: 103/67  Max: 129/80  122/76    Pulse  Min: 63  Max: 89  63    Resp  Min: 16  Max: 18  16    SpO2  Min: 96 %  Max: 99 %  99 %      HT: 5' 4" (162.6 cm)  WT: 65.5 kg (144 lb 8 oz)  BMI: 24.8     Intake/output:  Intake/Output - Last 3 Shifts         06/16 0700 06/17 0659 06/17 0700 06/18 0659 06/18 0700 06/19 0659    P.O. 2120 300     Total Intake(mL/kg) 2120 (32.4) 300 (4.6)     Urine (mL/kg/hr) 2820 (1.8) 1450 (0.9)     Emesis/NG output 0      Stool 0      Total Output 2820 1450     Net -700 -1150            Stool Occurrence 4 x      Emesis Occurrence 0 x              Intake/Output Summary (Last 24 hours) at 6/18/2023 0808  Last data filed at 6/18/2023 0623  Gross per 24 hour   Intake 300 ml   Output 1450 ml   Net -1150 ml           Lines/drains/airway:  PICC Double Lumen 06/12/23 1555 left basilic (Active)   $ PICC Line Charges (Upon insertion) Catheter - Double Lumen (Supply);Bedside Insertion " Performed Pt > 5 Years Old (no subq port/pump or image guidance) 06/12/23 1554   Line Necessity Review Longterm central access required 06/14/23 0800   Site Assessment No drainage;No redness;No swelling;No warmth 06/16/23 0800   Extremity Assessment Distal to IV No abnormal discoloration 06/16/23 0800   Line Securement Device Secured with sutureless device 06/16/23 0800   Dressing Type CHG impregnated dressing/sponge;Central line dressing 06/16/23 0800   Dressing Status Clean;Dry;Intact 06/16/23 0800   Dressing Intervention Integrity maintained 06/16/23 0800   Distal Patency/Care flushed w/o difficulty 06/16/23 0800   Proximal 1 Patency/Care flushed w/o difficulty 06/16/23 0800   Number of days: 4       Physical examination:  Gen: NAD, AAOx3, answering questions appropriately  HEENT: PERRL, EOMI   CV: RRR  Resp: NWOB  Abd: soft, attp following surgery, incisions c/d/I   Ext: moving all extremities spontaneously and purposefully  Neuro: CN II-XII grossly intact      Labs:  Renal:  Recent Labs     06/16/23  0406 06/17/23  0632 06/18/23  0514   BUN 12.3 10.4 13.4   CREATININE 0.54* 0.57 0.55     No results for input(s): LACTIC in the last 72 hours.  FENGI:  Recent Labs     06/16/23 0406 06/17/23  0632 06/18/23  0514    142 144   K 3.8 3.8 4.0   CO2 24 24 26   CALCIUM 8.2* 8.5 8.4   MG 1.80 1.80 1.90   PHOS 3.3 4.0 3.9   ALBUMIN 2.4* 2.8* 2.8*   BILITOT 0.7 0.7 0.7   AST 23 18 25   ALKPHOS 45 51 67   ALT 25 21 25     Heme:  Recent Labs     06/16/23  0406 06/17/23  0632 06/18/23  0514   HGB 9.3* 9.3*  --    HCT 30.3* 29.3*  --     228  --    INR 1.20 1.35* 1.51*     ID:  Recent Labs     06/16/23  0406 06/17/23  0632   WBC 7.33 5.37     CBG:  Recent Labs     06/16/23  0406 06/17/23  0632 06/18/23  0514   GLUCOSE 127* 111 104      Cardiovascular:  No results for input(s): TROPONINI, CKTOTAL, CKMB, BNP in the last 168 hours.  ABG:  No results for input(s): PH, PO2, PCO2, HCO3, BE in the last 168 hours.   I  have reviewed all pertinent lab results within the past 24 hours.    Imaging:  X-Ray Chest 1 View   Final Result      Left-sided PICC line with tip over the superior vena cava.         Electronically signed by: Ana M Ward   Date:    06/12/2023   Time:    16:50      X-Ray Chest 1 View   Final Result      Changes suggestive of left-sided pleural effusion.      Midline as above         Electronically signed by: Enio Chamorro   Date:    06/12/2023   Time:    08:52      X-Ray Abdomen AP 1 View   Final Result      Nonspecific gas pattern.      No significant change         Electronically signed by: Enio Chamorro   Date:    06/12/2023   Time:    10:49      X-Ray Abdomen AP 1 View   Final Result      Enteric tube has been removed.  Nonobstructive bowel gas pattern.  Small effusion on the left.         Electronically signed by: Luis Reeves MD   Date:    06/10/2023   Time:    11:25      XR Gastric tube check, non-radiologist performed   Final Result      Enteric tube side port about 3.5 cm below the GE junction.         Electronically signed by: Sunil Peralta   Date:    06/04/2023   Time:    14:49      XR Gastric tube check, non-radiologist performed   Final Result      NG tube projects over the body of the stomach.         Electronically signed by: Evin Manzo   Date:    06/04/2023   Time:    10:47      CT Abdomen Pelvis  Without Contrast   Final Result      Suspected small-bowel obstruction with transition point in the right lower abdomen.         Electronically signed by: Sunil Peralta   Date:    06/04/2023   Time:    09:05         I have reviewed all pertinent imaging results/findings within the past 24 hours.    Micro/Path/Other:  Microbiology Results (last 7 days)       ** No results found for the last 168 hours. **           Specimen (168h ago, onward)      None             Assessment & Plan:   74 y/o w/ hx ex lap several decades ago with h/o colostomy s/p reversal, Gtube and Jtube s/p removal, and  chronic back pain on home morphine, now admitted 6/4 with SBO. Pt s/p exlap with lysis of adhesions 6/4. Taken back to the OR 6/5 for hypotension and post-op hemorrhage with evacuation of approximately 3L of blood and old clot, placement of temporary abdominal closure. S/p abdominal closure 6/7. Extubated post-op. Recovering slowly. NG removed 6/9.    - vitals, labs and images reviewed   - Continue regular diet   - Will continue TPN while inpatient   - Currently being bridged with lovenox while coumadin has been restarted. Goal INR 2.0- it is 1.51 today   - Continue multimodal pain meds  - PT/OT, IS   - She is ready for discharge from surgery standpoint once INR is therapeutic     Martine Bridges MD   [employer] General Surgery PGY4

## 2023-06-19 VITALS
OXYGEN SATURATION: 98 % | DIASTOLIC BLOOD PRESSURE: 79 MMHG | HEART RATE: 80 BPM | RESPIRATION RATE: 18 BRPM | TEMPERATURE: 98 F | WEIGHT: 144.5 LBS | HEIGHT: 64 IN | SYSTOLIC BLOOD PRESSURE: 120 MMHG | BODY MASS INDEX: 24.67 KG/M2

## 2023-06-19 LAB
INR BLD: 1.53 (ref 0–1.3)
PROTHROMBIN TIME: 18.3 SECONDS (ref 12.5–14.5)

## 2023-06-19 PROCEDURE — C9113 INJ PANTOPRAZOLE SODIUM, VIA: HCPCS | Performed by: INTERNAL MEDICINE

## 2023-06-19 PROCEDURE — 63600175 PHARM REV CODE 636 W HCPCS: Performed by: INTERNAL MEDICINE

## 2023-06-19 PROCEDURE — 99233 SBSQ HOSP IP/OBS HIGH 50: CPT | Mod: 95,,, | Performed by: INTERNAL MEDICINE

## 2023-06-19 PROCEDURE — 25000003 PHARM REV CODE 250: Performed by: SURGERY

## 2023-06-19 PROCEDURE — 25000003 PHARM REV CODE 250: Performed by: STUDENT IN AN ORGANIZED HEALTH CARE EDUCATION/TRAINING PROGRAM

## 2023-06-19 PROCEDURE — 25000003 PHARM REV CODE 250: Performed by: INTERNAL MEDICINE

## 2023-06-19 PROCEDURE — 99233 PR SUBSEQUENT HOSPITAL CARE,LEVL III: ICD-10-PCS | Mod: 95,,, | Performed by: INTERNAL MEDICINE

## 2023-06-19 PROCEDURE — 85610 PROTHROMBIN TIME: CPT | Performed by: STUDENT IN AN ORGANIZED HEALTH CARE EDUCATION/TRAINING PROGRAM

## 2023-06-19 PROCEDURE — A4216 STERILE WATER/SALINE, 10 ML: HCPCS | Performed by: STUDENT IN AN ORGANIZED HEALTH CARE EDUCATION/TRAINING PROGRAM

## 2023-06-19 PROCEDURE — 97165 OT EVAL LOW COMPLEX 30 MIN: CPT

## 2023-06-19 RX ORDER — POLYETHYLENE GLYCOL 3350 17 G/17G
17 POWDER, FOR SOLUTION ORAL 2 TIMES DAILY
Qty: 14 EACH | Refills: 0 | Status: SHIPPED | OUTPATIENT
Start: 2023-06-19 | End: 2023-06-27 | Stop reason: SDUPTHER

## 2023-06-19 RX ORDER — GABAPENTIN 300 MG/1
300 CAPSULE ORAL 3 TIMES DAILY
Qty: 90 CAPSULE | Refills: 11 | Status: SHIPPED | OUTPATIENT
Start: 2023-06-19 | End: 2023-06-27 | Stop reason: SDUPTHER

## 2023-06-19 RX ORDER — SENNOSIDES 8.6 MG/1
1 TABLET ORAL 2 TIMES DAILY
Qty: 14 TABLET | Refills: 0 | Status: SHIPPED | OUTPATIENT
Start: 2023-06-19 | End: 2023-06-26

## 2023-06-19 RX ORDER — METHOCARBAMOL 500 MG/1
500 TABLET, FILM COATED ORAL 4 TIMES DAILY
Qty: 20 TABLET | Refills: 0 | Status: SHIPPED | OUTPATIENT
Start: 2023-06-19 | End: 2023-06-24

## 2023-06-19 RX ORDER — WARFARIN SODIUM 5 MG/1
5 TABLET ORAL ONCE
Status: COMPLETED | OUTPATIENT
Start: 2023-06-19 | End: 2023-06-19

## 2023-06-19 RX ORDER — ENOXAPARIN SODIUM 100 MG/ML
60 INJECTION SUBCUTANEOUS 2 TIMES DAILY
Qty: 6 ML | Refills: 0 | Status: SHIPPED | OUTPATIENT
Start: 2023-06-19 | End: 2023-06-24

## 2023-06-19 RX ORDER — WARFARIN SODIUM 5 MG/1
5 TABLET ORAL DAILY
Qty: 30 TABLET | Refills: 11 | Status: SHIPPED | OUTPATIENT
Start: 2023-06-19 | End: 2023-06-21

## 2023-06-19 RX ADMIN — SODIUM CHLORIDE, PRESERVATIVE FREE 10 ML: 5 INJECTION INTRAVENOUS at 12:06

## 2023-06-19 RX ADMIN — PANTOPRAZOLE SODIUM 40 MG: 40 INJECTION, POWDER, FOR SOLUTION INTRAVENOUS at 08:06

## 2023-06-19 RX ADMIN — CLOPIDOGREL BISULFATE 75 MG: 75 TABLET ORAL at 08:06

## 2023-06-19 RX ADMIN — DOCUSATE SODIUM 100 MG: 100 CAPSULE, LIQUID FILLED ORAL at 08:06

## 2023-06-19 RX ADMIN — MORPHINE SULFATE 15 MG: 15 TABLET ORAL at 08:06

## 2023-06-19 RX ADMIN — ENOXAPARIN SODIUM 60 MG: 80 INJECTION SUBCUTANEOUS at 08:06

## 2023-06-19 RX ADMIN — SODIUM CHLORIDE, PRESERVATIVE FREE 10 ML: 5 INJECTION INTRAVENOUS at 06:06

## 2023-06-19 RX ADMIN — ACETAMINOPHEN 650 MG: 325 TABLET, FILM COATED ORAL at 12:06

## 2023-06-19 RX ADMIN — OXYCODONE HYDROCHLORIDE 5 MG: 5 TABLET ORAL at 01:06

## 2023-06-19 RX ADMIN — METHOCARBAMOL 500 MG: 500 TABLET ORAL at 08:06

## 2023-06-19 RX ADMIN — GABAPENTIN 300 MG: 300 CAPSULE ORAL at 08:06

## 2023-06-19 RX ADMIN — WARFARIN SODIUM 5 MG: 5 TABLET ORAL at 08:06

## 2023-06-19 RX ADMIN — ACETAMINOPHEN 650 MG: 325 TABLET, FILM COATED ORAL at 07:06

## 2023-06-19 NOTE — DISCHARGE SUMMARY
[facility] - 8th Floor Med Surg  General Surgery  Discharge Summary      Patient Name: Alejandrina Rodriguez  MRN: [id number]  Admission Date: 6/4/2023  Hospital Length of Stay: 15 days  Discharge Date and Time:  06/19/2023 8:02 AM  Attending Physician: Camilo Valadez MD   Discharging Provider: Tenisha Brand MD  Primary Care Provider: Alex Treviño MD       Procedure(s) (LRB):  LAPAROTOMY, EXPLORATORY (N/A)  WASHOUT (N/A)     Hospital Course: 72 y/o w/ hx ex lap several decades ago with h/o colostomy s/p reversal, Gtube and Jtube s/p removal, and chronic back pain on home morphine, who was admitted 6/4 with SBO. Pt s/p exlap with lysis of adhesions 6/4. Taken back to the OR 6/5 for hypotension and post-op hemorrhage with evacuation of approximately 3L of blood and old clot, placement of temporary abdominal closure device. S/p abdominal closure 6/7. Extubated post-op and recovered slowly. Expectedly had post-op ileus requiring NG placement, removed 6/9. Pt started on TPN due to delay in return of bowel function. Required pausing anticoagulation due to bleeding risk, noted to have a brachial vein DVT in RUE at site of IV placement 6/10. Therapeutic anticoagulation resumed 6/10. Coumadin restarted 6/14, bridged with lovenox. INR 1.53 on day of discharge. Discussed with pt's PCP managing pt's coumadin while inpatient, pt ok for discharge with coumadin and lovenox. Pt to get INR checked day after discharge. Pt otherwise afebrile, hemodynamically stable. Ambulating, voiding, tolerating regular diet without N/V. Pain controlled on home morphine dose. Pt stable for discharge to home with close clinic follow up.    Consults:   Consults (From admission, onward)          Status Ordering Provider     Inpatient consult to Midline team  Once        Provider:  (Not yet assigned)    LESLYE Tomas     Inpatient consult to Social Work/Case Management  Once        Provider:  (Not yet assigned)     "Acknowledged HAZEL KIM     Inpatient consult to Hospital Medicine  Once        Provider:  MD Giuseppe Young ROBERT L            Significant Diagnostic Studies: Radiology: CT scan: CT ABDOMEN PELVIS WITHOUT CONTRAST: Suspected small-bowel obstruction with transition point in the right lower abdomen.  Ultrasound: CV ultrasound doppler venous right arm: Right brachial vein is abnormal. A thrombus is present.   INR 6/19: 1.53    Pending Diagnostic Studies:       None          Final Active Diagnoses:    Diagnosis Date Noted POA    PRINCIPAL PROBLEM:  SBO (small bowel obstruction) [K56.609] 06/04/2023 Yes    Acid reflux [K21.9] 03/01/2023 Yes    Chronic lumbar pain [M54.50, G89.29] 03/01/2023 Yes    Hypertension [I10] 03/01/2023 Yes    HLD (hyperlipidemia) [E78.5] 03/01/2023 Yes    History of continuous positive airway pressure (CPAP) therapy at home [Z99.89] 03/01/2023 Not Applicable    Diabetes mellitus [E11.9] 06/21/2022 Yes    Protein C deficiency [D68.59] 06/21/2022 Yes    Recurrent deep vein thrombosis (DVT) [I82.409] 06/21/2022 Yes      Problems Resolved During this Admission:      Discharged Condition: good    Disposition: Home or Self Care    Follow Up:   Follow-up Information       [facility] ACUTE CARE SURGERY Follow up.    Contact information:  [place] [address] [phone]               [facility] Follow up.    Specialties: Home Health Services, Home Therapy Services, Home Living Aide Services  Why: This is your home health provider  Contact information:  [facility] [address] [phone]                         Patient Instructions:      WALKER FOR HOME USE     Order Specific Question Answer Comments   Type of Walker: Adult (5'4"-6'6")    With wheels? Yes    Height: 5' 4" (1.626 m)    Weight: 65.5 kg (144 lb 8 oz)    Length of need (1-99 months): 99    Does patient have medical equipment at home? cane, straight  "   Please check all that apply: Patient's condition impairs ambulation.    Please check all that apply: Patient is unable to safely ambulate without equipment.    Please check all that apply: Patient needs help to get in and out of chair.    Please check all that apply: Altered sensory perception.    Please check all that apply: Walker will be used for gait training.      Protime-INR   Standing Status: Future Standing Exp. Date: 07/19/23     Diet Adult Regular     Lifting restrictions   Order Comments: Do not lift more than 15lbs for at least 4 weeks     Notify your health care provider if you experience any of the following:  temperature >100.4     Notify your health care provider if you experience any of the following:  persistent nausea and vomiting or diarrhea     Notify your health care provider if you experience any of the following:  severe uncontrolled pain     Notify your health care provider if you experience any of the following:  redness, tenderness, or signs of infection (pain, swelling, redness, odor or green/yellow discharge around incision site)     No dressing needed     Activity as tolerated     Medications:  Reconciled Home Medications:      Medication List        START taking these medications      enoxaparin 60 mg/0.6 mL Syrg  Commonly known as: LOVENOX  Inject 0.6 mLs (60 mg total) into the skin 2 (two) times daily. for 5 days     gabapentin 300 MG capsule  Commonly known as: NEURONTIN  Take 1 capsule (300 mg total) by mouth 3 (three) times daily.     methocarbamoL 500 MG Tab  Commonly known as: ROBAXIN  Take 1 tablet (500 mg total) by mouth 4 (four) times daily. for 5 days     polyethylene glycol 17 gram Pwpk  Commonly known as: GLYCOLAX  Take 17 g by mouth 2 (two) times daily. for 7 days     senna 8.6 mg tablet  Commonly known as: SENOKOT  Take 1 tablet by mouth 2 (two) times daily. for 7 days            CHANGE how you take these medications      warfarin 5 MG tablet  Commonly known as:  COUMADIN  Take 1 tablet (5 mg total) by mouth Daily.  What changed:   medication strength  how much to take  when to take this            CONTINUE taking these medications      azelastine 137 mcg (0.1 %) nasal spray  Commonly known as: ASTELIN  1 spray (137 mcg total) by Nasal route 2 (two) times daily.     B-complex with vitamin C tablet  Commonly known as: Z-Bec or Equiv  Take 1 tablet by mouth once daily.     baclofen 10 MG tablet  Commonly known as: LIORESAL  Take 10 mg by mouth 4 (four) times daily. PRN muscle spasms     betamethasone dipropionate 0.05 % cream  Apply 1 application topically 2 (two) times daily.     celecoxib 100 MG capsule  Commonly known as: CeleBREX  TAKE ONE CAPSULE BY MOUTH ONCE DAILY WITH FOOD AS NEEDED FOR PAIN     clopidogreL 75 mg tablet  Commonly known as: PLAVIX  Take 1 tablet by mouth Daily.     famotidine 20 MG tablet  Commonly known as: PEPCID  TAKE 1 TABLET TWICE DAILY     hydroCHLOROthiazide 25 MG tablet  Commonly known as: HYDRODIURIL  TAKE 1 TABLET EVERY DAY     LEXAPRO 10 MG tablet  Generic drug: EScitalopram oxalate  TAKE 1 TABLET BY MOUTH ONCE DAILY AS DIRECTED FOR MOOD/CHRONIC PAIN AS DIRECTED     LIDOcaine 5 %  Commonly known as: LIDODERM  Place 1 patch onto the skin every 24 hours. Remove & Discard patch within 12 hours or as directed by MD     metFORMIN 500 MG tablet  Commonly known as: GLUCOPHAGE  Take 500 mg by mouth 2 (two) times a day.     morphine 15 MG tablet  Commonly known as: MSIR  Take 1.5 tablets by mouth 2 (two) times daily as needed.     nitroGLYCERIN 0.4 MG SL tablet  Commonly known as: NITROSTAT  PLEASE SEE ATTACHED FOR DETAILED DIRECTIONS     OMEGA-3-EPA-FISH OIL ORAL  Take 1 capsule by mouth once daily.     omeprazole 40 MG capsule  Commonly known as: PRILOSEC  TAKE 1 CAPSULE EVERY DAY     ondansetron 8 MG Tbdl  Commonly known as: ZOFRAN-ODT  Take 1 tablet (8 mg total) by mouth 2 (two) times daily.     potassium chloride SA 20 MEQ tablet  Commonly known  as: K-DUR,KLOR-CON  TAKE 1 TABLET ONE TIME DAILY     pravastatin 40 MG tablet  Commonly known as: PRAVACHOL  TAKE 1 TABLET EVERY DAY     Systane (propylene glycoL) 0.4-0.3 % Drop  Generic drug: peg 400-propylene glycol  Apply 1 drop to eye.     timolol maleate 0.5% 0.5 % Drop  Commonly known as: TIMOPTIC  Place 1 drop into both eyes Daily.     vitamin D 1000 units Tab  Commonly known as: VITAMIN D3  Take 5,000 Units by mouth once daily.              Tenisha Brand MD  General Surgery  [facility] - 8th Floor Med Surg

## 2023-06-19 NOTE — PROGRESS NOTES
[facility] Medicine Progress Note        Chief Complaint: Inpatient Follow-up for SBO    HPI: see full H and P    Interval Hx:   INR this morning noted to be at 1.53 with goal of 2  Patient still complains of pain this morning pain is described as pins and needles to the abdomen suspicious for postoperative neuropathy.  Appetite is good, patient up and ambulatory  Pending discharge today  Shes sitting up in bed putting her make up on     Objective/physical exam:  General: In no acute distress, afebrile  Chest: Clear to auscultation bilaterally  Heart: RRR, +S1, S2, no appreciable murmur  Abdomen: Soft, nontender, BS +, midline incision is dry  MSK: Warm, no lower extremity edema, no clubbing or cyanosis  Neurologic: Alert and oriented , no FND    VITAL SIGNS: 24 HRS MIN & MAX LAST   Temp  Min: 97.9 °F (36.6 °C)  Max: 98.8 °F (37.1 °C) 98.4 °F (36.9 °C)   BP  Min: 98/57  Max: 131/84 120/79   Pulse  Min: 71  Max: 81  80   Resp  Min: 16  Max: 20 20   SpO2  Min: 96 %  Max: 98 % 98 %       Recent Labs   Lab 06/13/23  0629 06/16/23  0406 06/17/23  0632   WBC 6.54 7.33 5.37   RBC 3.44* 3.06* 3.07*   HGB 10.3* 9.3* 9.3*   HCT 32.2* 30.3* 29.3*   MCV 93.6 99.0* 95.4*   MCH 29.9 30.4 30.3   MCHC 32.0* 30.7* 31.7*   RDW 15.6 15.9 15.9    219 228   MPV 10.5* 11.0* 11.0*       Recent Labs   Lab 06/16/23  0406 06/17/23  0632 06/18/23  0514    142 144   K 3.8 3.8 4.0   CO2 24 24 26   BUN 12.3 10.4 13.4   CREATININE 0.54* 0.57 0.55   CALCIUM 8.2* 8.5 8.4   MG 1.80 1.80 1.90   ALBUMIN 2.4* 2.8* 2.8*   ALKPHOS 45 51 67   ALT 25 21 25   AST 23 18 25   BILITOT 0.7 0.7 0.7          Microbiology Results (last 7 days)       ** No results found for the last 168 hours. **             See below for Radiology    Scheduled Med:   clopidogreL  75 mg Oral Daily    docusate sodium  100 mg Oral Daily    enalaprilat  1.25 mg Intravenous Q6H    enoxparin  1 mg/kg (Dosing Weight) Subcutaneous Q12H  (treatment, non-standard time)    gabapentin  300 mg Oral TID    methocarbamoL  500 mg Oral QID    morphine  15 mg Oral BID    pantoprazole  40 mg Intravenous Daily    polyethylene glycol  17 g Oral BID    sodium chloride 0.9%  10 mL Intravenous Q6H    timolol maleate 0.5%  1 drop Both Eyes Daily    warfarin  5 mg Oral Daily    warfarin  5 mg Oral Once       PRN Meds:  sodium chloride, sodium chloride, sodium chloride, sodium chloride, acetaminophen, bisacodyL, dextrose 10%, dextrose 10%, glucagon (human recombinant), glucose, hydrALAZINE, insulin aspart U-100, naloxone, nitroGLYCERIN, ondansetron, senna, simethicone, Flushing PICC Protocol **AND** sodium chloride 0.9% **AND** sodium chloride 0.9%       Assessment/Plan:  Patient Active Problem List   Diagnosis    Diabetes mellitus    Protein C deficiency    Recurrent deep vein thrombosis (DVT)    Acid reflux    Chronic lumbar pain    Hypertension    HLD (hyperlipidemia)    History of continuous positive airway pressure (CPAP) therapy at home    SBO (small bowel obstruction)      Patient cleared for discharge from medicine standpoint her morphine has been increased to 15 mg p.o. b.i.d. scheduled.  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 which I think is very beneficial.  This may be why the INR has been difficult to reach goal with.  We are going to monitor her INR closely, arrange home health with [facility] with daily INR checks until goal INR of 2-3.  Her Coumadin dosage has been increased from 4 to 5 mg and hopefully next week she will be back at [facility] clinic and have them adjusting that for her.      At the time of discharge all patient questions have been answered, medications reconciled and prescription sent to pharmacy we will see her in our office in a week.  If any questions please call.    Patient condition:  Stable    Anticipated discharge and Disposition: DC today      All diagnosis and differential diagnosis  have been reviewed; assessment and plan has been documented; I have personally reviewed the labs and test results that are presently available; I have reviewed the patients medication list; I have reviewed the consulting providers response and recommendations. I have reviewed or attempted to review medical records based upon their availability    All of the patient's questions have been  addressed and answered. Patient's is agreeable to the above stated plan. I will continue to monitor closely and make adjustments to medical management as needed.      Nutrition Status:      X-Ray Chest 1 View  Narrative: EXAMINATION:  XR CHEST 1 VIEW    CLINICAL HISTORY:  picc;    TECHNIQUE:  AP chest    COMPARISON:  Chest x-ray dated 06/12/2023    FINDINGS:  Left-sided PICC line has its tip over the superior vena cava.  The heart is normal in size.  There are small bilateral pleural effusions.  There is no definite visible pneumothorax.  Impression: Left-sided PICC line with tip over the superior vena cava.    Electronically signed by: Ana M Ward  Date:    06/12/2023  Time:    16:50  X-Ray Abdomen AP 1 View  Narrative: EXAMINATION:  XR ABDOMEN AP 1 VIEW    CLINICAL HISTORY:  ileus;    TECHNIQUE:  AP X-RAY OF THE ABDOMEN:    CPT 35670    FINDINGS:  Examination reveals some residual feces throughout the colon gas pattern is nonspecific with no clear evidence of ileus or obstruction no abnormal masses or calcifications identified no significant change as compared with the previous exam of Mely 10, 2023.    Simulated device with leads identified projecting over the lower thoracic spine.    There are surgical changes of the lumbosacral spine and a vascular stent identified in the left hemipelvis  Impression: Nonspecific gas pattern.    No significant change    Electronically signed by: Enio Chamorro  Date:    06/12/2023  Time:    10:49  X-Ray Chest 1 View  Narrative: EXAMINATION:  XR CHEST 1 VIEW    CPT 96932    CLINICAL  HISTORY:  fever;    COMPARISON:  May 20, 2022    FINDINGS:  Examination reveals mediastinal silhouette to be within normal limits cardiac silhouette is not enlarged right lung field is clear and free of gross infiltrates atelectasis or effusions.  There is blunting of the left costophrenic angle indicating the presence of left-sided pleural effusion.    Midline is identified tip in the axillary region  Impression: Changes suggestive of left-sided pleural effusion.    Midline as above    Electronically signed by: Enio Chamorro  Date:    06/12/2023  Time:    08:52      Alex Treviño MD   06/19/2023

## 2023-06-19 NOTE — PT/OT/SLP EVAL
"Occupational Therapy   Evaluation and Discharge Note    Name: Alejandrina Rodriguez  MRN: [id number]  Admitting Diagnosis: SBO, s/p ex lap, hypotension, anemia 2/2 post op hem  Recent Surgery: Procedure(s) (LRB):  LAPAROTOMY, EXPLORATORY (N/A)  WASHOUT (N/A) 12 Days Post-Op    Recommendations:     Discharge Recommendations: home with home health  Discharge Equipment Recommendations: walker, rolling  Barriers to discharge:  None    Assessment:     Alejandrina Rodriguez is a 73 y.o. female with a medical diagnosis of SBO, s/p ex lap, hypotension, anemia 2/2 post op hem. At this time, patient is completing all ADLs and mobility with modified independence and demonstrates safe abilities to d/c home c HH. Pt also reported her  would be able to provide assistance when needed.   Plan:     During this hospitalization, patient does not require further acute OT services.  Please re-consult if situation changes.    Plan of Care Reviewed with: patient, spouse    Subjective     Chief Complaint: mention of B knee pain and "shocking" sensation in R hand  Patient/Family Comments/goals: to return home    Occupational Profile:  Living Environment: Pt lives in a [facility] c her . Pt has a tub and walk in shower. Pt has a ramp to enter through back door  Previous level of function: Pt reported she was independent in all ADLs. Pt uses a single point cane most of the time and uses a rollator walker when carrying things.   Equipment Used at home: cane, straight, rollator  Assistance upon Discharge: Pt's  able to provide assistance upon d/c. Pt would benefit from HH services upon d/c.     Pain/Comfort:  Pain Rating 1: 4/10  Location - Side 1: Bilateral  Location 1: knee    Patients cultural, spiritual, Nondenominational conflicts given the current situation: no    Objective:     Communicated with: RN prior to session.  Patient found HOB elevated with telemetry upon OT entry to room.    General Precautions: Standard, fall  Orthopedic Precautions: " "N/A  Braces: N/A  Respiratory Status: Room air     Occupational Performance:    Bed Mobility:    Patient completed Scooting/Bridging with independence  Patient completed Supine to Sit with independence  Patient completed Sit to Supine with independence    Functional Mobility/Transfers:  Patient completed Sit <> Stand Transfer with modified independence  with  rolling walker   Patient completed Toilet Transfer Step Transfer technique with modified independence with  rolling walker  Patient completed  Shower Transfer Step Transfer technique with modified independence with rolling walker  Functional Mobility: Pt safely navigated through room and bathroom c mod I using rolling walker.     Activities of Daily Living:  Lower Body Dressing: independence Pt donned/doffed socks while seated EOB.   Toileting: modified independence Pt able to manage clothing at toilet c mod I using rolling walker.     Cognitive/Visual Perceptual:  Cognitive/Psychosocial Skills:     -       Oriented to: Person, Place, Time, and Situation   -       Follows Commands/attention:Follows multistep  commands  -       Safety awareness/insight to disability: intact   -       Mood/Affect/Coping skills/emotional control: Appropriate to situation, Cooperative, and Pleasant    Physical Exam:  Upper Extremity Range of Motion:     -       Right Upper Extremity: WFL  -       Left Upper Extremity: WFL  Upper Extremity Strength:    -       Right Upper Extremity: WFL  -       Left Upper Extremity: WFL  - Pt reported "shocking" sensation in R hand. Light touch tested on each digit-assessed to be intact on 5/5 digits.      Therapeutic Positioning  Risk for acquired pressure injuries is decreased due to ability to mobilize independently .    OT interventions performed during the course of today's session in an effort to prevent and/or reduce acquired pressure injuries:   Therapeutic positioning completed       OT recommendations for therapeutic positioning " throughout hospitalization:   Follow [facility] Pressure Injury Prevention Protocol        Patient Education:  Patient provided with verbal education regarding OT role/goals/POC, fall prevention, safety awareness, and Discharge/DME recommendations.  Understanding was verbalized.     Patient left HOB elevated with all lines intact and call button in reach    GOALS:   Multidisciplinary Problems       Occupational Therapy Goals       Not on file                    History:     Past Medical History:   Diagnosis Date    Abnormal blood smear     Acid reflux     Anxiety and depression     Bilateral carotid artery disease     Cervical spondylosis with radiculopathy     Chronic lumbar pain     Diabetes mellitus     History of continuous positive airway pressure (CPAP) therapy at home     HLD (hyperlipidemia)     Hypertension     Iron deficiency anemia, unspecified     Neuropathy     On anticoagulant therapy     Osteopenia     Personal history of colonic polyps 07/14/2014    Protein C deficiency     Recurrent deep vein thrombosis (DVT) 6/21/2022    Sleep apnea, unspecified     Unspecified osteoarthritis, unspecified site          Past Surgical History:   Procedure Laterality Date    COLONOSCOPY  07/14/2014    COLONOSCOPY W/ POLYPECTOMY  08/29/2022    Humberto Chopra/ Follow up pending pathology results    LAPAROTOMY, EXPLORATORY N/A 6/4/2023    Procedure: LAPAROTOMY, EXPLORATORY;  Surgeon: Sd Conway Jr., MD;  Location: [facility] OR;  Service: General;  Laterality: N/A;    LAPAROTOMY, EXPLORATORY N/A 6/5/2023    Procedure: LAPAROTOMY, EXPLORATORY;  Surgeon: Camilo Valadez MD;  Location: [facility] OR;  Service: General;  Laterality: N/A;    LAPAROTOMY, EXPLORATORY N/A 6/7/2023    Procedure: LAPAROTOMY, EXPLORATORY;  Surgeon: Camilo Valadez MD;  Location: [facility] OR;  Service: General;  Laterality: N/A;  CLOSURE OF WOUND     LYSIS OF ADHESIONS N/A 6/4/2023    Procedure: LYSIS, ADHESIONS;  Surgeon: Sd Conway Jr., MD;  Location: [facility]  OR;  Service: General;  Laterality: N/A;  detorsion of small bowel volvulus    SPINAL CORD STIMULATOR IMPLANT      WASHOUT N/A 6/7/2023    Procedure: WASHOUT;  Surgeon: Camilo Valadez MD;  Location: [facility] OR;  Service: General;  Laterality: N/A;  WASHOUT OF ABDOMEN        Time Tracking:     OT Date of Treatment:    OT Start Time: 0852  OT Stop Time: 0913  OT Total Time (min): 21 min    Billable Minutes:Evaluation low complexity    6/19/2023

## 2023-06-19 NOTE — PLAN OF CARE
AVS and clinicals sent to [facility] via care \[facility] [place] [facility]\"". [place] with [facility] notified of discharge.

## 2023-06-20 ENCOUNTER — TELEPHONE (OUTPATIENT)
Dept: INTERNAL MEDICINE | Facility: CLINIC | Age: 73
End: 2023-06-20
Payer: MEDICARE

## 2023-06-20 ENCOUNTER — PATIENT OUTREACH (OUTPATIENT)
Dept: ADMINISTRATIVE | Facility: CLINIC | Age: 73
End: 2023-06-20
Payer: MEDICARE

## 2023-06-20 ENCOUNTER — LAB REQUISITION (OUTPATIENT)
Dept: LAB | Facility: HOSPITAL | Age: 73
End: 2023-06-20
Payer: MEDICARE

## 2023-06-20 DIAGNOSIS — Z79.01 LONG TERM (CURRENT) USE OF ANTICOAGULANTS: ICD-10-CM

## 2023-06-20 LAB
INR BLD: 2.34 (ref 2–3)
PROTHROMBIN TIME: 25.7 SECONDS (ref 11.7–14.5)

## 2023-06-20 PROCEDURE — 85610 PROTHROMBIN TIME: CPT | Performed by: INTERNAL MEDICINE

## 2023-06-20 NOTE — TELEPHONE ENCOUNTER
I spoke with Tristan at [facility]. Patient INR 2.34 AND PT 25.7 per . She needs to stop taking Lovenox and continue taking coumadin at her current dose. Patient will continue on following up at the [facility] to monitor her INR levels.

## 2023-06-20 NOTE — PROGRESS NOTES
C3 nurse spoke with Alejandrina Rodriguez  for a TCC post hospital discharge follow up call. The patient has a scheduled HOSFU appointment with Alex Trevñio MD  on 06/27/2023 @ 1120 am.

## 2023-06-21 ENCOUNTER — TELEPHONE (OUTPATIENT)
Dept: INTERNAL MEDICINE | Facility: CLINIC | Age: 73
End: 2023-06-21
Payer: MEDICARE

## 2023-06-21 DIAGNOSIS — I82.409 RECURRENT DEEP VEIN THROMBOSIS (DVT): Primary | ICD-10-CM

## 2023-06-21 RX ORDER — WARFARIN 4 MG/1
4 TABLET ORAL DAILY
Qty: 30 TABLET | Refills: 0 | Status: SHIPPED | OUTPATIENT
Start: 2023-06-21 | End: 2023-07-12 | Stop reason: SDUPTHER

## 2023-06-21 NOTE — TELEPHONE ENCOUNTER
Spoke to Tristan and she wanted to know what pt should be on for dosing Coumadin. Per Dr. [facility] sent for Coumadin 4 mg take PO QD, pt should be in range of 2-3, and to follow protocol of [facility] while pt has HH.   Tristan verbally confirmed understanding.

## 2023-06-21 NOTE — TELEPHONE ENCOUNTER
----- Message from Ioana Alberto sent at 6/21/2023 11:20 AM CDT -----  Regarding: [facility]  Tristan with nursing specialities called and questions regarding the patients PTINR .  She need clarification on frequency and dosing.  Her call back number is [phone].  She stated it is urgent and request a call back.

## 2023-06-27 ENCOUNTER — TELEPHONE (OUTPATIENT)
Dept: INTERNAL MEDICINE | Facility: CLINIC | Age: 73
End: 2023-06-27

## 2023-06-27 ENCOUNTER — OFFICE VISIT (OUTPATIENT)
Dept: INTERNAL MEDICINE | Facility: CLINIC | Age: 73
End: 2023-06-27
Payer: MEDICARE

## 2023-06-27 VITALS
SYSTOLIC BLOOD PRESSURE: 102 MMHG | RESPIRATION RATE: 16 BRPM | BODY MASS INDEX: 22.2 KG/M2 | HEART RATE: 72 BPM | DIASTOLIC BLOOD PRESSURE: 64 MMHG | OXYGEN SATURATION: 98 % | HEIGHT: 64 IN | WEIGHT: 130 LBS | TEMPERATURE: 97 F

## 2023-06-27 DIAGNOSIS — Z09 HOSPITAL DISCHARGE FOLLOW-UP: ICD-10-CM

## 2023-06-27 DIAGNOSIS — K56.609 INTESTINAL OBSTRUCTION, UNSPECIFIED CAUSE, UNSPECIFIED WHETHER PARTIAL OR COMPLETE: Primary | ICD-10-CM

## 2023-06-27 DIAGNOSIS — E03.9 HYPOTHYROIDISM, UNSPECIFIED TYPE: ICD-10-CM

## 2023-06-27 DIAGNOSIS — R73.9 HYPERGLYCEMIA: ICD-10-CM

## 2023-06-27 DIAGNOSIS — Z79.01 ANTICOAGULATED: ICD-10-CM

## 2023-06-27 PROCEDURE — 3066F NEPHROPATHY DOC TX: CPT | Mod: CPTII,,, | Performed by: INTERNAL MEDICINE

## 2023-06-27 PROCEDURE — 1111F PR DISCHARGE MEDS RECONCILED W/ CURRENT OUTPATIENT MED LIST: ICD-10-PCS | Mod: CPTII,,, | Performed by: INTERNAL MEDICINE

## 2023-06-27 PROCEDURE — 3288F PR FALLS RISK ASSESSMENT DOCUMENTED: ICD-10-PCS | Mod: CPTII,,, | Performed by: INTERNAL MEDICINE

## 2023-06-27 PROCEDURE — 1111F DSCHRG MED/CURRENT MED MERGE: CPT | Mod: CPTII,,, | Performed by: INTERNAL MEDICINE

## 2023-06-27 PROCEDURE — 1159F MED LIST DOCD IN RCRD: CPT | Mod: CPTII,,, | Performed by: INTERNAL MEDICINE

## 2023-06-27 PROCEDURE — 1160F RVW MEDS BY RX/DR IN RCRD: CPT | Mod: CPTII,,, | Performed by: INTERNAL MEDICINE

## 2023-06-27 PROCEDURE — 3074F PR MOST RECENT SYSTOLIC BLOOD PRESSURE < 130 MM HG: ICD-10-PCS | Mod: CPTII,,, | Performed by: INTERNAL MEDICINE

## 2023-06-27 PROCEDURE — 3008F BODY MASS INDEX DOCD: CPT | Mod: CPTII,,, | Performed by: INTERNAL MEDICINE

## 2023-06-27 PROCEDURE — 1101F PR PT FALLS ASSESS DOC 0-1 FALLS W/OUT INJ PAST YR: ICD-10-PCS | Mod: CPTII,,, | Performed by: INTERNAL MEDICINE

## 2023-06-27 PROCEDURE — 3008F PR BODY MASS INDEX (BMI) DOCUMENTED: ICD-10-PCS | Mod: CPTII,,, | Performed by: INTERNAL MEDICINE

## 2023-06-27 PROCEDURE — 3078F DIAST BP <80 MM HG: CPT | Mod: CPTII,,, | Performed by: INTERNAL MEDICINE

## 2023-06-27 PROCEDURE — 1160F PR REVIEW ALL MEDS BY PRESCRIBER/CLIN PHARMACIST DOCUMENTED: ICD-10-PCS | Mod: CPTII,,, | Performed by: INTERNAL MEDICINE

## 2023-06-27 PROCEDURE — 3066F PR DOCUMENTATION OF TREATMENT FOR NEPHROPATHY: ICD-10-PCS | Mod: CPTII,,, | Performed by: INTERNAL MEDICINE

## 2023-06-27 PROCEDURE — 3061F NEG MICROALBUMINURIA REV: CPT | Mod: CPTII,,, | Performed by: INTERNAL MEDICINE

## 2023-06-27 PROCEDURE — 3074F SYST BP LT 130 MM HG: CPT | Mod: CPTII,,, | Performed by: INTERNAL MEDICINE

## 2023-06-27 PROCEDURE — 1101F PT FALLS ASSESS-DOCD LE1/YR: CPT | Mod: CPTII,,, | Performed by: INTERNAL MEDICINE

## 2023-06-27 PROCEDURE — 3061F PR NEG MICROALBUMINURIA RESULT DOCUMENTED/REVIEW: ICD-10-PCS | Mod: CPTII,,, | Performed by: INTERNAL MEDICINE

## 2023-06-27 PROCEDURE — 99214 PR OFFICE/OUTPT VISIT, EST, LEVL IV, 30-39 MIN: ICD-10-PCS | Mod: ,,, | Performed by: INTERNAL MEDICINE

## 2023-06-27 PROCEDURE — 3288F FALL RISK ASSESSMENT DOCD: CPT | Mod: CPTII,,, | Performed by: INTERNAL MEDICINE

## 2023-06-27 PROCEDURE — 1159F PR MEDICATION LIST DOCUMENTED IN MEDICAL RECORD: ICD-10-PCS | Mod: CPTII,,, | Performed by: INTERNAL MEDICINE

## 2023-06-27 PROCEDURE — 99214 OFFICE O/P EST MOD 30 MIN: CPT | Mod: ,,, | Performed by: INTERNAL MEDICINE

## 2023-06-27 PROCEDURE — 3078F PR MOST RECENT DIASTOLIC BLOOD PRESSURE < 80 MM HG: ICD-10-PCS | Mod: CPTII,,, | Performed by: INTERNAL MEDICINE

## 2023-06-27 RX ORDER — GABAPENTIN 300 MG/1
300 CAPSULE ORAL 3 TIMES DAILY PRN
Qty: 90 CAPSULE | Refills: 3 | Status: SHIPPED | OUTPATIENT
Start: 2023-06-27 | End: 2023-07-12 | Stop reason: SDUPTHER

## 2023-06-27 NOTE — TELEPHONE ENCOUNTER
----- Message from Alex Treviño MD sent at 6/27/2023  4:17 PM CDT -----  The boost breeze may be a good choice  It almost looks like a juice  ----- Message -----  From: Per Medrano MA  Sent: 6/27/2023   3:20 PM CDT  To: Alex Treviño MD    Pt states the Ensure Boost gives her stomach cramps. Please advise if there is anything else  ----- Message -----  From: Mary Adela  Sent: 6/27/2023   3:05 PM CDT  To: [facility] Staff    .Type:  Needs Medical Advice    Who Called: pt  Symptoms (please be specific):    How long has patient had these symptoms:    Pharmacy name and phone #:    Would the patient rather a call back or a response via [facility]?   Best Call Back Number: [phone]  Additional Information: pt said the boost cramps her stomach

## 2023-06-27 NOTE — PROGRESS NOTES
Subjective:      Patient ID: Alejandrina Rodriguez is a 73 y.o. female.    Chief Complaint: Follow-up (Hospital F/U)      HPI:  Patient Name: Alejandrina Rodriguez  MRN: [id number]  Admission Date: 6/4/2023  Hospital Length of Stay: 15 days  Discharge Date and Time:  06/19/2023 8:02 AM  Attending Physician: Camilo Valadez MD   Discharging Provider: Tenisha Brand MD  Primary Care Provider: Alex Treviño MD         Procedure(s) (LRB):  LAPAROTOMY, EXPLORATORY (N/A)  WASHOUT (N/A)      Hospital Course: 74 y/o w/ hx ex lap several decades ago with h/o colostomy s/p reversal, Gtube and Jtube s/p removal, and chronic back pain on home morphine, who was admitted 6/4 with SBO. Pt s/p exlap with lysis of adhesions 6/4. Taken back to the OR 6/5 for hypotension and post-op hemorrhage with evacuation of approximately 3L of blood and old clot, placement of temporary abdominal closure device. S/p abdominal closure 6/7. Extubated post-op and recovered slowly. Expectedly had post-op ileus requiring NG placement, removed 6/9. Pt started on TPN due to delay in return of bowel function. Required pausing anticoagulation due to bleeding risk, noted to have a brachial vein DVT in RUE at site of IV placement 6/10. Therapeutic anticoagulation resumed 6/10. Coumadin restarted 6/14, bridged with lovenox. INR 1.53 on day of discharge. Discussed with pt's PCP managing pt's coumadin while inpatient, pt ok for discharge with coumadin and lovenox. Pt to get INR checked day after discharge. Pt otherwise afebrile, hemodynamically stable. Ambulating, voiding, tolerating regular diet without N/V. Pain controlled on home morphine dose. Pt stable for discharge to home with close clinic follow up.    Today's information:  Weight is down 8 lb since her last visit.  Not really drinking a whole lot she ate well this morning and she states is getting better she is not supplementing with any meal replacements so advised for her to do that.  Her medications have  been reviewed she states that the gabapentin was helping a lot with some of her postsurgical pain but she does not have a refill and would like to get that filled.  She needs to follow-up with her pain management physician as well.    Past Medical History:  Past Medical History:   Diagnosis Date    Abnormal blood smear     Acid reflux     Anxiety and depression     Bilateral carotid artery disease     Cervical spondylosis with radiculopathy     Chronic lumbar pain     Diabetes mellitus     History of continuous positive airway pressure (CPAP) therapy at home     HLD (hyperlipidemia)     Hypertension     Iron deficiency anemia, unspecified     Neuropathy     On anticoagulant therapy     Osteopenia     Personal history of colonic polyps 07/14/2014    Protein C deficiency     Recurrent deep vein thrombosis (DVT) 6/21/2022    Sleep apnea, unspecified     Unspecified osteoarthritis, unspecified site      Past Surgical History:   Procedure Laterality Date    COLONOSCOPY  07/14/2014    COLONOSCOPY W/ POLYPECTOMY  08/29/2022    Humberto Chopra/ Follow up pending pathology results    LAPAROTOMY, EXPLORATORY N/A 6/4/2023    Procedure: LAPAROTOMY, EXPLORATORY;  Surgeon: Sd Conway Jr., MD;  Location: [facility] OR;  Service: General;  Laterality: N/A;    LAPAROTOMY, EXPLORATORY N/A 6/5/2023    Procedure: LAPAROTOMY, EXPLORATORY;  Surgeon: Camilo Valadez MD;  Location: [facility] OR;  Service: General;  Laterality: N/A;    LAPAROTOMY, EXPLORATORY N/A 6/7/2023    Procedure: LAPAROTOMY, EXPLORATORY;  Surgeon: Camilo Valadez MD;  Location: [facility] OR;  Service: General;  Laterality: N/A;  CLOSURE OF WOUND     LYSIS OF ADHESIONS N/A 6/4/2023    Procedure: LYSIS, ADHESIONS;  Surgeon: Sd Conway Jr., MD;  Location: [facility] OR;  Service: General;  Laterality: N/A;  detorsion of small bowel volvulus    SPINAL CORD STIMULATOR IMPLANT      WASHOUT N/A 6/7/2023    Procedure: WASHOUT;  Surgeon: Camilo Valadez MD;  Location: [facility] OR;  Service:  General;  Laterality: N/A;  WASHOUT OF ABDOMEN      Review of patient's allergies indicates:   Allergen Reactions    Hydrocodone-acetaminophen      Other reaction(s): Difficulty breathing, Throat tightness    Oxycodone-acetaminophen Hallucinations    Iodine     Meperidine      Other reaction(s): Unknown (qualifier value)    Promethazine      Other reaction(s): Unknown (qualifier value), Unknown (qualifier value)     Current Outpatient Medications on File Prior to Visit   Medication Sig Dispense Refill    azelastine (ASTELIN) 137 mcg (0.1 %) nasal spray 1 spray (137 mcg total) by Nasal route 2 (two) times daily. 30 mL 3    B-complex with vitamin C (Z-BEC OR EQUIV) tablet Take 1 tablet by mouth once daily.      baclofen (LIORESAL) 10 MG tablet Take 10 mg by mouth 4 (four) times daily. PRN muscle spasms      celecoxib (CELEBREX) 100 MG capsule TAKE ONE CAPSULE BY MOUTH ONCE DAILY WITH FOOD AS NEEDED FOR PAIN      clopidogreL (PLAVIX) 75 mg tablet Take 1 tablet by mouth Daily.      famotidine (PEPCID) 20 MG tablet TAKE 1 TABLET TWICE DAILY 180 tablet 3    LEXAPRO 10 mg tablet TAKE 1 TABLET BY MOUTH ONCE DAILY AS DIRECTED FOR MOOD/CHRONIC PAIN AS DIRECTED      LIDOcaine (LIDODERM) 5 % Place 1 patch onto the skin every 24 hours. Remove & Discard patch within 12 hours or as directed by MD      metFORMIN (GLUCOPHAGE) 500 MG tablet Take 500 mg by mouth 2 (two) times a day.      morphine (MSIR) 15 MG tablet Take 1.5 tablets by mouth 2 (two) times daily as needed.      nitroGLYCERIN (NITROSTAT) 0.4 MG SL tablet PLEASE SEE ATTACHED FOR DETAILED DIRECTIONS      OMEGA-3-EPA-FISH OIL ORAL Take 1 capsule by mouth once daily.      omeprazole (PRILOSEC) 40 MG capsule TAKE 1 CAPSULE EVERY DAY 90 capsule 4    ondansetron (ZOFRAN-ODT) 8 MG TbDL Take 1 tablet (8 mg total) by mouth 2 (two) times daily. 12 tablet 0    peg 400-propylene glycol (SYSTANE, PROPYLENE GLYCOL,) 0.4-0.3 % Drop Apply 1 drop to eye.      pravastatin (PRAVACHOL) 40  MG tablet TAKE 1 TABLET EVERY DAY 90 tablet 4    timolol maleate 0.5% (TIMOPTIC) 0.5 % Drop Place 1 drop into both eyes Daily.      vitamin D (VITAMIN D3) 1000 units Tab Take 5,000 Units by mouth once daily.      warfarin (COUMADIN) 4 MG tablet Take 1 tablet (4 mg total) by mouth Daily. 30 tablet 0    [DISCONTINUED] hydroCHLOROthiazide (HYDRODIURIL) 25 MG tablet TAKE 1 TABLET EVERY DAY 90 tablet 3    [DISCONTINUED] potassium chloride SA (K-DUR,KLOR-CON) 20 MEQ tablet TAKE 1 TABLET ONE TIME DAILY 90 tablet 3    [] senna (SENOKOT) 8.6 mg tablet Take 1 tablet by mouth 2 (two) times daily. for 7 days (Patient not taking: Reported on 2023) 14 tablet 0    [DISCONTINUED] betamethasone dipropionate 0.05 % cream Apply 1 application topically 2 (two) times daily. (Patient not taking: Reported on 2023) 45 g 1    [DISCONTINUED] gabapentin (NEURONTIN) 300 MG capsule Take 1 capsule (300 mg total) by mouth 3 (three) times daily. (Patient not taking: Reported on 2023) 90 capsule 11    [DISCONTINUED] polyethylene glycol (GLYCOLAX) 17 gram PwPk Take 17 g by mouth 2 (two) times daily. for 7 days 14 each 0     No current facility-administered medications on file prior to visit.     Social History     Socioeconomic History    Marital status:    Tobacco Use    Smoking status: Never    Smokeless tobacco: Never   Substance and Sexual Activity    Alcohol use: Never    Drug use: Never    Sexual activity: Yes     Partners: Male     Family History   Problem Relation Age of Onset    Cancer Mother     Hypertension Mother     Diabetes Mother     Arthritis Mother     Cervical cancer Mother     Hypertension Father     Diabetes Father     Arthritis Father     Heart disease Father     Prostate cancer Father        Review of Systems  A comprehensive review of systems was performed and was negative with exception of what is documented above.     Objective:   /64 (BP Location: Left arm, Patient Position: Sitting, BP  "Method: Medium (Manual))   Pulse 72   Temp 97.3 °F (36.3 °C) (Temporal)   Resp 16   Ht 5' 4" (1.626 m)   Wt 59 kg (130 lb)   SpO2 98%   BMI 22.31 kg/m²   Physical Exam  General : Alert and oriented, No acute distress, afebrile.  Eye : PERRLA. EOMI. Normal conjunctiva, Sclerae are nonicteric. No conjunctival injection, no pallor.  HEENT : Normocephalic/ atraumatic, Normal hearing, Oral mucosa is moist.  Respiratory : Respirations are non-labored and clear to auscultation bilaterally. Symmetrical air entry bilaterally, no crackles, no wheezes, no rhonchi. No cyanosis, no clubbing.  Cardiovascular : Normal rate, Regular rhythm. No murmurs, rubs, or gallops. Pulses are 2+ throughout. No JVD. No Edema.  Gastrointestinal : Soft, nontender, non-distended, bowel sounds are present in all quadrants, no organomegaly, no guarding, no rebound.  Midline incision is dry and healing well  Musculoskeletal : Normal range of motion throughout. No muscle tenderness.  Integumentary : Warm, moist, intact.  Neurologic : Alert, Oriented, Cranial Nerves II-XII are grossly intact, No focal neurologic deficits. No sensory deficit. No motor deficit.  Lymph: No palpable lymphadenopathy appreciated.  Psychiatric : Cooperative, Appropriate mood & affect.   Assessment/ Plan:   1. Intestinal obstruction, unspecified cause, unspecified whether partial or complete  -     CBC Auto Differential; Future; Expected date: 06/27/2023  -     Comprehensive Metabolic Panel; Future; Expected date: 06/27/2023  -     Lipid Panel; Future; Expected date: 06/27/2023  -     TSH; Future; Expected date: 06/27/2023  -     Hemoglobin A1C; Future; Expected date: 06/27/2023  -     Urinalysis; Future; Expected date: 06/27/2023    2. Hospital discharge follow-up  Assessment & Plan:  Post hospital course progressing as expected patient is down 8 lb since her hospital admission.  She is on HCTZ 12.5 and potassium supplementation her blood pressure is 102/64 she needs " to have some updated labs done she was very malnourished in the hospital was NPO for a long time.  I did tell her I wanted her to stop her diuretic and her potassium supplement will get some new labs tomorrow she needs to be supplementing with some Ensure.  We gave her some samples today and a coupon.  She does not have a follow-up appointment with surgery so we need to get that scheduled  Will see her back in 30 days    Orders:  -     CBC Auto Differential; Future; Expected date: 06/27/2023  -     Comprehensive Metabolic Panel; Future; Expected date: 06/27/2023  -     Lipid Panel; Future; Expected date: 06/27/2023  -     TSH; Future; Expected date: 06/27/2023  -     Hemoglobin A1C; Future; Expected date: 06/27/2023  -     Urinalysis; Future; Expected date: 06/27/2023    3. Anticoagulated    4. Hypothyroidism, unspecified type  -     CBC Auto Differential; Future; Expected date: 06/27/2023  -     Comprehensive Metabolic Panel; Future; Expected date: 06/27/2023  -     Lipid Panel; Future; Expected date: 06/27/2023  -     TSH; Future; Expected date: 06/27/2023  -     Hemoglobin A1C; Future; Expected date: 06/27/2023  -     Urinalysis; Future; Expected date: 06/27/2023    5. Hyperglycemia  -     Hemoglobin A1C; Future; Expected date: 06/27/2023    Other orders  -     gabapentin (NEURONTIN) 300 MG capsule; Take 1 capsule (300 mg total) by mouth 3 (three) times daily as needed (nerve pain).  Dispense: 90 capsule; Refill: 3           Spoke to Dr. Camilo Valadez patient [facility]  Follow up in about 4 weeks (around 7/25/2023).

## 2023-06-27 NOTE — OP NOTE
PATIENT:  Alejandrina Rodriguez        : 1950       DATE OF SURGERY:   2023          SURGEON:  Sd Conway MD       ASSISTANT:  Martine Bridges MD (Resident)          PREOPERATIVE DIAGNOSIS:  Small Bowel Obstruction           POSTOPERATIVE DIAGNOSIS:  Same.           OPERATIONS:   1.  Exploratory Lapartomy     2.  Lysis of Critical Adhesion     3.  Small Bowel Resection           Anesthesia:  General endotracheal anesthesia.      Estimated blood loss:  Less than 50 cc.      Blood administered:  None.      Lap and instrument counts correct x 2 at the end of the case.     Specimen: short segment of Small Bowel           INDICATIONS/SIGNIFICANT HISTORY:  The patient is a 73 y.o. year old female who was seen with complaints of abdominal pain with small bowel obstruction.  Pt states the abdominal has slowly worsened now located primarily in the Right Lower Quadrant with consistent radiologic findings.  Risks and Benefits of surgery was discussed with the patient, who voiced understanding of risks and benefits and elected to proceed with surgery.           PROCEDURE IN DETAIL:  Once informed consents were obtained, the patient was taken to the operating room and placed supine on the operating table.  After general endotracheal anesthesia was induced, the abdomen was prepped and draped in a standard surgical fashion.  The abdomen was opened in the usual fashion with a midline incision and a self retaining retractor was placed.  The small bowel appeared distended with significant adhesions in the lower quadrant.  The bowel was critically adhered to a band with no ischemia appreciated. The rest of the bowel was run and no other signs of pathology was appreciated.  The abdomen was then irrigated and dried.  The abdomen was closed with a 1-0 Loop PDS suture in a running fashion.  The skin was cleaned, dried then closed with staples.  The patient tolerated the procedure well and was transported to recovery room in good  condition.

## 2023-06-27 NOTE — ASSESSMENT & PLAN NOTE
Post hospital course progressing as expected patient is down 8 lb since her hospital admission.  She is on HCTZ 12.5 and potassium supplementation her blood pressure is 102/64 she needs to have some updated labs done she was very malnourished in the hospital was NPO for a long time.  I did tell her I wanted her to stop her diuretic and her potassium supplement will get some new labs tomorrow she needs to be supplementing with some Ensure.  We gave her some samples today and a coupon.  She does not have a follow-up appointment with surgery so we need to get that scheduled  Will see her back in 30 days

## 2023-06-29 ENCOUNTER — LAB REQUISITION (OUTPATIENT)
Dept: LAB | Facility: HOSPITAL | Age: 73
End: 2023-06-29
Attending: INTERNAL MEDICINE
Payer: MEDICARE

## 2023-06-29 DIAGNOSIS — E03.9 HYPOTHYROIDISM, UNSPECIFIED: ICD-10-CM

## 2023-06-29 DIAGNOSIS — R73.9 HYPERGLYCEMIA, UNSPECIFIED: ICD-10-CM

## 2023-06-29 DIAGNOSIS — Z09 ENCOUNTER FOR FOLLOW-UP EXAMINATION AFTER COMPLETED TREATMENT FOR CONDITIONS OTHER THAN MALIGNANT NEOPLASM: ICD-10-CM

## 2023-06-29 LAB
ALBUMIN SERPL-MCNC: 3.6 G/DL (ref 3.4–4.8)
ALBUMIN/GLOB SERPL: 1.2 RATIO (ref 1.1–2)
ALP SERPL-CCNC: 74 UNIT/L (ref 40–150)
ALT SERPL-CCNC: 13 UNIT/L (ref 0–55)
AST SERPL-CCNC: 17 UNIT/L (ref 5–34)
BASOPHILS # BLD AUTO: 0.03 X10(3)/MCL
BASOPHILS NFR BLD AUTO: 0.7 %
BILIRUBIN DIRECT+TOT PNL SERPL-MCNC: 0.5 MG/DL
BUN SERPL-MCNC: 11.9 MG/DL (ref 9.8–20.1)
CALCIUM SERPL-MCNC: 9.5 MG/DL (ref 8.4–10.2)
CHLORIDE SERPL-SCNC: 108 MMOL/L (ref 98–107)
CHOLEST SERPL-MCNC: 138 MG/DL
CHOLEST/HDLC SERPL: 4 {RATIO} (ref 0–5)
CO2 SERPL-SCNC: 30 MMOL/L (ref 23–31)
CREAT SERPL-MCNC: 0.67 MG/DL (ref 0.55–1.02)
EOSINOPHIL # BLD AUTO: 0.18 X10(3)/MCL (ref 0–0.9)
EOSINOPHIL NFR BLD AUTO: 4.1 %
ERYTHROCYTE [DISTWIDTH] IN BLOOD BY AUTOMATED COUNT: 16.1 % (ref 11.5–17)
EST. AVERAGE GLUCOSE BLD GHB EST-MCNC: 102.5 MG/DL
GFR SERPLBLD CREATININE-BSD FMLA CKD-EPI: >60 MLS/MIN/1.73/M2
GLOBULIN SER-MCNC: 3.1 GM/DL (ref 2.4–3.5)
GLUCOSE SERPL-MCNC: 90 MG/DL (ref 82–115)
HBA1C MFR BLD: 5.2 %
HCT VFR BLD AUTO: 37.6 % (ref 37–47)
HDLC SERPL-MCNC: 38 MG/DL (ref 35–60)
HGB BLD-MCNC: 12 G/DL (ref 12–16)
IMM GRANULOCYTES # BLD AUTO: 0 X10(3)/MCL (ref 0–0.04)
IMM GRANULOCYTES NFR BLD AUTO: 0 %
LDLC SERPL CALC-MCNC: 80 MG/DL (ref 50–140)
LYMPHOCYTES # BLD AUTO: 1.22 X10(3)/MCL (ref 0.6–4.6)
LYMPHOCYTES NFR BLD AUTO: 27.7 %
MCH RBC QN AUTO: 30.5 PG (ref 27–31)
MCHC RBC AUTO-ENTMCNC: 31.9 G/DL (ref 33–36)
MCV RBC AUTO: 95.4 FL (ref 80–94)
MONOCYTES # BLD AUTO: 0.37 X10(3)/MCL (ref 0.1–1.3)
MONOCYTES NFR BLD AUTO: 8.4 %
NEUTROPHILS # BLD AUTO: 2.6 X10(3)/MCL (ref 2.1–9.2)
NEUTROPHILS NFR BLD AUTO: 59.1 %
NRBC BLD AUTO-RTO: 0 %
PLATELET # BLD AUTO: 318 X10(3)/MCL (ref 130–400)
PMV BLD AUTO: 10.6 FL (ref 7.4–10.4)
POTASSIUM SERPL-SCNC: 3.7 MMOL/L (ref 3.5–5.1)
PROT SERPL-MCNC: 6.7 GM/DL (ref 5.8–7.6)
RBC # BLD AUTO: 3.94 X10(6)/MCL (ref 4.2–5.4)
SODIUM SERPL-SCNC: 145 MMOL/L (ref 136–145)
TRIGL SERPL-MCNC: 98 MG/DL (ref 37–140)
TSH SERPL-ACNC: 0.86 UIU/ML (ref 0.35–4.94)
VLDLC SERPL CALC-MCNC: 20 MG/DL
WBC # SPEC AUTO: 4.4 X10(3)/MCL (ref 4.5–11.5)

## 2023-06-29 PROCEDURE — 80061 LIPID PANEL: CPT | Performed by: INTERNAL MEDICINE

## 2023-06-29 PROCEDURE — 80053 COMPREHEN METABOLIC PANEL: CPT | Performed by: INTERNAL MEDICINE

## 2023-06-29 PROCEDURE — 85025 COMPLETE CBC W/AUTO DIFF WBC: CPT | Performed by: INTERNAL MEDICINE

## 2023-06-29 PROCEDURE — 84443 ASSAY THYROID STIM HORMONE: CPT | Performed by: INTERNAL MEDICINE

## 2023-06-29 PROCEDURE — 83036 HEMOGLOBIN GLYCOSYLATED A1C: CPT | Performed by: INTERNAL MEDICINE

## 2023-06-30 ENCOUNTER — TELEPHONE (OUTPATIENT)
Dept: INTERNAL MEDICINE | Facility: CLINIC | Age: 73
End: 2023-06-30
Payer: MEDICARE

## 2023-06-30 NOTE — TELEPHONE ENCOUNTER
----- Message from Alex Treviño MD sent at 6/30/2023 11:27 AM CDT -----  Laboratory studies reviewed excellent just potassium is a touch low at 3.7 normal is 4;  encourage her to may be eat a  half a banana a day, drinking Gatorade would also help, tomatoes also a good source of potassium as well as raisin bran  ----- Message -----  From: Jessica Mar  Sent: 6/30/2023  11:04 AM CDT  To: Alex Treviño MD    Please review

## 2023-07-01 DIAGNOSIS — K21.9 GASTROESOPHAGEAL REFLUX DISEASE, UNSPECIFIED WHETHER ESOPHAGITIS PRESENT: Primary | ICD-10-CM

## 2023-07-03 RX ORDER — OMEPRAZOLE 40 MG/1
CAPSULE, DELAYED RELEASE ORAL
Qty: 90 CAPSULE | Refills: 4 | Status: SHIPPED | OUTPATIENT
Start: 2023-07-03

## 2023-07-06 ENCOUNTER — TELEPHONE (OUTPATIENT)
Dept: INTERNAL MEDICINE | Facility: CLINIC | Age: 73
End: 2023-07-06
Payer: MEDICARE

## 2023-07-07 ENCOUNTER — TELEPHONE (OUTPATIENT)
Dept: INTERNAL MEDICINE | Facility: CLINIC | Age: 73
End: 2023-07-07
Payer: MEDICARE

## 2023-07-07 NOTE — TELEPHONE ENCOUNTER
----- Message from Dayna Alexandra sent at 7/7/2023  9:53 AM CDT -----  Regarding: Lab results  .Type:  Needs Medical Advice    Who Called: ELVIA Brown  Symptoms (please be specific):    How long has patient had these symptoms:    Pharmacy name and phone #:    Would the patient rather a call back or a response via [facility]? Call back  Best Call Back Number: [phone]  Additional Information: Wants to know if pt's lab results were received .. Sent via fax on  6/30

## 2023-07-07 NOTE — TELEPHONE ENCOUNTER
Left voice message for some one to call our office back .. Per spoke with patient on 06/30 about lab results

## 2023-07-11 ENCOUNTER — DOCUMENT SCAN (OUTPATIENT)
Dept: HOME HEALTH SERVICES | Facility: HOSPITAL | Age: 73
End: 2023-07-11
Payer: MEDICARE

## 2023-07-11 ENCOUNTER — TELEPHONE (OUTPATIENT)
Dept: INTERNAL MEDICINE | Facility: CLINIC | Age: 73
End: 2023-07-11

## 2023-07-11 NOTE — TELEPHONE ENCOUNTER
----- Message from Keyshawn Ontiveros MA sent at 7/11/2023  1:44 PM CDT -----  Regarding: FW: med adv    ----- Message -----  From: Marilyn Da Silva  Sent: 7/11/2023   1:42 PM CDT  To: [facility] Staff  Subject: med adv                                          Type:  Needs Medical Advice    Who Called: patient  Would the patient rather a call back or a response via [facility]?   Best Call Back Number: [phone]  Additional Information: patient needs sx clearance in order to receive joint injections. Please call patient back.

## 2023-07-12 ENCOUNTER — TELEPHONE (OUTPATIENT)
Dept: INTERNAL MEDICINE | Facility: CLINIC | Age: 73
End: 2023-07-12
Payer: MEDICARE

## 2023-07-12 DIAGNOSIS — G89.29 CHRONIC LOW BACK PAIN, UNSPECIFIED BACK PAIN LATERALITY, UNSPECIFIED WHETHER SCIATICA PRESENT: ICD-10-CM

## 2023-07-12 DIAGNOSIS — M54.50 CHRONIC LOW BACK PAIN, UNSPECIFIED BACK PAIN LATERALITY, UNSPECIFIED WHETHER SCIATICA PRESENT: ICD-10-CM

## 2023-07-12 DIAGNOSIS — E78.2 MIXED HYPERLIPIDEMIA: Primary | ICD-10-CM

## 2023-07-12 DIAGNOSIS — I82.409 RECURRENT DEEP VEIN THROMBOSIS (DVT): ICD-10-CM

## 2023-07-12 RX ORDER — PRAVASTATIN SODIUM 40 MG/1
40 TABLET ORAL DAILY
Qty: 90 TABLET | Refills: 4 | Status: SHIPPED | OUTPATIENT
Start: 2023-07-12

## 2023-07-12 RX ORDER — WARFARIN 4 MG/1
4 TABLET ORAL DAILY
Qty: 30 TABLET | Refills: 0 | Status: SHIPPED | OUTPATIENT
Start: 2023-07-12 | End: 2023-08-02

## 2023-07-12 RX ORDER — GABAPENTIN 300 MG/1
300 CAPSULE ORAL 3 TIMES DAILY PRN
Qty: 90 CAPSULE | Refills: 3 | Status: SHIPPED | OUTPATIENT
Start: 2023-07-12 | End: 2023-09-05

## 2023-07-12 NOTE — TELEPHONE ENCOUNTER
----- Message from Jessica Mar sent at 7/12/2023 11:28 AM CDT -----  Warfarin 4 mg tab  Qty 90    Gabapentin 300 mg cap  Qty 90    Pravastatin 40 mg tab  Qty 90       [facility]    
Rx sent   
no

## 2023-07-13 ENCOUNTER — TELEPHONE (OUTPATIENT)
Dept: INTERNAL MEDICINE | Facility: CLINIC | Age: 73
End: 2023-07-13

## 2023-07-13 ENCOUNTER — DOCUMENT SCAN (OUTPATIENT)
Dept: HOME HEALTH SERVICES | Facility: HOSPITAL | Age: 73
End: 2023-07-13
Payer: MEDICARE

## 2023-07-13 NOTE — TELEPHONE ENCOUNTER
----- Message from Mary Chapman sent at 7/13/2023 12:00 PM CDT -----  .Type:  Needs Medical Advice    Who Called: nursing service  Symptoms (please be specific):    How long has patient had these symptoms:    Pharmacy name and phone #:    Would the patient rather a call back or a response via [facility]?   Best Call Back Number: [phone] Stephanie  Additional Information: did labs results reach office on 07/11/23 please advice she stated she fax on 06/30 & 07/11

## 2023-07-14 ENCOUNTER — TELEPHONE (OUTPATIENT)
Dept: INTERNAL MEDICINE | Facility: CLINIC | Age: 73
End: 2023-07-14
Payer: MEDICARE

## 2023-07-14 DIAGNOSIS — E11.9 TYPE 2 DIABETES MELLITUS WITHOUT COMPLICATION, WITHOUT LONG-TERM CURRENT USE OF INSULIN: Primary | ICD-10-CM

## 2023-07-14 RX ORDER — LANCETS
1 EACH MISCELLANEOUS DAILY
Qty: 100 EACH | Refills: 3 | Status: SHIPPED | OUTPATIENT
Start: 2023-07-14 | End: 2023-08-02 | Stop reason: SDUPTHER

## 2023-07-14 NOTE — TELEPHONE ENCOUNTER
----- Message from Jigar Cannon sent at 7/14/2023  9:46 AM CDT -----  .Type:  Diabetic/Medical Supplies Request    Name of Caller:pt  What supplies are needed:test strips & lancets  What is the brand of the supplies: Onetouch  Refill or New Rx:Refill  If checking glucose, how many times do they check it?:  Who prescribed the original supplies:  Pharmacy/Company Name, Phone #, Location: [facility] [place] [address]  Requesting a Call Back?:yes  Would the patient rather a call back or a response via [facility]? Call back  Best Call Back Number:[phone]  Additional Information:

## 2023-07-14 NOTE — TELEPHONE ENCOUNTER
----- Message from Jigar Cannon sent at 7/14/2023  9:46 AM CDT -----  .Type:  Diabetic/Medical Supplies Request    Name of Caller:pt  What supplies are needed:test strips & lancets  What is the brand of the supplies: Onetouch  Refill or New Rx:Refill  If checking glucose, how many times do they check it?:  Who prescribed the original supplies:  Pharmacy/Company Name, Phone #, Location: [facility] [address]  Requesting a Call Back?:yes  Would the patient rather a call back or a response via [facility]? Call back  Best Call Back Number:[phone]  Additional Information:

## 2023-07-15 DIAGNOSIS — E11.9 TYPE 2 DIABETES MELLITUS WITHOUT COMPLICATION, WITHOUT LONG-TERM CURRENT USE OF INSULIN: Primary | ICD-10-CM

## 2023-07-17 RX ORDER — METFORMIN HYDROCHLORIDE 500 MG/1
TABLET ORAL
Qty: 180 TABLET | Refills: 3 | Status: SHIPPED | OUTPATIENT
Start: 2023-07-17 | End: 2023-09-05

## 2023-07-19 ENCOUNTER — DOCUMENT SCAN (OUTPATIENT)
Dept: HOME HEALTH SERVICES | Facility: HOSPITAL | Age: 73
End: 2023-07-19
Payer: MEDICARE

## 2023-07-19 ENCOUNTER — TELEPHONE (OUTPATIENT)
Dept: INTERNAL MEDICINE | Facility: CLINIC | Age: 73
End: 2023-07-19
Payer: MEDICARE

## 2023-07-19 DIAGNOSIS — E87.6 HYPOKALEMIA: Primary | ICD-10-CM

## 2023-07-19 NOTE — TELEPHONE ENCOUNTER
----- Message from Per Medrano MA sent at 7/19/2023 11:14 AM CDT -----  Regarding: PV 7/26/23 @ 10:40 DEYVI Márquez  1. Are there any outstanding tasks in the patient's chart? Yes, fasting labs    2. Is there any documentation in the chart? No    3.Has patient been seen in an ER, Urgent care clinic, or been admitted since last visit?  If yes, When, where, and why    4. Has patient seen any other healthcare providers since last visit?  If yes, when, where, and why    5. Has patient had any bloodwork or XR done since last visit?    6. Is patient signed up for patient portal?

## 2023-07-21 ENCOUNTER — TELEPHONE (OUTPATIENT)
Dept: INTERNAL MEDICINE | Facility: CLINIC | Age: 73
End: 2023-07-21
Payer: MEDICARE

## 2023-07-21 NOTE — TELEPHONE ENCOUNTER
----- Message from April Stewart sent at 7/21/2023 11:49 AM CDT -----  Regarding: med advice  .Type:  Needs Medical Advice    Who Called: patient  Symptoms (please be specific):    How long has patient had these symptoms:    Pharmacy name and phone #:    Would the patient rather a call back or a response via [facility]? Call back  Best Call Back Number:  [phone]  Additional Information: patient is asking is any labs needed for her 7/26 appointment. Please advise.

## 2023-07-24 ENCOUNTER — LAB VISIT (OUTPATIENT)
Dept: LAB | Facility: HOSPITAL | Age: 73
End: 2023-07-24
Attending: INTERNAL MEDICINE
Payer: MEDICARE

## 2023-07-24 DIAGNOSIS — E87.6 HYPOKALEMIA: ICD-10-CM

## 2023-07-24 DIAGNOSIS — R73.9 HYPERGLYCEMIA: ICD-10-CM

## 2023-07-24 DIAGNOSIS — K56.609 INTESTINAL OBSTRUCTION, UNSPECIFIED CAUSE, UNSPECIFIED WHETHER PARTIAL OR COMPLETE: ICD-10-CM

## 2023-07-24 DIAGNOSIS — Z09 HOSPITAL DISCHARGE FOLLOW-UP: ICD-10-CM

## 2023-07-24 DIAGNOSIS — E03.9 HYPOTHYROIDISM, UNSPECIFIED TYPE: ICD-10-CM

## 2023-07-24 LAB
ALBUMIN SERPL-MCNC: 3.7 G/DL (ref 3.4–4.8)
ALBUMIN/GLOB SERPL: 1.4 RATIO (ref 1.1–2)
ALP SERPL-CCNC: 74 UNIT/L (ref 40–150)
ALT SERPL-CCNC: 9 UNIT/L (ref 0–55)
APPEARANCE UR: CLEAR
AST SERPL-CCNC: 17 UNIT/L (ref 5–34)
BACTERIA #/AREA URNS AUTO: NORMAL /HPF
BASOPHILS # BLD AUTO: 0.01 X10(3)/MCL
BASOPHILS NFR BLD AUTO: 0.2 %
BILIRUB UR QL STRIP.AUTO: NEGATIVE
BILIRUBIN DIRECT+TOT PNL SERPL-MCNC: 0.8 MG/DL
BUN SERPL-MCNC: 10.4 MG/DL (ref 9.8–20.1)
CALCIUM SERPL-MCNC: 9.1 MG/DL (ref 8.4–10.2)
CHLORIDE SERPL-SCNC: 108 MMOL/L (ref 98–107)
CHOLEST SERPL-MCNC: 129 MG/DL
CHOLEST/HDLC SERPL: 3 {RATIO} (ref 0–5)
CO2 SERPL-SCNC: 29 MMOL/L (ref 23–31)
COLOR UR: ABNORMAL
CREAT SERPL-MCNC: 0.64 MG/DL (ref 0.55–1.02)
EOSINOPHIL # BLD AUTO: 0.07 X10(3)/MCL (ref 0–0.9)
EOSINOPHIL NFR BLD AUTO: 1.6 %
ERYTHROCYTE [DISTWIDTH] IN BLOOD BY AUTOMATED COUNT: 15.6 % (ref 11.5–17)
EST. AVERAGE GLUCOSE BLD GHB EST-MCNC: 93.9 MG/DL
GFR SERPLBLD CREATININE-BSD FMLA CKD-EPI: >60 MLS/MIN/1.73/M2
GLOBULIN SER-MCNC: 2.7 GM/DL (ref 2.4–3.5)
GLUCOSE SERPL-MCNC: 83 MG/DL (ref 82–115)
GLUCOSE UR QL STRIP.AUTO: NEGATIVE
HBA1C MFR BLD: 4.9 %
HCT VFR BLD AUTO: 36.5 % (ref 37–47)
HDLC SERPL-MCNC: 42 MG/DL (ref 35–60)
HGB BLD-MCNC: 12 G/DL (ref 12–16)
IMM GRANULOCYTES # BLD AUTO: 0.02 X10(3)/MCL (ref 0–0.04)
IMM GRANULOCYTES NFR BLD AUTO: 0.5 %
KETONES UR QL STRIP.AUTO: ABNORMAL
LDLC SERPL CALC-MCNC: 75 MG/DL (ref 50–140)
LEUKOCYTE ESTERASE UR QL STRIP.AUTO: ABNORMAL
LYMPHOCYTES # BLD AUTO: 0.81 X10(3)/MCL (ref 0.6–4.6)
LYMPHOCYTES NFR BLD AUTO: 18.8 %
MCH RBC QN AUTO: 30.5 PG (ref 27–31)
MCHC RBC AUTO-ENTMCNC: 32.9 G/DL (ref 33–36)
MCV RBC AUTO: 92.9 FL (ref 80–94)
MONOCYTES # BLD AUTO: 0.34 X10(3)/MCL (ref 0.1–1.3)
MONOCYTES NFR BLD AUTO: 7.9 %
NEUTROPHILS # BLD AUTO: 3.06 X10(3)/MCL (ref 2.1–9.2)
NEUTROPHILS NFR BLD AUTO: 71 %
NITRITE UR QL STRIP.AUTO: NEGATIVE
NRBC BLD AUTO-RTO: 0 %
PH UR STRIP.AUTO: 7.5 [PH]
PLATELET # BLD AUTO: 188 X10(3)/MCL (ref 130–400)
PMV BLD AUTO: 11.3 FL (ref 7.4–10.4)
POTASSIUM SERPL-SCNC: 3.7 MMOL/L (ref 3.5–5.1)
PROT SERPL-MCNC: 6.4 GM/DL (ref 5.8–7.6)
PROT UR QL STRIP.AUTO: NEGATIVE
RBC # BLD AUTO: 3.93 X10(6)/MCL (ref 4.2–5.4)
RBC #/AREA URNS AUTO: <5 /HPF
RBC UR QL AUTO: NEGATIVE
SODIUM SERPL-SCNC: 145 MMOL/L (ref 136–145)
SP GR UR STRIP.AUTO: 1.02 (ref 1–1.03)
SQUAMOUS #/AREA URNS AUTO: <5 /HPF
TRIGL SERPL-MCNC: 60 MG/DL (ref 37–140)
TSH SERPL-ACNC: 1.98 UIU/ML (ref 0.35–4.94)
UROBILINOGEN UR STRIP-ACNC: 1
VLDLC SERPL CALC-MCNC: 12 MG/DL
WBC # SPEC AUTO: 4.31 X10(3)/MCL (ref 4.5–11.5)
WBC #/AREA URNS AUTO: <5 /HPF

## 2023-07-24 PROCEDURE — 80061 LIPID PANEL: CPT

## 2023-07-24 PROCEDURE — 81001 URINALYSIS AUTO W/SCOPE: CPT

## 2023-07-24 PROCEDURE — 85025 COMPLETE CBC W/AUTO DIFF WBC: CPT

## 2023-07-24 PROCEDURE — 36415 COLL VENOUS BLD VENIPUNCTURE: CPT

## 2023-07-24 PROCEDURE — 84443 ASSAY THYROID STIM HORMONE: CPT

## 2023-07-24 PROCEDURE — 83036 HEMOGLOBIN GLYCOSYLATED A1C: CPT

## 2023-07-24 PROCEDURE — 80053 COMPREHEN METABOLIC PANEL: CPT

## 2023-08-01 ENCOUNTER — DOCUMENT SCAN (OUTPATIENT)
Dept: HOME HEALTH SERVICES | Facility: HOSPITAL | Age: 73
End: 2023-08-01
Payer: MEDICARE

## 2023-08-02 ENCOUNTER — TELEPHONE (OUTPATIENT)
Dept: INTERNAL MEDICINE | Facility: CLINIC | Age: 73
End: 2023-08-02
Payer: MEDICARE

## 2023-08-02 DIAGNOSIS — I82.409 RECURRENT DEEP VEIN THROMBOSIS (DVT): ICD-10-CM

## 2023-08-02 DIAGNOSIS — E11.9 TYPE 2 DIABETES MELLITUS WITHOUT COMPLICATION, WITHOUT LONG-TERM CURRENT USE OF INSULIN: ICD-10-CM

## 2023-08-02 RX ORDER — LANCETS
1 EACH MISCELLANEOUS DAILY
Qty: 100 EACH | Refills: 3 | Status: SHIPPED | OUTPATIENT
Start: 2023-08-02 | End: 2023-08-22 | Stop reason: SDUPTHER

## 2023-08-02 RX ORDER — WARFARIN 4 MG/1
4 TABLET ORAL
Qty: 30 TABLET | Refills: 0 | Status: SHIPPED | OUTPATIENT
Start: 2023-08-02 | End: 2023-09-25

## 2023-08-02 NOTE — TELEPHONE ENCOUNTER
----- Message from Mara Frias sent at 8/2/2023  8:31 AM CDT -----  Regarding: diabetic supplies  Type:  Diabetic/Medical Supplies Request    Name of Caller:pt  What supplies are needed:blood sugar diagnostic Strp & lancets (ONETOUCH ULTRASOFT LANCETS) Misc  What is the brand of the supplies:one touch ultra  Refill or New Rx:refill  If checking glucose, how many times do they check it?:every 1- 2 days  Who prescribed the original supplies:dr brush  Pharmacy/Company Name, Phone #, Location:[facility] mail order  Requesting a Call Back?:yes  Would the patient rather a call back or a response via [facility]? C/b  Best Call Back Number:[phone]  Additional Information: authorization # [id number]

## 2023-08-07 ENCOUNTER — TELEPHONE (OUTPATIENT)
Dept: INTERNAL MEDICINE | Facility: CLINIC | Age: 73
End: 2023-08-07
Payer: MEDICARE

## 2023-08-07 NOTE — TELEPHONE ENCOUNTER
"Per Dr. Madrid, "Make sure she is eating and drinking enough   Will need orthostatics checked and shes not on any BP meds   Make sure she is eating enough protein, boost, ensure, animal protein, etc"  "

## 2023-08-07 NOTE — TELEPHONE ENCOUNTER
----- Message from Jillian Smith sent at 8/7/2023  9:19 AM CDT -----  Regarding: advice  Type:  Needs Medical Advice    Who Called: pt  Symptoms (please be specific):    How long has patient had these symptoms:    Pharmacy name and phone #:    Would the patient rather a call back or a response via [facility]? cb  Best Call Back Number: [phone]  Additional Information: pt called about wanting to get back on hydrochlorothiazide being that she has been having swelling in her feet and legs recently

## 2023-08-07 NOTE — TELEPHONE ENCOUNTER
Spoke to pt and she stated that her legs and feet have been swollen since her surgery in June, and her blood pressure has been running low. She was able to take BP while on the phone: 128/77.   I scheduled pt appointment with DEYVI Chicas on 8/9/23 @ 9:20 AM. She stated that she could not come tomorrow because she has other appointments tomorrow, and today everyone was booked.

## 2023-08-09 ENCOUNTER — OFFICE VISIT (OUTPATIENT)
Dept: INTERNAL MEDICINE | Facility: CLINIC | Age: 73
End: 2023-08-09
Payer: MEDICARE

## 2023-08-09 VITALS
WEIGHT: 131.63 LBS | TEMPERATURE: 97 F | SYSTOLIC BLOOD PRESSURE: 112 MMHG | HEART RATE: 64 BPM | BODY MASS INDEX: 22.59 KG/M2 | RESPIRATION RATE: 18 BRPM | OXYGEN SATURATION: 99 % | DIASTOLIC BLOOD PRESSURE: 60 MMHG

## 2023-08-09 DIAGNOSIS — R60.0 PEDAL EDEMA: ICD-10-CM

## 2023-08-09 DIAGNOSIS — I83.93 ASYMPTOMATIC VARICOSE VEINS OF BOTH LOWER EXTREMITIES: ICD-10-CM

## 2023-08-09 PROCEDURE — 3061F NEG MICROALBUMINURIA REV: CPT | Mod: CPTII,,,

## 2023-08-09 PROCEDURE — 1101F PT FALLS ASSESS-DOCD LE1/YR: CPT | Mod: CPTII,,,

## 2023-08-09 PROCEDURE — 3288F FALL RISK ASSESSMENT DOCD: CPT | Mod: CPTII,,,

## 2023-08-09 PROCEDURE — 1126F PR PAIN SEVERITY QUANTIFIED, NO PAIN PRESENT: ICD-10-PCS | Mod: CPTII,,,

## 2023-08-09 PROCEDURE — 3008F BODY MASS INDEX DOCD: CPT | Mod: CPTII,,,

## 2023-08-09 PROCEDURE — 99214 PR OFFICE/OUTPT VISIT, EST, LEVL IV, 30-39 MIN: ICD-10-PCS | Mod: ,,,

## 2023-08-09 PROCEDURE — 1159F PR MEDICATION LIST DOCUMENTED IN MEDICAL RECORD: ICD-10-PCS | Mod: CPTII,,,

## 2023-08-09 PROCEDURE — 3074F PR MOST RECENT SYSTOLIC BLOOD PRESSURE < 130 MM HG: ICD-10-PCS | Mod: CPTII,,,

## 2023-08-09 PROCEDURE — 1159F MED LIST DOCD IN RCRD: CPT | Mod: CPTII,,,

## 2023-08-09 PROCEDURE — 1160F PR REVIEW ALL MEDS BY PRESCRIBER/CLIN PHARMACIST DOCUMENTED: ICD-10-PCS | Mod: CPTII,,,

## 2023-08-09 PROCEDURE — 3044F HG A1C LEVEL LT 7.0%: CPT | Mod: CPTII,,,

## 2023-08-09 PROCEDURE — 3066F PR DOCUMENTATION OF TREATMENT FOR NEPHROPATHY: ICD-10-PCS | Mod: CPTII,,,

## 2023-08-09 PROCEDURE — 1101F PR PT FALLS ASSESS DOC 0-1 FALLS W/OUT INJ PAST YR: ICD-10-PCS | Mod: CPTII,,,

## 2023-08-09 PROCEDURE — 3044F PR MOST RECENT HEMOGLOBIN A1C LEVEL <7.0%: ICD-10-PCS | Mod: CPTII,,,

## 2023-08-09 PROCEDURE — 3078F PR MOST RECENT DIASTOLIC BLOOD PRESSURE < 80 MM HG: ICD-10-PCS | Mod: CPTII,,,

## 2023-08-09 PROCEDURE — 3066F NEPHROPATHY DOC TX: CPT | Mod: CPTII,,,

## 2023-08-09 PROCEDURE — 3008F PR BODY MASS INDEX (BMI) DOCUMENTED: ICD-10-PCS | Mod: CPTII,,,

## 2023-08-09 PROCEDURE — 3061F PR NEG MICROALBUMINURIA RESULT DOCUMENTED/REVIEW: ICD-10-PCS | Mod: CPTII,,,

## 2023-08-09 PROCEDURE — 1160F RVW MEDS BY RX/DR IN RCRD: CPT | Mod: CPTII,,,

## 2023-08-09 PROCEDURE — 3288F PR FALLS RISK ASSESSMENT DOCUMENTED: ICD-10-PCS | Mod: CPTII,,,

## 2023-08-09 PROCEDURE — 3078F DIAST BP <80 MM HG: CPT | Mod: CPTII,,,

## 2023-08-09 PROCEDURE — 99214 OFFICE O/P EST MOD 30 MIN: CPT | Mod: ,,,

## 2023-08-09 PROCEDURE — 3074F SYST BP LT 130 MM HG: CPT | Mod: CPTII,,,

## 2023-08-09 PROCEDURE — 1126F AMNT PAIN NOTED NONE PRSNT: CPT | Mod: CPTII,,,

## 2023-08-09 NOTE — ASSESSMENT & PLAN NOTE
-prominent   -previously with cosmetic intervention by vascular  -referral back to vascular for follow up intervention offered, patient denied for now

## 2023-08-09 NOTE — ASSESSMENT & PLAN NOTE
-trace along bilateral ankle and feet  -prominent varicosities to bilateral lower extremities   -likely secondary to degree of insufficiency  -elevate extremities   -low-sodium diet   -hold off on diuretics since BP on lower end of normal   -lower extremity compression stockings offered, however patient declined for now

## 2023-08-09 NOTE — PROGRESS NOTES
Patient ID: Alejandrina Rodriguez is a 73 y.o. female.    Chief Complaint: Foot Swelling (Bilateral  feet/ankle swelling since June )    73-year-old female here today for requested visit as a Dr. Madrid patient.  Today presents with bilateral ankle and feet edema.  This has been an ongoing problem, however has noticed more within the last 3 months.  Edema worse during the daytime with dependent position.  Noted improved after elevating extremities and upon awaking.  Denies traumas over exertions.  No calf pain, shortness a breath, chest pain, palpitations, orthopnea, unintended weight gain, weakness, or fatigue.  Patient does have significant bilateral lower extremity varicose veins and reports nonpainful.  In the past has had cosmetic correction by vascular.  After discussing does have some bilateral lower extremity compression stockings, however reports does not utilize.  Otherwise no other acute medical concerns noted today.        MEDICAL HISTORY:    Past Medical History:   Diagnosis Date    Abnormal blood smear     Acid reflux     Anxiety and depression     Bilateral carotid artery disease     Cervical spondylosis with radiculopathy     Chronic lumbar pain     Diabetes mellitus     History of continuous positive airway pressure (CPAP) therapy at home     HLD (hyperlipidemia)     Hypertension     Iron deficiency anemia, unspecified     Neuropathy     On anticoagulant therapy     Osteopenia     Personal history of colonic polyps 07/14/2014    Protein C deficiency     Recurrent deep vein thrombosis (DVT) 6/21/2022    Sleep apnea, unspecified     Unspecified osteoarthritis, unspecified site       Past Surgical History:   Procedure Laterality Date    COLONOSCOPY  07/14/2014    COLONOSCOPY W/ POLYPECTOMY  08/29/2022    Humberto Chopra/ Follow up pending pathology results    LAPAROTOMY, EXPLORATORY N/A 6/4/2023    Procedure: LAPAROTOMY, EXPLORATORY;  Surgeon: Sd Conway Jr., MD;  Location: [facility];  Service:  General;  Laterality: N/A;    LAPAROTOMY, EXPLORATORY N/A 6/5/2023    Procedure: LAPAROTOMY, EXPLORATORY;  Surgeon: Camilo Valadez MD;  Location: [facility];  Service: General;  Laterality: N/A;    LAPAROTOMY, EXPLORATORY N/A 6/7/2023    Procedure: LAPAROTOMY, EXPLORATORY;  Surgeon: Camilo Valadez MD;  Location: [facility];  Service: General;  Laterality: N/A;  CLOSURE OF WOUND     LYSIS OF ADHESIONS N/A 6/4/2023    Procedure: LYSIS, ADHESIONS;  Surgeon: Sd Conway Jr., MD;  Location: [facility];  Service: General;  Laterality: N/A;  detorsion of small bowel volvulus    SPINAL CORD STIMULATOR IMPLANT      WASHOUT N/A 6/7/2023    Procedure: WASHOUT;  Surgeon: Camilo Valadez MD;  Location: [facility];  Service: General;  Laterality: N/A;  WASHOUT OF ABDOMEN       Social History     Tobacco Use    Smoking status: Never    Smokeless tobacco: Never   Substance Use Topics    Alcohol use: Never    Drug use: Never          Health Maintenance Due   Topic Date Due    Foot Exam  Never done    TETANUS VACCINE  Never done    COVID-19 Vaccine (6 - Pfizer series) 02/10/2023          Patient Care Team:  Alex Treviño MD as PCP - General (Internal Medicine)  [facility] as Consulting Physician (Obstetrics and Gynecology)  Margaret Muñoz LPN as Care Coordinator  Humberto Chopra MD as Consulting Physician (Gastroenterology)  Sicard, Laurie, OD (Optometry)      Review of Systems   Constitutional:  Negative for fatigue and fever.   HENT:  Negative for congestion, rhinorrhea, sore throat and trouble swallowing.    Eyes:  Negative for redness and visual disturbance.   Respiratory:  Negative for cough, chest tightness and shortness of breath.    Cardiovascular:  Positive for leg swelling. Negative for chest pain and palpitations.   Gastrointestinal:  Negative for abdominal pain, constipation, diarrhea, nausea and vomiting.   Genitourinary:  Negative for dysuria, flank  pain, frequency and urgency.   Musculoskeletal:  Negative for arthralgias, gait problem and myalgias.   Skin:  Negative for rash and wound.   Neurological:  Negative for facial asymmetry, speech difficulty, weakness and headaches.   All other systems reviewed and are negative.      Objective:   /60 (BP Location: Left arm, Patient Position: Sitting, BP Method: Small (Automatic))   Pulse 64   Temp 97 °F (36.1 °C) (Temporal)   Resp 18   Wt 59.7 kg (131 lb 9.6 oz)   SpO2 99%   BMI 22.59 kg/m²      Physical Exam  Constitutional:       General: She is not in acute distress.     Appearance: Normal appearance.   HENT:      Right Ear: Tympanic membrane, ear canal and external ear normal.      Left Ear: Tympanic membrane, ear canal and external ear normal.      Nose: Nose normal.      Mouth/Throat:      Mouth: Mucous membranes are moist.      Pharynx: Oropharynx is clear.   Eyes:      Extraocular Movements: Extraocular movements intact.      Conjunctiva/sclera: Conjunctivae normal.      Pupils: Pupils are equal, round, and reactive to light.   Cardiovascular:      Rate and Rhythm: Normal rate and regular rhythm.      Pulses: Normal pulses.      Heart sounds: Normal heart sounds. No murmur heard.     No gallop.      Comments: Trace edema.  Bilateral lower extremity multiple varicosities nontender.  Pulmonary:      Effort: Pulmonary effort is normal.      Breath sounds: Normal breath sounds. No wheezing.   Abdominal:      General: Bowel sounds are normal. There is no distension.      Palpations: Abdomen is soft. There is no mass.      Tenderness: There is no abdominal tenderness. There is no guarding.   Musculoskeletal:         General: Normal range of motion.      Right lower leg: Edema (Trace ankle/pedal) present.      Left lower leg: Edema (trace ankle/pedal) present.   Skin:     General: Skin is warm and dry.   Neurological:      Mental Status: She is alert. Mental status is at baseline.      Sensory: No sensory  deficit.      Motor: No weakness.           Assessment:       ICD-10-CM ICD-9-CM   1. Pedal edema  R60.0 782.3   2. Asymptomatic varicose veins of both lower extremities  I83.93 454.9        Plan:     Problem List Items Addressed This Visit          Cardiac/Vascular    Asymptomatic varicose veins of both lower extremities (Chronic)     -prominent   -previously with cosmetic intervention by vascular  -referral back to vascular for follow up intervention offered, patient denied for now            Other    Pedal edema (Chronic)     -trace along bilateral ankle and feet  -prominent varicosities to bilateral lower extremities   -likely secondary to degree of insufficiency  -elevate extremities   -low-sodium diet   -hold off on diuretics since BP on lower end of normal   -lower extremity compression stockings offered, however patient declined for now               Follow up for Previously scheduled and PRN if need, with Dr. Madrid.   -plan specifics discussed above          Medication List with Changes/Refills   Current Medications    AZELASTINE (ASTELIN) 137 MCG (0.1 %) NASAL SPRAY    1 spray (137 mcg total) by Nasal route 2 (two) times daily.    B-COMPLEX WITH VITAMIN C (Z-BEC OR EQUIV) TABLET    Take 1 tablet by mouth once daily.    BACLOFEN (LIORESAL) 10 MG TABLET    Take 10 mg by mouth 4 (four) times daily. PRN muscle spasms    BLOOD SUGAR DIAGNOSTIC STRP    1 each by Misc.(Non-Drug; Combo Route) route Daily.    CELECOXIB (CELEBREX) 100 MG CAPSULE    TAKE ONE CAPSULE BY MOUTH ONCE DAILY WITH FOOD AS NEEDED FOR PAIN    CLOPIDOGREL (PLAVIX) 75 MG TABLET    Take 1 tablet by mouth Daily.    FAMOTIDINE (PEPCID) 20 MG TABLET    TAKE 1 TABLET TWICE DAILY    GABAPENTIN (NEURONTIN) 300 MG CAPSULE    Take 1 capsule (300 mg total) by mouth 3 (three) times daily as needed (nerve pain).    LANCETS (ONETOUCH ULTRASOFT LANCETS) MISC    1 each by Misc.(Non-Drug; Combo Route) route Daily.    LEXAPRO 10 MG TABLET    TAKE 1 TABLET BY  MOUTH ONCE DAILY AS DIRECTED FOR MOOD/CHRONIC PAIN AS DIRECTED    LIDOCAINE (LIDODERM) 5 %    Place 1 patch onto the skin every 24 hours. Remove & Discard patch within 12 hours or as directed by MD    METFORMIN (GLUCOPHAGE) 500 MG TABLET    TAKE 1 TABLET TWICE DAILY    MORPHINE (MSIR) 15 MG TABLET    Take 1.5 tablets by mouth 2 (two) times daily as needed.    NITROGLYCERIN (NITROSTAT) 0.4 MG SL TABLET    PLEASE SEE ATTACHED FOR DETAILED DIRECTIONS    OMEGA-3-EPA-FISH OIL ORAL    Take 1 capsule by mouth once daily.    OMEPRAZOLE (PRILOSEC) 40 MG CAPSULE    TAKE 1 CAPSULE EVERY DAY    ONDANSETRON (ZOFRAN-ODT) 8 MG TBDL    Take 1 tablet (8 mg total) by mouth 2 (two) times daily.    -PROPYLENE GLYCOL (SYSTANE, PROPYLENE GLYCOL,) 0.4-0.3 % DROP    Apply 1 drop to eye.    PRAVASTATIN (PRAVACHOL) 40 MG TABLET    Take 1 tablet (40 mg total) by mouth once daily.    TIMOLOL MALEATE 0.5% (TIMOPTIC) 0.5 % DROP    Place 1 drop into both eyes Daily.    VITAMIN D (VITAMIN D3) 1000 UNITS TAB    Take 5,000 Units by mouth once daily.    WARFARIN (COUMADIN) 4 MG TABLET    TAKE 1 TABLET EVERY DAY

## 2023-08-21 ENCOUNTER — TELEPHONE (OUTPATIENT)
Dept: INTERNAL MEDICINE | Facility: CLINIC | Age: 73
End: 2023-08-21
Payer: MEDICARE

## 2023-08-21 DIAGNOSIS — E03.9 HYPOTHYROIDISM, UNSPECIFIED TYPE: ICD-10-CM

## 2023-08-21 DIAGNOSIS — E55.9 VITAMIN D DEFICIENCY: ICD-10-CM

## 2023-08-21 DIAGNOSIS — I10 HYPERTENSION, UNSPECIFIED TYPE: ICD-10-CM

## 2023-08-21 DIAGNOSIS — E11.9 TYPE 2 DIABETES MELLITUS WITHOUT COMPLICATION, WITHOUT LONG-TERM CURRENT USE OF INSULIN: Primary | ICD-10-CM

## 2023-08-21 DIAGNOSIS — E78.2 MIXED HYPERLIPIDEMIA: ICD-10-CM

## 2023-08-21 NOTE — TELEPHONE ENCOUNTER
----- Message from April Stewart sent at 8/21/2023  9:15 AM CDT -----  Regarding: med advice  .Type:  Needs Medical Advice    Who Called:  patient  Symptoms (please be specific):    How long has patient had these symptoms:    Pharmacy name and phone #:    Would the patient rather a call back or a response via [facility]? Call back  Best Call Back Number:  [phone]  Additional Information: patient is requesting a call back concerning labs due for a wellness on 9/5. Please advise.

## 2023-08-21 NOTE — TELEPHONE ENCOUNTER
Pt called back and wanted to know if we could order her labs for appointment on 9/5/23.   Lab orders placed.

## 2023-08-22 ENCOUNTER — TELEPHONE (OUTPATIENT)
Dept: INTERNAL MEDICINE | Facility: CLINIC | Age: 73
End: 2023-08-22
Payer: MEDICARE

## 2023-08-22 DIAGNOSIS — E11.9 TYPE 2 DIABETES MELLITUS WITHOUT COMPLICATION, WITHOUT LONG-TERM CURRENT USE OF INSULIN: ICD-10-CM

## 2023-08-22 RX ORDER — LANCETS
1 EACH MISCELLANEOUS DAILY
Qty: 100 EACH | Refills: 3 | Status: SHIPPED | OUTPATIENT
Start: 2023-08-22 | End: 2023-08-23 | Stop reason: SDUPTHER

## 2023-08-22 NOTE — TELEPHONE ENCOUNTER
----- Message from Mara Frias sent at 8/22/2023 12:50 PM CDT -----  Regarding: advice/ chart info  Type:  Needs Medical Advice    Who Called: pt    Best Call Back Number: [phone]    Additional Information: Pt stopped taking the gabapentin because she is taking morphin and backlofin and it was making her ankels swell

## 2023-08-23 ENCOUNTER — TELEPHONE (OUTPATIENT)
Dept: INTERNAL MEDICINE | Facility: CLINIC | Age: 73
End: 2023-08-23
Payer: MEDICARE

## 2023-08-23 DIAGNOSIS — E11.9 TYPE 2 DIABETES MELLITUS WITHOUT COMPLICATION, WITHOUT LONG-TERM CURRENT USE OF INSULIN: ICD-10-CM

## 2023-08-23 RX ORDER — LANCETS
1 EACH MISCELLANEOUS DAILY
Qty: 100 EACH | Refills: 3 | Status: SHIPPED | OUTPATIENT
Start: 2023-08-23

## 2023-08-23 NOTE — TELEPHONE ENCOUNTER
----- Message from Jessica Mar sent at 8/23/2023  9:40 AM CDT -----  [facility]

## 2023-08-28 ENCOUNTER — LAB VISIT (OUTPATIENT)
Dept: LAB | Facility: HOSPITAL | Age: 73
End: 2023-08-28
Attending: INTERNAL MEDICINE
Payer: MEDICARE

## 2023-08-28 DIAGNOSIS — E55.9 VITAMIN D DEFICIENCY: ICD-10-CM

## 2023-08-28 DIAGNOSIS — E03.9 HYPOTHYROIDISM, UNSPECIFIED TYPE: ICD-10-CM

## 2023-08-28 DIAGNOSIS — E11.9 TYPE 2 DIABETES MELLITUS WITHOUT COMPLICATION, WITHOUT LONG-TERM CURRENT USE OF INSULIN: ICD-10-CM

## 2023-08-28 DIAGNOSIS — E78.2 MIXED HYPERLIPIDEMIA: ICD-10-CM

## 2023-08-28 DIAGNOSIS — I10 HYPERTENSION, UNSPECIFIED TYPE: ICD-10-CM

## 2023-08-28 LAB
ALBUMIN SERPL-MCNC: 3.6 G/DL (ref 3.4–4.8)
ALBUMIN/GLOB SERPL: 1.4 RATIO (ref 1.1–2)
ALP SERPL-CCNC: 70 UNIT/L (ref 40–150)
ALT SERPL-CCNC: 19 UNIT/L (ref 0–55)
APPEARANCE UR: CLEAR
AST SERPL-CCNC: 19 UNIT/L (ref 5–34)
BACTERIA #/AREA URNS AUTO: ABNORMAL /HPF
BASOPHILS # BLD AUTO: 0.02 X10(3)/MCL
BASOPHILS NFR BLD AUTO: 0.2 %
BILIRUB SERPL-MCNC: 0.6 MG/DL
BILIRUB UR QL STRIP.AUTO: NEGATIVE
BUN SERPL-MCNC: 14.6 MG/DL (ref 9.8–20.1)
CALCIUM SERPL-MCNC: 8.9 MG/DL (ref 8.4–10.2)
CHLORIDE SERPL-SCNC: 109 MMOL/L (ref 98–107)
CHOLEST SERPL-MCNC: 126 MG/DL
CHOLEST/HDLC SERPL: 3 {RATIO} (ref 0–5)
CO2 SERPL-SCNC: 29 MMOL/L (ref 23–31)
COLOR UR: ABNORMAL
CREAT SERPL-MCNC: 0.65 MG/DL (ref 0.55–1.02)
DEPRECATED CALCIDIOL+CALCIFEROL SERPL-MC: 46.3 NG/ML (ref 30–80)
EOSINOPHIL # BLD AUTO: 0.08 X10(3)/MCL (ref 0–0.9)
EOSINOPHIL NFR BLD AUTO: 0.9 %
ERYTHROCYTE [DISTWIDTH] IN BLOOD BY AUTOMATED COUNT: 15.5 % (ref 11.5–17)
EST. AVERAGE GLUCOSE BLD GHB EST-MCNC: 105.4 MG/DL
GFR SERPLBLD CREATININE-BSD FMLA CKD-EPI: >60 MLS/MIN/1.73/M2
GLOBULIN SER-MCNC: 2.5 GM/DL (ref 2.4–3.5)
GLUCOSE SERPL-MCNC: 85 MG/DL (ref 82–115)
GLUCOSE UR QL STRIP.AUTO: NEGATIVE
HBA1C MFR BLD: 5.3 %
HCT VFR BLD AUTO: 38.8 % (ref 37–47)
HDLC SERPL-MCNC: 47 MG/DL (ref 35–60)
HGB BLD-MCNC: 12.7 G/DL (ref 12–16)
IMM GRANULOCYTES # BLD AUTO: 0.03 X10(3)/MCL (ref 0–0.04)
IMM GRANULOCYTES NFR BLD AUTO: 0.3 %
KETONES UR QL STRIP.AUTO: NEGATIVE
LDLC SERPL CALC-MCNC: 73 MG/DL (ref 50–140)
LEUKOCYTE ESTERASE UR QL STRIP.AUTO: ABNORMAL
LYMPHOCYTES # BLD AUTO: 1.47 X10(3)/MCL (ref 0.6–4.6)
LYMPHOCYTES NFR BLD AUTO: 16.3 %
MCH RBC QN AUTO: 31 PG (ref 27–31)
MCHC RBC AUTO-ENTMCNC: 32.7 G/DL (ref 33–36)
MCV RBC AUTO: 94.6 FL (ref 80–94)
MONOCYTES # BLD AUTO: 0.65 X10(3)/MCL (ref 0.1–1.3)
MONOCYTES NFR BLD AUTO: 7.2 %
NEUTROPHILS # BLD AUTO: 6.75 X10(3)/MCL (ref 2.1–9.2)
NEUTROPHILS NFR BLD AUTO: 75.1 %
NITRITE UR QL STRIP.AUTO: NEGATIVE
NRBC BLD AUTO-RTO: 0 %
PH UR STRIP.AUTO: 6.5 [PH]
PLATELET # BLD AUTO: 184 X10(3)/MCL (ref 130–400)
PMV BLD AUTO: 11.1 FL (ref 7.4–10.4)
POTASSIUM SERPL-SCNC: 3.7 MMOL/L (ref 3.5–5.1)
PROT SERPL-MCNC: 6.1 GM/DL (ref 5.8–7.6)
PROT UR QL STRIP.AUTO: NEGATIVE
RBC # BLD AUTO: 4.1 X10(6)/MCL (ref 4.2–5.4)
RBC #/AREA URNS AUTO: <5 /HPF
RBC UR QL AUTO: NEGATIVE
SODIUM SERPL-SCNC: 145 MMOL/L (ref 136–145)
SP GR UR STRIP.AUTO: 1.02 (ref 1–1.03)
SQUAMOUS #/AREA URNS AUTO: 7 /HPF
TRIGL SERPL-MCNC: 30 MG/DL (ref 37–140)
TSH SERPL-ACNC: 1.41 UIU/ML (ref 0.35–4.94)
UROBILINOGEN UR STRIP-ACNC: 1
VLDLC SERPL CALC-MCNC: 6 MG/DL
WBC # SPEC AUTO: 9 X10(3)/MCL (ref 4.5–11.5)
WBC #/AREA URNS AUTO: 6 /HPF

## 2023-08-28 PROCEDURE — 85025 COMPLETE CBC W/AUTO DIFF WBC: CPT

## 2023-08-28 PROCEDURE — 83036 HEMOGLOBIN GLYCOSYLATED A1C: CPT

## 2023-08-28 PROCEDURE — 80053 COMPREHEN METABOLIC PANEL: CPT

## 2023-08-28 PROCEDURE — 36415 COLL VENOUS BLD VENIPUNCTURE: CPT

## 2023-08-28 PROCEDURE — 82306 VITAMIN D 25 HYDROXY: CPT

## 2023-08-28 PROCEDURE — 81001 URINALYSIS AUTO W/SCOPE: CPT

## 2023-08-28 PROCEDURE — 80061 LIPID PANEL: CPT

## 2023-08-28 PROCEDURE — 84443 ASSAY THYROID STIM HORMONE: CPT

## 2023-09-05 ENCOUNTER — OFFICE VISIT (OUTPATIENT)
Dept: INTERNAL MEDICINE | Facility: CLINIC | Age: 73
End: 2023-09-05
Payer: MEDICARE

## 2023-09-05 VITALS
OXYGEN SATURATION: 99 % | WEIGHT: 123 LBS | HEIGHT: 64 IN | HEART RATE: 95 BPM | BODY MASS INDEX: 21 KG/M2 | TEMPERATURE: 97 F | DIASTOLIC BLOOD PRESSURE: 68 MMHG | RESPIRATION RATE: 16 BRPM | SYSTOLIC BLOOD PRESSURE: 126 MMHG

## 2023-09-05 DIAGNOSIS — D68.59 PROTEIN C DEFICIENCY: ICD-10-CM

## 2023-09-05 DIAGNOSIS — R35.0 FREQUENCY OF URINATION AND POLYURIA: ICD-10-CM

## 2023-09-05 DIAGNOSIS — E11.9 TYPE 2 DIABETES MELLITUS WITHOUT COMPLICATION, WITHOUT LONG-TERM CURRENT USE OF INSULIN: ICD-10-CM

## 2023-09-05 DIAGNOSIS — I10 PRIMARY HYPERTENSION: ICD-10-CM

## 2023-09-05 DIAGNOSIS — Z99.89 HISTORY OF CONTINUOUS POSITIVE AIRWAY PRESSURE (CPAP) THERAPY AT HOME: ICD-10-CM

## 2023-09-05 DIAGNOSIS — R63.4 WEIGHT LOSS: ICD-10-CM

## 2023-09-05 DIAGNOSIS — Z00.00 MEDICARE ANNUAL WELLNESS VISIT, SUBSEQUENT: Primary | ICD-10-CM

## 2023-09-05 DIAGNOSIS — G89.29 CHRONIC LOW BACK PAIN, UNSPECIFIED BACK PAIN LATERALITY, UNSPECIFIED WHETHER SCIATICA PRESENT: ICD-10-CM

## 2023-09-05 DIAGNOSIS — E78.2 MIXED HYPERLIPIDEMIA: ICD-10-CM

## 2023-09-05 DIAGNOSIS — K59.1 FUNCTIONAL DIARRHEA: ICD-10-CM

## 2023-09-05 DIAGNOSIS — M54.50 CHRONIC LOW BACK PAIN, UNSPECIFIED BACK PAIN LATERALITY, UNSPECIFIED WHETHER SCIATICA PRESENT: ICD-10-CM

## 2023-09-05 DIAGNOSIS — R35.89 FREQUENCY OF URINATION AND POLYURIA: ICD-10-CM

## 2023-09-05 DIAGNOSIS — I82.409 RECURRENT DEEP VEIN THROMBOSIS (DVT): ICD-10-CM

## 2023-09-05 PROBLEM — I83.93 ASYMPTOMATIC VARICOSE VEINS OF BOTH LOWER EXTREMITIES: Chronic | Status: RESOLVED | Noted: 2023-08-09 | Resolved: 2023-09-05

## 2023-09-05 PROBLEM — K21.9 ACID REFLUX: Status: RESOLVED | Noted: 2023-03-01 | Resolved: 2023-09-05

## 2023-09-05 PROBLEM — R60.0 PEDAL EDEMA: Chronic | Status: RESOLVED | Noted: 2023-08-09 | Resolved: 2023-09-05

## 2023-09-05 PROBLEM — K56.609 SBO (SMALL BOWEL OBSTRUCTION): Status: RESOLVED | Noted: 2023-06-04 | Resolved: 2023-09-05

## 2023-09-05 PROBLEM — Z09 HOSPITAL DISCHARGE FOLLOW-UP: Status: RESOLVED | Noted: 2023-06-27 | Resolved: 2023-09-05

## 2023-09-05 PROBLEM — R19.7 DIARRHEA: Status: ACTIVE | Noted: 2023-09-05

## 2023-09-05 PROCEDURE — 3074F SYST BP LT 130 MM HG: CPT | Mod: CPTII,,, | Performed by: INTERNAL MEDICINE

## 2023-09-05 PROCEDURE — 3074F PR MOST RECENT SYSTOLIC BLOOD PRESSURE < 130 MM HG: ICD-10-PCS | Mod: CPTII,,, | Performed by: INTERNAL MEDICINE

## 2023-09-05 PROCEDURE — 1159F MED LIST DOCD IN RCRD: CPT | Mod: CPTII,,, | Performed by: INTERNAL MEDICINE

## 2023-09-05 PROCEDURE — 3061F PR NEG MICROALBUMINURIA RESULT DOCUMENTED/REVIEW: ICD-10-PCS | Mod: CPTII,,, | Performed by: INTERNAL MEDICINE

## 2023-09-05 PROCEDURE — 3078F DIAST BP <80 MM HG: CPT | Mod: CPTII,,, | Performed by: INTERNAL MEDICINE

## 2023-09-05 PROCEDURE — 3288F PR FALLS RISK ASSESSMENT DOCUMENTED: ICD-10-PCS | Mod: CPTII,,, | Performed by: INTERNAL MEDICINE

## 2023-09-05 PROCEDURE — 3044F HG A1C LEVEL LT 7.0%: CPT | Mod: CPTII,,, | Performed by: INTERNAL MEDICINE

## 2023-09-05 PROCEDURE — 3008F BODY MASS INDEX DOCD: CPT | Mod: CPTII,,, | Performed by: INTERNAL MEDICINE

## 2023-09-05 PROCEDURE — G0439 PPPS, SUBSEQ VISIT: HCPCS | Mod: ,,, | Performed by: INTERNAL MEDICINE

## 2023-09-05 PROCEDURE — 3008F PR BODY MASS INDEX (BMI) DOCUMENTED: ICD-10-PCS | Mod: CPTII,,, | Performed by: INTERNAL MEDICINE

## 2023-09-05 PROCEDURE — 1101F PT FALLS ASSESS-DOCD LE1/YR: CPT | Mod: CPTII,,, | Performed by: INTERNAL MEDICINE

## 2023-09-05 PROCEDURE — 3078F PR MOST RECENT DIASTOLIC BLOOD PRESSURE < 80 MM HG: ICD-10-PCS | Mod: CPTII,,, | Performed by: INTERNAL MEDICINE

## 2023-09-05 PROCEDURE — 3061F NEG MICROALBUMINURIA REV: CPT | Mod: CPTII,,, | Performed by: INTERNAL MEDICINE

## 2023-09-05 PROCEDURE — G0439 PR MEDICARE ANNUAL WELLNESS SUBSEQUENT VISIT: ICD-10-PCS | Mod: ,,, | Performed by: INTERNAL MEDICINE

## 2023-09-05 PROCEDURE — 1101F PR PT FALLS ASSESS DOC 0-1 FALLS W/OUT INJ PAST YR: ICD-10-PCS | Mod: CPTII,,, | Performed by: INTERNAL MEDICINE

## 2023-09-05 PROCEDURE — 3044F PR MOST RECENT HEMOGLOBIN A1C LEVEL <7.0%: ICD-10-PCS | Mod: CPTII,,, | Performed by: INTERNAL MEDICINE

## 2023-09-05 PROCEDURE — 3066F PR DOCUMENTATION OF TREATMENT FOR NEPHROPATHY: ICD-10-PCS | Mod: CPTII,,, | Performed by: INTERNAL MEDICINE

## 2023-09-05 PROCEDURE — 99213 OFFICE O/P EST LOW 20 MIN: CPT | Mod: 25,,, | Performed by: INTERNAL MEDICINE

## 2023-09-05 PROCEDURE — 3066F NEPHROPATHY DOC TX: CPT | Mod: CPTII,,, | Performed by: INTERNAL MEDICINE

## 2023-09-05 PROCEDURE — 1159F PR MEDICATION LIST DOCUMENTED IN MEDICAL RECORD: ICD-10-PCS | Mod: CPTII,,, | Performed by: INTERNAL MEDICINE

## 2023-09-05 PROCEDURE — 3288F FALL RISK ASSESSMENT DOCD: CPT | Mod: CPTII,,, | Performed by: INTERNAL MEDICINE

## 2023-09-05 PROCEDURE — 99213 PR OFFICE/OUTPT VISIT, EST, LEVL III, 20-29 MIN: ICD-10-PCS | Mod: 25,,, | Performed by: INTERNAL MEDICINE

## 2023-09-05 RX ORDER — SENNOSIDES 8.6 MG/1
1 TABLET ORAL
COMMUNITY

## 2023-09-05 RX ORDER — BRIMONIDINE TARTRATE, TIMOLOL MALEATE 2; 5 MG/ML; MG/ML
SOLUTION/ DROPS OPHTHALMIC
COMMUNITY
Start: 2023-08-16

## 2023-09-05 RX ORDER — CEFUROXIME AXETIL 500 MG/1
500 TABLET ORAL 2 TIMES DAILY
Qty: 6 TABLET | Refills: 0 | Status: ON HOLD | OUTPATIENT
Start: 2023-09-05 | End: 2024-02-08 | Stop reason: HOSPADM

## 2023-09-05 RX ORDER — POLYETHYLENE GLYCOL 3350 17 G/17G
17 POWDER, FOR SOLUTION ORAL
COMMUNITY

## 2023-09-05 RX ORDER — MORPHINE SULFATE 15 MG/1
7.5 TABLET ORAL 2 TIMES DAILY
Start: 2023-09-05

## 2023-09-05 NOTE — ASSESSMENT & PLAN NOTE
Patient's blood sugars are at 85  Discontinue metformin  Monitor blood sugar recheck of A1c in 3 months  Patient is okay to take the MiraLax but only want her taking the Senokot every 2-3 days as needed if no BM will see if that does not give her any improvement I advised her to call us and let me know other possibilities that maybe she has some microscopic colitis?

## 2023-09-05 NOTE — ASSESSMENT & PLAN NOTE
Poor dietary intake, advised on boost Breeze since she cannot tolerate the other formulations of boost  Will follow closely

## 2023-09-05 NOTE — PROGRESS NOTES
Patient ID: [id number]     Chief Complaint: Medicare AWV      HPI:     Alejandrina Rodriguez is a 73 y.o. female here today for a Medicare Wellness.   Hitory ex lap several decades ago with h/o colostomy s/p reversal, Gtube and Jtube s/p removal, and chronic back pain on home morphine, who was admitted 6/4 with SBO. Pt s/p exlap with lysis of adhesions 6/4. Taken back to the OR 6/5 for hypotension and post-op hemorrhage with evacuation of approximately 3L of blood and old clot, placement of temporary abdominal closure device. S/p abdominal closure 6/7.    Dr. 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 She is on chronic anticoagulation with Coumadin and has her INR levels checked  Past medical history of intestinal rupture? She sees Dr. Humberto Chopra- GI; had a FOBT positive  She's been disabled for the past 14 years since back surgery with Dr. Sanchez left her with lower extremity radiculopathy.  Dr. Jurgen Witt for spider veins to the lower extremities; history of ablation on the left   Dr. Rosas-carotid disease; has an appointment in two weeks  Dr. Blue for for venous insufficency  Dr. Eng 2 years for Pap smears    Problem list:  Weight was 131 the last time today she is 123  She woke up at 2am with cramps and diarrhea.   She takes senakot because she gets constipated  She takes miralax every other day   Her GI is Dr. Chopra  Reports urinary incontinence  Not up-to-date with gyn visits      Opioid Screening: Patient medication list reviewed, patient is taking prescription opioids. Patient is using additional opioids than prescribed. Patient is not at low risk of substance abuse based on this opioid use history.       ----------------------------  Abnormal blood smear  Acid reflux  Anxiety and depression  Bilateral carotid artery disease  Cervical spondylosis with radiculopathy  Chronic lumbar pain  Diabetes mellitus  History of continuous positive  airway pressure (CPAP) therapy at home  HLD (hyperlipidemia)  Hypertension  Iron deficiency anemia, unspecified  Neuropathy  On anticoagulant therapy  Osteopenia  Personal history of colonic polyps  Protein C deficiency  Recurrent deep vein thrombosis (DVT)  Sleep apnea, unspecified  Unspecified osteoarthritis, unspecified site     Past Surgical History:   Procedure Laterality Date    COLONOSCOPY  07/14/2014    COLONOSCOPY W/ POLYPECTOMY  08/29/2022    Humberto Mirandaarez/ Follow up pending pathology results    LAPAROTOMY, EXPLORATORY N/A 6/4/2023    Procedure: LAPAROTOMY, EXPLORATORY;  Surgeon: Sd Conway Jr., MD;  Location: [facility] OR;  Service: General;  Laterality: N/A;    LAPAROTOMY, EXPLORATORY N/A 6/5/2023    Procedure: LAPAROTOMY, EXPLORATORY;  Surgeon: Camilo Valadez MD;  Location: [facility] OR;  Service: General;  Laterality: N/A;    LAPAROTOMY, EXPLORATORY N/A 6/7/2023    Procedure: LAPAROTOMY, EXPLORATORY;  Surgeon: Camilo Valadez MD;  Location: [facility] OR;  Service: General;  Laterality: N/A;  CLOSURE OF WOUND     LYSIS OF ADHESIONS N/A 6/4/2023    Procedure: LYSIS, ADHESIONS;  Surgeon: Sd Conway Jr., MD;  Location: [facility] OR;  Service: General;  Laterality: N/A;  detorsion of small bowel volvulus    SPINAL CORD STIMULATOR IMPLANT      WASHOUT N/A 6/7/2023    Procedure: WASHOUT;  Surgeon: Camilo Valadez MD;  Location: [facility] OR;  Service: General;  Laterality: N/A;  WASHOUT OF ABDOMEN        Review of patient's allergies indicates:   Allergen Reactions    Hydrocodone-acetaminophen      Other reaction(s): Difficulty breathing, Throat tightness    Oxycodone-acetaminophen Hallucinations    Iodine     Meperidine      Other reaction(s): Unknown (qualifier value)    Promethazine      Other reaction(s): Unknown (qualifier value), Unknown (qualifier value)       Outpatient Medications Marked as Taking for the 9/5/23 encounter (Office Visit) with Alex Treviño MD   Medication Sig Dispense Refill     azelastine (ASTELIN) 137 mcg (0.1 %) nasal spray 1 spray (137 mcg total) by Nasal route 2 (two) times daily. 30 mL 3    B-complex with vitamin C (Z-BEC OR EQUIV) tablet Take 1 tablet by mouth once daily.      baclofen (LIORESAL) 10 MG tablet Take 10 mg by mouth 4 (four) times daily. PRN muscle spasms      blood sugar diagnostic Strp 1 each by Misc.(Non-Drug; Combo Route) route Daily. 100 each 3    clopidogreL (PLAVIX) 75 mg tablet Take 1 tablet by mouth Daily.      COMBIGAN 0.2-0.5 % Drop Place into both eyes.      famotidine (PEPCID) 20 MG tablet TAKE 1 TABLET TWICE DAILY 180 tablet 3    lancets (ONETOUCH ULTRASOFT LANCETS) Misc 1 each by Misc.(Non-Drug; Combo Route) route Daily. 100 each 3    LEXAPRO 10 mg tablet TAKE 1 TABLET BY MOUTH ONCE DAILY AS DIRECTED FOR MOOD/CHRONIC PAIN AS DIRECTED      LIDOcaine (LIDODERM) 5 % Place 1 patch onto the skin every 24 hours. Remove & Discard patch within 12 hours or as directed by MD      OMEGA-3-EPA-FISH OIL ORAL Take 1 capsule by mouth once daily.      omeprazole (PRILOSEC) 40 MG capsule TAKE 1 CAPSULE EVERY DAY 90 capsule 4    polyethylene glycol (GLYCOLAX) 17 gram/dose powder Take 17 g by mouth as needed.      pravastatin (PRAVACHOL) 40 MG tablet Take 1 tablet (40 mg total) by mouth once daily. 90 tablet 4    senna (SENOKOT) 8.6 mg tablet Take 1 tablet by mouth once daily.      timolol maleate 0.5% (TIMOPTIC) 0.5 % Drop Place 1 drop into both eyes Daily.      vitamin D (VITAMIN D3) 1000 units Tab Take 5,000 Units by mouth once daily.      warfarin (COUMADIN) 4 MG tablet TAKE 1 TABLET EVERY DAY 30 tablet 0    [DISCONTINUED] metFORMIN (GLUCOPHAGE) 500 MG tablet TAKE 1 TABLET TWICE DAILY 180 tablet 3    [DISCONTINUED] morphine (MSIR) 15 MG tablet Take 1.5 tablets by mouth 2 (two) times daily as needed.      [DISCONTINUED] nitroGLYCERIN (NITROSTAT) 0.4 MG SL tablet PLEASE SEE ATTACHED FOR DETAILED DIRECTIONS         Social History     Socioeconomic History    Marital status:     Tobacco Use    Smoking status: Never    Smokeless tobacco: Never   Substance and Sexual Activity    Alcohol use: Never    Drug use: Never    Sexual activity: Yes     Partners: Male     Social Determinants of Health     Financial Resource Strain: Low Risk  (9/5/2023)    Overall Financial Resource Strain (CARDIA)     Difficulty of Paying Living Expenses: Not hard at all   Food Insecurity: No Food Insecurity (9/5/2023)    Hunger Vital Sign     Worried About Running Out of Food in the Last Year: Never true     Ran Out of Food in the Last Year: Never true   Transportation Needs: No Transportation Needs (9/5/2023)    PRAPARE - Transportation     Lack of Transportation (Medical): No     Lack of Transportation (Non-Medical): No   Physical Activity: Sufficiently Active (9/5/2023)    Exercise Vital Sign     Days of Exercise per Week: 3 days     Minutes of Exercise per Session: 60 min   Stress: No Stress Concern Present (9/5/2023)    [employer] - Occupational Stress Questionnaire     Feeling of Stress : Not at all   Social Connections: Socially Integrated (9/5/2023)    Social Connection and Isolation Panel [NHANES]     Frequency of Communication with Friends and Family: More than three times a week     Frequency of Social Gatherings with Friends and Family: More than three times a week     Attends [employer] Services: More than 4 times per year     Active Member of Clubs or Organizations: Yes     Attends Club or Organization Meetings: More than 4 times per year     Marital Status:    Housing Stability: Low Risk  (9/5/2023)    Housing Stability Vital Sign     Unable to Pay for Housing in the Last Year: No     Number of Places Lived in the Last Year: 1     Unstable Housing in the Last Year: No        Family History   Problem Relation Age of Onset    Cancer Mother     Hypertension Mother     Diabetes Mother     Arthritis Mother     Cervical cancer Mother     Hypertension Father      Diabetes Father     Arthritis Father     Heart disease Father     Prostate cancer Father         Patient Care Team:  Alex Treviño MD as PCP - General (Internal Medicine)  [facility] as Consulting Physician (Obstetrics and Gynecology)  Margaret Muñoz LPN as Care Coordinator  Humberto Chopra MD as Consulting Physician (Gastroenterology)  Sicard, Laurie, OD (Optometry)       Subjective:     ROS  Constitutional: No fever, no chills, no night sweats, no changes in weight, no changes in appetite.  Eye: No blurring of vision, no double vision, no conjunctival injection, no acute vision loss  ENMT: No trauma, No sore throat, no rhinorrhea, no tinnitus, no headache, no vertigo, no ear pain or discharge  Respiratory: No cough, no sputum production, no shortness of breath, no hemoptysis, no wheezing.  Cardiovascular: No chest pain, no GREEN, no PND, no orthopnea, no edema, no palpitations.  Gastrointestinal: No abdominal pain, nausea, no vomiting, no changes in frequency or consistency of stools, + diarrhea, no constipation, no BRBPR.  Genitourinary: No dysuria, no hematuria, no foul-smelling urine, no straining to urinate, no increase in frequency  Heme/Lymph: No easy bruising/ bleeding, no swollen or painful glands.  Endocrine: + polyuria, no polydipsia, no polyphagia, no heat or cold intolerance.  Musculoskeletal: No muscle pain, no muscle weakness, no joint pain, no difficulties on reference to range of motion.  Integumentary: No rash, no pruritis.  Neurologic: No sensory deficit, no motor deficit, no headache, no neck rigidity, no paresthesias, no syncope.  Psychiatric: no mood changes, no anxiety, no depression, no tension, no memory defecits  All Other ROS: Negative with exception of what is documented in the history of present illness     Patient Reported Health Risk Assessment  [age] 70-78  Are you male or female?: Female  During the  past four weeks, how much have you been bothered by emotional problems such as feeling anxious, depressed, irritable, sad, or downhearted and blue?: Not at all  During the past five weeks, has your physical and/or emotional health limited your social activities with family, friends, neighbors, or groups?: Not at all  During the past four weeks, how much bodily pain have you generally had?: Mild pain  During the past four weeks, was someone available to help if you needed and wanted help?: Yes, as much as I wanted  During the past four weeks, what was the hardest physical activity you could do for at least two minutes?: Light  Can you get to places out of walking distance without help?  (For example, can you travel alone on buses or taxis, or drive your own car?): Yes  Can you go shopping for groceries or clothes without someone's help?: Yes  Can you prepare your own meals?: Yes  Can you do your own housework without help?: Yes  Because of any health problems, do you need the help of another person with your personal care needs such as eating, bathing, dressing, or getting around the house?: No  Can you handle your own money without help?: Yes  During the past four weeks, how would you rate your health in general?: Very good  How have things been going for you during the past four weeks?: Pretty well  Are you having difficulties driving your car?: No  Do you always fasten your seat belt when you are in a car?: Yes, usually  How often in the past four weeks have you been bothered by falling or dizzy when standing up?: Never  How often in the past four weeks have you been bothered by sexual problems?: Never  How often in the past four weeks have you been bothered by trouble eating well?: Never  How often in the past four weeks have you been bothered by teeth or denture problems?: Never  How often in the past four weeks have you been bothered with problems using the telephone?: Never  How often in the past four weeks  "have you been bothered by tiredness or fatigue?: Never  Have you fallen two or more times in the past year?: No  Are you afraid of falling?: No  Are you a smoker?: No  During the past four weeks, how many drinks of wine, beer, or other alcoholic beverages did you have?: No alcohol at all  Do you exercise for about 20 minutes three or more days a week?: Yes, most of the time  Have you been given any information to help you with hazards in your house that might hurt you?: Yes  Have you been given any information to help you with keeping track of your medications?: Yes  How often do you have trouble taking medicines the way you've been told to take them?: I always take them as prescribed  How confident are you that you can control and manage most of your health problems?: Very confident  What is your race? (Check all that apply.):     Objective:     /68 (BP Location: Right arm, Patient Position: Sitting, BP Method: Medium (Manual))   Pulse 95   Temp 97.3 °F (36.3 °C) (Temporal)   Resp 16   Ht 5' 4" (1.626 m)   Wt 55.8 kg (123 lb)   SpO2 99%   BMI 21.11 kg/m²     Physical Exam  General : Alert and oriented, No acute distress, afebrile.  Eye : PERRLA. EOMI. Normal conjunctiva, Sclerae are nonicteric. No conjunctival injection, no pallor.  HEENT : Normocephalic/ atraumatic, Normal hearing, Oral mucosa is moist.  Respiratory : Respirations are non-labored and clear to auscultation bilaterally. Symmetrical air entry bilaterally, no crackles, no wheezes, no rhonchi. No cyanosis, no clubbing.  Cardiovascular : Normal rate, Regular rhythm. No murmurs, rubs, or gallops. Pulses are 2+ throughout. No JVD. No Edema.  Gastrointestinal : Soft, nontender, non-distended, bowel sounds are present in all quadrants, no organomegaly, no guarding, no rebound.  Musculoskeletal : Normal range of motion throughout. No muscle tenderness.  Integumentary : Warm, moist, intact.  Neurologic : Alert, Oriented, no " FND  Psychiatric : Cooperative, Appropriate mood & affect.         6/21/2022     2:00 PM   Checklist of Activities of Daily Living   Bathing Independent   Dressing Independent   Grooming Independent   Oral Care Independent   Toileting Independent   Transferring Independent   Walking Independent   Climbing Stairs Independent   Eating Independent   Shopping Independent   Cooking Independent   Managing Medications Independent   Using the Phone Independent   Housework Indpendent   Laundry Independent   Driving Independent   Managing Finances Independent         9/5/2023     9:20 AM 8/9/2023     9:20 AM 6/27/2023    11:20 AM 3/1/2023     9:00 AM 8/16/2022     9:00 AM 6/21/2022     2:00 PM   Fall Risk Assessment - Outpatient   Mobility Status Ambulatory Ambulatory Ambulatory Ambulatory Ambulatory w/ assistance Ambulatory   Number of falls 0 0 0 0 0 0   Identified as fall risk False False False False False False           Depression Screening  Over the past two weeks, has the patient felt down, depressed, or hopeless?: No  Over the past two weeks, has the patient felt little interest or pleasure in doing things?: No  Functional Ability/Safety Screening  Was the patient's timed Up & Go test unsteady or longer than 30 seconds?: No  Does the patient need help with phone, transportation, shopping, preparing meals, housework, laundry, meds, or managing money?: No  Does the patient's home have rugs in the hallway, lack grab bars in the bathroom, lack handrails on the stairs or have poor lighting?: No  Have you noticed any hearing difficulties?: No  Cognitive Function (Assessed through direct observation with due consideration of information obtained by way of patient reports and/or concerns raised by family, friends, caretakers, or others)    Does the patient repeat questions/statements in the same day?: No  Does the patient have trouble remembering the date, year, and time?: No  Does the patient have difficulty managing  finances?: No  Does the patient have a decreased sense of direction?: No  Assessment/Plan:     1. Medicare annual wellness visit, subsequent  -     Ambulatory referral/consult to Dermatology; Future; Expected date: 09/12/2023    2. Frequency of urination and polyuria  Assessment & Plan:  Referral to Urogynecology I am going to place her on some cefuroxime mean today she has a little white blood cells in the urine and , no bacteria.  I think lot of this may have to do with her bowels bili 1 make sure that there is no anatomical issue.    Orders:  -     Ambulatory referral/consult to Urogynecology; Future; Expected date: 09/12/2023  -     Ambulatory referral/consult to Gastroenterology; Future; Expected date: 09/12/2023    3. Functional diarrhea  Assessment & Plan:  Patient's blood sugars are at 85  Discontinue metformin  Monitor blood sugar recheck of A1c in 3 months  Patient is okay to take the MiraLax but only want her taking the Senokot every 2-3 days as needed if no BM will see if that does not give her any improvement I advised her to call us and let me know other possibilities that maybe she has some microscopic colitis?      4. History of continuous positive airway pressure (CPAP) therapy at home  Assessment & Plan:  Compliant      5. Mixed hyperlipidemia  Assessment & Plan:  Lab Results   Component Value Date    LDL 73.00 08/28/2023    TRIG 30 (L) 08/28/2023    HDL 47 08/28/2023    TOTALCHOLEST 3 08/28/2023     Continue current med management  Stressed importance of dietary modifications. Follow a low cholesterol, low saturated fat diet with less that 200mg of cholesterol a day.  Avoid fried foods and high saturated fats (high saturated fats less than 7% of calories).  Add Flax Seed/Fish Oil supplements to diet. Increase dietary fiber.  Regular exercise can reduce LDL and raise HDL. Stressed importance of physical activity 5 times per week for 30 minutes per day.       6. Primary hypertension  Assessment &  Plan:  Well controlled.   Continue current med management  Low Sodium Diet (DASH Diet - Less than 2 grams of sodium per day).  Monitor blood pressure daily and log. Report consistent numbers greater than 140/90.  Maintain healthy weight with goal BMI <30. Exercise 30 minutes per day, 5 days per week.  Smoking cessation encouraged to aid in BP reduction.      7. Protein C deficiency  Assessment & Plan:  Compliant with coumadin treatment  Follows up at the coumadin clinic at [facility]      8. Recurrent deep vein thrombosis (DVT)  Assessment & Plan:  See above      9. Type 2 diabetes mellitus without complication, without long-term current use of insulin  Assessment & Plan:  DC metformin  Revisit in 3 months   BS at goal  She has lost so much weight and eating not that much that really feel like she does not even need medication      10. Chronic low back pain, unspecified back pain laterality, unspecified whether sciatica present  Assessment & Plan:  Compliant with medicine followed by chronic pain physician      11. Weight loss  Assessment & Plan:  Poor dietary intake, advised on boost Breeze since she cannot tolerate the other formulations of boost  Will follow closely      Other orders  -     cefUROXime (CEFTIN) 500 MG tablet; Take 1 tablet (500 mg total) by mouth 2 (two) times daily.  Dispense: 6 tablet; Refill: 0  -     morphine (MSIR) 15 MG tablet; Take 0.5 tablets (7.5 mg total) by mouth 2 (two) times a day.           Medicare Annual Wellness and Personalized Prevention Plan:   Fall Risk + Home Safety + Hearing Impairment + Depression Screen + Opioid and Substance Abuse Screening + Cognitive Impairment Screen + Health Risk Assessment all reviewed.     Health Maintenance Topics with due status: Not Due       Topic Last Completion Date    DEXA Scan 04/21/2022    Colorectal Cancer Screening 08/29/2022    Eye Exam 02/07/2023    Diabetes Urine Screening 02/20/2023    Mammogram 05/10/2023    Lipid Panel  08/28/2023    Hemoglobin A1c 08/28/2023    Low Dose Statin 09/05/2023      The patient's Health Maintenance was reviewed and the following appears to be due at this time:   Health Maintenance Due   Topic Date Due    Foot Exam  Never done    TETANUS VACCINE  Never done    COVID-19 Vaccine (6 - Pfizer series) 02/10/2023    Influenza Vaccine (1) 09/01/2023       Advance Care Planning   I attest to discussing Advance Care Planning with patient and/or family member.  Education was provided including the importance of the Health Care Power of , Advance Directives, and/or [place] documentation.  The patient expressed understanding to the importance of this information and discussion.         Follow up in about 3 months (around 12/5/2023). In addition to their scheduled follow up, the patient has also been instructed to follow up on as needed basis.

## 2023-09-05 NOTE — ASSESSMENT & PLAN NOTE
Referral to Urogynecology I am going to place her on some cefuroxime mean today she has a little white blood cells in the urine and , no bacteria.  I think lot of this may have to do with her bowels bili 1 make sure that there is no anatomical issue.

## 2023-09-05 NOTE — ASSESSMENT & PLAN NOTE
Lab Results   Component Value Date    LDL 73.00 08/28/2023    TRIG 30 (L) 08/28/2023    HDL 47 08/28/2023    TOTALCHOLEST 3 08/28/2023     Continue current med management  Stressed importance of dietary modifications. Follow a low cholesterol, low saturated fat diet with less that 200mg of cholesterol a day.  Avoid fried foods and high saturated fats (high saturated fats less than 7% of calories).  Add Flax Seed/Fish Oil supplements to diet. Increase dietary fiber.  Regular exercise can reduce LDL and raise HDL. Stressed importance of physical activity 5 times per week for 30 minutes per day.

## 2023-09-05 NOTE — ASSESSMENT & PLAN NOTE
LACI metformin  Revisit in 3 months   BS at goal  She has lost so much weight and eating not that much that really feel like she does not even need medication

## 2023-09-11 ENCOUNTER — TELEPHONE (OUTPATIENT)
Dept: UROGYNECOLOGY | Facility: CLINIC | Age: 73
End: 2023-09-11

## 2023-09-11 DIAGNOSIS — K59.1 FUNCTIONAL DIARRHEA: Primary | ICD-10-CM

## 2023-09-12 ENCOUNTER — PATIENT MESSAGE (OUTPATIENT)
Dept: ADMINISTRATIVE | Facility: OTHER | Age: 73
End: 2023-09-12
Payer: MEDICARE

## 2023-09-19 ENCOUNTER — DOCUMENT SCAN (OUTPATIENT)
Dept: HOME HEALTH SERVICES | Facility: HOSPITAL | Age: 73
End: 2023-09-19
Payer: MEDICARE

## 2023-09-23 DIAGNOSIS — I82.409 RECURRENT DEEP VEIN THROMBOSIS (DVT): ICD-10-CM

## 2023-09-25 RX ORDER — WARFARIN 4 MG/1
4 TABLET ORAL DAILY
Qty: 90 TABLET | Refills: 0 | Status: SHIPPED | OUTPATIENT
Start: 2023-09-25 | End: 2024-02-19

## 2023-10-12 ENCOUNTER — TELEPHONE (OUTPATIENT)
Dept: INTERNAL MEDICINE | Facility: CLINIC | Age: 73
End: 2023-10-12
Payer: MEDICARE

## 2023-10-12 ENCOUNTER — LAB VISIT (OUTPATIENT)
Dept: LAB | Facility: HOSPITAL | Age: 73
End: 2023-10-12
Attending: NURSE PRACTITIONER
Payer: MEDICARE

## 2023-10-12 DIAGNOSIS — E11.9 TYPE 2 DIABETES MELLITUS WITHOUT COMPLICATION, WITHOUT LONG-TERM CURRENT USE OF INSULIN: ICD-10-CM

## 2023-10-12 DIAGNOSIS — K21.9 GASTROESOPHAGEAL REFLUX DISEASE, UNSPECIFIED WHETHER ESOPHAGITIS PRESENT: ICD-10-CM

## 2023-10-12 DIAGNOSIS — E78.2 MIXED HYPERLIPIDEMIA: ICD-10-CM

## 2023-10-12 DIAGNOSIS — I10 PRIMARY HYPERTENSION: ICD-10-CM

## 2023-10-12 DIAGNOSIS — E87.6 HYPOKALEMIA: ICD-10-CM

## 2023-10-12 LAB
ALBUMIN SERPL-MCNC: 3.4 G/DL (ref 3.4–4.8)
ALBUMIN/GLOB SERPL: 1.2 RATIO (ref 1.1–2)
ALP SERPL-CCNC: 84 UNIT/L (ref 40–150)
ALT SERPL-CCNC: 35 UNIT/L (ref 0–55)
AST SERPL-CCNC: 29 UNIT/L (ref 5–34)
BASOPHILS # BLD AUTO: 0.02 X10(3)/MCL
BASOPHILS NFR BLD AUTO: 0.3 %
BILIRUB SERPL-MCNC: 0.7 MG/DL
BUN SERPL-MCNC: 12 MG/DL (ref 9.8–20.1)
CALCIUM SERPL-MCNC: 8.7 MG/DL (ref 8.4–10.2)
CHLORIDE SERPL-SCNC: 108 MMOL/L (ref 98–107)
CHOLEST SERPL-MCNC: 134 MG/DL
CHOLEST/HDLC SERPL: 2 {RATIO} (ref 0–5)
CO2 SERPL-SCNC: 30 MMOL/L (ref 23–31)
CREAT SERPL-MCNC: 0.63 MG/DL (ref 0.55–1.02)
EOSINOPHIL # BLD AUTO: 0.07 X10(3)/MCL (ref 0–0.9)
EOSINOPHIL NFR BLD AUTO: 1.1 %
ERYTHROCYTE [DISTWIDTH] IN BLOOD BY AUTOMATED COUNT: 15.9 % (ref 11.5–17)
EST. AVERAGE GLUCOSE BLD GHB EST-MCNC: 116.9 MG/DL
GFR SERPLBLD CREATININE-BSD FMLA CKD-EPI: >60 MLS/MIN/1.73/M2
GLOBULIN SER-MCNC: 2.8 GM/DL (ref 2.4–3.5)
GLUCOSE SERPL-MCNC: 82 MG/DL (ref 82–115)
HBA1C MFR BLD: 5.7 %
HCT VFR BLD AUTO: 42.9 % (ref 37–47)
HDLC SERPL-MCNC: 57 MG/DL (ref 35–60)
HGB BLD-MCNC: 13.7 G/DL (ref 12–16)
IMM GRANULOCYTES # BLD AUTO: 0.01 X10(3)/MCL (ref 0–0.04)
IMM GRANULOCYTES NFR BLD AUTO: 0.2 %
LDLC SERPL CALC-MCNC: 71 MG/DL (ref 50–140)
LYMPHOCYTES # BLD AUTO: 1.36 X10(3)/MCL (ref 0.6–4.6)
LYMPHOCYTES NFR BLD AUTO: 22.2 %
MCH RBC QN AUTO: 31.4 PG (ref 27–31)
MCHC RBC AUTO-ENTMCNC: 31.9 G/DL (ref 33–36)
MCV RBC AUTO: 98.4 FL (ref 80–94)
MONOCYTES # BLD AUTO: 0.46 X10(3)/MCL (ref 0.1–1.3)
MONOCYTES NFR BLD AUTO: 7.5 %
NEUTROPHILS # BLD AUTO: 4.2 X10(3)/MCL (ref 2.1–9.2)
NEUTROPHILS NFR BLD AUTO: 68.7 %
NRBC BLD AUTO-RTO: 0 %
PLATELET # BLD AUTO: 165 X10(3)/MCL (ref 130–400)
PMV BLD AUTO: 11.8 FL (ref 7.4–10.4)
POTASSIUM SERPL-SCNC: 4.2 MMOL/L (ref 3.5–5.1)
PROT SERPL-MCNC: 6.2 GM/DL (ref 5.8–7.6)
RBC # BLD AUTO: 4.36 X10(6)/MCL (ref 4.2–5.4)
SODIUM SERPL-SCNC: 144 MMOL/L (ref 136–145)
TRIGL SERPL-MCNC: 30 MG/DL (ref 37–140)
VLDLC SERPL CALC-MCNC: 6 MG/DL
WBC # SPEC AUTO: 6.12 X10(3)/MCL (ref 4.5–11.5)

## 2023-10-12 PROCEDURE — 80061 LIPID PANEL: CPT

## 2023-10-12 PROCEDURE — 80053 COMPREHEN METABOLIC PANEL: CPT

## 2023-10-12 PROCEDURE — 36415 COLL VENOUS BLD VENIPUNCTURE: CPT

## 2023-10-12 PROCEDURE — 85025 COMPLETE CBC W/AUTO DIFF WBC: CPT

## 2023-10-12 PROCEDURE — 83036 HEMOGLOBIN GLYCOSYLATED A1C: CPT

## 2023-10-12 NOTE — TELEPHONE ENCOUNTER
----- Message from DEYVI Hale sent at 10/12/2023  4:07 PM CDT -----  All lab results within acceptable ranges.

## 2023-11-16 DIAGNOSIS — K21.9 GASTROESOPHAGEAL REFLUX DISEASE, UNSPECIFIED WHETHER ESOPHAGITIS PRESENT: Primary | ICD-10-CM

## 2023-11-17 RX ORDER — FAMOTIDINE 20 MG/1
TABLET, FILM COATED ORAL
Qty: 180 TABLET | Refills: 10 | Status: SHIPPED | OUTPATIENT
Start: 2023-11-17

## 2023-11-28 ENCOUNTER — TELEPHONE (OUTPATIENT)
Dept: INTERNAL MEDICINE | Facility: CLINIC | Age: 73
End: 2023-11-28
Payer: MEDICARE

## 2023-11-28 DIAGNOSIS — D68.32 INCREASE IN EXTRINSIC ANTICOAGULANT: Primary | ICD-10-CM

## 2023-11-28 DIAGNOSIS — Z01.818 PRE-OP EXAMINATION: ICD-10-CM

## 2023-11-28 NOTE — TELEPHONE ENCOUNTER
----- Message from Ioana Alberto sent at 11/28/2023  2:59 PM CST -----  Regarding: return call  Type:  Patient Returning Call    Who Called:Alejandrina  Who Left Message for Patient:  Does the patient know what this is regarding?:  Would the patient rather a call back or a response via [employer]? Call back   Best Call Back Number:[phone]  Additional Information: Alejandrina was returning a call

## 2023-11-30 ENCOUNTER — TELEPHONE (OUTPATIENT)
Dept: INTERNAL MEDICINE | Facility: CLINIC | Age: 73
End: 2023-11-30
Payer: MEDICARE

## 2023-11-30 NOTE — TELEPHONE ENCOUNTER
----- Message from Rosa Ontiveros MA sent at 11/30/2023  7:38 AM CST -----  Regarding: Willi 12/14@2:00PM  1. Are there any outstanding tasks in the patient's chart? Yes, fasting labs, EKG,X-RAY    (SX Clearance)     2. Is there any documentation in the chart? No    3.Has patient been seen in an ER, Urgent care clinic, or been admitted since last visit?  If yes, When, where, and why    4. Has patient seen any other healthcare providers since last visit?  If yes, when, where, and why    5. Has patient had any bloodwork or XR done since last visit?    6. Is patient signed up for patient portal?

## 2023-12-11 ENCOUNTER — HOSPITAL ENCOUNTER (OUTPATIENT)
Dept: RADIOLOGY | Facility: HOSPITAL | Age: 73
Discharge: HOME | End: 2023-12-11
Attending: INTERNAL MEDICINE
Payer: MEDICARE

## 2023-12-11 DIAGNOSIS — D68.32 INCREASE IN EXTRINSIC ANTICOAGULANT: ICD-10-CM

## 2023-12-11 DIAGNOSIS — Z01.818 PRE-OP EXAMINATION: ICD-10-CM

## 2023-12-11 PROCEDURE — 71046 X-RAY EXAM CHEST 2 VIEWS: CPT | Mod: TC

## 2023-12-12 ENCOUNTER — TELEPHONE (OUTPATIENT)
Dept: INTERNAL MEDICINE | Facility: CLINIC | Age: 73
End: 2023-12-12
Payer: MEDICARE

## 2023-12-12 NOTE — TELEPHONE ENCOUNTER
Patient informed about chest x-ray.  Patient would like lab results that was done on 12/11/23 Please Advise?

## 2023-12-12 NOTE — TELEPHONE ENCOUNTER
----- Message from Alex Treviño MD sent at 12/11/2023  8:32 PM CST -----  Chest x-ray reviewed with no evidence of any acute cardiopulmonary process

## 2023-12-14 ENCOUNTER — OFFICE VISIT (OUTPATIENT)
Dept: INTERNAL MEDICINE | Facility: CLINIC | Age: 73
End: 2023-12-14
Payer: MEDICARE

## 2023-12-14 VITALS
DIASTOLIC BLOOD PRESSURE: 70 MMHG | RESPIRATION RATE: 16 BRPM | OXYGEN SATURATION: 97 % | BODY MASS INDEX: 23.56 KG/M2 | SYSTOLIC BLOOD PRESSURE: 110 MMHG | HEIGHT: 64 IN | TEMPERATURE: 97 F | WEIGHT: 138 LBS | HEART RATE: 54 BPM

## 2023-12-14 DIAGNOSIS — Z01.818 PREOPERATIVE CLEARANCE: Primary | ICD-10-CM

## 2023-12-14 PROCEDURE — 1101F PR PT FALLS ASSESS DOC 0-1 FALLS W/OUT INJ PAST YR: ICD-10-PCS | Mod: CPTII,,, | Performed by: NURSE PRACTITIONER

## 2023-12-14 PROCEDURE — 3074F PR MOST RECENT SYSTOLIC BLOOD PRESSURE < 130 MM HG: ICD-10-PCS | Mod: CPTII,,, | Performed by: NURSE PRACTITIONER

## 2023-12-14 PROCEDURE — 3061F NEG MICROALBUMINURIA REV: CPT | Mod: CPTII,,, | Performed by: NURSE PRACTITIONER

## 2023-12-14 PROCEDURE — 3061F PR NEG MICROALBUMINURIA RESULT DOCUMENTED/REVIEW: ICD-10-PCS | Mod: CPTII,,, | Performed by: NURSE PRACTITIONER

## 2023-12-14 PROCEDURE — 1159F MED LIST DOCD IN RCRD: CPT | Mod: CPTII,,, | Performed by: NURSE PRACTITIONER

## 2023-12-14 PROCEDURE — 99214 OFFICE O/P EST MOD 30 MIN: CPT | Mod: ,,, | Performed by: NURSE PRACTITIONER

## 2023-12-14 PROCEDURE — 3288F PR FALLS RISK ASSESSMENT DOCUMENTED: ICD-10-PCS | Mod: CPTII,,, | Performed by: NURSE PRACTITIONER

## 2023-12-14 PROCEDURE — 3008F BODY MASS INDEX DOCD: CPT | Mod: CPTII,,, | Performed by: NURSE PRACTITIONER

## 2023-12-14 PROCEDURE — 3078F PR MOST RECENT DIASTOLIC BLOOD PRESSURE < 80 MM HG: ICD-10-PCS | Mod: CPTII,,, | Performed by: NURSE PRACTITIONER

## 2023-12-14 PROCEDURE — 1159F PR MEDICATION LIST DOCUMENTED IN MEDICAL RECORD: ICD-10-PCS | Mod: CPTII,,, | Performed by: NURSE PRACTITIONER

## 2023-12-14 PROCEDURE — 99214 PR OFFICE/OUTPT VISIT, EST, LEVL IV, 30-39 MIN: ICD-10-PCS | Mod: ,,, | Performed by: NURSE PRACTITIONER

## 2023-12-14 PROCEDURE — 3074F SYST BP LT 130 MM HG: CPT | Mod: CPTII,,, | Performed by: NURSE PRACTITIONER

## 2023-12-14 PROCEDURE — 3044F PR MOST RECENT HEMOGLOBIN A1C LEVEL <7.0%: ICD-10-PCS | Mod: CPTII,,, | Performed by: NURSE PRACTITIONER

## 2023-12-14 PROCEDURE — 3288F FALL RISK ASSESSMENT DOCD: CPT | Mod: CPTII,,, | Performed by: NURSE PRACTITIONER

## 2023-12-14 PROCEDURE — 3078F DIAST BP <80 MM HG: CPT | Mod: CPTII,,, | Performed by: NURSE PRACTITIONER

## 2023-12-14 PROCEDURE — 3066F NEPHROPATHY DOC TX: CPT | Mod: CPTII,,, | Performed by: NURSE PRACTITIONER

## 2023-12-14 PROCEDURE — 3008F PR BODY MASS INDEX (BMI) DOCUMENTED: ICD-10-PCS | Mod: CPTII,,, | Performed by: NURSE PRACTITIONER

## 2023-12-14 PROCEDURE — 3066F PR DOCUMENTATION OF TREATMENT FOR NEPHROPATHY: ICD-10-PCS | Mod: CPTII,,, | Performed by: NURSE PRACTITIONER

## 2023-12-14 PROCEDURE — 1101F PT FALLS ASSESS-DOCD LE1/YR: CPT | Mod: CPTII,,, | Performed by: NURSE PRACTITIONER

## 2023-12-14 PROCEDURE — 3044F HG A1C LEVEL LT 7.0%: CPT | Mod: CPTII,,, | Performed by: NURSE PRACTITIONER

## 2023-12-14 RX ORDER — COVID-19 VACCINE, MRNA 0.04 MG/.418ML
0.3 INJECTION, SUSPENSION INTRAMUSCULAR
COMMUNITY
Start: 2023-10-04 | End: 2024-03-05

## 2023-12-14 NOTE — ASSESSMENT & PLAN NOTE
She will call Coumadin Clinic for adjustment as she is supratherapeutic   Repeat 7 days prior to surgery - may need to bridge with LMWH pending these results  Discussed with Dr. Madrid who is in agreement with plan.

## 2023-12-14 NOTE — PROGRESS NOTES
Internal Medicine    Patient ID: [id number]     Chief Complaint: Procedure (Sx cl.) and Otalgia (Left ear pain for a while.)      HPI:     Alejandrina Rodriguez is a 73 y.o. female here today for a surgery clearance. Plans to have spinal cord stimulator placed by Dr. Jody Bates on January 11. Reviewed and discussed lab results, CXR and EKG. Supratherapeutic INR at 3.7 - takes Coumadin for protein C deficiency with recurrent DVTs. Managed by [facility]. No other complaints today.     Pre-Operative Evaluation:    Risk/Complexity of Surgery:   []  High risk (> 5% MI risk): cardiac and aortic and peripheral vascular surgery   []  Intermediate risk (1-5% MI risk): head and neck (including CEA), intraperitoneal, intrathoracic, orthopedic, prostate   [x]  Low risk (1% MI risk): endoscopic procedures, cataract surgery, breast surgery     Current Medical Problems: Is EKG needed?   [] Active CVD (Unstable Angina, Class III or IV Angina, recent MI in past 30 days, decompensated heart failure, SVT>100, High Grade AV block, mobitz type 2 AV block, symptomatic bradycardia or VT, severe aortic stenosis, symptomatic mitral stenosis)  [] Creatinine > 2.0   [] DM on insulin   [] QT prolonging drugs (I.e - antiarrythmics, antipsychotics/SSRIs, flouroquinolones)  [] Hx of abnormal EKG   [] HTN   [x] No active CVD, creatinine > 2.0, DM on insulin - therefore no ECG required.     Functional Status:  < 4 METs (can do light housework (dishwashing), can walk two blocks on level ground, cannot climb a flight of stairs, walk up a hill, or run)   []  Low risk procedure: no testing required   []  Intermediate/high risk procedure: EKG and stress testing   > 4 METs (can rake leaves, weeding or pushing a power mower, walking up two flights of stairs)  [x] Low/intermediate risk procedure: no testing required   [] High risk procedure: EKG required     Pregnancy: [x]  No     []  Yes  History of anesthesia problems: [x]  No   []   Yes  Smoking status: [x]  Non-smoker   []  Smoker  Social support:  [x]  Yes   []  No    Revised Cardiac Risk Index:   []  High risk surgery   []  History of CVA   []  CHF   []  Creatinine > 2.0   []  DM on insulin   []  Ischemic heart disease   []  Suprainguinal vascular, intrathoracic, or intra-abdominal surgical site     Total Cardiac Risk Index Score:   [x]  0 - 0.4% risk perioperative cardiac event   []  1 - 0.9% risk perioperative cardiac event   []  2 - 6.6% risk perioperative cardiac event   []  3 - 11% risk perioperative cardiac event, perioperative beta blocker for four weeks prior to surgery would be beneficial   []  4 - 11% risk perioperative cardiac event, perioperative beta blocker for four weeks prior to surgery would be beneficial   []  5 - 11% risk perioperative cardiac event, perioperative beta blocker for four weeks prior to surgery would be beneficial       Past Medical History:   Diagnosis Date    Abnormal blood smear     Acid reflux     Anxiety and depression     Bilateral carotid artery disease     Cervical spondylosis with radiculopathy     Chronic lumbar pain     Diabetes mellitus     History of continuous positive airway pressure (CPAP) therapy at home     HLD (hyperlipidemia)     Hypertension     Iron deficiency anemia, unspecified     Neuropathy     On anticoagulant therapy     Osteopenia     Personal history of colonic polyps 07/14/2014    Protein C deficiency     Recurrent deep vein thrombosis (DVT) 6/21/2022    Sleep apnea, unspecified     Unspecified osteoarthritis, unspecified site         Past Surgical History:   Procedure Laterality Date    COLONOSCOPY  07/14/2014    COLONOSCOPY W/ POLYPECTOMY  08/29/2022    Humberto Chopra/ Follow up pending pathology results    LAPAROTOMY, EXPLORATORY N/A 6/4/2023    Procedure: LAPAROTOMY, EXPLORATORY;  Surgeon: Sd Conway Jr., MD;  Location: [facility];  Service: General;  Laterality: N/A;    LAPAROTOMY, EXPLORATORY N/A 6/5/2023    Procedure:  LAPAROTOMY, EXPLORATORY;  Surgeon: Camilo Valadez MD;  Location: [facility];  Service: General;  Laterality: N/A;    LAPAROTOMY, EXPLORATORY N/A 6/7/2023    Procedure: LAPAROTOMY, EXPLORATORY;  Surgeon: Camilo Valadez MD;  Location: [facility];  Service: General;  Laterality: N/A;  CLOSURE OF WOUND     LYSIS OF ADHESIONS N/A 6/4/2023    Procedure: LYSIS, ADHESIONS;  Surgeon: Sd Conway Jr., MD;  Location: [facility];  Service: General;  Laterality: N/A;  detorsion of small bowel volvulus    SPINAL CORD STIMULATOR IMPLANT      WASHOUT N/A 6/7/2023    Procedure: WASHOUT;  Surgeon: Camilo Valadez MD;  Location: [facility];  Service: General;  Laterality: N/A;  WASHOUT OF ABDOMEN         Social History     Tobacco Use    Smoking status: Never    Smokeless tobacco: Never   Substance and Sexual Activity    Alcohol use: Never    Drug use: Never    Sexual activity: Yes     Partners: Male        Current Outpatient Medications   Medication Instructions    azelastine (ASTELIN) 137 mcg, Nasal, 2 times daily    B-complex with vitamin C (Z-BEC OR EQUIV) tablet 1 tablet, Oral, Daily    baclofen (LIORESAL) 10 MG tablet Oral, 4 times daily, PRN muscle spasms    blood sugar diagnostic Strp 1 each, Misc.(Non-Drug; Combo Route), Daily    cefUROXime (CEFTIN) 500 mg, Oral, 2 times daily    clopidogreL (PLAVIX) 75 mg tablet 1 tablet, Oral, Daily    COMBIGAN 0.2-0.5 % Drop Both Eyes    COMIRNATY 2023-24, 12Y UP,,PF, 30 mcg/0.3 mL inection 0.3 mLs, Intramuscular, As needed (PRN)    famotidine (PEPCID) 20 MG tablet TAKE 1 TABLET TWICE DAILY    lancets (ONETOUCH ULTRASOFT LANCETS) Misc 1 each, Misc.(Non-Drug; Combo Route), Daily    LEXAPRO 10 mg tablet TAKE 1 TABLET BY MOUTH ONCE DAILY AS DIRECTED FOR MOOD/CHRONIC PAIN AS DIRECTED    LIDOcaine (LIDODERM) 5 % 1 patch, Transdermal, Every 24 hours (non-standard times), Remove & Discard patch within 12 hours or as directed by MD    morphine (MSIR) 7.5 mg, Oral, 2 times daily    OMEGA-3-EPA-FISH OIL  ORAL 1 capsule, Oral, Daily    omeprazole (PRILOSEC) 40 MG capsule TAKE 1 CAPSULE EVERY DAY    polyethylene glycol (GLYCOLAX) 17 g, Oral, As needed (PRN)    pravastatin (PRAVACHOL) 40 mg, Oral, Daily    senna (SENOKOT) 8.6 mg tablet 1 tablet, Oral, Daily    timolol maleate 0.5% (TIMOPTIC) 0.5 % Drop 1 drop, Both Eyes, Daily    vitamin D (VITAMIN D3) 5,000 Units, Oral, Daily    warfarin (COUMADIN) 4 mg, Oral, Daily       Review of patient's allergies indicates:   Allergen Reactions    Hydrocodone-acetaminophen      Other reaction(s): Difficulty breathing, Throat tightness    Oxycodone-acetaminophen Hallucinations    Iodine     Meperidine      Other reaction(s): Unknown (qualifier value)    Promethazine      Other reaction(s): Unknown (qualifier value), Unknown (qualifier value)        Patient Care Team:  Alex Treviño MD as PCP - General (Internal Medicine)  [facility] as Consulting Physician (Obstetrics and Gynecology)  Margaret Muñoz LPN as Care Coordinator  Humberto Chopra MD as Consulting Physician (Gastroenterology)  Sicard, Laurie, OD (Optometry)     Subjective:     ROS    See HPI for details    Constitutional: Denies Change in appetite. Denies Chills. Denies Fever. Denies Night sweats.  Eye: Denies Blurred vision. Denies Discharge. Denies Eye pain.  ENT: Denies Decreased hearing. Denies Sore throat. Denies Swollen glands.  Respiratory: Denies Cough. Denies Shortness of breath. Denies Shortness of breath with exertion. Denies Wheezing.  Cardiovascular: DeniesChest pain at rest. Denies Chest pain with exertion. Denies Irregular heartbeat. Denies Palpitations.  Gastrointestinal: Denies Abdominal pain. DeniesDiarrhea. Denies Nausea. Denies Vomiting. Denies Hematemesis or Hematochezia.  Genitourinary: Denies Dysuria. Denies Urinary frequency. Denies Urinary urgency. Denies Blood in urine.  Endocrine: Denies Cold intolerance. Denies Excessive  "thirst. Denies Heat intolerance. Denies Weight loss. Denies Weight gain.  Musculoskeletal: Denies Painful joints. Denies Weakness.  Integumentary: Denies Rash. Denies Itching. Denies Dry skin.  Neurologic: Denies Dizziness. Denies Fainting. Denies Headache.  Psychiatric: Denies Depression. Denies Anxiety. Denies Suicidal/Homicidal ideations.    All Other ROS: Negative except as stated in HPI.     Objective:     Visit Vitals  /70 (BP Location: Right arm, Patient Position: Sitting, BP Method: Medium (Manual))   Pulse (!) 54   Temp 96.7 °F (35.9 °C)   Resp 16   Ht 5' 4" (1.626 m)   Wt 62.6 kg (138 lb)   SpO2 97%   BMI 23.69 kg/m²       Physical Exam    General: Alert and oriented, No acute distress.  Head: Normocephalic, Atraumatic.  Eye: Pupils are equal, round and reactive to light, Extraocular movements are intact, Sclera non-icteric.  Ears/Nose/Throat: Normal, Mucosa moist,Clear.  Neck/Thyroid: Supple, Non-tender, No carotid bruit, No palpable thyromegaly or thyroid nodule, No lymphadenopathy, No JVD, Full range of motion.  Respiratory: Clear to auscultation bilaterally; No wheezes, rales or rhonchi,Non-labored respirations, Symmetrical chest wall expansion.  Cardiovascular: Regular rate and rhythm, S1/S2 normal, No murmurs, rubs or gallops.  Gastrointestinal: Soft, Non-tender, Non-distended, Normal bowel sounds, No palpable organomegaly.  Musculoskeletal: Normal range of motion.  Integumentary: Warm, Dry, Intact, No suspicious lesions or rashes.  Extremities: No clubbing, cyanosis or edema  Neurologic: No focal deficits, Cranial Nerves II-XII are grossly intact, Motor strength normal upper and lower extremities, Sensory exam intact.  Psychiatric: Normal interaction, Coherent speech, Euthymic mood, Appropriate affect     Assessment:       ICD-10-CM ICD-9-CM   1. Preoperative clearance  Z01.818 V72.84        Plan:     1. Preoperative clearance  Assessment & Plan:  She will call Coumadin Clinic for adjustment " as she is supratherapeutic   Repeat 7 days prior to surgery - may need to bridge with LMWH pending these results  Discussed with Dr. Madrid who is in agreement with plan.         The patient is cleared for the planned procedure as scheduled as all of their chronic conditions are optimally controlled. ASA Class: III - Patient appears to have severe systemic disease not posing a constant threat to life    Preoperative Medication Adjustment  [x]  Plavix/Brilinta - Stop 5-7 days prior to surgery, resume with PO intake.   []  NSAIDs - Stop 1-3 days prior.   []  Pradaxa - Stop 2-5 days prior, resume with PO intake.   []  Xarelto - Stop at least 24 hrs prior, resume with PO intake.   []  Coumadin Low Thromboembolic Risk (Afib with no embolism in 12 mo, biologic heart valves > 3 mo ago) - Stop 5 days pre-op and restart post-op.   [x]  Coumadin High Thromboembolic Risk (mechanical heart valve, VTE with hypercoagulable state or < 3 mo ago) - Stop 4 days pre-op and start LMWH, stop LMWH 12-18h pre-op, restart LMWH 6h post-op, restart warfarin with PO intake, stop LMWH when INR = 2.0.   []  Insulin - Continue BASAL insulin at 1/2 dose, hold short-acting and regular insulin until eating again.   []  Oral Hypoglycemics: Hold on day of surgery; hold metformin 48 hours preoperatively.  []  AntiHTN: Hold diuretic, hold ace-inhibitors, hold arbs, continue others.  []  Sedative: Hold 24 hours preoperatively.  []  Lithium: Check serum level, hold on day of surgery.  []  Levodopa/Carbidopa: Hold on day of surgery.  []  Estrogen: Hold on day of surgery; resume when patient ambulates.    Risk for surgical site infection secondary to:   []  Smoking   [x]  Diabetes    []  Obesity   []  Malnutrition   []  Chronic Skin Disease       No follow-ups on file.  In addition to their scheduled follow up, the patient has also been instructed to follow up on as needed basis.     Future Appointments   Date Time Provider Department Center   3/5/2024   9:20 AM Alex Treviño MD [facility] [id number]   4/12/2024  9:00 AM NURSE [facility] UROGYNECOLOGY [facility]   4/15/2024 10:00 AM Katie Bryson MD [facility]

## 2023-12-19 ENCOUNTER — TELEPHONE (OUTPATIENT)
Dept: INTERNAL MEDICINE | Facility: CLINIC | Age: 73
End: 2023-12-19
Payer: MEDICARE

## 2023-12-19 NOTE — TELEPHONE ENCOUNTER
----- Message from Per Medrano MA sent at 12/19/2023  9:41 AM CST -----    ----- Message -----  From: Mary Chapman  Sent: 12/19/2023   8:46 AM CST  To: [facility] Staff    .Type:  Needs Medical Advice    Who Called: clinic  Symptoms (please be specific):    How long has patient had these symptoms:    Pharmacy name and phone #:    Would the patient rather a call back or a response via [facility]?   Best Call Back Number: fax [phone]  Additional Information: please fax sx to [facility]

## 2024-02-07 ENCOUNTER — HOSPITAL ENCOUNTER (OUTPATIENT)
Facility: HOSPITAL | Age: 74
Discharge: HOME | End: 2024-02-08
Attending: STUDENT IN AN ORGANIZED HEALTH CARE EDUCATION/TRAINING PROGRAM | Admitting: INTERNAL MEDICINE
Payer: MEDICARE

## 2024-02-07 DIAGNOSIS — R42 DIZZINESS: ICD-10-CM

## 2024-02-07 DIAGNOSIS — I10 HYPERTENSION, UNSPECIFIED TYPE: ICD-10-CM

## 2024-02-07 DIAGNOSIS — I77.9 CAROTID ARTERIAL DISEASE: ICD-10-CM

## 2024-02-07 DIAGNOSIS — R42 DIZZY: Primary | ICD-10-CM

## 2024-02-07 LAB
ALBUMIN SERPL-MCNC: 3.5 G/DL (ref 3.4–4.8)
ALBUMIN/GLOB SERPL: 1.1 RATIO (ref 1.1–2)
ALP SERPL-CCNC: 96 UNIT/L (ref 40–150)
ALT SERPL-CCNC: 22 UNIT/L (ref 0–55)
APPEARANCE UR: CLEAR
AST SERPL-CCNC: 32 UNIT/L (ref 5–34)
BACTERIA #/AREA URNS AUTO: ABNORMAL /HPF
BASOPHILS # BLD AUTO: 0.02 X10(3)/MCL
BASOPHILS NFR BLD AUTO: 0.4 %
BILIRUB SERPL-MCNC: 0.5 MG/DL
BILIRUB UR QL STRIP.AUTO: NEGATIVE
BUN SERPL-MCNC: 11.3 MG/DL (ref 9.8–20.1)
CALCIUM SERPL-MCNC: 8.8 MG/DL (ref 8.4–10.2)
CHLORIDE SERPL-SCNC: 111 MMOL/L (ref 98–107)
CO2 SERPL-SCNC: 25 MMOL/L (ref 23–31)
COLOR UR AUTO: ABNORMAL
CREAT SERPL-MCNC: 0.71 MG/DL (ref 0.55–1.02)
EOSINOPHIL # BLD AUTO: 0.14 X10(3)/MCL (ref 0–0.9)
EOSINOPHIL NFR BLD AUTO: 2.8 %
ERYTHROCYTE [DISTWIDTH] IN BLOOD BY AUTOMATED COUNT: 14.6 % (ref 11.5–17)
FLUAV AG UPPER RESP QL IA.RAPID: NOT DETECTED
FLUBV AG UPPER RESP QL IA.RAPID: NOT DETECTED
GFR SERPLBLD CREATININE-BSD FMLA CKD-EPI: >60 MLS/MIN/1.73/M2
GLOBULIN SER-MCNC: 3.2 GM/DL (ref 2.4–3.5)
GLUCOSE SERPL-MCNC: 87 MG/DL (ref 82–115)
GLUCOSE UR QL STRIP.AUTO: NORMAL
HCT VFR BLD AUTO: 45 % (ref 37–47)
HGB BLD-MCNC: 14 G/DL (ref 12–16)
IMM GRANULOCYTES # BLD AUTO: 0.01 X10(3)/MCL (ref 0–0.04)
IMM GRANULOCYTES NFR BLD AUTO: 0.2 %
INR PPP: 3.1
KETONES UR QL STRIP.AUTO: NEGATIVE
LEUKOCYTE ESTERASE UR QL STRIP.AUTO: 25
LYMPHOCYTES # BLD AUTO: 1.62 X10(3)/MCL (ref 0.6–4.6)
LYMPHOCYTES NFR BLD AUTO: 32.5 %
MCH RBC QN AUTO: 31.4 PG (ref 27–31)
MCHC RBC AUTO-ENTMCNC: 31.1 G/DL (ref 33–36)
MCV RBC AUTO: 100.9 FL (ref 80–94)
MONOCYTES # BLD AUTO: 0.51 X10(3)/MCL (ref 0.1–1.3)
MONOCYTES NFR BLD AUTO: 10.2 %
NEUTROPHILS # BLD AUTO: 2.69 X10(3)/MCL (ref 2.1–9.2)
NEUTROPHILS NFR BLD AUTO: 53.9 %
NITRITE UR QL STRIP.AUTO: NEGATIVE
NRBC BLD AUTO-RTO: 0 %
PH UR STRIP.AUTO: 7.5 [PH]
PLATELET # BLD AUTO: 147 X10(3)/MCL (ref 130–400)
PMV BLD AUTO: 10.7 FL (ref 7.4–10.4)
POTASSIUM SERPL-SCNC: 4.1 MMOL/L (ref 3.5–5.1)
PROT SERPL-MCNC: 6.7 GM/DL (ref 5.8–7.6)
PROT UR QL STRIP.AUTO: NEGATIVE
PROTHROMBIN TIME: 31.8 SECONDS (ref 12.5–14.5)
RBC # BLD AUTO: 4.46 X10(6)/MCL (ref 4.2–5.4)
RBC #/AREA URNS AUTO: ABNORMAL /HPF
RBC UR QL AUTO: NEGATIVE
SARS-COV-2 RNA RESP QL NAA+PROBE: NOT DETECTED
SODIUM SERPL-SCNC: 143 MMOL/L (ref 136–145)
SP GR UR STRIP.AUTO: 1.01 (ref 1–1.03)
SQUAMOUS #/AREA URNS LPF: ABNORMAL /HPF
TROPONIN I SERPL-MCNC: <0.01 NG/ML (ref 0–0.04)
UROBILINOGEN UR STRIP-ACNC: NORMAL
WBC # SPEC AUTO: 4.99 X10(3)/MCL (ref 4.5–11.5)
WBC #/AREA URNS AUTO: ABNORMAL /HPF

## 2024-02-07 PROCEDURE — 80053 COMPREHEN METABOLIC PANEL: CPT | Performed by: PHYSICIAN ASSISTANT

## 2024-02-07 PROCEDURE — 93010 ELECTROCARDIOGRAM REPORT: CPT | Mod: ,,, | Performed by: INTERNAL MEDICINE

## 2024-02-07 PROCEDURE — 93005 ELECTROCARDIOGRAM TRACING: CPT

## 2024-02-07 PROCEDURE — 85025 COMPLETE CBC W/AUTO DIFF WBC: CPT | Performed by: PHYSICIAN ASSISTANT

## 2024-02-07 PROCEDURE — 85610 PROTHROMBIN TIME: CPT | Performed by: PHYSICIAN ASSISTANT

## 2024-02-07 PROCEDURE — 0240U COVID/FLU A&B PCR: CPT | Performed by: STUDENT IN AN ORGANIZED HEALTH CARE EDUCATION/TRAINING PROGRAM

## 2024-02-07 PROCEDURE — 99285 EMERGENCY DEPT VISIT HI MDM: CPT | Mod: 25

## 2024-02-07 PROCEDURE — 81001 URINALYSIS AUTO W/SCOPE: CPT | Performed by: PHYSICIAN ASSISTANT

## 2024-02-07 PROCEDURE — 84484 ASSAY OF TROPONIN QUANT: CPT | Performed by: PHYSICIAN ASSISTANT

## 2024-02-07 NOTE — Clinical Note
Diagnosis: Dizziness [248867]   Future Attending Provider: ALEXIS WANG [[id number]]   Admit to which facility:: [facility] [[id number]]

## 2024-02-08 VITALS
DIASTOLIC BLOOD PRESSURE: 73 MMHG | TEMPERATURE: 98 F | WEIGHT: 140 LBS | RESPIRATION RATE: 16 BRPM | SYSTOLIC BLOOD PRESSURE: 130 MMHG | HEART RATE: 56 BPM | HEIGHT: 64 IN | OXYGEN SATURATION: 97 % | BODY MASS INDEX: 23.9 KG/M2

## 2024-02-08 PROBLEM — I10 HYPERTENSION: Status: RESOLVED | Noted: 2023-03-01 | Resolved: 2024-02-08

## 2024-02-08 PROBLEM — R19.7 DIARRHEA: Status: RESOLVED | Noted: 2023-09-05 | Resolved: 2024-02-08

## 2024-02-08 PROBLEM — R35.0 FREQUENCY OF URINATION AND POLYURIA: Status: RESOLVED | Noted: 2023-09-05 | Resolved: 2024-02-08

## 2024-02-08 PROBLEM — R63.4 WEIGHT LOSS: Status: RESOLVED | Noted: 2023-09-05 | Resolved: 2024-02-08

## 2024-02-08 PROBLEM — I16.0 HYPERTENSIVE URGENCY: Status: ACTIVE | Noted: 2024-02-08

## 2024-02-08 PROBLEM — Z01.818 PREOPERATIVE CLEARANCE: Status: RESOLVED | Noted: 2023-12-14 | Resolved: 2024-02-08

## 2024-02-08 PROBLEM — E11.9 DIABETES MELLITUS: Status: RESOLVED | Noted: 2022-06-21 | Resolved: 2024-02-08

## 2024-02-08 PROBLEM — R35.89 FREQUENCY OF URINATION AND POLYURIA: Status: RESOLVED | Noted: 2023-09-05 | Resolved: 2024-02-08

## 2024-02-08 LAB
AORTIC ROOT ANNULUS: 2.9 CM
AV INDEX (PROSTH): 0.79
AV MEAN GRADIENT: 3 MMHG
AV PEAK GRADIENT: 6 MMHG
AV VALVE AREA BY VELOCITY RATIO: 2.35 CM²
AV VALVE AREA: 2.25 CM²
AV VELOCITY RATIO: 0.83
BSA FOR ECHO PROCEDURE: 1.69 M2
CV ECHO LV RWT: 0.47 CM
DOP CALC AO PEAK VEL: 1.18 M/S
DOP CALC AO VTI: 29.6 CM
DOP CALC LVOT AREA: 2.8 CM2
DOP CALC LVOT DIAMETER: 1.9 CM
DOP CALC LVOT PEAK VEL: 0.98 M/S
DOP CALC LVOT STROKE VOLUME: 66.6 CM3
DOP CALC MV VTI: 38.3 CM
DOP CALCLVOT PEAK VEL VTI: 23.5 CM
E WAVE DECELERATION TIME: 240 MSEC
E/A RATIO: 1.08
E/E' RATIO: 8 M/S
ECHO LV POSTERIOR WALL: 0.96 CM (ref 0.6–1.1)
FRACTIONAL SHORTENING: 42 % (ref 28–44)
HR MV ECHO: 50 BPM
INTERVENTRICULAR SEPTUM: 0.9 CM (ref 0.6–1.1)
LEFT ATRIUM SIZE: 3.6 CM
LEFT ATRIUM VOLUME INDEX MOD: 29.8 ML/M2
LEFT ATRIUM VOLUME MOD: 50 CM3
LEFT INTERNAL DIMENSION IN SYSTOLE: 2.37 CM (ref 2.1–4)
LEFT VENTRICLE DIASTOLIC VOLUME INDEX: 44.7 ML/M2
LEFT VENTRICLE DIASTOLIC VOLUME: 75.1 ML
LEFT VENTRICLE MASS INDEX: 72 G/M2
LEFT VENTRICLE SYSTOLIC VOLUME INDEX: 11.6 ML/M2
LEFT VENTRICLE SYSTOLIC VOLUME: 19.5 ML
LEFT VENTRICULAR INTERNAL DIMENSION IN DIASTOLE: 4.12 CM (ref 3.5–6)
LEFT VENTRICULAR MASS: 120.34 G
LV LATERAL E/E' RATIO: 6.46 M/S
LV SEPTAL E/E' RATIO: 10.5 M/S
LVOT MG: 2 MMHG
LVOT MV: 0.63 CM/S
MV MEAN GRADIENT: 2 MMHG
MV PEAK A VEL: 0.78 M/S
MV PEAK E VEL: 0.84 M/S
MV PEAK GRADIENT: 4 MMHG
MV STENOSIS PRESSURE HALF TIME: 84 MS
MV VALVE AREA BY CONTINUITY EQUATION: 1.74 CM2
MV VALVE AREA P 1/2 METHOD: 2.62 CM2
OHS LV EJECTION FRACTION SIMPSONS BIPLANE MOD: 66 %
OHS QRS DURATION: 70 MS
OHS QTC CALCULATION: 431 MS
PISA TR MAX VEL: 2.4 M/S
RA PRESSURE ESTIMATED: 3 MMHG
RV TB RVSP: 5 MMHG
TDI LATERAL: 0.13 M/S
TDI SEPTAL: 0.08 M/S
TDI: 0.11 M/S
TR MAX PG: 23 MMHG
TRICUSPID ANNULAR PLANE SYSTOLIC EXCURSION: 2.35 CM
TV REST PULMONARY ARTERY PRESSURE: 26 MMHG
Z-SCORE OF LEFT VENTRICULAR DIMENSION IN END DIASTOLE: -1.29
Z-SCORE OF LEFT VENTRICULAR DIMENSION IN END SYSTOLE: -1.61

## 2024-02-08 PROCEDURE — G0378 HOSPITAL OBSERVATION PER HR: HCPCS

## 2024-02-08 PROCEDURE — 99223 1ST HOSP IP/OBS HIGH 75: CPT | Mod: AI,,, | Performed by: INTERNAL MEDICINE

## 2024-02-08 PROCEDURE — 25000003 PHARM REV CODE 250: Performed by: INTERNAL MEDICINE

## 2024-02-08 RX ORDER — NITROGLYCERIN 0.4 MG/1
0.4 TABLET SUBLINGUAL EVERY 5 MIN PRN
COMMUNITY

## 2024-02-08 RX ORDER — WARFARIN 1 MG/1
4 TABLET ORAL DAILY
Status: DISCONTINUED | OUTPATIENT
Start: 2024-02-08 | End: 2024-02-08 | Stop reason: HOSPADM

## 2024-02-08 RX ORDER — AMLODIPINE BESYLATE 2.5 MG/1
2.5 TABLET ORAL ONCE
Status: COMPLETED | OUTPATIENT
Start: 2024-02-08 | End: 2024-02-08

## 2024-02-08 RX ORDER — ESCITALOPRAM OXALATE 10 MG/1
10 TABLET ORAL DAILY
Status: DISCONTINUED | OUTPATIENT
Start: 2024-02-08 | End: 2024-02-08 | Stop reason: HOSPADM

## 2024-02-08 RX ORDER — AMLODIPINE BESYLATE 5 MG/1
5 TABLET ORAL DAILY
Status: DISCONTINUED | OUTPATIENT
Start: 2024-02-09 | End: 2024-02-08 | Stop reason: HOSPADM

## 2024-02-08 RX ORDER — PRAVASTATIN SODIUM 40 MG/1
40 TABLET ORAL DAILY
Status: DISCONTINUED | OUTPATIENT
Start: 2024-02-08 | End: 2024-02-08 | Stop reason: HOSPADM

## 2024-02-08 RX ORDER — PANTOPRAZOLE SODIUM 40 MG/1
40 TABLET, DELAYED RELEASE ORAL DAILY
Status: DISCONTINUED | OUTPATIENT
Start: 2024-02-08 | End: 2024-02-08 | Stop reason: HOSPADM

## 2024-02-08 RX ORDER — AMLODIPINE BESYLATE 2.5 MG/1
2.5 TABLET ORAL DAILY
Status: DISCONTINUED | OUTPATIENT
Start: 2024-02-08 | End: 2024-02-08

## 2024-02-08 RX ORDER — AMLODIPINE BESYLATE 5 MG/1
5 TABLET ORAL DAILY
Qty: 90 TABLET | Refills: 3 | Status: SHIPPED | OUTPATIENT
Start: 2024-02-09 | End: 2024-04-08 | Stop reason: SDUPTHER

## 2024-02-08 RX ADMIN — PRAVASTATIN SODIUM 40 MG: 40 TABLET ORAL at 12:02

## 2024-02-08 RX ADMIN — AMLODIPINE BESYLATE 2.5 MG: 2.5 TABLET ORAL at 09:02

## 2024-02-08 RX ADMIN — PANTOPRAZOLE SODIUM 40 MG: 40 TABLET, DELAYED RELEASE ORAL at 12:02

## 2024-02-08 RX ADMIN — AMLODIPINE BESYLATE 2.5 MG: 2.5 TABLET ORAL at 03:02

## 2024-02-08 RX ADMIN — ESCITALOPRAM OXALATE 10 MG: 10 TABLET ORAL at 12:02

## 2024-02-08 NOTE — H&P
[facility] Hospital Medicine History & Physical Examination       Patient: Alejandrina Rodriguez  MRN: [id number]  STATUS: OP- Observation   DOS: 2/8/2024   PCP: Alex Treviño MD      CC: HTN urgency       HISTORY OF PRESENT ILLNESS   73 y.o. female with high blood pressure, she does not take high blood pressure meds Last BP in the office was 126/68. History of ex lap several decades ago with h/o colostomy s/p reversal, Gtube and Jtube s/p removal, and chronic back pain on home morphine, who was admitted 6/4/23 with SBO. Pt s/p exlap with lysis of adhesions 6/4. Taken back to the OR 6/5 for hypotension and post-op hemorrhage with evacuation of approximately 3L of blood and old clot, placement of temporary abdominal closure device. S/p abdominal closure 6/7.  Dr. 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 She is on chronic anticoagulation with Coumadin and has her INR levels checked  Past medical history of intestinal rupture? She sees Dr. Humberto Chopra- GI  She's been disabled for the past 14 years since back surgery with Dr. Sanchez left her with lower extremity radiculopathy.  Dr. Jurgen Witt for spider veins to the lower extremities; history of ablation on the left   Dr. Rosas-carotid disease  Dr. Blue for for venous insufficency  Dr. Eng 2 years for Pap smears     She reports just not feeling like herself for the past several days, fatigue, flu-like symptoms denies any fever chills night sweats or cough.  COVID and flu upon hospital admission are negative.  She reports blood pressure that was elevated at home of 203/101 which prompted her to present to the emergency room.  Upon ED presentation blood pressure was noted to be 188/80 with pulse of 59.  She also complained of unilateral headache and dizziness. Her  laboratory studies reviewed excellent with no evidence of end-organ damage, troponin less than  0.010, BUN and creatinine are at 11.3 creatinine of 0.71, INR is therapeutic at 3.1 CT of the head was ordered with evidence of changes of microangiopathy she has a MRI pending for this morning.  She has no focal deficits, no weakness, no slurred speech.   Her weight had gotten down to 125 lb when she was so sick last June her weight is now up to 140 lb.  She takes senakot because she gets constipated  She takes miralax every other day, does not drink enough water  BP this am of 160/79  She no longer has a headache or dizziness  No acute complaints      REVIEW OF SYSTEMS   Except as documented, all other systems reviewed and negative       PAST MEDICAL HISTORY     Past Medical History:   Diagnosis Date    Abnormal blood smear     Acid reflux     Anxiety and depression     Bilateral carotid artery disease     Cervical spondylosis with radiculopathy     Chronic lumbar pain     Diabetes mellitus     History of continuous positive airway pressure (CPAP) therapy at home     HLD (hyperlipidemia)     Hypertension     Iron deficiency anemia, unspecified     Neuropathy     On anticoagulant therapy     Osteopenia     Personal history of colonic polyps 07/14/2014    Protein C deficiency     Recurrent deep vein thrombosis (DVT) 6/21/2022    Sleep apnea, unspecified     Unspecified osteoarthritis, unspecified site           PAST SURGICAL HISTORY     Past Surgical History:   Procedure Laterality Date    COLONOSCOPY  07/14/2014    COLONOSCOPY W/ POLYPECTOMY  08/29/2022    Humberto Chopra/ Follow up pending pathology results    LAPAROTOMY, EXPLORATORY N/A 6/4/2023    Procedure: LAPAROTOMY, EXPLORATORY;  Surgeon: Sd Conway Jr., MD;  [facility];  Service: General;  Laterality: N/A;    LAPAROTOMY, EXPLORATORY N/A 6/5/2023    Procedure: LAPAROTOMY, EXPLORATORY;  Surgeon: Camilo Valadez MD;  [facility] OR;  Service: General;  Laterality: N/A;    LAPAROTOMY, EXPLORATORY N/A 6/7/2023    Procedure: LAPAROTOMY, EXPLORATORY;   Surgeon: Camilo Valadez MD;  Location: [facility] OR;  Service: General;  Laterality: N/A;  CLOSURE OF WOUND     LYSIS OF ADHESIONS N/A 6/4/2023    Procedure: LYSIS, ADHESIONS;  Surgeon: Sd Conway Jr., MD;  Location: [facility] OR;  Service: General;  Laterality: N/A;  detorsion of small bowel volvulus    SPINAL CORD STIMULATOR IMPLANT      WASHOUT N/A 6/7/2023    Procedure: WASHOUT;  Surgeon: Camiol Valadez MD;  Location: [facility] OR;  Service: General;  Laterality: N/A;  WASHOUT OF ABDOMEN           FAMILY HISTORY   Reviewed, negative in relation to patient's current condition.  Family History   Problem Relation Age of Onset    Cancer Mother     Hypertension Mother     Diabetes Mother     Arthritis Mother     Cervical cancer Mother     Hypertension Father     Diabetes Father     Arthritis Father     Heart disease Father     Prostate cancer Father           SOCIAL HISTORY     Social History     Tobacco Use    Smoking status: Never    Smokeless tobacco: Never   Substance Use Topics    Alcohol use: Never          ALLERGIES   Hydrocodone-acetaminophen, Oxycodone-acetaminophen, Iodine, Meperidine, and Promethazine        HOME MEDICATIONS     Current Outpatient Medications   Medication Instructions    azelastine (ASTELIN) 137 mcg, Nasal, 2 times daily    B-complex with vitamin C (Z-BEC OR EQUIV) tablet 1 tablet, Oral, Daily    baclofen (LIORESAL) 10 MG tablet Oral, 4 times daily, PRN muscle spasms    blood sugar diagnostic Strp 1 each, Misc.(Non-Drug; Combo Route), Daily    cefUROXime (CEFTIN) 500 mg, Oral, 2 times daily    clopidogreL (PLAVIX) 75 mg tablet 1 tablet, Oral, Daily    COMBIGAN 0.2-0.5 % Drop Both Eyes    COMIRNATY 2023-24, 12Y UP,,PF, 30 mcg/0.3 mL inection 0.3 mLs, Intramuscular, As needed (PRN)    famotidine (PEPCID) 20 MG tablet TAKE 1 TABLET TWICE DAILY    lancets (ONETOUCH ULTRASOFT LANCETS) Misc 1 each, Misc.(Non-Drug; Combo Route), Daily    LEXAPRO 10 mg tablet TAKE 1 TABLET BY MOUTH ONCE DAILY AS DIRECTED  "FOR MOOD/CHRONIC PAIN AS DIRECTED    LIDOcaine (LIDODERM) 5 % 1 patch, Transdermal, Every 24 hours (non-standard times), Remove & Discard patch within 12 hours or as directed by MD    morphine (MSIR) 7.5 mg, Oral, 2 times daily    nitroGLYCERIN (NITROSTAT) 0.4 mg, Sublingual, Every 5 min PRN    OMEGA-3-EPA-FISH OIL ORAL 1 capsule, Oral, Daily    omeprazole (PRILOSEC) 40 MG capsule TAKE 1 CAPSULE EVERY DAY    polyethylene glycol (GLYCOLAX) 17 g, Oral, As needed (PRN)    pravastatin (PRAVACHOL) 40 mg, Oral, Daily    senna (SENOKOT) 8.6 mg tablet 1 tablet, Oral, Daily    timolol maleate 0.5% (TIMOPTIC) 0.5 % Drop 1 drop, Both Eyes, Daily    vitamin D (VITAMIN D3) 5,000 Units, Oral, Daily    warfarin (COUMADIN) 4 mg, Oral, Daily          PHYSICAL EXAM   VITALS:  BP (!) 160/79   Pulse 61   Temp 97 °F (36.1 °C) (Temporal)   Resp 12   Ht 5' 4" (1.626 m)   Wt 63.5 kg (140 lb)   SpO2 95%   BMI 24.03 kg/m²      GENERAL: Awake and in NAD  HEENT: NC/AT, EOMI, PERRL.  NECK: Supple,  No JVD  LUNGS: CTA B/L  CVS: RRR, S1S2 positive  GI/: Soft, NT/ND, bowel sounds positive.  EXTREMITIES: Peripheral pulses 2+, no peripheral edema  DERM: Warm, dry.  No rashes or lesions noted.  NEURO: AAOx3, no focal neurologic deficit  PSYCH: Cooperative, appropriate mood and affect       LABS     Admission on 02/07/2024   Component Date Value    Sodium Level 02/07/2024 143     Potassium Level 02/07/2024 4.1     Chloride 02/07/2024 111 (H)     Carbon Dioxide 02/07/2024 25     Glucose Level 02/07/2024 87     Blood Urea Nitrogen 02/07/2024 11.3     Creatinine 02/07/2024 0.71     Calcium Level Total 02/07/2024 8.8     Protein Total 02/07/2024 6.7     Albumin Level 02/07/2024 3.5     Globulin 02/07/2024 3.2     Albumin/Globulin Ratio 02/07/2024 1.1     Bilirubin Total 02/07/2024 0.5     Alkaline Phosphatase 02/07/2024 96     Alanine Aminotransferase 02/07/2024 22     Aspartate Aminotransfera* 02/07/2024 32     eGFR 02/07/2024 >60     PT " 02/07/2024 31.8 (H)     INR 02/07/2024 3.1 (H)     Troponin-I 02/07/2024 <0.010     Color, UA 02/07/2024 Light-Yellow     Appearance, UA 02/07/2024 Clear     Specific Gravity, UA 02/07/2024 1.015     pH, UA 02/07/2024 7.5     Protein, UA 02/07/2024 Negative     Glucose, UA 02/07/2024 Normal     Ketones, UA 02/07/2024 Negative     Blood, UA 02/07/2024 Negative     Bilirubin, UA 02/07/2024 Negative     Urobilinogen, UA 02/07/2024 Normal     Nitrites, UA 02/07/2024 Negative     Leukocyte Esterase, UA 02/07/2024 25 (A)     WBC, UA 02/07/2024 0-5     Bacteria, UA 02/07/2024 None Seen     Squamous Epithelial Cell* 02/07/2024 Trace     RBC, UA 02/07/2024 0-5     QRS Duration 02/07/2024 70     [facility] QTC Calculation 02/07/2024 431     WBC 02/07/2024 4.99     RBC 02/07/2024 4.46     Hgb 02/07/2024 14.0     Hct 02/07/2024 45.0     MCV 02/07/2024 100.9 (H)     MCH 02/07/2024 31.4 (H)     MCHC 02/07/2024 31.1 (L)     RDW 02/07/2024 14.6     Platelet 02/07/2024 147     MPV 02/07/2024 10.7 (H)     Neut % 02/07/2024 53.9     Lymph % 02/07/2024 32.5     Mono % 02/07/2024 10.2     Eos % 02/07/2024 2.8     Basophil % 02/07/2024 0.4     Lymph # 02/07/2024 1.62     Neut # 02/07/2024 2.69     Mono # 02/07/2024 0.51     Eos # 02/07/2024 0.14     Baso # 02/07/2024 0.02     IG# 02/07/2024 0.01     IG% 02/07/2024 0.2     NRBC% 02/07/2024 0.0     Influenza A PCR 02/07/2024 Not Detected     Influenza B PCR 02/07/2024 Not Detected     SARS-CoV-2 PCR 02/07/2024 Not Detected           DIAGNOSTICS   CT Head Without Contrast    Result Date: 2/7/2024  EXAMINATION: CT HEAD WITHOUT CONTRAST CLINICAL HISTORY: Dizziness, persistent/recurrent, cardiac or vascular cause suspected; TECHNIQUE: Low dose axial CT images obtained throughout the head without intravenous contrast. Sagittal and coronal reconstructions were performed.  An automated dose exposure technique was utilized this limits radiation does the patient. COMPARISON: 10/28/2020 FINDINGS:  Intracranial compartment: Ventricles and sulci are normal in size for age without evidence of hydrocephalus. No extra-axial blood or fluid collections. The brain parenchyma appears normal. No parenchymal mass, hemorrhage, edema or major vascular distribution infarct. Skull/extracranial contents (limited evaluation): No fracture. Mastoid air cells and paranasal sinuses are essentially clear.     No acute abnormality.  Changes of microangiopathy. Electronically signed by: Luis Reeves MD Date:    02/07/2024 Time:    20:56      CT Head Without Contrast   Final Result      No acute abnormality.  Changes of microangiopathy.         Electronically signed by: Luis Reeves MD   Date:    02/07/2024   Time:    20:56      MRI Brain Without Contrast    (Results Pending)         ASSESSMENT     Patient Active Problem List   Diagnosis    Protein C deficiency    Recurrent deep vein thrombosis (DVT)    Chronic lumbar pain    HLD (hyperlipidemia)    History of continuous positive airway pressure (CPAP) therapy at home    Hypertensive urgency          PLAN:   Placed in observation, pending MRI of the brain this morning however patient has spinal cord stimulator and she has a contrast allergy---her repeat is going to be negative.  Patient has no focal deficits she has no slurred speech, no weakness.  She presented with evidence of hypertensive urgency with no evidence of end-organ damage.  Cardiac enzymes, kidney function, liver function all normal, CT of the head normal.  She has no history of high blood pressure current blood pressure noted to be 160/79 will start her on Norvasc 2.5 mg.  Follow-up results of head CT and then hopefully get her home okay for Coumadin diet this morning    We will continue to monitor for any encephalopathic changes, hemodynamic parameters, oxygenation, supplement oxygen as needed, fecal/ urinary output, electrolytes, H/H, and transfuse as needed. Images and images' reports have been reviewed; labs have  been reviewed.      Prophylaxis: on Coumadin   Code Status: Full      Alex Treviño MD  Hospital Medicine  02/08/2024         If the patient has been admitted under observation status, it is at my discretion and under our care with hospital medicine services. [TWA]

## 2024-02-08 NOTE — FIRST PROVIDER EVALUATION
"Medical screening examination initiated.  I have conducted a focused provider triage encounter, findings are as follows:    Brief history of present illness:  72 yo female presents to ED for evaluation of weakness and dizziness. States blood pressure with systolic of 203/101 at home. Denies any BP medication at home.     Vitals:    02/07/24 2025   BP: (!) 188/80   BP Location: Left arm   Patient Position: Sitting   Pulse: (!) 59   Resp: 20   Temp: 97 °F (36.1 °C)   TempSrc: Temporal   SpO2: 97%   Weight: 63.5 kg (140 lb)   Height: 5' 4" (1.626 m)       Pertinent physical exam:  Patient is awake and alert and oriented.  Ambulatory to triage.  In no acute distress.      Brief workup plan:  labs, EKG, UA, ct head    Preliminary workup initiated; this workup will be continued and followed by the physician or advanced practice provider that is assigned to the patient when roomed.  "

## 2024-02-08 NOTE — PLAN OF CARE
02/08/24 1538   Discharge Assessment   Assessment Type Discharge Planning Assessment   Confirmed/corrected address, phone number and insurance Yes   Confirmed Demographics Correct on Facesheet   Source of Information patient   When was your last doctors appointment? 01/15/24   Communicated LUISA with patient/caregiver Date not available/Unable to determine   Reason For Admission fatigue, htn   People in Home spouse   Do you expect to return to your current living situation? Yes   Do you have help at home or someone to help you manage your care at home? Yes   Who are your caregiver(s) and their phone number(s)?    Prior to hospitilization cognitive status: Alert/Oriented   Current cognitive status: Alert/Oriented   Walking or Climbing Stairs Difficulty yes   Walking or Climbing Stairs ambulation difficulty, requires equipment   Mobility Management cane, rollator   Dressing/Bathing Difficulty no   Home Accessibility wheelchair accessible;other (see comments)  (ramp to enter home available)   Equipment Currently Used at Home cane, straight;rollator   Readmission within 30 days? No   Patient currently being followed by outpatient case management? No   Do you currently have service(s) that help you manage your care at home? No   Do you take prescription medications? Yes   Do you have any problems affording any of your prescribed medications? No   Is the patient taking medications as prescribed? yes   Who is going to help you get home at discharge?    How do you get to doctors appointments? car, drives self   Are you on dialysis? No   Do you take coumadin? Yes   Who monitors your labs? Dr Elizondo; [facility]   Discharge Plan A Home with family   Discharge Plan B Home Health   DME Needed Upon Discharge  none   Discharge Plan discussed with: Spouse/sig other;Patient   Transition of Care Barriers None   Financial Resource Strain   How hard is it for you to pay for the very basics like food,  housing, medical care, and heating? Somewhat   Housing Stability   In the last 12 months, was there a time when you were not able to pay the mortgage or rent on time? N   In the last 12 months, was there a time when you did not have a steady place to sleep or slept in a shelter (including now)? N   Transportation Needs   In the past 12 months, has lack of transportation kept you from medical appointments or from getting medications? no   In the past 12 months, has lack of transportation kept you from meetings, work, or from getting things needed for daily living? No   Food Insecurity   Within the past 12 months, you worried that your food would run out before you got the money to buy more. Never true   Within the past 12 months, the food you bought just didn't last and you didn't have money to get more. Never true   Social Connections   In a typical week, how many times do you talk on the phone with family, friends, or neighbors? More than 3   How often do you get together with friends or relatives? Twice   How often do you attend Confucianist or Caodaism services? More than 4   Are you , , , , never , or living with a partner?    Alcohol Use   Q1: How often do you have a drink containing alcohol? Never   Q2: How many drinks containing alcohol do you have on a typical day when you are drinking? None   Q3: How often do you have six or more drinks on one occasion? Never     Pharmacy: [facility]; [place] [id number] [place]  Not current with .  Pt denies any dc needs at this time.

## 2024-02-08 NOTE — ED PROVIDER NOTES
Encounter Date: 2/7/2024    SCRIBE #1 NOTE: I, Lanre Patricia, am scribing for, and in the presence of,  Kyle Huerta MD. I have scribed the following portions of the note - Other sections scribed: HPI,ROS,PE.       History     Chief Complaint   Patient presents with    Dizziness     Pt c/o generalized weakness and dizziness x 4 days. Reports high BP at home 203/101. Denies headache. On warfarin. Hx DVT, HTN.      72 y/o female with PMHx of anxiety, bilateral carotid artery disease, DM, HTN, HLD, DVT, and protein C deficiency presents to ED c/o generalized weakness and dizziness onset 4x days. Pt reports associated symptoms of blurry vision, mild cough, and mild leg swelling. Pt reports taking her bp today which was 203/101 and had a similar bp yesterday with her PCP. She reports being on Hctz previously, states being off it since July 2023. She reports taking warfarin. She reports having a spinal cord stimulator implant on 1/11.     The history is provided by the patient. No  was used.   Dizziness  This is a new problem. The current episode started more than 2 days ago. The problem has not changed since onset.Pertinent negatives include no chest pain, no abdominal pain and no shortness of breath.     Review of patient's allergies indicates:   Allergen Reactions    Hydrocodone-acetaminophen      Other reaction(s): Difficulty breathing, Throat tightness    Oxycodone-acetaminophen Hallucinations    Iodine     Meperidine      Other reaction(s): Unknown (qualifier value)    Promethazine      Other reaction(s): Unknown (qualifier value), Unknown (qualifier value)     Past Medical History:   Diagnosis Date    Abnormal blood smear     Acid reflux     Anxiety and depression     Bilateral carotid artery disease     Cervical spondylosis with radiculopathy     Chronic lumbar pain     Diabetes mellitus     History of continuous positive airway pressure (CPAP) therapy at home     HLD (hyperlipidemia)      Hypertension     Iron deficiency anemia, unspecified     Neuropathy     On anticoagulant therapy     Osteopenia     Personal history of colonic polyps 07/14/2014    Protein C deficiency     Recurrent deep vein thrombosis (DVT) 6/21/2022    Sleep apnea, unspecified     Unspecified osteoarthritis, unspecified site      Past Surgical History:   Procedure Laterality Date    COLONOSCOPY  07/14/2014    COLONOSCOPY W/ POLYPECTOMY  08/29/2022    Humberto Mirandaarez/ Follow up pending pathology results    LAPAROTOMY, EXPLORATORY N/A 6/4/2023    Procedure: LAPAROTOMY, EXPLORATORY;  Surgeon: Sd Conway Jr., MD;  Location: [facility] OR;  Service: General;  Laterality: N/A;    LAPAROTOMY, EXPLORATORY N/A 6/5/2023    Procedure: LAPAROTOMY, EXPLORATORY;  Surgeon: Camilo Valadez MD;  Location: [facility] OR;  Service: General;  Laterality: N/A;    LAPAROTOMY, EXPLORATORY N/A 6/7/2023    Procedure: LAPAROTOMY, EXPLORATORY;  Surgeon: Camilo Valadez MD;  Location: [facility] OR;  Service: General;  Laterality: N/A;  CLOSURE OF WOUND     LYSIS OF ADHESIONS N/A 6/4/2023    Procedure: LYSIS, ADHESIONS;  Surgeon: Sd Conway Jr., MD;  Location: [facility] OR;  Service: General;  Laterality: N/A;  detorsion of small bowel volvulus    SPINAL CORD STIMULATOR IMPLANT      WASHOUT N/A 6/7/2023    Procedure: WASHOUT;  Surgeon: Camilo Valadez MD;  Location: [facility] OR;  Service: General;  Laterality: N/A;  WASHOUT OF ABDOMEN      Family History   Problem Relation Age of Onset    Cancer Mother     Hypertension Mother     Diabetes Mother     Arthritis Mother     Cervical cancer Mother     Hypertension Father     Diabetes Father     Arthritis Father     Heart disease Father     Prostate cancer Father      Social History     Tobacco Use    Smoking status: Never    Smokeless tobacco: Never   Substance Use Topics    Alcohol use: Never    Drug use: Never     Review of Systems   Constitutional:  Negative for fever.        Generalized weakness    HENT:  Negative for sore  throat.    Eyes:  Positive for visual disturbance (mild blurry vision).   Respiratory:  Positive for cough (mild). Negative for shortness of breath.    Cardiovascular:  Positive for leg swelling (mild). Negative for chest pain.        HTN    Gastrointestinal:  Negative for abdominal pain.   Genitourinary:  Negative for dysuria.   Musculoskeletal:  Negative for joint swelling.   Skin:  Negative for rash.   Neurological:  Positive for dizziness. Negative for weakness.   Psychiatric/Behavioral:  Negative for confusion.        Physical Exam     Initial Vitals [02/07/24 2025]   BP Pulse Resp Temp SpO2   (!) 188/80 (!) 59 20 97 °F (36.1 °C) 97 %      MAP       --         Physical Exam    Nursing note and vitals reviewed.  Constitutional: She appears well-developed and well-nourished.   HENT:   Head: Normocephalic and atraumatic.   Eyes: EOM are normal. Pupils are equal, round, and reactive to light.   Neck:   Normal range of motion.  Cardiovascular:  Normal rate, regular rhythm, normal heart sounds and intact distal pulses.           No murmur heard.  Pulmonary/Chest: Breath sounds normal. No respiratory distress. She has no wheezes. She has no rales.   Abdominal: Abdomen is soft. She exhibits no distension. There is no abdominal tenderness. There is no rebound.   Musculoskeletal:         General: No tenderness or edema. Normal range of motion.      Cervical back: Normal range of motion.      Comments: Incision sites to lumbar spine, right Paraspinous, and right lower lumbar areas that are clean, dry, and intact.      Neurological: She is alert and oriented to person, place, and time. She has normal strength. No cranial nerve deficit. GCS score is 15.   No finger to nose ataxia.  5/5 upper and lower motor extremity strength.   Skin: Skin is warm and dry. Capillary refill takes less than 2 seconds. No rash noted. No erythema.   Psychiatric: She has a normal mood and affect.         ED Course   Procedures  Labs Reviewed    COMPREHENSIVE METABOLIC PANEL - Abnormal; Notable for the following components:       Result Value    Chloride 111 (*)     All other components within normal limits   PROTIME-INR - Abnormal; Notable for the following components:    PT 31.8 (*)     INR 3.1 (*)     All other components within normal limits   URINALYSIS, REFLEX TO URINE CULTURE - Abnormal; Notable for the following components:    Leukocyte Esterase, UA 25 (*)     All other components within normal limits   CBC WITH DIFFERENTIAL - Abnormal; Notable for the following components:    .9 (*)     MCH 31.4 (*)     MCHC 31.1 (*)     MPV 10.7 (*)     All other components within normal limits   TROPONIN I - Normal   COVID/FLU A&B PCR - Normal    Narrative:     The Xpert Xpress SARS-CoV-2/FLU/RSV plus is a rapid, multiplexed real-time PCR test intended for the simultaneous qualitative detection and differentiation of SARS-CoV-2, Influenza A, Influenza B, and respiratory syncytial virus (RSV) viral RNA in either nasopharyngeal swab or nasal swab specimens.         CBC W/ AUTO DIFFERENTIAL    Narrative:     The following orders were created for panel order CBC auto differential.  Procedure                               Abnormality         Status                     ---------                               -----------         ------                     CBC with Differential[[id number]]       Abnormal            Final result                 Please view results for these tests on the individual orders.     EKG Readings: (Independently Interpreted)   Initial Reading: No STEMI. Rhythm: Sinus Bradycardia. Heart Rate: 53. Ectopy: No Ectopy. Conduction: Normal. ST Segments: Normal ST Segments. T Waves: Normal. Axis: Normal.   Taken at 2023    Low voltage QRS     ECG Results              EKG 12-lead (Final result)        Collection Time Result Time QRS Duration OHS QTC Calculation    02/07/24 20:23:46 02/08/24 03:21:51 70 431                     Final result by  Interface, [facility] (02/08/24 03:21:55)                   Narrative:    Test Reason : R42,    Vent. Rate : 053 BPM     Atrial Rate : 053 BPM     P-R Int : 168 ms          QRS Dur : 070 ms      QT Int : 460 ms       P-R-T Axes : 087 084 085 degrees     QTc Int : 431 ms    Sinus bradycardia  Low voltage QRS  Otherwise Normal EKG  Confirmed by Refugio Goodrich MD ([id number]) on 2/8/2024 3:21:50 AM    Referred By:             Confirmed By:Refugio Goodrich MD                                     EKG 12-LEAD (Final result)  Result time 02/15/24 11:46:58      Final result by Unknown User (02/15/24 11:46:58)                                      Imaging Results              CT Head Without Contrast (Final result)  Result time 02/08/24 09:08:57      Final result by Jose L Kelly MD (02/08/24 09:08:57)                   Impression:      No acute intracranial findings or significant interval change compared to yesterday      Electronically signed by: Jose L Kelly  Date:    02/08/2024  Time:    09:08               Narrative:    EXAMINATION:  CT HEAD WITHOUT CONTRAST    CLINICAL HISTORY:  Dizziness, persistent/recurrent, cardiac or vascular cause suspected;Headache, new or worsening (Age >= 50y);    TECHNIQUE:  CT imaging of the head performed from the skull base to the vertex without intravenous contrast.  mGycm. Automatic exposure control, adjustment of mA/kV or iterative reconstruction technique was used to reduce radiation.    COMPARISON:  Yesterday    FINDINGS:  There is no acute cortical infarct, hemorrhage or mass lesion.  No new parenchymal attenuation abnormality.  Ventricular size is stable.  There are vascular calcifications.    Similar appearance of a polyp or retention cyst left maxillary sinus.  Mastoid air cells are clear.                                       CT Head Without Contrast (Final result)  Result time 02/07/24 20:56:59      Final result by Luis Reeves MD (02/07/24 20:56:59)                    Impression:      No acute abnormality.  Changes of microangiopathy.      Electronically signed by: Luis Reeves MD  Date:    02/07/2024  Time:    20:56               Narrative:    EXAMINATION:  CT HEAD WITHOUT CONTRAST    CLINICAL HISTORY:  Dizziness, persistent/recurrent, cardiac or vascular cause suspected;    TECHNIQUE:  Low dose axial CT images obtained throughout the head without intravenous contrast. Sagittal and coronal reconstructions were performed.  An automated dose exposure technique was utilized this limits radiation does the patient.    COMPARISON:  10/28/2020    FINDINGS:  Intracranial compartment:    Ventricles and sulci are normal in size for age without evidence of hydrocephalus. No extra-axial blood or fluid collections.    The brain parenchyma appears normal. No parenchymal mass, hemorrhage, edema or major vascular distribution infarct.    Skull/extracranial contents (limited evaluation): No fracture. Mastoid air cells and paranasal sinuses are essentially clear.                                       Medications   amLODIPine tablet 2.5 mg (2.5 mg Oral Given 2/8/24 1521)       Additional MDM:     NIH Stroke Scale:   Level of consciousness = 0 - alert  LOC questions = 0 - answers both correctly  LOC commands = 0 - performs both correctly  Best gaze = 0 - normal  Visual = 0 - no visual loss  Facial palsy = 0 - normal  Motor left arm =  0 - no drift  Motor right arm =  0 - no drift  Motor left leg = 0 - no drift  Motor right leg =  0 - no drift  Limb ataxia = 0 - absent  Sensory = 0 - normal  Best language = 0 - no aphasia  Dysarthria = 0 - normal articulation  Extinction and inattention = 0 - no neglect  NIH Stroke Scale Total = 0           Scribe Attestation:   Scribe #1: I performed the above scribed service and the documentation accurately describes the services I performed. I attest to the accuracy of the note.    Attending Attestation:           Physician Attestation for Scribe:  Physician  Attestation Statement for Scribe #1: I, Kyle Huerta MD, reviewed documentation, as scribed by Lanre Patricia in my presence, and it is both accurate and complete.         Medical Decision Making  Problems Addressed:  Dizziness: acute illness or injury that poses a threat to life or bodily functions  Hypertension, unspecified type: acute illness or injury that poses a threat to life or bodily functions    Amount and/or Complexity of Data Reviewed  External Data Reviewed: notes.  Labs: ordered. Decision-making details documented in ED Course.  Radiology: ordered.  ECG/medicine tests: ordered and independent interpretation performed.            ED Course as of 02/24/24 1931 Thu Feb 08, 2024 0101 Patient and family feel uncomfortable going home.  They will like to be ruled out for CVA. [RP]   0101 Discussed with Dr. Cruz.  [RP]      ED Course User Index  [RP] Kyle Huerta MD               Medical Decision Making:   History:   I obtained history from: someone other than patient.       <> Summary of History: Collateral from family.  Family member concerned about CVA.  Old Medical Records: I decided to obtain old medical records.  Old Records Summarized: records from clinic visits, records from previous admission(s) and records from another hospital.       <> Summary of Records: Reviewed old records, history of protein S deficiency, on Coumadin  Initial Assessment:   Dizziness, HTN  Differential Diagnosis:   Judging by the patient's chief complaint and pertinent history, the patient has the following possible differential diagnoses, including but not limited to the following.  Some of these are deemed to be lower likelihood and some more likely based on my physical exam and history combined with possible lab work and/or imaging studies.   Please see the pertinent studies, and refer to the HPI.  Some of these diagnoses will take further evaluation to fully rule out, perhaps as an outpatient and the patient  was encouraged to follow up when discharged for more comprehensive evaluation.    Hypertensive urgency, hypertensive emergency, hypertensive encephalopathy, intracerebral hemorrhage, acute heart failure, myocardial ischemia, pulmonary edema, nephropathy, renal failure, proteinuria, electrolyte abnormalities, asymptomatic elevated blood pressure, chronic hypertension,    Independently Interpreted Test(s):   I have ordered and independently interpreted X-rays - see prior notes.  I have ordered and independently interpreted EKG Reading(s) - see prior notes  Clinical Tests:   Lab Tests: Reviewed and Ordered  Radiological Study: Reviewed and Ordered  Medical Tests: Reviewed and Ordered  ED Management:  Patient is a 73-year-old female with past medical history diabetes, hypertension, protein C deficiency, DVT, on Coumadin presents to the ED for dizziness and hypertension.  Patient states she does not typically have elevated blood pressure.  She has been off of her blood pressure medication for several months.  On arrival she was hypertensive, given amlodipine with improvement.  No acute neuro deficits appreciated.  CT of the head without any evidence of intracranial hemorrhage or any other abnormality.  On reassessment patient resting comfortably however feels uncomfortable going home due to elevated blood pressure and dizziness.  Family member at bedside concerned about CVA.  Will place in observation for blood pressure control, further CVA workup though suspect will likely be negative.  All results discussed answered all questions time.  Patient family verbalized understanding agreed to plan.             Clinical Impression:  Final diagnoses:  [R42] Dizziness  [R42] Dizzy (Primary)  [I10] Hypertension, unspecified type          ED Disposition Condition    Observation Stable                Kyle Huerta MD  02/24/24 1934

## 2024-02-09 ENCOUNTER — PATIENT OUTREACH (OUTPATIENT)
Dept: ADMINISTRATIVE | Facility: CLINIC | Age: 74
End: 2024-02-09
Payer: MEDICARE

## 2024-02-09 DIAGNOSIS — R51.9 CHRONIC NONINTRACTABLE HEADACHE, UNSPECIFIED HEADACHE TYPE: Primary | ICD-10-CM

## 2024-02-09 DIAGNOSIS — R42 DIZZINESS: ICD-10-CM

## 2024-02-09 DIAGNOSIS — G89.29 CHRONIC NONINTRACTABLE HEADACHE, UNSPECIFIED HEADACHE TYPE: Primary | ICD-10-CM

## 2024-02-09 DIAGNOSIS — I10 HYPERTENSION, UNSPECIFIED TYPE: ICD-10-CM

## 2024-02-09 LAB
LEFT CCA DIST DIAS: 4 CM/S
LEFT CCA DIST SYS: 87 CM/S
LEFT CCA PROX DIAS: 6 CM/S
LEFT CCA PROX SYS: 93 CM/S
LEFT ECA DIAS: 0 CM/S
LEFT ECA SYS: 100 CM/S
LEFT ICA DIST DIAS: 12 CM/S
LEFT ICA DIST SYS: 64 CM/S
LEFT ICA MID DIAS: 8 CM/S
LEFT ICA MID SYS: 56 CM/S
LEFT ICA PROX DIAS: 15 CM/S
LEFT ICA PROX SYS: 84 CM/S
LEFT VERTEBRAL DIAS: 5 CM/S
LEFT VERTEBRAL SYS: 35 CM/S
OHS CV CAROTID RIGHT ICA EDV HIGHEST: 13
OHS CV CAROTID ULTRASOUND LEFT ICA/CCA RATIO: 0.97
OHS CV CAROTID ULTRASOUND RIGHT ICA/CCA RATIO: 0.61
OHS CV PV CAROTID LEFT HIGHEST CCA: 93
OHS CV PV CAROTID LEFT HIGHEST ICA: 84
OHS CV PV CAROTID RIGHT HIGHEST CCA: 98
OHS CV PV CAROTID RIGHT HIGHEST ICA: 60
OHS CV US CAROTID LEFT HIGHEST EDV: 15
RIGHT CCA DIST DIAS: 10 CM/S
RIGHT CCA DIST SYS: 98 CM/S
RIGHT CCA PROX DIAS: 8 CM/S
RIGHT CCA PROX SYS: 95 CM/S
RIGHT ECA DIAS: 0 CM/S
RIGHT ECA SYS: 92 CM/S
RIGHT ICA DIST DIAS: 10 CM/S
RIGHT ICA DIST SYS: 60 CM/S
RIGHT ICA MID DIAS: 13 CM/S
RIGHT ICA MID SYS: 57 CM/S
RIGHT ICA PROX DIAS: 7 CM/S
RIGHT ICA PROX SYS: 44 CM/S
RIGHT VERTEBRAL DIAS: 4 CM/S
RIGHT VERTEBRAL SYS: 38 CM/S

## 2024-02-09 NOTE — PROGRESS NOTES
Full Dictation to follow.     Our MRI machine is unable to accommodate patients spinal cord stimulator, and she is contrast allergic so we are unable to proceed with CT with contrast. Patient remains without focal deficit, headache and dizziness, HTN resolved. RX Norvasc 5 mg sent to pharmacy. Nursing at the office will call and arrange outpatient imaging that is compatible with her spinal cord stimulator.    Case discussed with  her nurse Mirna.

## 2024-02-09 NOTE — HOSPITAL COURSE
Please see full H&P from the same day.  Patient had repeat CT head without contrast 12 hours after original which showed no evidence of any change.  Discussed with nursing patient responded beautifully to Norvasc blood pressure 130/73.  Unable to perform contrast is CT based on iodine allergy, patient with history of anaphylaxis.  She does not have a spinal cord stimulator that is compatible with our MRI machine.  She never demonstrated any focal deficit her dizziness and headache resolved with treatment of blood pressure.  I will plan on having the office staff attempt to schedule her for an outpatient MRI at a facility that is compatible with her spinal cord stimulator.  Rx Norvas sent to pharmacy at the time of discharge all patient questions have been answered, medications reconciled and patient stable to go home will see her in the office next week.

## 2024-02-09 NOTE — PROGRESS NOTES
C3 nurse spoke with Alejandrina Rodriguez  for a TCC post hospital discharge follow up call. The patient does not have a scheduled HOSFU appointment with Alex Treviño MD  within 5-7 days post hospital discharge date 02/08/2024. Appointment with Alex Treviño MD on 03/05/2024 @ 920 am.    Message sent to PCP staff requesting they contact patient and schedule follow up appointment.

## 2024-02-09 NOTE — DISCHARGE SUMMARY
[facility] Medicine  Discharge Summary      Patient Name: Alejandrina Rodriguez  MRN: [id number]  TRISTEN: [id number]  Patient Class: OP- Observation  Admission Date: 2/7/2024  Hospital Length of Stay: 0 days  Discharge Date and Time:  02/09/2024 9:04 AM  Attending Physician: No att. providers found   Discharging Provider: Alex Treviño MD  Primary Care Provider: Alex Treviño MD    Primary Care Team: Networked reference to record PCT     HPI:   See full H and P    Hospital Course:   Please see full H&P from the same day.  Patient had repeat CT head without contrast 12 hours after original which showed no evidence of any change.  Discussed with nursing patient responded beautifully to Norvasc blood pressure 130/73.  Unable to perform contrast is CT based on iodine allergy, patient with history of anaphylaxis.  She does not have a spinal cord stimulator that is compatible with our MRI machine.  She never demonstrated any focal deficit her dizziness and headache resolved with treatment of blood pressure.  I will plan on having the office staff attempt to schedule her for an outpatient MRI at a facility that is compatible with her spinal cord stimulator.  Rx Norvasc sent to pharmacy at the time of discharge all patient questions have been answered, medications reconciled and patient stable to go home will see her in the office next week.     Goals of Care Treatment Preferences:  Code Status: Full Code      Consults:     No new Assessment & Plan notes have been filed under this hospital service since the last note was generated.  Service: Hospital Medicine    Final Active Diagnoses:    Diagnosis Date Noted POA    PRINCIPAL PROBLEM:  Hypertensive urgency [I16.0] 02/08/2024 Yes    Chronic lumbar pain [M54.50, G89.29] 03/01/2023 Yes    HLD (hyperlipidemia) [E78.5] 03/01/2023 Yes    History of continuous positive airway pressure (CPAP) therapy at home [Z99.89] 03/01/2023 Not  Applicable    Protein C deficiency [D68.59] 06/21/2022 Yes    Recurrent deep vein thrombosis (DVT) [I82.409] 06/21/2022 Yes      Problems Resolved During this Admission:       Discharged Condition: good    Disposition: Home or Self Care    Follow Up:   Follow-up Information       Alex Treviño MD Follow up.    Specialty: Internal Medicine  Why: Office will call patient to schedule follow-up.  Contact information:  [place] [address] [phone]                           Patient Instructions:   No discharge procedures on file.    Significant Diagnostic Studies: Labs: All labs within the past 24 hours have been reviewed    Pending Diagnostic Studies:       None           Medications:  Reconciled Home Medications:      Medication List        START taking these medications      amLODIPine 5 MG tablet  Commonly known as: NORVASC  Take 1 tablet (5 mg total) by mouth once daily.            CONTINUE taking these medications      azelastine 137 mcg (0.1 %) nasal spray  Commonly known as: ASTELIN  1 spray (137 mcg total) by Nasal route 2 (two) times daily.     B-complex with vitamin C tablet  Commonly known as: Z-Bec or Equiv  Take 1 tablet by mouth once daily.     baclofen 10 MG tablet  Commonly known as: LIORESAL  Take by mouth 4 (four) times daily. PRN muscle spasms     blood sugar diagnostic Strp  1 each by Misc.(Non-Drug; Combo Route) route Daily.     COMBIGAN 0.2-0.5 % Drop  Generic drug: brimonidine-timoloL  Place into both eyes.     COMIRNATY 2023-24 (12Y UP)(PF) 30 mcg/0.3 mL inection  Generic drug: COVID qla25-91(12up)(raxt)(PF)  Inject 0.3 mLs into the muscle as needed.     famotidine 20 MG tablet  Commonly known as: PEPCID  TAKE 1 TABLET TWICE DAILY     lancets Misc  Commonly known as: ONETOUCH ULTRASOFT LANCETS  1 each by Misc.(Non-Drug; Combo Route) route Daily.     LEXAPRO 10 MG tablet  Generic drug: EScitalopram oxalate  TAKE 1 TABLET BY MOUTH ONCE DAILY AS DIRECTED FOR  MOOD/CHRONIC PAIN AS DIRECTED     LIDOcaine 5 %  Commonly known as: LIDODERM  Place 1 patch onto the skin every 24 hours. Remove & Discard patch within 12 hours or as directed by MD     morphine 15 MG tablet  Commonly known as: MSIR  Take 0.5 tablets (7.5 mg total) by mouth 2 (two) times a day.     nitroGLYCERIN 0.4 MG SL tablet  Commonly known as: NITROSTAT  Place 0.4 mg under the tongue every 5 (five) minutes as needed for Chest pain.     OMEGA-3-EPA-FISH OIL ORAL  Take 1 capsule by mouth once daily.     omeprazole 40 MG capsule  Commonly known as: PRILOSEC  TAKE 1 CAPSULE EVERY DAY     polyethylene glycol 17 gram/dose powder  Commonly known as: GLYCOLAX  Take 17 g by mouth as needed.     pravastatin 40 MG tablet  Commonly known as: PRAVACHOL  Take 1 tablet (40 mg total) by mouth once daily.     senna 8.6 mg tablet  Commonly known as: SENOKOT  Take 1 tablet by mouth once daily.     timolol maleate 0.5% 0.5 % Drop  Commonly known as: TIMOPTIC  Place 1 drop into both eyes Daily.     vitamin D 1000 units Tab  Commonly known as: VITAMIN D3  Take 5,000 Units by mouth once daily.     warfarin 4 MG tablet  Commonly known as: COUMADIN  Take 1 tablet (4 mg total) by mouth Daily.            STOP taking these medications      cefUROXime 500 MG tablet  Commonly known as: CEFTIN     clopidogreL 75 mg tablet  Commonly known as: PLAVIX              Indwelling Lines/Drains at time of discharge:   Lines/Drains/Airways       None                   Time spent on the discharge of patient: 46 minutes         Alex Treviño MD  Department of Hospital Medicine  [facility]

## 2024-02-12 ENCOUNTER — TELEPHONE (OUTPATIENT)
Dept: INTERNAL MEDICINE | Facility: CLINIC | Age: 74
End: 2024-02-12
Payer: MEDICARE

## 2024-02-12 NOTE — TELEPHONE ENCOUNTER
----- Message from Alex Treviño MD sent at 2/12/2024 12:39 PM CST -----  I need a MRI/MRA of the brain I wanted to do when she was in the hospital but her spinal cord stimulators not compatible with our magnet so we need to find a facility that has a MRI machine that would be compatible  ----- Message -----  From: Jessica Mar  Sent: 2/12/2024  11:16 AM CST  To: Alex Treviño MD    Please review

## 2024-02-12 NOTE — TELEPHONE ENCOUNTER
----- Message from Alex Treviño MD sent at 2/12/2024 12:39 PM CST -----  I need a MRI/MRA of the brain I wanted to do when she was in the hospital but her spinal cord stimulators not compatible with our magnet so we need to find a facility that has a MRI machine that would be compatible  ----- Message -----  From: Jessica Mar  Sent: 2/12/2024  11:16 AM CST  To: Aelx Treviño MD    Please review

## 2024-02-15 ENCOUNTER — OFFICE VISIT (OUTPATIENT)
Dept: INTERNAL MEDICINE | Facility: CLINIC | Age: 74
End: 2024-02-15
Payer: MEDICARE

## 2024-02-15 VITALS
HEIGHT: 64 IN | HEART RATE: 51 BPM | OXYGEN SATURATION: 97 % | DIASTOLIC BLOOD PRESSURE: 70 MMHG | SYSTOLIC BLOOD PRESSURE: 120 MMHG | WEIGHT: 145 LBS | RESPIRATION RATE: 16 BRPM | BODY MASS INDEX: 24.75 KG/M2

## 2024-02-15 DIAGNOSIS — R42 DIZZINESS: ICD-10-CM

## 2024-02-15 DIAGNOSIS — I10 PRIMARY HYPERTENSION: ICD-10-CM

## 2024-02-15 DIAGNOSIS — L71.9 ROSACEA: Primary | ICD-10-CM

## 2024-02-15 PROCEDURE — 3074F SYST BP LT 130 MM HG: CPT | Mod: CPTII,,,

## 2024-02-15 PROCEDURE — 1159F MED LIST DOCD IN RCRD: CPT | Mod: CPTII,,,

## 2024-02-15 PROCEDURE — 1160F RVW MEDS BY RX/DR IN RCRD: CPT | Mod: CPTII,,,

## 2024-02-15 PROCEDURE — 3288F FALL RISK ASSESSMENT DOCD: CPT | Mod: CPTII,,,

## 2024-02-15 PROCEDURE — 99213 OFFICE O/P EST LOW 20 MIN: CPT | Mod: ,,,

## 2024-02-15 PROCEDURE — 1101F PT FALLS ASSESS-DOCD LE1/YR: CPT | Mod: CPTII,,,

## 2024-02-15 PROCEDURE — 3078F DIAST BP <80 MM HG: CPT | Mod: CPTII,,,

## 2024-02-15 RX ORDER — CHOLECALCIFEROL (VITAMIN D3) 125 MCG
CAPSULE ORAL
COMMUNITY
Start: 2023-09-14

## 2024-02-15 RX ORDER — METRONIDAZOLE 7.5 MG/G
1 GEL TOPICAL 2 TIMES DAILY
Qty: 45 G | Refills: 0 | Status: SHIPPED | OUTPATIENT
Start: 2024-02-15

## 2024-02-15 RX ORDER — METRONIDAZOLE 7.5 MG/G
1 GEL TOPICAL 2 TIMES DAILY
COMMUNITY
End: 2024-02-15 | Stop reason: SDUPTHER

## 2024-02-15 NOTE — ASSESSMENT & PLAN NOTE
-carotid ultrasound, CT head, EKG, and echo unremarkable   -awaiting outpatient MRI/MRA brain  -dizziness symptoms resolved for today's visit

## 2024-02-15 NOTE — PROGRESS NOTES
Patient ID: Alejandrina Rodriguez is a 73 y.o. female.    Chief Complaint: Follow-up (Hospital f/u for high BP)      Transitional Care Note    Family and/or Caretaker present at visit?  No.  Diagnostic tests reviewed/disposition: No diagnosic tests pending after this hospitalization.  Home health/community services discussion/referrals: Patient does not have home health established from hospital visit.  They do not need home health.  If needed, we will set up home health for the patient.   Establishment or re-establishment of referral orders for community resources: No other necessary community resources.   Discussion with other health care providers: No discussion with other health care providers necessary.   I have reviewed the following:     Details / Date    [x]   Labs     []   Micro     []   Pathology     [x]   Imaging     [x]   Cardiology Procedures     []   Other         74 y/o female here today for hospital discharge follow-up as a Dr. Madrid patient.  Medical comorbidities significant  anxiety, bilateral carotid artery disease, DM, HTN, HLD, venous insufficiency, DVT, and  protein C deficiency (Coumadin).  Recent hospital admission (2/7/24 to 2/8/24) weakness, unilateral headache, and dizziness.  BP noted 203/101 at the time.  No chest pain, palpitations, syncope, or shortness a breath.  EKG sinus Guillermo.  Echocardiogram unremarkable.  Carotid ultrasound left ICA < 50%, right ICA with no hemodynamically significant stenosis.  CT head negative for acute intracranial findings.  Unable to complete MRI/MRA brain due to recent spinal stimulator .  Not a candidate for CT with contrast due to contrast allergy.  Initiated on amlodipine with improvement of hypertension.  Ultimately discharged with plans for outpatient MRI/MRA of brain.  Review of documents, noted orders faxed to [facility].  Per patient feeling well today.  No repeat dizziness, focal deficits, or weakness.  Blood pressure 120/70 and  well-controlled currently on amlodipine 5 mg dose.  Otherwise stable.           MEDICAL HISTORY:    Past Medical History:   Diagnosis Date    Abnormal blood smear     Acid reflux     Anxiety and depression     Bilateral carotid artery disease     Cervical spondylosis with radiculopathy     Chronic lumbar pain     Diabetes mellitus     History of continuous positive airway pressure (CPAP) therapy at home     HLD (hyperlipidemia)     Hypertension     Iron deficiency anemia, unspecified     Neuropathy     On anticoagulant therapy     Osteopenia     Personal history of colonic polyps 07/14/2014    Protein C deficiency     Recurrent deep vein thrombosis (DVT) 6/21/2022    Sleep apnea, unspecified     Unspecified osteoarthritis, unspecified site       Past Surgical History:   Procedure Laterality Date    COLONOSCOPY  07/14/2014    COLONOSCOPY W/ POLYPECTOMY  08/29/2022    Humberto Chopra/ Follow up pending pathology results    LAPAROTOMY, EXPLORATORY N/A 6/4/2023    Procedure: LAPAROTOMY, EXPLORATORY;  Surgeon: Sd Conway Jr., MD;  Location: [facility] OR;  Service: General;  Laterality: N/A;    LAPAROTOMY, EXPLORATORY N/A 6/5/2023    Procedure: LAPAROTOMY, EXPLORATORY;  Surgeon: Camilo Valadez MD;  Location: [facility] OR;  Service: General;  Laterality: N/A;    LAPAROTOMY, EXPLORATORY N/A 6/7/2023    Procedure: LAPAROTOMY, EXPLORATORY;  Surgeon: Camilo Valadez MD;  Location: [facility] OR;  Service: General;  Laterality: N/A;  CLOSURE OF WOUND     LYSIS OF ADHESIONS N/A 6/4/2023    Procedure: LYSIS, ADHESIONS;  Surgeon: Sd Conway Jr., MD;  Location: [facility] OR;  Service: General;  Laterality: N/A;  detorsion of small bowel volvulus    SPINAL CORD STIMULATOR IMPLANT      WASHOUT N/A 6/7/2023    Procedure: WASHOUT;  Surgeon: Camilo Valadez MD;  Location: [facility] OR;  Service: General;  Laterality: N/A;  WASHOUT OF ABDOMEN       Social History     Tobacco Use    Smoking status: Never    Smokeless tobacco: Never   Substance Use Topics  "   Alcohol use: Never    Drug use: Never          Health Maintenance Due   Topic Date Due    Foot Exam  Never done    TETANUS VACCINE  Never done    RSV Vaccine (Age 60+ and Pregnant patients) (1 - 1-dose 60+ series) Never done    Diabetes Urine Screening  02/20/2024    Mammogram  05/10/2024          Patient Care Team:  Alex Treviño MD as PCP - General (Internal Medicine)  [facility] as Consulting Physician (Obstetrics and Gynecology)  Margaret Muñoz LPN as Care Coordinator  Humberto Chopra MD as Consulting Physician (Gastroenterology)  Sicard, Laurie, OD (Optometry)      Review of Systems   Constitutional:  Negative for fatigue and fever.   HENT:  Negative for congestion, rhinorrhea, sore throat and trouble swallowing.    Eyes:  Negative for redness and visual disturbance.   Respiratory:  Negative for cough, chest tightness and shortness of breath.    Cardiovascular:  Negative for chest pain and palpitations.   Gastrointestinal:  Negative for abdominal pain, constipation, diarrhea, nausea and vomiting.   Genitourinary:  Negative for dysuria, flank pain, frequency and urgency.   Musculoskeletal:  Negative for arthralgias, gait problem and myalgias.   Skin:  Negative for rash and wound.   Neurological:  Negative for facial asymmetry, speech difficulty, weakness and headaches.   All other systems reviewed and are negative.      Objective:   /70 (BP Location: Left arm, Patient Position: Sitting, BP Method: Medium (Manual))   Pulse (!) 51   Resp 16   Ht 5' 4" (1.626 m)   Wt 65.8 kg (145 lb)   SpO2 97%   BMI 24.89 kg/m²      Physical Exam  Constitutional:       General: She is not in acute distress.     Appearance: Normal appearance.   HENT:      Right Ear: Tympanic membrane, ear canal and external ear normal.      Left Ear: Tympanic membrane, ear canal and external ear normal.      Nose: Nose normal.      Mouth/Throat:      Mouth: " Mucous membranes are moist.      Pharynx: Oropharynx is clear.   Eyes:      Extraocular Movements: Extraocular movements intact.      Conjunctiva/sclera: Conjunctivae normal.      Pupils: Pupils are equal, round, and reactive to light.   Cardiovascular:      Rate and Rhythm: Normal rate and regular rhythm.      Pulses: Normal pulses.      Heart sounds: Normal heart sounds. No murmur heard.     No gallop.   Pulmonary:      Effort: Pulmonary effort is normal.      Breath sounds: Normal breath sounds. No wheezing.   Abdominal:      General: Bowel sounds are normal. There is no distension.      Palpations: Abdomen is soft. There is no mass.      Tenderness: There is no abdominal tenderness. There is no guarding.   Musculoskeletal:         General: Normal range of motion.   Skin:     General: Skin is warm and dry.   Neurological:      Mental Status: She is alert. Mental status is at baseline.      Sensory: No sensory deficit.      Motor: No weakness.           Assessment:       ICD-10-CM ICD-9-CM   1. Rosacea  L71.9 695.3   2. Primary hypertension  I10 401.9   3. Dizziness  R42 780.4        Plan:     Problem List Items Addressed This Visit          Derm    Rosacea - Primary (Chronic)     -stable   -currently utilizing metronidazole gel, requesting refill.  Refill sent to patient's pharmacy         Relevant Medications    metroNIDAZOLE (METROGEL) 0.75 % gel       Cardiac/Vascular    Hypertension (Chronic)     -stable and well-controlled  -currently on amlodipine 5 mg daily, continue  -low-sodium diet            Other    Dizziness     -carotid ultrasound, CT head, EKG, and echo unremarkable   -awaiting outpatient MRI/MRA brain  -dizziness symptoms resolved for today's visit               Follow up for Previously scheduled and PRN if need.   -plan specifics discussed above    Orders Placed This Encounter    metroNIDAZOLE (METROGEL) 0.75 % gel        Medication List with Changes/Refills   Current Medications    AMLODIPINE  (NORVASC) 5 MG TABLET    Take 1 tablet (5 mg total) by mouth once daily.    AZELASTINE (ASTELIN) 137 MCG (0.1 %) NASAL SPRAY    1 spray (137 mcg total) by Nasal route 2 (two) times daily.    B-COMPLEX WITH VITAMIN C (Z-BEC OR EQUIV) TABLET    Take 1 tablet by mouth once daily.    BACLOFEN (LIORESAL) 10 MG TABLET    Take by mouth 4 (four) times daily. PRN muscle spasms    BIOTIN 10,000 MCG TBDL    biotin 10,000 mcg disintegrating tablet, [RxNorm: 5418731]    BLOOD SUGAR DIAGNOSTIC STRP    1 each by Misc.(Non-Drug; Combo Route) route Daily.    COMBIGAN 0.2-0.5 % DROP    Place into both eyes.    COMIRNATY 2023-24, 12Y UP,,PF, 30 MCG/0.3 ML INECTION    Inject 0.3 mLs into the muscle as needed.    FAMOTIDINE (PEPCID) 20 MG TABLET    TAKE 1 TABLET TWICE DAILY    LANCETS (ONETOUCH ULTRASOFT LANCETS) MISC    1 each by Misc.(Non-Drug; Combo Route) route Daily.    LEXAPRO 10 MG TABLET    TAKE 1 TABLET BY MOUTH ONCE DAILY AS DIRECTED FOR MOOD/CHRONIC PAIN AS DIRECTED    LIDOCAINE (LIDODERM) 5 %    Place 1 patch onto the skin every 24 hours. Remove & Discard patch within 12 hours or as directed by MD    MORPHINE (MSIR) 15 MG TABLET    Take 0.5 tablets (7.5 mg total) by mouth 2 (two) times a day.    NITROGLYCERIN (NITROSTAT) 0.4 MG SL TABLET    Place 0.4 mg under the tongue every 5 (five) minutes as needed for Chest pain.    OMEGA-3-EPA-FISH OIL ORAL    Take 1 capsule by mouth once daily.    OMEPRAZOLE (PRILOSEC) 40 MG CAPSULE    TAKE 1 CAPSULE EVERY DAY    POLYETHYLENE GLYCOL (GLYCOLAX) 17 GRAM/DOSE POWDER    Take 17 g by mouth as needed.    PRAVASTATIN (PRAVACHOL) 40 MG TABLET    Take 1 tablet (40 mg total) by mouth once daily.    SENNA (SENOKOT) 8.6 MG TABLET    Take 1 tablet by mouth once daily.    TIMOLOL MALEATE 0.5% (TIMOPTIC) 0.5 % DROP    Place 1 drop into both eyes Daily.    VITAMIN D (VITAMIN D3) 1000 UNITS TAB    Take 5,000 Units by mouth once daily.    WARFARIN (COUMADIN) 4 MG TABLET    Take 1 tablet (4 mg total) by  mouth Daily.   Changed and/or Refilled Medications    Modified Medication Previous Medication    METRONIDAZOLE (METROGEL) 0.75 % GEL metroNIDAZOLE (METROGEL) 0.75 % gel       Apply 1 Application topically 2 (two) times daily.    Apply 1 Application topically 2 (two) times daily.

## 2024-02-15 NOTE — ASSESSMENT & PLAN NOTE
-stable   -currently utilizing metronidazole gel, requesting refill.  Refill sent to patient's pharmacy

## 2024-02-18 DIAGNOSIS — I82.409 RECURRENT DEEP VEIN THROMBOSIS (DVT): ICD-10-CM

## 2024-02-19 RX ORDER — WARFARIN 4 MG/1
4 TABLET ORAL
Qty: 90 TABLET | Refills: 3 | Status: SHIPPED | OUTPATIENT
Start: 2024-02-19 | End: 2024-04-25 | Stop reason: SDUPTHER

## 2024-02-20 ENCOUNTER — TELEPHONE (OUTPATIENT)
Dept: INTERNAL MEDICINE | Facility: CLINIC | Age: 74
End: 2024-02-20
Payer: MEDICARE

## 2024-02-20 DIAGNOSIS — I10 PRIMARY HYPERTENSION: Primary | ICD-10-CM

## 2024-02-20 DIAGNOSIS — E11.9 TYPE 2 DIABETES MELLITUS WITHOUT COMPLICATION, WITHOUT LONG-TERM CURRENT USE OF INSULIN: ICD-10-CM

## 2024-02-20 DIAGNOSIS — E78.2 MIXED HYPERLIPIDEMIA: ICD-10-CM

## 2024-02-20 NOTE — TELEPHONE ENCOUNTER
----- Message from Per Medrano MA sent at 2/20/2024  9:06 AM CST -----  Regarding: PV 3/5/24 @ 9:20 Dr. Madrid  1. Are there any outstanding tasks in the patient's chart? Yes, fasting labs    2. Is there any documentation in the chart? No    3.Has patient been seen in an ER, Urgent care clinic, or been admitted since last visit?  If yes, When, where, and why    4. Has patient seen any other healthcare providers since last visit?  If yes, when, where, and why    5. Has patient had any bloodwork or XR done since last visit?    6. Is patient signed up for patient portal?

## 2024-02-29 ENCOUNTER — TELEPHONE (OUTPATIENT)
Dept: INTERNAL MEDICINE | Facility: CLINIC | Age: 74
End: 2024-02-29
Payer: MEDICARE

## 2024-02-29 ENCOUNTER — LAB VISIT (OUTPATIENT)
Dept: LAB | Facility: HOSPITAL | Age: 74
End: 2024-02-29
Attending: INTERNAL MEDICINE
Payer: MEDICARE

## 2024-02-29 DIAGNOSIS — I10 PRIMARY HYPERTENSION: ICD-10-CM

## 2024-02-29 DIAGNOSIS — E78.2 MIXED HYPERLIPIDEMIA: ICD-10-CM

## 2024-02-29 DIAGNOSIS — E11.9 TYPE 2 DIABETES MELLITUS WITHOUT COMPLICATION, WITHOUT LONG-TERM CURRENT USE OF INSULIN: ICD-10-CM

## 2024-02-29 LAB
ALBUMIN SERPL-MCNC: 3.4 G/DL (ref 3.4–4.8)
ALBUMIN/GLOB SERPL: 1.3 RATIO (ref 1.1–2)
ALP SERPL-CCNC: 92 UNIT/L (ref 40–150)
ALT SERPL-CCNC: 31 UNIT/L (ref 0–55)
APPEARANCE UR: ABNORMAL
AST SERPL-CCNC: 29 UNIT/L (ref 5–34)
BACTERIA #/AREA URNS AUTO: ABNORMAL /HPF
BILIRUB SERPL-MCNC: 0.7 MG/DL
BILIRUB UR QL STRIP.AUTO: NEGATIVE
BUN SERPL-MCNC: 11.2 MG/DL (ref 9.8–20.1)
CALCIUM SERPL-MCNC: 8.8 MG/DL (ref 8.4–10.2)
CHLORIDE SERPL-SCNC: 109 MMOL/L (ref 98–107)
CHOLEST SERPL-MCNC: 140 MG/DL
CHOLEST/HDLC SERPL: 2 {RATIO} (ref 0–5)
CO2 SERPL-SCNC: 35 MMOL/L (ref 23–31)
COLOR UR AUTO: YELLOW
CREAT SERPL-MCNC: 0.71 MG/DL (ref 0.55–1.02)
CREAT UR-MCNC: 154 MG/DL (ref 45–106)
EST. AVERAGE GLUCOSE BLD GHB EST-MCNC: 122.6 MG/DL
GFR SERPLBLD CREATININE-BSD FMLA CKD-EPI: >60 MLS/MIN/1.73/M2
GLOBULIN SER-MCNC: 2.7 GM/DL (ref 2.4–3.5)
GLUCOSE SERPL-MCNC: 92 MG/DL (ref 82–115)
GLUCOSE UR QL STRIP.AUTO: NORMAL
HBA1C MFR BLD: 5.9 %
HDLC SERPL-MCNC: 57 MG/DL (ref 35–60)
KETONES UR QL STRIP.AUTO: NEGATIVE
LDLC SERPL CALC-MCNC: 72 MG/DL (ref 50–140)
LEUKOCYTE ESTERASE UR QL STRIP.AUTO: 500
MICROALBUMIN UR-MCNC: 14.3 UG/ML
MICROALBUMIN/CREAT RATIO PNL UR: 9.3 MG/GM CR (ref 0–30)
MUCOUS THREADS URNS QL MICRO: ABNORMAL /LPF
NITRITE UR QL STRIP.AUTO: NEGATIVE
PH UR STRIP.AUTO: 7.5 [PH]
POTASSIUM SERPL-SCNC: 4.1 MMOL/L (ref 3.5–5.1)
PROT SERPL-MCNC: 6.1 GM/DL (ref 5.8–7.6)
PROT UR QL STRIP.AUTO: ABNORMAL
RBC #/AREA URNS AUTO: ABNORMAL /HPF
RBC UR QL AUTO: NEGATIVE
SODIUM SERPL-SCNC: 146 MMOL/L (ref 136–145)
SP GR UR STRIP.AUTO: 1.02 (ref 1–1.03)
SQUAMOUS #/AREA URNS LPF: ABNORMAL /HPF
TRIGL SERPL-MCNC: 57 MG/DL (ref 37–140)
UROBILINOGEN UR STRIP-ACNC: 2
VLDLC SERPL CALC-MCNC: 11 MG/DL
WBC #/AREA URNS AUTO: ABNORMAL /HPF

## 2024-02-29 PROCEDURE — 83036 HEMOGLOBIN GLYCOSYLATED A1C: CPT

## 2024-02-29 PROCEDURE — 81001 URINALYSIS AUTO W/SCOPE: CPT

## 2024-02-29 PROCEDURE — 80053 COMPREHEN METABOLIC PANEL: CPT

## 2024-02-29 PROCEDURE — 87086 URINE CULTURE/COLONY COUNT: CPT

## 2024-02-29 PROCEDURE — 36415 COLL VENOUS BLD VENIPUNCTURE: CPT

## 2024-02-29 PROCEDURE — 80061 LIPID PANEL: CPT

## 2024-02-29 PROCEDURE — 82043 UR ALBUMIN QUANTITATIVE: CPT

## 2024-02-29 NOTE — TELEPHONE ENCOUNTER
----- Message from Alex Treviño MD sent at 2/29/2024  4:01 PM CST -----  Patient's sodium is elevated again please make sure she is drinking enough water

## 2024-03-02 LAB — BACTERIA UR CULT: NORMAL

## 2024-03-05 ENCOUNTER — OFFICE VISIT (OUTPATIENT)
Dept: INTERNAL MEDICINE | Facility: CLINIC | Age: 74
End: 2024-03-05
Payer: MEDICARE

## 2024-03-05 VITALS
WEIGHT: 143 LBS | BODY MASS INDEX: 24.41 KG/M2 | RESPIRATION RATE: 16 BRPM | TEMPERATURE: 97 F | HEIGHT: 64 IN | HEART RATE: 61 BPM | SYSTOLIC BLOOD PRESSURE: 108 MMHG | DIASTOLIC BLOOD PRESSURE: 60 MMHG | OXYGEN SATURATION: 99 %

## 2024-03-05 DIAGNOSIS — I82.409 RECURRENT DEEP VEIN THROMBOSIS (DVT): ICD-10-CM

## 2024-03-05 DIAGNOSIS — I10 PRIMARY HYPERTENSION: Primary | Chronic | ICD-10-CM

## 2024-03-05 DIAGNOSIS — Z00.00 WELL ADULT EXAM: ICD-10-CM

## 2024-03-05 DIAGNOSIS — F32.4 MAJOR DEPRESSIVE DISORDER, SINGLE EPISODE, IN PARTIAL REMISSION: ICD-10-CM

## 2024-03-05 DIAGNOSIS — I82.502 CHRONIC EMBOLISM AND THROMBOSIS OF UNSPECIFIED DEEP VEINS OF LEFT LOWER EXTREMITY: ICD-10-CM

## 2024-03-05 DIAGNOSIS — D68.59 PROTEIN C DEFICIENCY: ICD-10-CM

## 2024-03-05 DIAGNOSIS — E78.2 MIXED HYPERLIPIDEMIA: ICD-10-CM

## 2024-03-05 DIAGNOSIS — Z99.89 HISTORY OF CONTINUOUS POSITIVE AIRWAY PRESSURE (CPAP) THERAPY AT HOME: ICD-10-CM

## 2024-03-05 DIAGNOSIS — Z12.31 SCREENING MAMMOGRAM FOR BREAST CANCER: ICD-10-CM

## 2024-03-05 DIAGNOSIS — E87.0 HYPERNATREMIA: ICD-10-CM

## 2024-03-05 DIAGNOSIS — Z00.00 MEDICARE ANNUAL WELLNESS VISIT, SUBSEQUENT: ICD-10-CM

## 2024-03-05 PROBLEM — M54.50 CHRONIC LUMBAR PAIN: Status: RESOLVED | Noted: 2023-03-01 | Resolved: 2024-03-05

## 2024-03-05 PROBLEM — R42 DIZZINESS: Status: RESOLVED | Noted: 2024-02-15 | Resolved: 2024-03-05

## 2024-03-05 PROBLEM — I16.0 HYPERTENSIVE URGENCY: Status: RESOLVED | Noted: 2024-02-08 | Resolved: 2024-03-05

## 2024-03-05 PROBLEM — G89.29 CHRONIC LUMBAR PAIN: Status: RESOLVED | Noted: 2023-03-01 | Resolved: 2024-03-05

## 2024-03-05 PROCEDURE — 3044F HG A1C LEVEL LT 7.0%: CPT | Mod: CPTII,,, | Performed by: INTERNAL MEDICINE

## 2024-03-05 PROCEDURE — 1160F RVW MEDS BY RX/DR IN RCRD: CPT | Mod: CPTII,,, | Performed by: INTERNAL MEDICINE

## 2024-03-05 PROCEDURE — 1101F PT FALLS ASSESS-DOCD LE1/YR: CPT | Mod: CPTII,,, | Performed by: INTERNAL MEDICINE

## 2024-03-05 PROCEDURE — 1159F MED LIST DOCD IN RCRD: CPT | Mod: CPTII,,, | Performed by: INTERNAL MEDICINE

## 2024-03-05 PROCEDURE — 3078F DIAST BP <80 MM HG: CPT | Mod: CPTII,,, | Performed by: INTERNAL MEDICINE

## 2024-03-05 PROCEDURE — 3066F NEPHROPATHY DOC TX: CPT | Mod: CPTII,,, | Performed by: INTERNAL MEDICINE

## 2024-03-05 PROCEDURE — 3061F NEG MICROALBUMINURIA REV: CPT | Mod: CPTII,,, | Performed by: INTERNAL MEDICINE

## 2024-03-05 PROCEDURE — 3288F FALL RISK ASSESSMENT DOCD: CPT | Mod: CPTII,,, | Performed by: INTERNAL MEDICINE

## 2024-03-05 PROCEDURE — 99214 OFFICE O/P EST MOD 30 MIN: CPT | Mod: ,,, | Performed by: INTERNAL MEDICINE

## 2024-03-05 PROCEDURE — 3074F SYST BP LT 130 MM HG: CPT | Mod: CPTII,,, | Performed by: INTERNAL MEDICINE

## 2024-03-05 PROCEDURE — 3008F BODY MASS INDEX DOCD: CPT | Mod: CPTII,,, | Performed by: INTERNAL MEDICINE

## 2024-03-05 RX ORDER — PNV NO.95/FERROUS FUM/FOLIC AC 28MG-0.8MG
100 TABLET ORAL DAILY
COMMUNITY

## 2024-03-05 NOTE — ASSESSMENT & PLAN NOTE
General health maintenance education given, labs reviewed and stable.  Mammogram orders placed.  Remainder of age-appropriate exams are up-to-date

## 2024-03-05 NOTE — ASSESSMENT & PLAN NOTE
Patient with evidence of intravascular volume depletion strongly encouraged her to increase her intake of water and decrease intake of salt.      Patient has hypernatremia which is controlled. The hypernatremia is due to Dehydration. @[id number](Na)@

## 2024-03-05 NOTE — ASSESSMENT & PLAN NOTE
Patient has persistent depression which is mild and is currently controlled. Will Continue anti-depressant medications

## 2024-03-05 NOTE — ASSESSMENT & PLAN NOTE
Lab Results   Component Value Date    LDL 72.00 02/29/2024    TRIG 57 02/29/2024    HDL 57 02/29/2024    TOTALCHOLEST 2 02/29/2024     Continue current med management  Stressed importance of dietary modifications. Follow a low cholesterol, low saturated fat diet with less that 200mg of cholesterol a day.  Avoid fried foods and high saturated fats (high saturated fats less than 7% of calories).  Add Flax Seed/Fish Oil supplements to diet. Increase dietary fiber.  Regular exercise can reduce LDL and raise HDL. Stressed importance of physical activity 5 times per week for 30 minutes per day.

## 2024-03-05 NOTE — PROGRESS NOTES
Internal Medicine    Patient ID: [id number]     Chief Complaint: Diabetes (6 month f/u)      HPI:     Alejandrina Rodriguez is a 73 y.o. female here today for a follow up.   Hitory ex belkis several decades ago with h/o colostomy s/p reversal, Gtube and Jtube s/p removal, and chronic back pain on home morphine, who was admitted 6/4 with SBO. Pt s/p exlap with lysis of adhesions 6/4. Taken back to the OR 6/5 for hypotension and post-op hemorrhage with evacuation of approximately 3L of blood and old clot, placement of temporary abdominal closure device. S/p abdominal closure 6/7/2023     Dr. 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 She is on chronic anticoagulation with Coumadin and has her INR levels checked  Past medical history of intestinal rupture? She sees Dr. Humberto Chopra- GI; had a FOBT positive  She's been disabled for the past 14 years since back surgery with Dr. Sanchez left her with lower extremity radiculopathy.  Dr. Jurgen Witt for spider veins to the lower extremities; history of ablation on the left   Dr. Rosas-carotid disease  Dr. Blue for for venous insufficency  Dr. Eng 2 years for Pap smears  She is going through some situational issues with a friend of hers but her antidepressant is working well.  Her blood sugar at current is at 92 her A1c is at 5.9 everything is falling into a prediabetic range no indication for any further  Past Medical History:   Diagnosis Date    Abnormal blood smear     Acid reflux     Anxiety and depression     Bilateral carotid artery disease     Cervical spondylosis with radiculopathy     Chronic lumbar pain     Diabetes mellitus     History of continuous positive airway pressure (CPAP) therapy at home     HLD (hyperlipidemia)     Hypertension     Iron deficiency anemia, unspecified     Neuropathy     On anticoagulant therapy     Osteopenia     Personal history of colonic polyps 07/14/2014    Protein C  deficiency     Recurrent deep vein thrombosis (DVT) 6/21/2022    Sleep apnea, unspecified     Unspecified osteoarthritis, unspecified site         Past Surgical History:   Procedure Laterality Date    COLONOSCOPY  07/14/2014    COLONOSCOPY W/ POLYPECTOMY  08/29/2022    Humberto Chopra/ Follow up pending pathology results    LAPAROTOMY, EXPLORATORY N/A 6/4/2023    Procedure: LAPAROTOMY, EXPLORATORY;  Surgeon: Sd Conway Jr., MD;  Location: [facility] OR;  Service: General;  Laterality: N/A;    LAPAROTOMY, EXPLORATORY N/A 6/5/2023    Procedure: LAPAROTOMY, EXPLORATORY;  Surgeon: Camilo Valadez MD;  Location: [facility] OR;  Service: General;  Laterality: N/A;    LAPAROTOMY, EXPLORATORY N/A 6/7/2023    Procedure: LAPAROTOMY, EXPLORATORY;  Surgeon: Camilo Valadez MD;  Location: [facility] OR;  Service: General;  Laterality: N/A;  CLOSURE OF WOUND     LYSIS OF ADHESIONS N/A 6/4/2023    Procedure: LYSIS, ADHESIONS;  Surgeon: Sd Conway Jr., MD;  Location: [facility] OR;  Service: General;  Laterality: N/A;  detorsion of small bowel volvulus    SPINAL CORD STIMULATOR IMPLANT      WASHOUT N/A 6/7/2023    Procedure: WASHOUT;  Surgeon: Camilo Valadez MD;  Location: [facility] OR;  Service: General;  Laterality: N/A;  WASHOUT OF ABDOMEN         Social History     Tobacco Use    Smoking status: Never    Smokeless tobacco: Never   Substance and Sexual Activity    Alcohol use: Never    Drug use: Never    Sexual activity: Yes     Partners: Male        Current Outpatient Medications   Medication Instructions    amLODIPine (NORVASC) 5 mg, Oral, Daily    azelastine (ASTELIN) 137 mcg, Nasal, 2 times daily    B-complex with vitamin C (Z-BEC OR EQUIV) tablet 1 tablet, Oral, Daily    baclofen (LIORESAL) 10 MG tablet Oral, 4 times daily, PRN muscle spasms    biotin 10,000 mcg TbDL biotin 10,000 mcg disintegrating tablet, [RxNorm: 3954905]    blood sugar diagnostic Strp 1 each, Misc.(Non-Drug; Combo Route), Daily    COMBIGAN 0.2-0.5 % Drop Both Eyes     cyanocobalamin (VITAMIN B-12) 100 mcg, Oral, Daily    famotidine (PEPCID) 20 MG tablet TAKE 1 TABLET TWICE DAILY    lancets (ONETOUCH ULTRASOFT LANCETS) Misc 1 each, Misc.(Non-Drug; Combo Route), Daily    LEXAPRO 10 mg tablet TAKE 1 TABLET BY MOUTH ONCE DAILY AS DIRECTED FOR MOOD/CHRONIC PAIN AS DIRECTED    LIDOcaine (LIDODERM) 5 % 1 patch, Transdermal, Every 24 hours (non-standard times), Remove & Discard patch within 12 hours or as directed by MD    metroNIDAZOLE (METROGEL) 0.75 % gel 1 Application, Topical (Top), 2 times daily    morphine (MSIR) 7.5 mg, Oral, 2 times daily    nitroGLYCERIN (NITROSTAT) 0.4 mg, Sublingual, Every 5 min PRN    OMEGA-3-EPA-FISH OIL ORAL 1 capsule, Oral, Daily    omeprazole (PRILOSEC) 40 MG capsule TAKE 1 CAPSULE EVERY DAY    polyethylene glycol (GLYCOLAX) 17 g, Oral, As needed (PRN)    pravastatin (PRAVACHOL) 40 mg, Oral, Daily    senna (SENOKOT) 8.6 mg tablet 1 tablet, Oral, As needed (PRN)    vitamin D (VITAMIN D3) 5,000 Units, Oral, Daily    warfarin (COUMADIN) 4 mg, Oral       Review of patient's allergies indicates:   Allergen Reactions    Hydrocodone-acetaminophen      Other reaction(s): Difficulty breathing, Throat tightness    Oxycodone-acetaminophen Hallucinations    Iodine     Meperidine      Other reaction(s): Unknown (qualifier value)    Promethazine      Other reaction(s): Unknown (qualifier value), Unknown (qualifier value)        Patient Care Team:  Alex Treviño MD as PCP - General (Internal Medicine)  [facility], Cory CRUZ MD as Consulting Physician (Obstetrics and Gynecology)  Margaret Muñoz LPN as Care Coordinator  Humberto Chopra MD as Consulting Physician (Gastroenterology)  Sicard, Laurie, OD (Optometry)     Subjective:     Review of Systems    12 point review of systems conducted, negative except as stated in the history of present illness. See HPI for details.    Objective:     Visit Vitals  /60  "(BP Location: Left arm, Patient Position: Sitting, BP Method: Medium (Manual))   Pulse 61   Temp 97.3 °F (36.3 °C) (Temporal)   Resp 16   Ht 5' 4" (1.626 m)   Wt 64.9 kg (143 lb)   SpO2 99%   BMI 24.55 kg/m²       Physical Exam  General : Alert and oriented, No acute distress, afebrile.  Eye : PERRLA. EOMI. Normal conjunctiva, Sclerae are nonicteric.   Respiratory : Respirations are non-labored and clear to auscultation bilaterally. Symmetrical air entry bilaterally, no crackles, no wheezes, no rhonchi. No cyanosis, no clubbing.  Cardiovascular : Normal rate, Regular rhythm. No murmurs, rubs, or gallops. Pulses are 2+ throughout. No JVD. No Edema.  Gastrointestinal : Soft, nontender, non-distended, bowel sounds are present in all quadrants, no organomegaly, no guarding, no rebound.  Musculoskeletal : Normal range of motion throughout. No muscle tenderness.  Integumentary : Warm, moist, intact.  Neurologic : Alert, Oriented, no FND  Psychiatric : Cooperative, Appropriate mood & affect.   Labs Reviewed:     Chemistry:  Lab Results   Component Value Date     (H) 02/29/2024    K 4.1 02/29/2024    CHLORIDE 109 (H) 02/29/2024    BUN 11.2 02/29/2024    CREATININE 0.71 02/29/2024    EGFRNORACEVR >60 02/29/2024    GLUCOSE 92 02/29/2024    CALCIUM 8.8 02/29/2024    ALKPHOS 92 02/29/2024    LABPROT 6.1 02/29/2024    ALBUMIN 3.4 02/29/2024    BILIDIR 0.2 09/13/2021    IBILI 0.30 09/13/2021    AST 29 02/29/2024    ALT 31 02/29/2024    MG 1.90 06/18/2023    PHOS 3.9 06/18/2023    GYZLMUNU09IN 46.3 08/28/2023    TSH 1.412 08/28/2023        Lab Results   Component Value Date    HGBA1C 5.9 02/29/2024        Hematology:  Lab Results   Component Value Date    WBC 4.99 02/07/2024    HGB 14.0 02/07/2024    HCT 45.0 02/07/2024     02/07/2024       Lipid Panel:  Lab Results   Component Value Date    CHOL 140 02/29/2024    HDL 57 02/29/2024    LDL 72.00 02/29/2024    TRIG 57 02/29/2024    TOTALCHOLEST 2 02/29/2024    "     Urine:  Lab Results   Component Value Date    COLORUA Yellow 02/29/2024    APPEARANCEUA Turbid (A) 02/29/2024    SGUA 1.021 02/29/2024    PHUA 7.5 02/29/2024    PROTEINUA Trace (A) 02/29/2024    GLUCOSEUA Normal 02/29/2024    KETONESUA Negative 02/29/2024    BLOODUA Negative 02/29/2024    NITRITESUA Negative 02/29/2024    LEUKOCYTESUR 500 (A) 02/29/2024    RBCUA 0-5 02/29/2024    WBCUA 11-20 (A) 02/29/2024    BACTERIA Trace 02/29/2024    SQEPUA Moderate (A) 02/29/2024    CREATRANDUR 154.0 (H) 02/29/2024        Assessment:       ICD-10-CM ICD-9-CM   1. Primary hypertension  I10 401.9   2. Screening mammogram for breast cancer  Z12.31 V76.12   3. Well adult exam  Z00.00 V70.0   4. Chronic embolism and thrombosis of unspecified deep veins of left lower extremity  I82.502 453.50   5. Major depressive disorder, single episode, in partial remission  F32.4 296.25   6. Medicare annual wellness visit, subsequent  Z00.00 V70.0   7. Recurrent deep vein thrombosis (DVT)  I82.409 453.40   8. Protein C deficiency  D68.59 289.81   9. Mixed hyperlipidemia  E78.2 272.2   10. History of continuous positive airway pressure (CPAP) therapy at home  Z99.89 V46.8   11. Hypernatremia  E87.0 276.0        Plan:     1. Primary hypertension  Assessment & Plan:  Chronic, controlled. Latest blood pressure and vitals reviewed-     [unfilled].   Home meds for hypertension were reviewed and noted below.   Hypertension Medications               amLODIPine (NORVASC) 5 MG tablet Take 1 tablet (5 mg total) by mouth once daily.    nitroGLYCERIN (NITROSTAT) 0.4 MG SL tablet Place 0.4 mg under the tongue every 5 (five) minutes as needed for Chest pain.              2. Screening mammogram for breast cancer  -     Mammo Digital Screening Bilat w/ [employer]; Future; Expected date: 03/05/2024    3. Well adult exam  -     Ambulatory referral/consult to Gynecology; Future; Expected date: 03/12/2024    4. Chronic embolism and thrombosis of unspecified deep  veins of left lower extremity    5. Major depressive disorder, single episode, in partial remission  Assessment & Plan:  Patient has persistent depression which is mild and is currently controlled. Will Continue anti-depressant medications      6. Medicare annual wellness visit, subsequent  Assessment & Plan:    General health maintenance education given, labs reviewed and stable.  Mammogram orders placed.  Remainder of age-appropriate exams are up-to-date      7. Recurrent deep vein thrombosis (DVT)  Assessment & Plan:  On Coumadin for protein C deficiency.      8. Protein C deficiency  Assessment & Plan:  Compliant with coumadin treatment  Follows up at the coumadin clinic at [facility]      9. Mixed hyperlipidemia  Assessment & Plan:  Lab Results   Component Value Date    LDL 72.00 02/29/2024    TRIG 57 02/29/2024    HDL 57 02/29/2024    TOTALCHOLEST 2 02/29/2024     Continue current med management  Stressed importance of dietary modifications. Follow a low cholesterol, low saturated fat diet with less that 200mg of cholesterol a day.  Avoid fried foods and high saturated fats (high saturated fats less than 7% of calories).  Add Flax Seed/Fish Oil supplements to diet. Increase dietary fiber.  Regular exercise can reduce LDL and raise HDL. Stressed importance of physical activity 5 times per week for 30 minutes per day.       10. History of continuous positive airway pressure (CPAP) therapy at home  Assessment & Plan:  Compliant        11. Hypernatremia  Assessment & Plan:  Patient with evidence of intravascular volume depletion strongly encouraged her to increase her intake of water and decrease intake of salt.      Patient has hypernatremia which is controlled. The hypernatremia is due to Dehydration. @[id number](Na)@           Follow up in about 6 months (around 9/5/2024) for WELLNESS, with labs prior to visit. In addition to their scheduled follow up, the patient has also been instructed to follow up on  as needed basis.      An office visit for an established patient was performed. 10 minutes was used for reviewing the [facility] chart [facility]. 15 minutes was used during the visit in regards to taking the patient history and physical exam. There was also an additional 5 minutes spent on education and counseling regarding medical conditions, current medications including risk/benefit and side effects/adverse events, vaccine counseling. After leaving the exam room, the provider then spent an additional 5 minutes completing the electronic health record.    The patient is receptive, expresses understanding and is agreeable to plan. All questions answered; total time spent was 35 minutes.      Alex Treviño MD

## 2024-03-05 NOTE — ASSESSMENT & PLAN NOTE
Chronic, controlled. Latest blood pressure and vitals reviewed-     [unfilled].   Home meds for hypertension were reviewed and noted below.   Hypertension Medications               amLODIPine (NORVASC) 5 MG tablet Take 1 tablet (5 mg total) by mouth once daily.    nitroGLYCERIN (NITROSTAT) 0.4 MG SL tablet Place 0.4 mg under the tongue every 5 (five) minutes as needed for Chest pain.

## 2024-03-11 ENCOUNTER — HOSPITAL ENCOUNTER (OUTPATIENT)
Dept: RADIOLOGY | Facility: HOSPITAL | Age: 74
Discharge: HOME | End: 2024-03-11
Attending: INTERNAL MEDICINE
Payer: MEDICARE

## 2024-03-11 DIAGNOSIS — Z12.31 SCREENING MAMMOGRAM FOR BREAST CANCER: ICD-10-CM

## 2024-03-11 PROCEDURE — 77063 BREAST TOMOSYNTHESIS BI: CPT | Mod: 26,,, | Performed by: STUDENT IN AN ORGANIZED HEALTH CARE EDUCATION/TRAINING PROGRAM

## 2024-03-11 PROCEDURE — 77067 SCR MAMMO BI INCL CAD: CPT | Mod: 26,,, | Performed by: STUDENT IN AN ORGANIZED HEALTH CARE EDUCATION/TRAINING PROGRAM

## 2024-03-11 PROCEDURE — 77067 SCR MAMMO BI INCL CAD: CPT | Mod: TC

## 2024-03-18 ENCOUNTER — TELEPHONE (OUTPATIENT)
Dept: GYNECOLOGIC ONCOLOGY | Facility: CLINIC | Age: 74
End: 2024-03-18
Payer: MEDICARE

## 2024-03-18 NOTE — TELEPHONE ENCOUNTER
Spoke with patient and relayed that Dr. Bryson is no longer seeing patient with [facility]. I explained the current clinic dynamic and reasoning for her appointment cancellation. Patient verbalized an understanding. I let her know she can call the clinic is she does have any questions or concerns.

## 2024-04-08 ENCOUNTER — TELEPHONE (OUTPATIENT)
Dept: INTERNAL MEDICINE | Facility: CLINIC | Age: 74
End: 2024-04-08
Payer: MEDICARE

## 2024-04-08 DIAGNOSIS — I10 PRIMARY HYPERTENSION: Primary | ICD-10-CM

## 2024-04-08 RX ORDER — AMLODIPINE BESYLATE 5 MG/1
5 TABLET ORAL DAILY
Qty: 90 TABLET | Refills: 3 | Status: SHIPPED | OUTPATIENT
Start: 2024-04-08 | End: 2025-04-08

## 2024-04-08 NOTE — TELEPHONE ENCOUNTER
----- Message from Zainab Negrete sent at 4/8/2024  2:08 PM CDT -----  Regarding: med refill  .Type:  RX Refill Request    Who Called: pt  Refill or New Rx:refill  RX Name and Strength:amLODIPine (NORVASC) 5 MG tablet  How is the patient currently taking it? (ex. 1XDay):1xday  Is this a 30 day or 90 day RX:90  Preferred Pharmacy with phone number:[facility] - [facility] [address]   Phone: [phone]  Fax:[phone]  Local or Mail Order:mail  Ordering Provider:[facility]  Would the patient rather a call back or a response via [facility]? Call back  Best Call Back Number:[phone]  Additional Information: pt needs authorization from  to refill prescription

## 2024-04-25 DIAGNOSIS — I82.409 RECURRENT DEEP VEIN THROMBOSIS (DVT): ICD-10-CM

## 2024-04-25 NOTE — TELEPHONE ENCOUNTER
----- Message from Leatha Ashraf sent at 4/25/2024  2:14 PM CDT -----  Type:  RX Refill Request    Who Called:  pt    Refill or New Rx: refill    RX Name and Strength: warfarin (COUMADIN) 4 MG tablet     How is the patient currently taking it? (ex. 1XDay): one day    Is this a 30 day or 90 day RX: 90 tablet    Preferred Pharmacy with phone number [facility]    Local or Mail Order: local      Ordering Provider:   Would the patient rather a call back or a response via [facility]?  If needed    Best Call Back Number: [phone]    Additional Information:  refillneeded, pt would also like a call  back to discuss  a (re-energizing the body supplement by dr charla larson), please advise, thanks

## 2024-04-25 NOTE — TELEPHONE ENCOUNTER
Spoke to pt who stated that she would like to know if it would be okay to take [employer] Blend by Dr. Santos Velasquez.     Pt also needs refill on Bela

## 2024-04-26 RX ORDER — WARFARIN 4 MG/1
4 TABLET ORAL DAILY
Qty: 90 TABLET | Refills: 3 | Status: SHIPPED | OUTPATIENT
Start: 2024-04-26 | End: 2024-05-16 | Stop reason: SDUPTHER

## 2024-05-01 ENCOUNTER — TELEPHONE (OUTPATIENT)
Dept: INTERNAL MEDICINE | Facility: CLINIC | Age: 74
End: 2024-05-01
Payer: MEDICARE

## 2024-05-01 NOTE — TELEPHONE ENCOUNTER
----- Message from Leatha Ashraf sent at 5/1/2024 11:32 AM CDT -----  Who Called: Alejandrina Rodriguez    Caller is requesting assistance/information from provider's office.    Preferred Method of Contact: Phone Call  Patient's Preferred Phone Number on File: [phone]   Best Call Back Number, if different:  Additional Information:  pt would like a return call to discuss if she can take (weight management supplement by dr larson), please advise, thanks

## 2024-05-01 NOTE — TELEPHONE ENCOUNTER
Spoke to pt who stated that she would like to know if it is okay for her to take Energy Renew OTC by Dr. Anders.

## 2024-05-02 NOTE — TELEPHONE ENCOUNTER
Alex Treviño MD [place], [place], [place]  Caller: Unspecified (Yesterday,  1:01 PM)  No this will interact with her Coumadin; I do not advise that she takes this

## 2024-05-10 ENCOUNTER — LAB VISIT (OUTPATIENT)
Dept: LAB | Facility: HOSPITAL | Age: 74
End: 2024-05-10
Attending: INTERNAL MEDICINE
Payer: MEDICARE

## 2024-05-10 DIAGNOSIS — I10 HYPERTENSION, ESSENTIAL: Primary | ICD-10-CM

## 2024-05-10 LAB
ANION GAP SERPL CALC-SCNC: 5 MEQ/L
BUN SERPL-MCNC: 13.3 MG/DL (ref 9.8–20.1)
CALCIUM SERPL-MCNC: 8.4 MG/DL (ref 8.4–10.2)
CHLORIDE SERPL-SCNC: 105 MMOL/L (ref 98–107)
CO2 SERPL-SCNC: 30 MMOL/L (ref 23–31)
CREAT SERPL-MCNC: 0.69 MG/DL (ref 0.55–1.02)
CREAT/UREA NIT SERPL: 19
GFR SERPLBLD CREATININE-BSD FMLA CKD-EPI: >60 ML/MIN/1.73/M2
GLUCOSE SERPL-MCNC: 96 MG/DL (ref 82–115)
POTASSIUM SERPL-SCNC: 3.5 MMOL/L (ref 3.5–5.1)
SODIUM SERPL-SCNC: 140 MMOL/L (ref 136–145)

## 2024-05-10 PROCEDURE — 36415 COLL VENOUS BLD VENIPUNCTURE: CPT

## 2024-05-10 PROCEDURE — 80048 BASIC METABOLIC PNL TOTAL CA: CPT

## 2024-05-16 ENCOUNTER — TELEPHONE (OUTPATIENT)
Dept: INTERNAL MEDICINE | Facility: CLINIC | Age: 74
End: 2024-05-16
Payer: MEDICARE

## 2024-05-16 DIAGNOSIS — I82.409 RECURRENT DEEP VEIN THROMBOSIS (DVT): ICD-10-CM

## 2024-05-16 RX ORDER — WARFARIN 4 MG/1
4 TABLET ORAL DAILY
Qty: 90 TABLET | Refills: 3 | Status: SHIPPED | OUTPATIENT
Start: 2024-05-16

## 2024-05-16 NOTE — TELEPHONE ENCOUNTER
----- Message from Mara Frias sent at 5/16/2024 10:39 AM CDT -----  Regarding: refill / chg of pharmacy  Type:  RX Refill Request    Who Called: pt   Refill or New Rx:refill  RX Name and Strength:warfarin (COUMADIN) 4 MG tablet  How is the patient currently taking it? (ex. 1XDay):1 day  Is this a 30 day or 90 day RX:90  Preferred [facility] with phone number[facility]  Local or Mail Order:mail order  Ordering Provider:dr brush  Would the patient rather a call back or a response via [facility]? C/b  Best Call Back Number:[phone]    Additional Information: medication sent to wrong pharmacy    Please resend script to [facility]

## 2024-05-16 NOTE — TELEPHONE ENCOUNTER
----- Message from Mara Frias sent at 5/16/2024 10:39 AM CDT -----  Regarding: refill / chg of pharmacy  Type:  RX Refill Request    Who Called: pt   Refill or New Rx:refill  RX Name and Strength:warfarin (COUMADIN) 4 MG tablet  How is the patient currently taking it? (ex. 1XDay):1 day  Is this a 30 day or 90 day RX:90  Preferred Pharmacy with phone number:[facility]  Local or Mail Order:mail order  Ordering Provider:dr brush  Would the patient rather a call back or a response via [facility]? C/b  Best Call Back Number:[phone]    Additional Information: medication sent to wrong pharmacy    Please resend script to [facility]

## 2024-06-10 PROBLEM — Z00.00 MEDICARE ANNUAL WELLNESS VISIT, SUBSEQUENT: Status: RESOLVED | Noted: 2024-03-05 | Resolved: 2024-06-10

## 2024-06-30 DIAGNOSIS — E11.9 TYPE 2 DIABETES MELLITUS WITHOUT COMPLICATION, WITHOUT LONG-TERM CURRENT USE OF INSULIN: ICD-10-CM

## 2024-07-01 ENCOUNTER — TELEPHONE (OUTPATIENT)
Dept: INTERNAL MEDICINE | Facility: CLINIC | Age: 74
End: 2024-07-01
Payer: MEDICARE

## 2024-07-01 RX ORDER — BLOOD SUGAR DIAGNOSTIC
STRIP MISCELLANEOUS
Qty: 100 STRIP | Refills: 3 | Status: SHIPPED | OUTPATIENT
Start: 2024-07-01

## 2024-07-01 NOTE — TELEPHONE ENCOUNTER
----- Message from Jillian Smith sent at 7/1/2024 11:27 AM CDT -----  Regarding: advice  Who Called: Alejandrina Rodriguez    Caller is requesting assistance/information from provider's office.      Patient's Preferred Phone Number on File: [phone]     Additional Information: wanted to inform pcp that she is going to get her eyebrows micro bladed and was advised to stop taking coumadin for at least 72 hours. Stated that she will be starting it tomorrow

## 2024-07-03 DIAGNOSIS — K21.9 GASTROESOPHAGEAL REFLUX DISEASE, UNSPECIFIED WHETHER ESOPHAGITIS PRESENT: ICD-10-CM

## 2024-07-03 RX ORDER — OMEPRAZOLE 40 MG/1
CAPSULE, DELAYED RELEASE ORAL
Qty: 90 CAPSULE | Refills: 3 | Status: SHIPPED | OUTPATIENT
Start: 2024-07-03

## 2024-09-09 ENCOUNTER — OFFICE VISIT (OUTPATIENT)
Dept: URGENT CARE | Facility: CLINIC | Age: 74
End: 2024-09-09
Payer: MEDICARE

## 2024-09-09 ENCOUNTER — TELEPHONE (OUTPATIENT)
Dept: INTERNAL MEDICINE | Facility: CLINIC | Age: 74
End: 2024-09-09
Payer: MEDICARE

## 2024-09-09 VITALS
WEIGHT: 143 LBS | HEART RATE: 73 BPM | OXYGEN SATURATION: 97 % | SYSTOLIC BLOOD PRESSURE: 146 MMHG | DIASTOLIC BLOOD PRESSURE: 79 MMHG | RESPIRATION RATE: 18 BRPM | BODY MASS INDEX: 24.41 KG/M2 | HEIGHT: 64 IN | TEMPERATURE: 100 F

## 2024-09-09 DIAGNOSIS — R50.9 FEVER, UNSPECIFIED FEVER CAUSE: Primary | ICD-10-CM

## 2024-09-09 LAB
BILIRUB UR QL STRIP: NEGATIVE
CTP QC/QA: YES
GLUCOSE UR QL STRIP: NEGATIVE
KETONES UR QL STRIP: NEGATIVE
LEUKOCYTE ESTERASE UR QL STRIP: NEGATIVE
MOLECULAR STREP A: NEGATIVE
PH, POC UA: 6.5
POC BLOOD, URINE: NEGATIVE
POC MOLECULAR INFLUENZA A AGN: NEGATIVE
POC MOLECULAR INFLUENZA B AGN: NEGATIVE
POC NITRATES, URINE: NEGATIVE
PROT UR QL STRIP: NEGATIVE
SARS-COV-2 RDRP RESP QL NAA+PROBE: NEGATIVE
SP GR UR STRIP: 1.01 (ref 1–1.03)
UROBILINOGEN UR STRIP-ACNC: NORMAL (ref 0.1–1.1)

## 2024-09-09 RX ORDER — HYDROCHLOROTHIAZIDE 25 MG/1
25 TABLET ORAL
COMMUNITY

## 2024-09-09 NOTE — PROGRESS NOTES
"Subjective:      Patient ID: Alejandrina Rodriguez is a 74 y.o. female.    Vitals:  height is 5' 4" (1.626 m) and weight is 64.9 kg (143 lb). Her temperature is 99.5 °F (37.5 °C). Her blood pressure is 146/79 (abnormal) and her pulse is 73. Her respiration is 18 and oxygen saturation is 97%.     Chief Complaint: Fever     Patient is a 74 y.o. female with past medical history of hypertension, hyperlipidemia, diabetes, recurrent DVT on Coumadin, chronic pain on morphine, who presents to urgent care with complaints of not feeling well, feeling feverish and fatigue, generalized body aches which began last night.  She reports associated mild scratchy throat and postnasal drip.  She does describe some mild left otalgia. Patient states that she had low BP readings this morning on her home BP cuffs, readings range 5am to 9:30am. 103/44, 77/31, 79/34 , 81/55, 89/78, 88/44, 92/45.  She called into her primary care provider's office and was told to come to the urgent care for further evaluation and told to hold off on taking her blood pressure medication today.  Patient reports prior to leaving her house her blood pressure had normalized after walking around.  Patient denies any significant cough, nasal congestion, sinus pressure, neck stiffness, rash, abdominal pain, GI symptoms.  She denies any change in urination from her usual frequency from hydrochlorothiazide prescription.  Denies shortness of breath.      ROS   Objective:     Physical Exam   Constitutional: She is oriented to person, place, and time. She appears well-developed. She is cooperative.  Non-toxic appearance. She does not appear ill. No distress.   HENT:   Head: Normocephalic and atraumatic.   Ears:   Right Ear: Hearing, tympanic membrane, external ear and ear canal normal.   Left Ear: Hearing, tympanic membrane, external ear and ear canal normal.      Comments: Left TM: Clear effusion  Nose: Nose normal. No mucosal edema, rhinorrhea or nasal deformity. No " epistaxis. Right sinus exhibits no maxillary sinus tenderness and no frontal sinus tenderness. Left sinus exhibits no maxillary sinus tenderness and no frontal sinus tenderness.   Mouth/Throat: Uvula is midline, oropharynx is clear and moist and mucous membranes are normal. Mucous membranes are moist. No trismus in the jaw. Normal dentition. No uvula swelling. No oropharyngeal exudate, posterior oropharyngeal edema or posterior oropharyngeal erythema. Oropharynx is clear.      Comments: Clear postnasal drip  Eyes: Conjunctivae and lids are normal. No scleral icterus.   Neck: Trachea normal and phonation normal. Neck supple. No edema present. No erythema present. No neck rigidity present.   Cardiovascular: Normal rate, regular rhythm, normal heart sounds and normal pulses.   Pulmonary/Chest: Effort normal and breath sounds normal. No respiratory distress. She has no decreased breath sounds. She has no wheezes. She has no rhonchi.   Abdominal: Normal appearance.   Musculoskeletal: Normal range of motion.         General: No deformity. Normal range of motion.   Neurological: She is alert and oriented to person, place, and time. She exhibits normal muscle tone. Coordination normal.   Skin: Skin is warm, dry, intact, not diaphoretic and not pale.   Psychiatric: Her speech is normal and behavior is normal. Judgment and thought content normal.   Nursing note and vitals reviewed.         Previous History      Review of patient's allergies indicates:   Allergen Reactions    Hydrocodone-acetaminophen      Other reaction(s): Difficulty breathing, Throat tightness    Oxycodone-acetaminophen Hallucinations    Iodine     Meperidine      Other reaction(s): Unknown (qualifier value)    Promethazine      Other reaction(s): Unknown (qualifier value), Unknown (qualifier value)       Past Medical History:   Diagnosis Date    Abnormal blood smear     Acid reflux     Anxiety and depression     Bilateral carotid artery disease      Cervical spondylosis with radiculopathy     Chronic lumbar pain     Diabetes mellitus     History of continuous positive airway pressure (CPAP) therapy at home     HLD (hyperlipidemia)     Hypertension     Iron deficiency anemia, unspecified     Neuropathy     On anticoagulant therapy     Osteopenia     Personal history of colonic polyps 07/14/2014    Protein C deficiency     Recurrent deep vein thrombosis (DVT) 6/21/2022    Sleep apnea, unspecified     Unspecified osteoarthritis, unspecified site      Current Outpatient Medications   Medication Instructions    amLODIPine (NORVASC) 5 mg, Oral, Daily    azelastine (ASTELIN) 137 mcg, Nasal, 2 times daily    B-complex with vitamin C (Z-BEC OR EQUIV) tablet 1 tablet, Oral, Daily    baclofen (LIORESAL) 10 MG tablet Oral, 4 times daily, PRN muscle spasms    biotin 10,000 mcg TbDL biotin 10,000 mcg disintegrating tablet, [RxNorm: 6439500]    COMBIGAN 0.2-0.5 % Drop Both Eyes    cyanocobalamin (VITAMIN B-12) 100 mcg, Oral, Daily    famotidine (PEPCID) 20 MG tablet TAKE 1 TABLET TWICE DAILY    hydroCHLOROthiazide (HYDRODIURIL) 25 mg, Oral    lancets (ONETOUCH ULTRASOFT LANCETS) Misc 1 each, Misc.(Non-Drug; Combo Route), Daily    LEXAPRO 10 mg tablet TAKE 1 TABLET BY MOUTH ONCE DAILY AS DIRECTED FOR MOOD/CHRONIC PAIN AS DIRECTED    LIDOcaine (LIDODERM) 5 % 1 patch, Transdermal, Every 24 hours (non-standard times), Remove & Discard patch within 12 hours or as directed by MD    metroNIDAZOLE (METROGEL) 0.75 % gel 1 Application, Topical (Top), 2 times daily    morphine (MSIR) 7.5 mg, Oral, 2 times daily    nitroGLYCERIN (NITROSTAT) 0.4 mg, Sublingual, Every 5 min PRN    OMEGA-3-EPA-FISH OIL ORAL 1 capsule, Oral, Daily    omeprazole (PRILOSEC) 40 MG capsule TAKE 1 CAPSULE EVERY DAY    ONETOUCH ULTRA TEST Strp TEST BLOOD SUGAR EVERY DAY    polyethylene glycol (GLYCOLAX) 17 g, Oral, As needed (PRN)    pravastatin (PRAVACHOL) 40 mg, Oral, Daily    senna (SENOKOT) 8.6 mg tablet 1  "tablet, Oral, As needed (PRN)    vitamin D (VITAMIN D3) 5,000 Units, Oral, Daily    warfarin (COUMADIN) 4 mg, Oral, Daily     Past Surgical History:   Procedure Laterality Date    COLONOSCOPY  07/14/2014    COLONOSCOPY W/ POLYPECTOMY  08/29/2022    Humberto Chopra/ Follow up pending pathology results    LAPAROTOMY, EXPLORATORY N/A 6/4/2023    Procedure: LAPAROTOMY, EXPLORATORY;  Surgeon: Sd Conway Jr., MD;  Location: [facility];  Service: General;  Laterality: N/A;    LAPAROTOMY, EXPLORATORY N/A 6/5/2023    Procedure: LAPAROTOMY, EXPLORATORY;  Surgeon: Camilo Valadez MD;  Location: [facility];  Service: General;  Laterality: N/A;    LAPAROTOMY, EXPLORATORY N/A 6/7/2023    Procedure: LAPAROTOMY, EXPLORATORY;  Surgeon: Camilo Valadez MD;  Location: [facility];  Service: General;  Laterality: N/A;  CLOSURE OF WOUND     LYSIS OF ADHESIONS N/A 6/4/2023    Procedure: LYSIS, ADHESIONS;  Surgeon: Sd Conway Jr., MD;  Location: [facility];  Service: General;  Laterality: N/A;  detorsion of small bowel volvulus    SPINAL CORD STIMULATOR IMPLANT      WASHOUT N/A 6/7/2023    Procedure: WASHOUT;  Surgeon: Camilo Valadez MD;  Location: [facility];  Service: General;  Laterality: N/A;  WASHOUT OF ABDOMEN      Family History   Problem Relation Name Age of Onset    Cancer Mother      Hypertension Mother      Diabetes Mother      Arthritis Mother      Cervical cancer Mother      Hypertension Father      Diabetes Father      Arthritis Father      Heart disease Father      Prostate cancer Father         Social History     Tobacco Use    Smoking status: Never    Smokeless tobacco: Never   Substance Use Topics    Alcohol use: Never    Drug use: Never        Physical Exam      Vital Signs Reviewed   BP (!) 146/79   Pulse 73   Temp 99.5 °F (37.5 °C)   Resp 18   Ht 5' 4" (1.626 m)   Wt 64.9 kg (143 lb)   SpO2 97%   BMI 24.55 kg/m²        Procedures    Procedures     Labs     Results for orders placed or performed in visit on 09/09/24 "   POCT COVID-19 Rapid Screening   Result Value Ref Range    POC Rapid COVID Negative Negative     Acceptable Yes    POCT Influenza A/B MOLECULAR   Result Value Ref Range    POC Molecular Influenza A Ag Negative Negative    POC Molecular Influenza B Ag Negative Negative     Acceptable Yes    POCT Strep A, Molecular   Result Value Ref Range    Molecular Strep A, POC Negative Negative     Acceptable Yes    POCT Urinalysis, Dipstick, Manual, W/O Scope   Result Value Ref Range    POC Blood, Urine Negative Negative    POC Bilirubin, Urine Negative Negative    POC Urobilinogen, Urine normal 0.1 - 1.1    POC Ketones, Urine Negative Negative    POC Protein, Urine Negative Negative    POC Nitrates, Urine Negative Negative    POC Glucose, Urine Negative Negative    pH, UA 6.5     POC Specific Gravity, Urine 1.015 1.003 - 1.029    POC Leukocytes, Urine Negative Negative     Chest x-ray:    Reading Physician Reading Date Result Priority   Kya Earl MD  [phone] 9/9/2024 Routine     Narrative & Impression  EXAMINATION:  XR CHEST PA AND LATERAL     CLINICAL HISTORY:  fever; Fever, unspecified     TECHNIQUE:  Single frontal view of the chest was performed.     COMPARISON:  December 11, 2023     FINDINGS:  The lungs are clear, with normal appearance of pulmonary vasculature and no pleural effusion or pneumothorax.     The cardiac silhouette is normal in size. The hilar and mediastinal contours are unremarkable.     Bones are intact.     Impression:     No acute abnormality.        Electronically signed by:Kya Earl MD  Date:                                            09/09/2024  Time:                                           15:25  Assessment:     1. Fever, unspecified fever cause        Plan:       Fever, unspecified fever cause  -     POCT COVID-19 Rapid Screening  -     POCT Influenza A/B MOLECULAR  -     POCT Strep A, Molecular  -     POCT Urinalysis,  Dipstick, Manual, W/O Scope  -     X-Ray Chest PA And Lateral              Symptoms began last night however she is currently febrile in clinic, molecular testing completed for COVID, flu, strep, negative.  Urinalysis negative for infection and chest x-ray ordered.  Offered Tylenol in clinic however she declines.  Vital signs repeated.  Blood pressure remains stable.    Did discussed small possibility of false negative swabs as symptoms began within the last 24 hours however testing was all molecular and she is currently febrile.  Instructed to monitor symptoms over the next 24 hours, follow with primary care provider.  Strict ER precautions given for persistent low blood pressure, worsening of symptoms.    As discussed, continue to monitor symptoms over the next 24 hours.  Call your primary care provider's office and inform them of results from your visit today.  Negative flu, strep, COVID swabs in clinic.  Negative urinalysis.  Chest x-ray negative.  Drink plenty of fluids. Get plenty of rest.   Claritin, Zyrtec, or other over-the-counter antihistamine for runny nose, postnasal drip, nasal congestion.   Over-the-counter cough medication as needed and as directed.  Warm saltwater gargles for sore throat.  Warm water with honey to help coat the throat.  Throat lozenges.  Chloraseptic spray for worsening sore throat.  Tylenol as needed for fever.    Call or return to clinic as needed   Go to the ER with any significant change or worsening of symptoms including persistent low blood pressure.   Follow up with your primary care doctor.

## 2024-09-09 NOTE — TELEPHONE ENCOUNTER
----- Message from Liliane Collado sent at 9/9/2024  3:52 PM CDT -----  Regarding: med advice  Who Called: Alejandrina Rodriguez    Caller is requesting assistance/information from provider's office.    Symptoms (please be specific):    How long has patient had these symptoms:    List of preferred pharmacies on file (remove unneeded): [unfilled]  If different, enter pharmacy into here including location and phone number:       Preferred Method of Contact: Phone Call  Patient's Preferred Phone Number on File: [phone]   Best Call Back Number, if different:  Additional Information: pt is requesting a call back regarding urgent care visit

## 2024-09-09 NOTE — TELEPHONE ENCOUNTER
Pt stated that she is experiencing lots of fatigue. She declines any dizziness and light headedness. Pt took her b/p from 5am to 9:30am. 103/44, 77/31, 79/34 , 81/55, 89/78, 88/44, 92/45   please advise

## 2024-09-09 NOTE — PATIENT INSTRUCTIONS
As discussed, continue to monitor symptoms over the next 24 hours.  Call your primary care provider's office and inform them of results from your visit today.  Negative flu, strep, COVID swabs in clinic.  Negative urinalysis.  Chest x-ray negative.  Drink plenty of fluids. Get plenty of rest.   Claritin, Zyrtec, or other over-the-counter antihistamine for runny nose, postnasal drip, nasal congestion.   Over-the-counter cough medication as needed and as directed.  Warm saltwater gargles for sore throat.  Warm water with honey to help coat the throat.  Throat lozenges.  Chloraseptic spray for worsening sore throat.  Tylenol as needed for fever.    Call or return to clinic as needed   Go to the ER with any significant change or worsening of symptoms including persistent low blood pressure.   Follow up with your primary care doctor.

## 2024-09-10 NOTE — TELEPHONE ENCOUNTER
Pt advised not to take her HCTZ. Get home test and re swab for COVID if possible. Adequate hydration, tylenol as needed

## 2024-09-10 NOTE — TELEPHONE ENCOUNTER
Pt stated that she is still having neck, back, buttocks left side pain. UC suggested she take tylenol for her fever of 100.9.

## 2024-09-14 DIAGNOSIS — E78.2 MIXED HYPERLIPIDEMIA: ICD-10-CM

## 2024-09-16 RX ORDER — PRAVASTATIN SODIUM 40 MG/1
40 TABLET ORAL
Qty: 90 TABLET | Refills: 3 | Status: SHIPPED | OUTPATIENT
Start: 2024-09-16

## 2024-09-27 DIAGNOSIS — E11.9 TYPE 2 DIABETES MELLITUS WITHOUT COMPLICATION, WITHOUT LONG-TERM CURRENT USE OF INSULIN: ICD-10-CM

## 2024-09-30 RX ORDER — LANCETS 30 GAUGE
EACH MISCELLANEOUS
Qty: 100 EACH | Refills: 3 | Status: SHIPPED | OUTPATIENT
Start: 2024-09-30

## 2024-10-16 ENCOUNTER — TELEPHONE (OUTPATIENT)
Dept: INTERNAL MEDICINE | Facility: CLINIC | Age: 74
End: 2024-10-16
Payer: MEDICARE

## 2024-10-16 DIAGNOSIS — E03.9 HYPOTHYROIDISM, UNSPECIFIED TYPE: ICD-10-CM

## 2024-10-16 DIAGNOSIS — I10 PRIMARY HYPERTENSION: ICD-10-CM

## 2024-10-16 DIAGNOSIS — F32.4 MAJOR DEPRESSIVE DISORDER, SINGLE EPISODE, IN PARTIAL REMISSION: ICD-10-CM

## 2024-10-16 DIAGNOSIS — E55.9 VITAMIN D DEFICIENCY: ICD-10-CM

## 2024-10-16 DIAGNOSIS — E11.9 TYPE 2 DIABETES MELLITUS WITHOUT COMPLICATION, WITHOUT LONG-TERM CURRENT USE OF INSULIN: Primary | ICD-10-CM

## 2024-10-16 DIAGNOSIS — E78.2 MIXED HYPERLIPIDEMIA: ICD-10-CM

## 2024-10-16 DIAGNOSIS — I82.409 RECURRENT DEEP VEIN THROMBOSIS (DVT): ICD-10-CM

## 2024-10-16 NOTE — TELEPHONE ENCOUNTER
----- Message from Nirav sent at 10/16/2024  2:51 PM CDT -----  Who Called: Alejandrina Rodriguez    Caller is requesting assistance/information from provider's office.    Symptoms (please be specific):    How long has patient had these symptoms:    List of preferred pharmacies on file (remove unneeded): [unfilled]  If different, enter pharmacy into here including location and phone number:       Patient's Preferred Phone Number on File: [phone]   Best Call Back Number, if different:  Additional Information: pt would like to determine if labs are needed for upcoming appt, stated she normally has labs for follow up appt thats completed every 6 months

## 2024-10-16 NOTE — TELEPHONE ENCOUNTER
Pt informed       Cj Vicente Staff  Caller: Unspecified (Today,  3:24 PM)  Who Called: Alejandrina VOGEL Michael    Patient is returning phone call    Who Left Message for Patient:Dorothea  Does the patient know what this is regarding?:      Preferred Method of Contact: Phone Call  Patient's Preferred Phone Number on File: [phone]  Best Call Back Number, if different:  Additional Information: Pt states she has a missed call from Dorothea.

## 2024-10-23 ENCOUNTER — LAB VISIT (OUTPATIENT)
Dept: LAB | Facility: HOSPITAL | Age: 74
End: 2024-10-23
Attending: INTERNAL MEDICINE
Payer: MEDICARE

## 2024-10-23 DIAGNOSIS — Z12.11 SPECIAL SCREENING FOR MALIGNANT NEOPLASMS, COLON: ICD-10-CM

## 2024-10-23 DIAGNOSIS — Z86.0100 HX OF COLONIC POLYPS: Primary | ICD-10-CM

## 2024-10-23 LAB
HEMOCCULT SP1 STL QL: NEGATIVE
HEMOCCULT SP2 STL QL: NEGATIVE
HEMOCCULT SP3 STL QL: NEGATIVE

## 2024-10-23 PROCEDURE — 82270 OCCULT BLOOD FECES: CPT

## 2024-10-25 ENCOUNTER — TELEPHONE (OUTPATIENT)
Dept: INTERNAL MEDICINE | Facility: CLINIC | Age: 74
End: 2024-10-25
Payer: MEDICARE

## 2024-10-25 NOTE — TELEPHONE ENCOUNTER
----- Message from Med Assistant Albarado sent at 10/23/2024 12:30 PM CDT -----  Regarding: MAU 11/6/24 @ 3:20 Dr. Madrid  1. Are there any outstanding tasks in the patient's chart? Yes, fasting labs    2. Is there any documentation in the chart? No    3.Has patient been seen in an ER, Urgent care clinic, or been admitted since last visit?  If yes, When, where, and why    4. Has patient seen any other healthcare providers since last visit?  If yes, when, where, and why    5. Has patient had any bloodwork or XR done since last visit?    6. Is patient signed up for patient portal?

## 2024-10-28 ENCOUNTER — LAB VISIT (OUTPATIENT)
Dept: LAB | Facility: HOSPITAL | Age: 74
End: 2024-10-28
Attending: INTERNAL MEDICINE
Payer: MEDICARE

## 2024-10-28 DIAGNOSIS — E11.9 TYPE 2 DIABETES MELLITUS WITHOUT COMPLICATION, WITHOUT LONG-TERM CURRENT USE OF INSULIN: ICD-10-CM

## 2024-10-28 DIAGNOSIS — I10 PRIMARY HYPERTENSION: ICD-10-CM

## 2024-10-28 DIAGNOSIS — I82.409 RECURRENT DEEP VEIN THROMBOSIS (DVT): ICD-10-CM

## 2024-10-28 DIAGNOSIS — E55.9 VITAMIN D DEFICIENCY: ICD-10-CM

## 2024-10-28 DIAGNOSIS — F32.4 MAJOR DEPRESSIVE DISORDER, SINGLE EPISODE, IN PARTIAL REMISSION: ICD-10-CM

## 2024-10-28 DIAGNOSIS — E78.2 MIXED HYPERLIPIDEMIA: ICD-10-CM

## 2024-10-28 DIAGNOSIS — E03.9 HYPOTHYROIDISM, UNSPECIFIED TYPE: ICD-10-CM

## 2024-10-28 LAB
25(OH)D3+25(OH)D2 SERPL-MCNC: 49 NG/ML (ref 30–80)
ALBUMIN SERPL-MCNC: 3.3 G/DL (ref 3.4–4.8)
ALBUMIN/GLOB SERPL: 1 RATIO (ref 1.1–2)
ALP SERPL-CCNC: 93 UNIT/L (ref 40–150)
ALT SERPL-CCNC: 21 UNIT/L (ref 0–55)
ANION GAP SERPL CALC-SCNC: 6 MEQ/L
AST SERPL-CCNC: 32 UNIT/L (ref 5–34)
BACTERIA #/AREA URNS AUTO: ABNORMAL /HPF
BASOPHILS # BLD AUTO: 0.02 X10(3)/MCL
BASOPHILS NFR BLD AUTO: 0.3 %
BILIRUB SERPL-MCNC: 0.8 MG/DL
BILIRUB UR QL STRIP.AUTO: NEGATIVE
BUN SERPL-MCNC: 11 MG/DL (ref 9.8–20.1)
CALCIUM SERPL-MCNC: 8.9 MG/DL (ref 8.4–10.2)
CHLORIDE SERPL-SCNC: 107 MMOL/L (ref 98–107)
CHOLEST SERPL-MCNC: 137 MG/DL
CHOLEST/HDLC SERPL: 3 {RATIO} (ref 0–5)
CLARITY UR: CLEAR
CO2 SERPL-SCNC: 31 MMOL/L (ref 23–31)
COLOR UR AUTO: YELLOW
CREAT SERPL-MCNC: 0.67 MG/DL (ref 0.55–1.02)
CREAT/UREA NIT SERPL: 16
EOSINOPHIL # BLD AUTO: 0.13 X10(3)/MCL (ref 0–0.9)
EOSINOPHIL NFR BLD AUTO: 2.1 %
ERYTHROCYTE [DISTWIDTH] IN BLOOD BY AUTOMATED COUNT: 14.7 % (ref 11.5–17)
EST. AVERAGE GLUCOSE BLD GHB EST-MCNC: 122.6 MG/DL
GFR SERPLBLD CREATININE-BSD FMLA CKD-EPI: >60 ML/MIN/1.73/M2
GLOBULIN SER-MCNC: 3.2 GM/DL (ref 2.4–3.5)
GLUCOSE SERPL-MCNC: 103 MG/DL (ref 82–115)
GLUCOSE UR QL STRIP: NORMAL
HBA1C MFR BLD: 5.9 %
HCT VFR BLD AUTO: 41.2 % (ref 37–47)
HDLC SERPL-MCNC: 43 MG/DL (ref 35–60)
HGB BLD-MCNC: 13.2 G/DL (ref 12–16)
HGB UR QL STRIP: NEGATIVE
IMM GRANULOCYTES # BLD AUTO: 0.01 X10(3)/MCL (ref 0–0.04)
IMM GRANULOCYTES NFR BLD AUTO: 0.2 %
KETONES UR QL STRIP: NEGATIVE
LDLC SERPL CALC-MCNC: 78 MG/DL (ref 50–140)
LEUKOCYTE ESTERASE UR QL STRIP: 500
LYMPHOCYTES # BLD AUTO: 1.1 X10(3)/MCL (ref 0.6–4.6)
LYMPHOCYTES NFR BLD AUTO: 17.5 %
MCH RBC QN AUTO: 31.8 PG (ref 27–31)
MCHC RBC AUTO-ENTMCNC: 32 G/DL (ref 33–36)
MCV RBC AUTO: 99.3 FL (ref 80–94)
MONOCYTES # BLD AUTO: 0.55 X10(3)/MCL (ref 0.1–1.3)
MONOCYTES NFR BLD AUTO: 8.7 %
MUCOUS THREADS URNS QL MICRO: ABNORMAL /LPF
NEUTROPHILS # BLD AUTO: 4.49 X10(3)/MCL (ref 2.1–9.2)
NEUTROPHILS NFR BLD AUTO: 71.2 %
NITRITE UR QL STRIP: NEGATIVE
NRBC BLD AUTO-RTO: 0 %
PH UR STRIP: 7.5 [PH]
PLATELET # BLD AUTO: 162 X10(3)/MCL (ref 130–400)
PMV BLD AUTO: 11.3 FL (ref 7.4–10.4)
POTASSIUM SERPL-SCNC: 4.7 MMOL/L (ref 3.5–5.1)
PROT SERPL-MCNC: 6.5 GM/DL (ref 5.8–7.6)
PROT UR QL STRIP: ABNORMAL
RBC # BLD AUTO: 4.15 X10(6)/MCL (ref 4.2–5.4)
RBC #/AREA URNS AUTO: ABNORMAL /HPF
SODIUM SERPL-SCNC: 144 MMOL/L (ref 136–145)
SP GR UR STRIP.AUTO: 1.02 (ref 1–1.03)
SQUAMOUS #/AREA URNS LPF: ABNORMAL /HPF
TRIGL SERPL-MCNC: 79 MG/DL (ref 37–140)
TSH SERPL-ACNC: 0.8 UIU/ML (ref 0.35–4.94)
UROBILINOGEN UR STRIP-ACNC: NORMAL
VLDLC SERPL CALC-MCNC: 16 MG/DL
WBC # BLD AUTO: 6.3 X10(3)/MCL (ref 4.5–11.5)
WBC #/AREA URNS AUTO: ABNORMAL /HPF

## 2024-10-28 PROCEDURE — 81001 URINALYSIS AUTO W/SCOPE: CPT

## 2024-10-28 PROCEDURE — 84443 ASSAY THYROID STIM HORMONE: CPT

## 2024-10-28 PROCEDURE — 82306 VITAMIN D 25 HYDROXY: CPT

## 2024-10-28 PROCEDURE — 83036 HEMOGLOBIN GLYCOSYLATED A1C: CPT

## 2024-10-28 PROCEDURE — 85025 COMPLETE CBC W/AUTO DIFF WBC: CPT

## 2024-10-28 PROCEDURE — 36415 COLL VENOUS BLD VENIPUNCTURE: CPT

## 2024-10-28 PROCEDURE — 80061 LIPID PANEL: CPT

## 2024-10-28 PROCEDURE — 80053 COMPREHEN METABOLIC PANEL: CPT

## 2024-11-07 ENCOUNTER — OFFICE VISIT (OUTPATIENT)
Dept: INTERNAL MEDICINE | Facility: CLINIC | Age: 74
End: 2024-11-07
Payer: MEDICARE

## 2024-11-07 ENCOUNTER — LAB VISIT (OUTPATIENT)
Dept: LAB | Facility: HOSPITAL | Age: 74
End: 2024-11-07
Attending: NURSE PRACTITIONER
Payer: MEDICARE

## 2024-11-07 VITALS
TEMPERATURE: 98 F | HEART RATE: 58 BPM | WEIGHT: 157 LBS | OXYGEN SATURATION: 98 % | HEIGHT: 64 IN | SYSTOLIC BLOOD PRESSURE: 110 MMHG | DIASTOLIC BLOOD PRESSURE: 64 MMHG | BODY MASS INDEX: 26.8 KG/M2

## 2024-11-07 DIAGNOSIS — H04.123 DRY EYE SYNDROME OF BOTH EYES: ICD-10-CM

## 2024-11-07 DIAGNOSIS — R53.83 OTHER FATIGUE: ICD-10-CM

## 2024-11-07 DIAGNOSIS — E11.51 TYPE 2 DIABETES MELLITUS WITH DIABETIC PERIPHERAL ANGIOPATHY WITHOUT GANGRENE, WITHOUT LONG-TERM CURRENT USE OF INSULIN: ICD-10-CM

## 2024-11-07 DIAGNOSIS — L71.9 ROSACEA: Chronic | ICD-10-CM

## 2024-11-07 DIAGNOSIS — H57.13 PAIN OF BOTH EYES: ICD-10-CM

## 2024-11-07 DIAGNOSIS — H57.13 PAIN OF BOTH EYES: Primary | ICD-10-CM

## 2024-11-07 PROBLEM — E78.5 DYSLIPIDEMIA: Status: ACTIVE | Noted: 2020-06-09

## 2024-11-07 PROBLEM — D68.59 PROTEIN C DEFICIENCY: Status: ACTIVE | Noted: 2017-09-07

## 2024-11-07 PROBLEM — M19.90 OSTEOARTHRITIS: Status: ACTIVE | Noted: 2024-11-07

## 2024-11-07 PROBLEM — Z99.89 USES CONTINUOUS POSITIVE AIRWAY PRESSURE (CPAP) VENTILATION AT HOME: Status: ACTIVE | Noted: 2024-11-07

## 2024-11-07 PROBLEM — M47.22 CERVICAL SPONDYLOSIS WITH RADICULOPATHY: Status: ACTIVE | Noted: 2024-11-07

## 2024-11-07 PROBLEM — Z87.39: Status: ACTIVE | Noted: 2020-06-09

## 2024-11-07 PROBLEM — I10 HYPERTENSION: Status: ACTIVE | Noted: 2020-06-09

## 2024-11-07 PROBLEM — I65.21 STENOSIS OF RIGHT CAROTID ARTERY: Status: ACTIVE | Noted: 2022-12-07

## 2024-11-07 PROBLEM — F41.8 MIXED ANXIETY DEPRESSIVE DISORDER: Status: ACTIVE | Noted: 2023-03-01

## 2024-11-07 PROBLEM — R73.09 ABNORMAL GLUCOSE TOLERANCE TEST (GTT): Status: ACTIVE | Noted: 2024-11-07

## 2024-11-07 PROBLEM — I82.409 RECURRENT DEEP VEIN THROMBOSIS (DVT): Status: ACTIVE | Noted: 2017-09-07

## 2024-11-07 PROBLEM — E11.9 TYPE 2 DIABETES MELLITUS: Status: ACTIVE | Noted: 2020-06-09

## 2024-11-07 PROBLEM — G47.30 SLEEP APNEA: Status: ACTIVE | Noted: 2020-06-09

## 2024-11-07 PROBLEM — Z98.890 HISTORY OF BACK SURGERY: Status: ACTIVE | Noted: 2020-06-09

## 2024-11-07 PROBLEM — Z86.2 H/O PROTEIN C DEFICIENCY: Status: ACTIVE | Noted: 2020-06-09

## 2024-11-07 LAB
C4 SERPL-MCNC: 28 MG/DL (ref 13–46)
CRP SERPL-MCNC: <1 MG/L
ERYTHROCYTE [SEDIMENTATION RATE] IN BLOOD: 11 MM/HR (ref 0–20)
RHEUMATOID FACT SERPL-ACNC: <13 IU/ML
URATE SERPL-MCNC: 6.1 MG/DL (ref 2.6–6)
VIT B12 SERPL-MCNC: 807 PG/ML (ref 213–816)

## 2024-11-07 PROCEDURE — 86200 CCP ANTIBODY: CPT

## 2024-11-07 PROCEDURE — 99214 OFFICE O/P EST MOD 30 MIN: CPT | Mod: ,,, | Performed by: NURSE PRACTITIONER

## 2024-11-07 PROCEDURE — 3044F HG A1C LEVEL LT 7.0%: CPT | Mod: CPTII,,, | Performed by: NURSE PRACTITIONER

## 2024-11-07 PROCEDURE — 3078F DIAST BP <80 MM HG: CPT | Mod: CPTII,,, | Performed by: NURSE PRACTITIONER

## 2024-11-07 PROCEDURE — 1125F AMNT PAIN NOTED PAIN PRSNT: CPT | Mod: CPTII,,, | Performed by: NURSE PRACTITIONER

## 2024-11-07 PROCEDURE — 82607 VITAMIN B-12: CPT

## 2024-11-07 PROCEDURE — 86431 RHEUMATOID FACTOR QUANT: CPT

## 2024-11-07 PROCEDURE — 1101F PT FALLS ASSESS-DOCD LE1/YR: CPT | Mod: CPTII,,, | Performed by: NURSE PRACTITIONER

## 2024-11-07 PROCEDURE — 86235 NUCLEAR ANTIGEN ANTIBODY: CPT

## 2024-11-07 PROCEDURE — 3061F NEG MICROALBUMINURIA REV: CPT | Mod: CPTII,,, | Performed by: NURSE PRACTITIONER

## 2024-11-07 PROCEDURE — 86431 RHEUMATOID FACTOR QUANT: CPT | Mod: 59

## 2024-11-07 PROCEDURE — 1159F MED LIST DOCD IN RCRD: CPT | Mod: CPTII,,, | Performed by: NURSE PRACTITIONER

## 2024-11-07 PROCEDURE — 1160F RVW MEDS BY RX/DR IN RCRD: CPT | Mod: CPTII,,, | Performed by: NURSE PRACTITIONER

## 2024-11-07 PROCEDURE — 86140 C-REACTIVE PROTEIN: CPT

## 2024-11-07 PROCEDURE — 86039 ANTINUCLEAR ANTIBODIES (ANA): CPT

## 2024-11-07 PROCEDURE — 3066F NEPHROPATHY DOC TX: CPT | Mod: CPTII,,, | Performed by: NURSE PRACTITIONER

## 2024-11-07 PROCEDURE — 36415 COLL VENOUS BLD VENIPUNCTURE: CPT

## 2024-11-07 PROCEDURE — 86160 COMPLEMENT ANTIGEN: CPT

## 2024-11-07 PROCEDURE — 85652 RBC SED RATE AUTOMATED: CPT

## 2024-11-07 PROCEDURE — 84550 ASSAY OF BLOOD/URIC ACID: CPT

## 2024-11-07 PROCEDURE — 3288F FALL RISK ASSESSMENT DOCD: CPT | Mod: CPTII,,, | Performed by: NURSE PRACTITIONER

## 2024-11-07 PROCEDURE — 3074F SYST BP LT 130 MM HG: CPT | Mod: CPTII,,, | Performed by: NURSE PRACTITIONER

## 2024-11-07 NOTE — PROGRESS NOTES
"Subjective:      Patient ID: Alejandrina Rodriguez is a 74 y.o. female.    Chief Complaint: Eye Pain      HPI: Patient of Dr. Madrid's here today for complaints of bilateral eye pain and redness x 2-3 months. She did see Dr. Sicard, Eye MD with multiple drops Rx'd.  Some visual blurring and eye discomfort. She reports issues with dry mouth as well. Denies previous work up for autoimmune issues. Some occ eye crusting noted upon awakening. No further d/c noted.    Review of patient's allergies indicates:   Allergen Reactions    Hydrocodone-acetaminophen      Other reaction(s): Difficulty breathing, Throat tightness    Oxycodone-acetaminophen Hallucinations    Iodine     Meperidine      Other reaction(s): Unknown (qualifier value)    Promethazine      Other reaction(s): Unknown (qualifier value), Unknown (qualifier value)       Review of Systems  Constitutional: No fever, No chills, No sweats, No fatigue, No weight loss.  Eyes: No blurring. Francisco Javier eye redness/irritation  Ear/Nose/Mouth/Throat: No nasal congestion, No vertigo.  Respiratory: No shortness of breath, No cough, No sputum production, No wheezing, No sleep apnea, No exertional dyspnea.   Cardiovascular: No chest pain, No palpitations  Neurologic: No altered mental status, No headaches.  Psychiatrics: No anxiety,No depression, No SI/HI.  Objective:   Visit Vitals  /64 (BP Location: Left arm, Patient Position: Sitting)   Pulse (!) 58   Temp 97.6 °F (36.4 °C) (Temporal)   Ht 5' 4" (1.626 m)   Wt 71.2 kg (157 lb)   SpO2 98%   BMI 26.95 kg/m²     The patient's weight trend is below:   Wt Readings from Last 4 Encounters:   11/07/24 71.2 kg (157 lb)   09/09/24 64.9 kg (143 lb)   03/05/24 64.9 kg (143 lb)   02/15/24 65.8 kg (145 lb)        Physical Exam  Vitals and nursing note reviewed.   Constitutional:       General: She is not in acute distress.     Appearance: Normal appearance. She is normal weight. She is not ill-appearing, toxic-appearing or diaphoretic. "   HENT:      Head: Normocephalic and atraumatic.      Right Ear: Tympanic membrane, ear canal and external ear normal.      Left Ear: Tympanic membrane, ear canal and external ear normal.      Nose: No congestion or rhinorrhea.      Mouth/Throat:      Mouth: Mucous membranes are moist.      Pharynx: Oropharynx is clear. No oropharyngeal exudate or posterior oropharyngeal erythema.   Eyes:      General: Lids are normal. Lids are everted, no foreign bodies appreciated. Vision grossly intact. Gaze aligned appropriately. No allergic shiner, visual field deficit or scleral icterus.        Right eye: No discharge.         Left eye: No discharge.      Extraocular Movements: Extraocular movements intact.      Conjunctiva/sclera:      Right eye: Right conjunctiva is injected (sclera).      Left eye: Left conjunctiva is injected (sclera).      Pupils: Pupils are equal, round, and reactive to light.   Cardiovascular:      Rate and Rhythm: Normal rate and regular rhythm.      Pulses: Normal pulses.      Heart sounds: Normal heart sounds. No murmur heard.  Pulmonary:      Effort: Pulmonary effort is normal. No respiratory distress.      Breath sounds: Normal breath sounds. No stridor. No wheezing, rhonchi or rales.   Musculoskeletal:         General: Normal range of motion.      Cervical back: Normal range of motion and neck supple. No rigidity or tenderness.   Lymphadenopathy:      Cervical: No cervical adenopathy.   Skin:     General: Skin is warm and dry.   Neurological:      General: No focal deficit present.      Mental Status: She is alert and oriented to person, place, and time. Mental status is at baseline.      Motor: No weakness.   Psychiatric:         Mood and Affect: Mood normal.         Thought Content: Thought content normal.         Judgment: Judgment normal.         Assessment/Plan:   1. Pain of both eyes  Work up with autoimmune profile  Upcoming procedure per Ophthalmology for tear ducts    - EMILY Screen  w/Reflex; Future  - Sedimentation rate; Future  - C-Reactive Protein; Future  - CYCLIC CITRULLINATED PEPTIDE (CCP) ANTIBODY; Future  - Uric Acid; Future  - C4 Complement; Future  - Rheumatoid Factors, IgA, IgG, IgM; Future    2. Dry eye syndrome of both eyes  See #1    - EMILY Screen w/Reflex; Future  - Sedimentation rate; Future  - C-Reactive Protein; Future  - CYCLIC CITRULLINATED PEPTIDE (CCP) ANTIBODY; Future  - Uric Acid; Future  - C4 Complement; Future  - Rheumatoid Factors, IgA, IgG, IgM; Future    3. Other fatigue  Reviewed all labs from last week-she missed appt with Dr. Madrid due to being late    - EMILY Screen w/Reflex; Future  - Sedimentation rate; Future  - C-Reactive Protein; Future  - CYCLIC CITRULLINATED PEPTIDE (CCP) ANTIBODY; Future  - Uric Acid; Future  - C4 Complement; Future  - Rheumatoid Factors, IgA, IgG, IgM; Future  - Vitamin B12; Future    4. Type 2 diabetes mellitus with diabetic peripheral angiopathy without gangrene, without long-term current use of insulin  DIABETES RECOMMENDATIONS:  diabetic diet discussed in detail, low cholesterol diet, weight control and daily exercise discussed with recommendation for 150 minutes per week, home glucose monitoring emphasized, all medications, side effects and compliance discussed carefully, low carbohydrate diet , and continue diet and exercise for management of diabetes    5. Rosacea  Stable on current dosing of metrogel      Medication List with Changes/Refills   Current Medications    AZELASTINE (ASTELIN) 137 MCG (0.1 %) NASAL SPRAY    1 spray (137 mcg total) by Nasal route 2 (two) times daily.    B-COMPLEX WITH VITAMIN C (Z-BEC OR EQUIV) TABLET    Take 1 tablet by mouth once daily.    BACLOFEN (LIORESAL) 10 MG TABLET    Take by mouth 4 (four) times daily. PRN muscle spasms    BIOTIN 10,000 MCG TBDL    biotin 10,000 mcg disintegrating tablet, [RxNorm: 1473079]    COMBIGAN 0.2-0.5 % DROP    Place into both eyes.    CYANOCOBALAMIN (VITAMIN B-12) 100  MCG TABLET    Take 100 mcg by mouth once daily.    FAMOTIDINE (PEPCID) 20 MG TABLET    TAKE 1 TABLET TWICE DAILY    HYDROCHLOROTHIAZIDE (HYDRODIURIL) 25 MG TABLET    Take 25 mg by mouth once daily.    LEXAPRO 10 MG TABLET    once daily.    LIDOCAINE (LIDODERM) 5 %    Place 1 patch onto the skin every 24 hours. Remove & Discard patch within 12 hours or as directed by MD    METRONIDAZOLE (METROGEL) 0.75 % GEL    Apply 1 Application topically 2 (two) times daily.    MORPHINE (MSIR) 15 MG TABLET    Take 0.5 tablets (7.5 mg total) by mouth 2 (two) times a day.    NITROGLYCERIN (NITROSTAT) 0.4 MG SL TABLET    Place 0.4 mg under the tongue every 5 (five) minutes as needed for Chest pain.    NON FORMULARY MEDICATION    Naphcan eye drops  BID    OMEGA-3-EPA-FISH OIL ORAL    Take 1 capsule by mouth once daily.    OMEPRAZOLE (PRILOSEC) 40 MG CAPSULE    TAKE 1 CAPSULE EVERY DAY    ONETOUCH ULTRA TEST STRP    TEST BLOOD SUGAR EVERY DAY    ONETOUCH ULTRASOFT 2 LANCET 30 GAUGE MISC    TEST EVERY DAY    POLYETHYLENE GLYCOL (GLYCOLAX) 17 GRAM/DOSE POWDER    Take 17 g by mouth as needed.    PRAVASTATIN (PRAVACHOL) 40 MG TABLET    TAKE 1 TABLET ONE TIME DAILY    PROPYLENE GLYCOL (SYSTANE BALANCE OPHT)    Apply to eye. PRN    SENNA (SENOKOT) 8.6 MG TABLET    Take 1 tablet by mouth as needed for Constipation.    VITAMIN D (VITAMIN D3) 1000 UNITS TAB    Take 5,000 Units by mouth once daily.    WARFARIN (COUMADIN) 4 MG TABLET    Take 1 tablet (4 mg total) by mouth Daily.        No follow-ups on file.    Chemistry:  Lab Results   Component Value Date     10/28/2024    K 4.7 10/28/2024    BUN 11.0 10/28/2024    CREATININE 0.67 10/28/2024    EGFRNORACEVR >60 10/28/2024    GLUCOSE 103 10/28/2024    CALCIUM 8.9 10/28/2024    ALKPHOS 93 10/28/2024    LABPROT 6.5 10/28/2024    ALBUMIN 3.3 (L) 10/28/2024    BILIDIR 0.2 09/13/2021    IBILI 0.30 09/13/2021    AST 32 10/28/2024    ALT 21 10/28/2024    MG 1.90 06/18/2023    PHOS 3.9 06/18/2023     IQCNUMZW98CG 49 10/28/2024        Lab Results   Component Value Date    HGBA1C 5.9 10/28/2024        Hematology:  Lab Results   Component Value Date    WBC 6.30 10/28/2024    HGB 13.2 10/28/2024    HCT 41.2 10/28/2024     10/28/2024       Lipid Panel:  Lab Results   Component Value Date    CHOL 137 10/28/2024    HDL 43 10/28/2024    LDL 78.00 10/28/2024    TRIG 79 10/28/2024    TOTALCHOLEST 3 10/28/2024        Urine:  Lab Results   Component Value Date    APPEARANCEUA Clear 10/28/2024    SGUA 1.020 10/28/2024    PROTEINUA Trace (A) 10/28/2024    KETONESUA Negative 10/28/2024    LEUKOCYTESUR 500 (A) 10/28/2024    RBCUA 0-5 10/28/2024    WBCUA 0-5 10/28/2024    BACTERIA Trace 10/28/2024    SQEPUA Few (A) 10/28/2024    CREATRANDUR 154.0 (H) 02/29/2024        No future appointments.

## 2024-11-11 LAB
ANTINUCLEAR ANTIBODY SCREEN (OHS): POSITIVE
CYCLIC CITRULLINATED PEPTIDE (CCP) (OHS): 1.6 U/ML
DSDNA AB QUANT (OHS): 1.3 IU/ML
RHEUMATOID FACTOR IGA QUANTITATIVE (OLG): 12 IU/ML
RHEUMATOID FACTOR IGM QUANTITATIVE (OLG): 1.5 IU/ML

## 2024-11-14 ENCOUNTER — TELEPHONE (OUTPATIENT)
Dept: INTERNAL MEDICINE | Facility: CLINIC | Age: 74
End: 2024-11-14
Payer: MEDICARE

## 2024-11-14 ENCOUNTER — LAB VISIT (OUTPATIENT)
Dept: LAB | Facility: HOSPITAL | Age: 74
End: 2024-11-14
Attending: NURSE PRACTITIONER
Payer: MEDICARE

## 2024-11-14 DIAGNOSIS — R76.8 POSITIVE ANA (ANTINUCLEAR ANTIBODY): Primary | ICD-10-CM

## 2024-11-14 DIAGNOSIS — R76.8 POSITIVE ANA (ANTINUCLEAR ANTIBODY): ICD-10-CM

## 2024-11-14 LAB
CENTROMERE QUANT (OHS): <0.4 U/ML
JO-1 AB QUANT (OHS): <0.3 U/ML
RNP70 AB QUANT (OHS): 2.1 U/ML
SCL-70S AB QUANT (OHS): 0.7 U/ML
SMITH AB QUANT (OHS): <0.7 U/ML
SSA(RO) AB QUANT (OHS): 75 U/ML
SSB(LA) AB QUANT (OHS): <0.4 U/ML
U1RNP AB QUANT (OHS): 1.9 U/ML

## 2024-11-14 PROCEDURE — 86376 MICROSOMAL ANTIBODY EACH: CPT

## 2024-11-14 PROCEDURE — 36415 COLL VENOUS BLD VENIPUNCTURE: CPT

## 2024-11-14 PROCEDURE — 84432 ASSAY OF THYROGLOBULIN: CPT

## 2024-11-14 PROCEDURE — 86235 NUCLEAR ANTIGEN ANTIBODY: CPT

## 2024-11-14 PROCEDURE — 86039 ANTINUCLEAR ANTIBODIES (ANA): CPT

## 2024-11-14 NOTE — TELEPHONE ENCOUNTER
----- Message from Alex Treviño MD sent at 11/14/2024 11:20 AM CST -----  Patient with a initial screening EMILY that is positive;  need to order EMILY ratio which is the Boo panel; check TPO and thyroglobulin antibodies as well as Ro and La antibodies for Sjrogrens

## 2024-11-14 NOTE — PROGRESS NOTES
Patient with a initial screening EMILY that is positive;  need to order EMILY ratio which is the Boo panel; check TPO and thyroglobulin antibodies as well as Ro and La antibodies for Sjrogrens

## 2024-11-15 LAB
ANA PAT SER IF-IMP: ABNORMAL
ANA SER QL HEP2 SUBST: ABNORMAL
ANA TITR SER HEP2 SUBST: ABNORMAL {TITER}
ENDOCRINOLOGIST REVIEW: ABNORMAL
SSA(RO) AB QUANT (OHS): 79 U/ML
SSB(LA) AB QUANT (OHS): 0.6 U/ML
THYROGLOB AB SERPL IA-ACNC: <1.8 IU/ML
THYROGLOB SERPL-MCNC: 12 NG/ML
THYROID PEROXIDASE QUANT (OLG): <4 IU/ML

## 2024-11-15 NOTE — TELEPHONE ENCOUNTER
Please verify labs ordered yesterday were completed (EMILY ratio, TPO, thyroglobulin, SSA RO, and SSO LA.  No further testing recommended and will follow up with remaining results once reviewed. Proceed with current POC.  One of markers has resulted positive with suspicion for autoimmune condition.

## 2024-11-15 NOTE — TELEPHONE ENCOUNTER
Pt stated she did some labwork on yesterday. Wants to know if she has to get more done?       Liliane Collado Staff  Caller: Unspecified (Today,  8:56 AM)  Who Called: Alejandrina Rodriguez    Patient is returning phone call    Who Left Message for Patient:Dorothea  Does the patient know what this is regarding?:results      Preferred Method of Contact: Phone Call  Patient's Preferred Phone Number on File: [phone]  Best Call Back Number, if different:  Additional Information:

## 2024-11-20 ENCOUNTER — TELEPHONE (OUTPATIENT)
Dept: INTERNAL MEDICINE | Facility: CLINIC | Age: 74
End: 2024-11-20
Payer: MEDICARE

## 2024-11-20 NOTE — TELEPHONE ENCOUNTER
1st atc lvm to call back     Alex Treviño MD  P Galen Johnson Staff; Saray, Chandni, NP  Caller: Unspecified (Yesterday,  1:34 PM)  This test was ordered by more NP Saray.  Most common reason for positive EMILY is to have autoimmune driven thyroid disease so suggest TPO and thyroglobulin antibody check.  Remainder of her workup was normal I also advised from a recent to add on a stroke runs antibody testing  If patient wishes to discuss further will need appointment, again she did not see me for this testing          Previous Messages       ----- Message -----  From: Per Medrano MA  Sent: 11/19/2024   4:47 PM CST  To: Alex Treviño MD      ----- Message -----  From: Chandni So NP  Sent: 11/19/2024   4:28 PM CST  To: Per Medrano MA    Addressed, see note to Dr. Madrid. Will await any further guidance  ----- Message -----  From: Per Medrano MA  Sent: 11/19/2024   4:12 PM CST  To: Chandni So NP      ----- Message -----  From: Dorothea Santamaria CMA  Sent: 11/19/2024   3:50 PM CST  To: Per Medrano MA    Please get marssia to check labs  ----- Message -----  From: Noemi Dietz  Sent: 11/19/2024   1:35 PM CST  To: Galen Johnson Staff    Who Called: Alejandrina JASPREET Rodriguez    Patient is returning phone call    Who Left Message for Patient:  Does the patient know what this is regarding?:results      Preferred Method of Contact: Phone Call  Patient's Preferred Phone Number on File: [phone]  Best Call Back Number, if different:  Additional Information: pt called to speak with nurse regarding EMILY positive lab results pls advise

## 2024-11-20 NOTE — TELEPHONE ENCOUNTER
----- Message from Alex Treviño MD sent at 11/20/2024  9:42 AM CST -----  So her testing for Sjogren's is positive please refer patient to Dr. Saumya Allen  Antibody thyroid testing negative.

## 2024-11-20 NOTE — PROGRESS NOTES
So her testing for Sjogren's is positive please refer patient to Dr. Saumya Allen  Antibody thyroid testing negative.

## 2024-11-20 NOTE — TELEPHONE ENCOUNTER
Pt informed of results, voiced understanding             [facility] Staff  Caller: Unspecified (Today,  2:23 PM)  Who Called: Alejandrina Rodriguez    Patient is returning phone call    Who Left Message for Patient: sadiq  Does the patient know what this is regarding? Test      Preferred Method of Contact: Phone Call  Patient's Preferred Phone Number on File: [phone]  Best Call Back Number, if different:    Additional Information:

## 2024-11-25 ENCOUNTER — TELEPHONE (OUTPATIENT)
Dept: INTERNAL MEDICINE | Facility: CLINIC | Age: 74
End: 2024-11-25
Payer: MEDICARE

## 2024-11-25 DIAGNOSIS — Z91.09 ENVIRONMENTAL ALLERGIES: ICD-10-CM

## 2024-11-25 RX ORDER — AZELASTINE 1 MG/ML
1 SPRAY, METERED NASAL 2 TIMES DAILY
Qty: 30 ML | Refills: 3 | Status: SHIPPED | OUTPATIENT
Start: 2024-11-25

## 2024-11-25 NOTE — TELEPHONE ENCOUNTER
----- Message from Ioana sent at 11/25/2024  1:25 PM CST -----  Regarding: referral update  Good Afternoon,     This is Ioana with [facility](referral scheduling)    I  contacted Dr Allen office to follow up on  referral.     Kassandra stated they did  receive referral. She suggested it is resubmitted and faxed to  [phone].

## 2024-11-25 NOTE — TELEPHONE ENCOUNTER
----- Message from Maria Guadalupe sent at 11/25/2024 10:22 AM CST -----  Who Called: Alejandrina Rodriguez    Does the patient already have the specialty appointment scheduled?:no  If yes, what is the date of that appointment?:  Referral to What Specialty:genecology   Reason for Referral: last gyn left town   Does the patient want the referral with a specific physician?:no  If yes, which provider?:   Is the specialist an [facility] or [facility]?:    Preferred Method of Contact: Phone Call  Patient's Preferred Phone Number on File: [phone]   Best Call Back Number, if different:  Additional Information:        Who Called: Alejandrina Rodriguez    Refill or New Rx:Refill  RX Name and Strength:azelastine (ASTELIN) 137 mcg (0.1 %) nasal spray  How is the patient currently taking it? (ex. 1XDay):1 spray (137 mcg total) by Nasal route 2 (two) times daily. - Nasal  Is this a 30 day or 90 day RX:requesting 3 month supply  Local or Mail Order:mail  List of preferred pharmacies on file (remove unneeded): [facility] - [facility] [address]      Ordering Provider:Alex Treviño MD      Preferred Method of Contact: Phone Call  Patient's Preferred Phone Number on File: [phone]   Best Call Back Number, if different:  Additional Information:

## 2024-12-03 ENCOUNTER — TELEPHONE (OUTPATIENT)
Dept: INTERNAL MEDICINE | Facility: CLINIC | Age: 74
End: 2024-12-03
Payer: MEDICARE

## 2024-12-03 NOTE — TELEPHONE ENCOUNTER
----- Message from Tech Reyna sent at 12/3/2024  9:24 AM CST -----  .Type:  Needs Medical Advice    Who Called:  pt    Symptoms (please be specific):  no     How long has patient had these symptoms:   no    Pharmacy name and phone #:   no    Would the patient rather a call back or a response via [facility]?      Best Call Back Number:  [phone]    Additional Information:  pt is waiting on her referral to be faxed to her Rheumatologist please refax her referral to Dr. Allen fax# [phone] thanks

## 2024-12-03 NOTE — TELEPHONE ENCOUNTER
Chart Review Routing History Since 12/5/2023 (View Full Routing History)    Recipients Sent On Sent By Routed Reports    Kathy Allen MD    11/14/2024  1:43 PM Anthony Hernandez MA ANTINUCLEAR ANTIBODIES ([id number])   RHEUMATOID FACTOR IGA ([id number])   RHEUMATOID FACTOR IGM ([id number])   RHEUMATOID QUANTITATIVE ([id number])   NUCLEAR ANTIGEN ANTIBODY ([id number])          Kathy Allen MD    11/14/2024  1:43 PM Anthony Hernandez MA Office Visit on 11/7/2024 with Chandni So, MUNDO Allen MD    11/14/2024  1:43 PM Anthony Hernandez MA [id number] - Insurance Documents (11/7/2024)          Kathy Allen MD    11/14/2024  1:43 PM Anthony Hernandez MA Consultation - Auth/Cert # [id number] (Authorized)

## 2024-12-09 DIAGNOSIS — K21.9 GASTROESOPHAGEAL REFLUX DISEASE, UNSPECIFIED WHETHER ESOPHAGITIS PRESENT: ICD-10-CM

## 2024-12-09 RX ORDER — FAMOTIDINE 20 MG/1
TABLET, FILM COATED ORAL
Qty: 180 TABLET | Refills: 3 | Status: SHIPPED | OUTPATIENT
Start: 2024-12-09

## 2024-12-17 LAB
LEFT EYE DM RETINOPATHY: NEGATIVE
RIGHT EYE DM RETINOPATHY: NEGATIVE

## 2025-01-15 ENCOUNTER — TELEPHONE (OUTPATIENT)
Dept: INTERNAL MEDICINE | Facility: CLINIC | Age: 75
End: 2025-01-15
Payer: MEDICARE

## 2025-01-15 NOTE — TELEPHONE ENCOUNTER
----- Message from Shelton Lee sent at 1/15/2025  1:10 PM CST -----  Regarding: FW: Pt Advice  Contact: Gladis Tobar    ----- Message -----  From: Ivone Jaffe  Sent: 1/15/2025   1:05 PM CST  To: Galen Johnson Staff  Subject: Pt Advice                                        Per Gladis phone # [phone] [facility] states during a follow up of pt, pt states she's having suicidal thoughts she doesn't have a plan and haven't follow through with any she states pt is refusing any support and refuse to go into full detail of why she just states something made her really angry.

## 2025-01-21 ENCOUNTER — PATIENT MESSAGE (OUTPATIENT)
Dept: ADMINISTRATIVE | Facility: HOSPITAL | Age: 75
End: 2025-01-21
Payer: MEDICARE

## 2025-01-22 ENCOUNTER — PATIENT OUTREACH (OUTPATIENT)
Facility: CLINIC | Age: 75
End: 2025-01-22
Payer: MEDICARE

## 2025-01-22 NOTE — LETTER
AUTHORIZATION FOR RELEASE OF CONFIDENTIAL INFORMATION      2025      Dear Dr Laurie Sicard,    We are seeing Alejandrina Rodriguez, date of birth 1950, in the clinic at [place] [address] [facility].  Alex Treviño MD is the patient's PCP. Alejandrina Rodriguez has an outstanding lab/procedure at the time we reviewed his chart.  In order to help keep her health information updated, Alejandrina has authorized us to request the following medical record(s):            Eye Exam - including evaluation for diabetic retinopathy           Please fax any records to Alex Treviño MD's at  [phone]    If you have any questions, please contact  Gagan JONES,CCC @ [phone]             Patient Name: Alejandrina Rodriguez  :1950  Patient Phone #:[phone]                Alejandrina Rodriguez  MRN: [id number]  : 1950  Age: 74 y.o.  Sex: female         Patient/Legal Guardian Signature  This signature was collected at 2024    Alejandrina Rodriguez       _______________________________   Printed Name/Relationship to Patient      Consent for Examination and Treatment: I hereby authorize the providers and employees of [facility] ([facility]) to provide medical treatment/services which includes, but is not limited to, performing and administering tests and diagnostic procedures that are deemed necessary, including, but not limited to, imaging examinations, blood tests and other laboratory procedures as may be required by the hospital, clinic, or may be ordered by my physician(s) or persons working under the general and/or special instructions of my physician(s).      I understand and agree that this consent covers all authorized persons, including but not limited to physicians, residents, nurse practitioners, physicians' assistants, specialists, consultants, student nurses, and independently contracted physicians, who are called upon by the physician in charge, to carry out the diagnostic procedures and  medical or surgical treatment.     I hereby authorize [facility] to retain or dispose of any specimens or tissue, should there be such remaining from any test or procedure.     I hereby authorize and give consent for [facility] providers and employees to take photographs, images or videotapes of such diagnostic, surgical or treatment procedures of Patient as may be required by [facility] or as may be ordered by a physician. I further acknowledge and agree that [facility] may use cameras or other devices for patient monitoring.     I am aware that the practice of medicine is not an exact science, and I acknowledge that no guarantees have been made to me as to the outcome of any tests, procedures or treatment.     Authorization for Release of Information: I understand that my insurance company and/or their agents may need information necessary to make determinations about payment/reimbursement. I hereby provide authorization to release to all insurance companies, their successors, assignees, other parties with whom they may have contracted, or others acting on their behalf, that are involved with payment for any hospital and/or clinic charges incurred by the patient, any information that they request and deem necessary for payment/reimbursement, and/or quality review.  I further authorize the release of my health information to physicians or other health care practitioners on staff who are involved in my health care now and in the future, and to other health care providers, entities, or institutions for the purpose of my continued care and treatment, including referrals.     REGISTRATION AUTHORIZATION  Form No. 77121 (Rev. 3/25/2024)    Page 1 of 3                       Medicare Patient's Certification and Authorization to Release Information and Payment Request:  I certify that the information given by me in applying for payment under Title XVIII of the Social Security Act is correct. I authorize any hoang of medical or other  information about me to release to the [facility], or its intermediaries or carriers, any information needed for this or a related Medicare claim. I request that payment of authorized benefits be made on my behalf.     Assignment of Insurance Benefits:   I hereby authorize any and all insurance companies, health plans, defined   benefit plans, health insurers or any entity that is or may be responsible for payment of my medical expenses to pay all hospital and medical benefits now due, and to become due and payable to me under any hospital benefits, sick benefits, injury benefits or any other benefit for services rendered to me, including Major Medical Benefits, direct to [facility] and all independently contracted physicians. I assign any and all rights that I may have against any and all insurance companies, health plans, defined benefit plans, health insurers or any entity that is or may be responsible for payment of my medical expenses, including, but not limited to any right to appeal a denial of a claim, any right to bring any action, lawsuit, administrative proceeding, or other cause of action on my behalf. I specifically assign my right to pursue litigation against any and all insurance companies, health plans, defined benefit plans, health insurers or any entity that is or may be responsible for payment of my medical expenses based upon a refusal to pay charges.            E. Valuables: It is understood and agreed that [facility] is not liable for the damage to or loss of any money, jewelry,   documents, dentures, eye glasses, hearing aids, prosthetics, or other property of value.     F. Computer Equipment: I understand and agree that should I choose to use computer equipment owned by [facility] or if I choose to access the Internet via [facility]s network, I do so at my own risk. [facility] is not responsible for any damage to my computer equipment or to any damages of any type that might arise from  my loss of equipment or data.     G. Acceptance of Financial Responsibility:  I agree that in consideration of the services and   supplies that have been   or will be furnished to the patient, I am hereby obligated to pay all charges made for or on the account of the patient according to the standard rates (in effect at the time the services and supplies are delivered) established by [facility], including its Patient Financial Assistance Policy to the extent it is applicable. I understand that I am responsible for all charges, or portions thereof, not covered by insurance or other sources. Patient refunds will be distributed only after balances at all [facility] facilities are paid.     H. Communication Authorization:  I hereby authorize [facility] and its representatives, along with any billing service   or  who may work on their behalf, to contact me on   my cell phone and/or home phone using pre- recorded messages, artificial voice messages, automatic telephone dialing devices or other computer assisted technology, or by electronic      mail, text messaging, or by any other form of electronic communication. This includes, but is not limited to, appointment reminders, yearly physical exam reminders, preventive care reminders, patient campaigns, welcome calls, and calls about account balances on my account or any account on which I am listed as a guarantor. I understand I have the right to opt out of these communications at any time.      Relationship  Between  Facility and  Provider:      I understand that some, but not all, providers furnishing services to the patient are not employees or agents of [facility]. The patient is under the care and supervision of his/her attending physician, and it is the responsibility of the facility and its nursing staff to carry out the instructions of such physicians. It is the responsibility of the patient's physician/designee to obtain the patient's informed consent, when  required, for medical or surgical treatment, special diagnostic or therapeutic procedures, or hospital services rendered for the patient under the special instructions of the physician/designee.           REGISTRATION AUTHORIZATION  Form No. 41882 (Rev. 3/25/2024)    Page 2 of 3                       Immunizations: [facility] shares immunization information with state sponsored health departments to help you and your doctor keep track of your immunization records. By signing, you consent to have this information shared with the health department in your state:                                [place] - LINKS ([place] Immunization Network for Kids Statewide)                                [place] - MIIX ([place] Immunization Information eXchange)                                [place] - ImmPRINT (Immunization Patient Registry with Integrated Technology)     TERM: This authorization is valid for this and subsequent care/treatment I receive at [facility] and will remain valid unless/until revoked in writing by me.     [facility]: As used in this document, [facility] means all [facility] owned and managed facilities, including, but not limited to, all health centers, surgery centers, clinics, urgent care centers, and hospitals.         [facility] complies with applicable Federal civil rights laws and does not discriminate on the basis of race, color, national origin, age, disability, or sex.  ATENCIÓN: si habla español, tiene a humphrey disposición servicios gratuitos de asistencia lingüística. Renetta irby [phone].  CHÚ Ý: N?u b?n nói Ti?ng Vi?t, có các d?ch v? h? tr? ngôn ng? mi?n phí dành cho b?n. G?i s? [phone].        REGISTRATION AUTHORIZATION  Form No. 49863 (Rev. 3/25/2024)   Page 3 of 3

## 2025-01-22 NOTE — PROGRESS NOTES
Health Maintenance Topic(s) Outreach Outcomes & Actions Taken:    Eye Exam - Outreach Outcomes & Actions Taken  : External Records Requested & Care Team Updated if Applicable         Additional Notes:  Campaign Reply: Request Diabetic Eye Exam Dr. Sicard

## 2025-01-24 ENCOUNTER — TELEPHONE (OUTPATIENT)
Dept: INTERNAL MEDICINE | Facility: CLINIC | Age: 75
End: 2025-01-24
Payer: MEDICARE

## 2025-01-24 NOTE — TELEPHONE ENCOUNTER
----- Message from Mary sent at 1/24/2025 10:49 AM CST -----  .Who Called: Alejandrina Rodriguez    Caller is requesting assistance/information from provider's office.    Symptoms (please be specific): ARTHRITIS    How long has patient had these symptoms:  UNK  List of preferred pharmacies on file (remove unneeded): [unfilled]  If different, enter pharmacy into here including location and phone number: UNK      Preferred Method of Contact: Phone Call  Patient's Preferred Phone Number on File: [phone]   Best Call Back Number, if different:  Additional Information: PT ASKING FOR MEDS FOR HER ARTHRITIS

## 2025-01-24 NOTE — TELEPHONE ENCOUNTER
Pt called stating that she is having arthritis pain in her arm. Pt takes tylenol arthritis and morphine and it is not helping the pain. Pt scheduled for 1/29/25

## 2025-01-29 ENCOUNTER — OFFICE VISIT (OUTPATIENT)
Dept: INTERNAL MEDICINE | Facility: CLINIC | Age: 75
End: 2025-01-29
Payer: MEDICARE

## 2025-01-29 VITALS
OXYGEN SATURATION: 97 % | BODY MASS INDEX: 26.63 KG/M2 | HEIGHT: 64 IN | HEART RATE: 60 BPM | DIASTOLIC BLOOD PRESSURE: 68 MMHG | RESPIRATION RATE: 16 BRPM | TEMPERATURE: 98 F | WEIGHT: 156 LBS | SYSTOLIC BLOOD PRESSURE: 122 MMHG

## 2025-01-29 DIAGNOSIS — L71.9 ROSACEA: ICD-10-CM

## 2025-01-29 DIAGNOSIS — F32.4 MAJOR DEPRESSIVE DISORDER, SINGLE EPISODE, IN PARTIAL REMISSION: ICD-10-CM

## 2025-01-29 DIAGNOSIS — F41.9 ANXIETY: Primary | ICD-10-CM

## 2025-01-29 PROBLEM — F39 MOOD DISORDER: Status: ACTIVE | Noted: 2025-01-29

## 2025-01-29 PROCEDURE — 3074F SYST BP LT 130 MM HG: CPT | Mod: CPTII,,, | Performed by: INTERNAL MEDICINE

## 2025-01-29 PROCEDURE — 1101F PT FALLS ASSESS-DOCD LE1/YR: CPT | Mod: CPTII,,, | Performed by: INTERNAL MEDICINE

## 2025-01-29 PROCEDURE — 1159F MED LIST DOCD IN RCRD: CPT | Mod: CPTII,,, | Performed by: INTERNAL MEDICINE

## 2025-01-29 PROCEDURE — 3288F FALL RISK ASSESSMENT DOCD: CPT | Mod: CPTII,,, | Performed by: INTERNAL MEDICINE

## 2025-01-29 PROCEDURE — 99214 OFFICE O/P EST MOD 30 MIN: CPT | Mod: ,,, | Performed by: INTERNAL MEDICINE

## 2025-01-29 PROCEDURE — 1125F AMNT PAIN NOTED PAIN PRSNT: CPT | Mod: CPTII,,, | Performed by: INTERNAL MEDICINE

## 2025-01-29 PROCEDURE — 3078F DIAST BP <80 MM HG: CPT | Mod: CPTII,,, | Performed by: INTERNAL MEDICINE

## 2025-01-29 PROCEDURE — 1160F RVW MEDS BY RX/DR IN RCRD: CPT | Mod: CPTII,,, | Performed by: INTERNAL MEDICINE

## 2025-01-29 RX ORDER — METRONIDAZOLE 7.5 MG/G
1 GEL TOPICAL 2 TIMES DAILY
Qty: 45 G | Refills: 0 | Status: SHIPPED | OUTPATIENT
Start: 2025-01-29

## 2025-01-29 RX ORDER — TIMOLOL MALEATE 5 MG/ML
1 SOLUTION/ DROPS OPHTHALMIC DAILY
COMMUNITY
Start: 2025-01-08

## 2025-01-29 NOTE — PROGRESS NOTES
Internal Medicine    Patient ID: [id number]     Chief Complaint: Arthritis and Depression      HPI:     Alejandrina Rodriguez is a 74 y.o. female here today for a anxiety    Patient reports mood swings, describing herself ass [place] and has thought about hurting this one woman at [employer]. When further interrogated she denies a plan on how to harm anyone.  She attributes interpersonal conflicts, particularly with one lady in her [employer] community. This situation has caused significant distress, making her feel overwhelmed and at her limit.    Patient is taking Lexapro 10 mg daily for mood symptoms but feels she may need additional increase in her medication. She reports discussing symptoms with an in-house doctor from [facility] and expresses a preference to discuss her mental health concerns with the current practitioner rather than others who have been following up with her.    SOCIAL HISTORY:  Patient is [employer], attending [employer] and [employer] regularly. She is .        Past Medical History:   Diagnosis Date    Abnormal blood smear     Acid reflux     Anxiety and depression     Bilateral carotid artery disease     Cervical spondylosis with radiculopathy     Chronic lumbar pain     Diabetes mellitus     History of continuous positive airway pressure (CPAP) therapy at home     HLD (hyperlipidemia)     Hypertension     Iron deficiency anemia, unspecified     Neuropathy     On anticoagulant therapy     Osteopenia     Personal history of colonic polyps 07/14/2014    Protein C deficiency     Recurrent deep vein thrombosis (DVT) 9/7/2017    Sleep apnea, unspecified     Unspecified osteoarthritis, unspecified site         Past Surgical History:   Procedure Laterality Date    BREAST SURGERY  1995    Cyst removed from left breast    COLON SURGERY  1970    Ruptured intestines    COLONOSCOPY  07/14/2014    COLONOSCOPY W/ POLYPECTOMY  08/29/2022    Humberto Chopra/ Follow up pending pathology results    EYE SURGERY  2020    Dont remember  the date    LAPAROTOMY, EXPLORATORY N/A 06/04/2023    Procedure: LAPAROTOMY, EXPLORATORY;  Surgeon: Sd Conway Jr., MD;  Location: [facility] OR;  Service: General;  Laterality: N/A;    LAPAROTOMY, EXPLORATORY N/A 06/05/2023    Procedure: LAPAROTOMY, EXPLORATORY;  Surgeon: Camilo Valadez MD;  Location: [facility] OR;  Service: General;  Laterality: N/A;    LAPAROTOMY, EXPLORATORY N/A 06/07/2023    Procedure: LAPAROTOMY, EXPLORATORY;  Surgeon: Camilo Valadez MD;  Location: [facility] OR;  Service: General;  Laterality: N/A;  CLOSURE OF WOUND     LYSIS OF ADHESIONS N/A 06/04/2023    Procedure: LYSIS, ADHESIONS;  Surgeon: Sd Conway Jr., MD;  Location: [facility] OR;  Service: General;  Laterality: N/A;  detorsion of small bowel volvulus    SMALL INTESTINE SURGERY  1970    SPINAL CORD STIMULATOR IMPLANT      SPINE SURGERY  2003 and 2004    Lower back surgery and Spinal cord implant    TUBAL LIGATION  1974    WASHOUT N/A 06/07/2023    Procedure: WASHOUT;  Surgeon: Camilo Valadez MD;  Location: [facility] OR;  Service: General;  Laterality: N/A;  WASHOUT OF ABDOMEN         Social History     Tobacco Use    Smoking status: Former     Types: Cigarettes    Smokeless tobacco: Never    Tobacco comments:     Two cigs per day   Substance and Sexual Activity    Alcohol use: Not Currently     Comment: One beer per month    Drug use: Never    Sexual activity: Yes     Partners: Male     Birth control/protection: Post-menopausal, See Surgical Hx        Current Outpatient Medications   Medication Instructions    azelastine (ASTELIN) 137 mcg, Nasal, 2 times daily    B-complex with vitamin C (Z-BEC OR EQUIV) tablet 1 tablet, Daily    baclofen (LIORESAL) 10 MG tablet 4 times daily    biotin 10,000 mcg TbDL biotin 10,000 mcg disintegrating tablet, [RxNorm: 7024509]    cyanocobalamin (VITAMIN B-12) 100 mcg, Daily    famotidine (PEPCID) 20 MG tablet TAKE 1 TABLET TWICE DAILY    hydroCHLOROthiazide (HYDRODIURIL) 25 mg, Daily    LEXAPRO 10 mg tablet Daily  "   LIDOcaine (LIDODERM) 5 % 1 patch, Every 24 hours (non-standard times)    metroNIDAZOLE (METROGEL) 0.75 % gel 1 Application, Topical (Top), 2 times daily    morphine (MSIR) 7.5 mg, Oral, 2 times daily    nitroGLYCERIN (NITROSTAT) 0.4 mg, Every 5 min PRN    OMEGA-3-EPA-FISH OIL ORAL 1 capsule, Daily    omeprazole (PRILOSEC) 40 MG capsule TAKE 1 CAPSULE EVERY DAY    ONETOUCH ULTRA TEST Strp TEST BLOOD SUGAR EVERY DAY    ONETOUCH ULTRASOFT 2 LANCET 30 gauge Misc TEST EVERY DAY    polyethylene glycol (GLYCOLAX) 17 g, As needed (PRN)    pravastatin (PRAVACHOL) 40 mg, Oral    propylene glycol (SYSTANE BALANCE OPHT) Apply to eye. PRN    senna (SENOKOT) 8.6 mg tablet 1 tablet, As needed (PRN)    timolol maleate 0.5% (TIMOPTIC) 0.5 % Drop 1 drop, Daily    vitamin D (VITAMIN D3) 5,000 Units, Daily    warfarin (COUMADIN) 4 mg, Oral, Daily       Review of patient's allergies indicates:   Allergen Reactions    Hydrocodone-acetaminophen      Other reaction(s): Difficulty breathing, Throat tightness    Oxycodone-acetaminophen Hallucinations    Iodine     Meperidine      Other reaction(s): Unknown (qualifier value)    Promethazine      Other reaction(s): Unknown (qualifier value), Unknown (qualifier value)        Patient Care Team:  Alex Treviño MD as PCP - General (Internal Medicine)  [facility] as Consulting Physician (Obstetrics and Gynecology)  Margaret Muñoz LPN as Care Coordinator  Humberto Chopra MD as Consulting Physician (Gastroenterology)  Sicard, Laurie, OD (Optometry)     Subjective:     Review of Systems    12 point review of systems conducted, negative except as stated in the history of present illness. See HPI for details.    Objective:     Visit Vitals  /68 (BP Location: Left arm, Patient Position: Sitting)   Pulse 60   Temp 97.6 °F (36.4 °C) (Temporal)   Resp 16   Ht 5' 4" (1.626 m)   Wt 70.8 kg (156 lb)   SpO2 97%   BMI 26.78 kg/m² "       Physical Exam  Constitutional:       Appearance: Normal appearance.   HENT:      Head: Normocephalic and atraumatic.   Eyes:      Extraocular Movements: Extraocular movements intact.      Pupils: Pupils are equal, round, and reactive to light.   Cardiovascular:      Rate and Rhythm: Normal rate and regular rhythm.   Pulmonary:      Effort: Pulmonary effort is normal.      Breath sounds: Normal breath sounds.   Skin:     General: Skin is warm and dry.   Neurological:      General: No focal deficit present.      Mental Status: She is alert and oriented to person, place, and time. Mental status is at baseline.   Psychiatric:         Mood and Affect: Mood normal.         Behavior: Behavior normal.         Judgment: Judgment normal.         Labs Reviewed:     Chemistry:  Lab Results   Component Value Date     10/28/2024    K 4.7 10/28/2024    BUN 11.0 10/28/2024    CREATININE 0.67 10/28/2024    EGFRNORACEVR >60 10/28/2024    GLUCOSE 103 10/28/2024    CALCIUM 8.9 10/28/2024    ALKPHOS 93 10/28/2024    LABPROT 6.5 10/28/2024    ALBUMIN 3.3 (L) 10/28/2024    BILIDIR 0.2 09/13/2021    IBILI 0.30 09/13/2021    AST 32 10/28/2024    ALT 21 10/28/2024    MG 1.90 06/18/2023    PHOS 3.9 06/18/2023    [id number] 49 10/28/2024    TSH 0.795 10/28/2024        Lab Results   Component Value Date    HGBA1C 5.9 10/28/2024        Hematology:  Lab Results   Component Value Date    WBC 6.30 10/28/2024    HGB 13.2 10/28/2024    HCT 41.2 10/28/2024     10/28/2024       Lipid Panel:  Lab Results   Component Value Date    CHOL 137 10/28/2024    HDL 43 10/28/2024    LDL 78.00 10/28/2024    TRIG 79 10/28/2024    TOTALCHOLEST 3 10/28/2024        Urine:  Lab Results   Component Value Date    APPEARANCEUA Clear 10/28/2024    SGUA 1.020 10/28/2024    PROTEINUA Trace (A) 10/28/2024    KETONESUA Negative 10/28/2024    LEUKOCYTESUR 500 (A) 10/28/2024    RBCUA 0-5 10/28/2024    WBCUA 0-5 10/28/2024    BACTERIA Trace 10/28/2024     SQEPUA Few (A) 10/28/2024    CREATRANDUR 154.0 (H) 02/29/2024        Assessment and Plan:     Assessment & Plan    F41.9 Anxiety  F32.4 Major depressive disorder, single episode, in partial remission  F39 Mood disorder  E11.51 Type 2 diabetes mellitus with diabetic peripheral angiopathy without gangrene, without long-term current use of insulin  F32.A Depression, unspecified  Z63.8 Other specified problems related to primary support group      F41.9 ANXIETY:  - Discussed potential benefits of mood stabilizers for anxiety and sleep management.  - Increase Lexapro to 20mg daily  - Scheduled follow up in 2 weeks to assess medication effectiveness.    F39 MOOD DISORDER:  - Explained the difference between suicidal ideation and having a concrete plan.  - Discussed potential benefits of mood stabilizers for mood regulation and sleep improvement.  Will try increase Lexparo to 20mg will revisit in 2 weeks and if needed mood stabilizer needed; patient to call if any worsening of symptoms    Follow up in about 2 weeks (around 2/12/2025). In addition to their scheduled follow up, the patient has also been instructed to follow up on as needed basis.     No future appointments.     Alex Treviño MD

## 2025-01-29 NOTE — TELEPHONE ENCOUNTER
Message  Received: Today  Alex Treviño MD [place], [place], [place]  Caller: Unspecified (Today,  1:58 PM)  Musical ringing? Better get a head CT W WO

## 2025-01-29 NOTE — TELEPHONE ENCOUNTER
Spoke to pt who noted that she has a musical ringing in her ears mainly in the evening. She does not see ENT or Audiology, and she does not have hearing aids. Please advise.

## 2025-01-30 ENCOUNTER — TELEPHONE (OUTPATIENT)
Dept: INTERNAL MEDICINE | Facility: CLINIC | Age: 75
End: 2025-01-30
Payer: MEDICARE

## 2025-01-30 NOTE — TELEPHONE ENCOUNTER
----- Message from Venkata sent at 1/30/2025  1:01 PM CST -----  .Who Called: Alejandrina Rodriguez    Caller is requesting assistance/information from provider's office.    Symptoms (please be specific): n/a   How long has patient had these symptoms:  n/a  List of preferred pharmacies on file (remove unneeded): [unfilled]  If different, enter pharmacy into here including location and phone number: n/a      Preferred Method of Contact: Phone Call    Patient's Preferred Phone Number on File: [phone]     Best Call Back Number, if different:    Additional Information: Called stating she has questions regarding her worker's comp and LEXAPRO 10 mg tablet. Please advise, thank you.

## 2025-02-13 ENCOUNTER — OFFICE VISIT (OUTPATIENT)
Dept: INTERNAL MEDICINE | Facility: CLINIC | Age: 75
End: 2025-02-13
Payer: MEDICARE

## 2025-02-13 VITALS
HEART RATE: 52 BPM | TEMPERATURE: 97 F | OXYGEN SATURATION: 97 % | HEIGHT: 64 IN | SYSTOLIC BLOOD PRESSURE: 112 MMHG | RESPIRATION RATE: 16 BRPM | WEIGHT: 160 LBS | DIASTOLIC BLOOD PRESSURE: 60 MMHG | BODY MASS INDEX: 27.31 KG/M2

## 2025-02-13 DIAGNOSIS — Z78.0 POSTMENOPAUSAL STATE: ICD-10-CM

## 2025-02-13 DIAGNOSIS — F41.8 MIXED ANXIETY DEPRESSIVE DISORDER: Primary | ICD-10-CM

## 2025-02-13 DIAGNOSIS — I95.9 HYPOTENSION, UNSPECIFIED HYPOTENSION TYPE: ICD-10-CM

## 2025-02-13 PROCEDURE — 3078F DIAST BP <80 MM HG: CPT | Mod: CPTII,,, | Performed by: INTERNAL MEDICINE

## 2025-02-13 PROCEDURE — 3074F SYST BP LT 130 MM HG: CPT | Mod: CPTII,,, | Performed by: INTERNAL MEDICINE

## 2025-02-13 PROCEDURE — 1159F MED LIST DOCD IN RCRD: CPT | Mod: CPTII,,, | Performed by: INTERNAL MEDICINE

## 2025-02-13 PROCEDURE — 1101F PT FALLS ASSESS-DOCD LE1/YR: CPT | Mod: CPTII,,, | Performed by: INTERNAL MEDICINE

## 2025-02-13 PROCEDURE — 99213 OFFICE O/P EST LOW 20 MIN: CPT | Mod: ,,, | Performed by: INTERNAL MEDICINE

## 2025-02-13 PROCEDURE — 1125F AMNT PAIN NOTED PAIN PRSNT: CPT | Mod: CPTII,,, | Performed by: INTERNAL MEDICINE

## 2025-02-13 PROCEDURE — 3288F FALL RISK ASSESSMENT DOCD: CPT | Mod: CPTII,,, | Performed by: INTERNAL MEDICINE

## 2025-02-13 NOTE — PROGRESS NOTES
Internal Medicine    Patient ID: [id number]     Chief Complaint: Anxiety (2 week f/u)      HPI:     Alejandrina Rodriguez is a 74 y.o. female here today for a follow up.   Reports that her depression is much improved on the current dose of Lexapro and her anxieties much better she has not even thought about the lady at [employer] that was so bothersome.  Blood pressure is a little bit low she reports feeling tired on HCTZ 25  Patient presents with red, dry, itchy, and painful eyes, describing a condition consistent with Sjögren's syndrome. She reports chronic symptoms of eye dryness, itching, and pain. She consulted her eye doctor, Dr. Rene, who recommended treatment for redness. She has an upcoming appointment with Dr. Allen on March 27th for further evaluation. She has been using Sustain eye drops for dryness and attempted to obtain Lumify for redness but used a different OTC product due to unavailability. Patient denies catching her 's recent cold or having any viral symptoms related to her eye condition.    SOCIAL HISTORY:  Marital status:         Past Medical History:   Diagnosis Date    Abnormal blood smear     Acid reflux     Anxiety and depression     Bilateral carotid artery disease     Cervical spondylosis with radiculopathy     Chronic lumbar pain     Diabetes mellitus     History of continuous positive airway pressure (CPAP) therapy at home     HLD (hyperlipidemia)     Hypertension     Iron deficiency anemia, unspecified     Neuropathy     On anticoagulant therapy     Osteopenia     Personal history of colonic polyps 07/14/2014    Protein C deficiency     Recurrent deep vein thrombosis (DVT) 9/7/2017    Sleep apnea, unspecified     Unspecified osteoarthritis, unspecified site         Past Surgical History:   Procedure Laterality Date    BREAST SURGERY  1995    Cyst removed from left breast    COLON SURGERY  1970    Ruptured intestines    COLONOSCOPY  07/14/2014    COLONOSCOPY W/ POLYPECTOMY   08/29/2022    Humberto Chopra/ Follow up pending pathology results    EYE SURGERY  2020    Dont remember the date    LAPAROTOMY, EXPLORATORY N/A 06/04/2023    Procedure: LAPAROTOMY, EXPLORATORY;  Surgeon: Sd Conway Jr., MD;  Location: [facility] OR;  Service: General;  Laterality: N/A;    LAPAROTOMY, EXPLORATORY N/A 06/05/2023    Procedure: LAPAROTOMY, EXPLORATORY;  Surgeon: Camilo Valadez MD;  Location: [facility] OR;  Service: General;  Laterality: N/A;    LAPAROTOMY, EXPLORATORY N/A 06/07/2023    Procedure: LAPAROTOMY, EXPLORATORY;  Surgeon: Camilo Valadez MD;  Location: [facility] OR;  Service: General;  Laterality: N/A;  CLOSURE OF WOUND     LYSIS OF ADHESIONS N/A 06/04/2023    Procedure: LYSIS, ADHESIONS;  Surgeon: Sd Conway Jr., MD;  Location: [facility] OR;  Service: General;  Laterality: N/A;  detorsion of small bowel volvulus    SMALL INTESTINE SURGERY  1970    SPINAL CORD STIMULATOR IMPLANT      SPINE SURGERY  2003 and 2004    Lower back surgery and Spinal cord implant    TUBAL LIGATION  1974    WASHOUT N/A 06/07/2023    Procedure: WASHOUT;  Surgeon: Camilo Valadez MD;  Location: [facility] OR;  Service: General;  Laterality: N/A;  WASHOUT OF ABDOMEN         Social History     Tobacco Use    Smoking status: Former     Types: Cigarettes    Smokeless tobacco: Never    Tobacco comments:     Two cigs per day   Substance and Sexual Activity    Alcohol use: Not Currently     Comment: One beer per month    Drug use: Never    Sexual activity: Yes     Partners: Male     Birth control/protection: Post-menopausal, See Surgical Hx        Current Outpatient Medications   Medication Instructions    azelastine (ASTELIN) 137 mcg, Nasal, 2 times daily    B-complex with vitamin C (Z-BEC OR EQUIV) tablet 1 tablet, Daily    baclofen (LIORESAL) 10 MG tablet 4 times daily    biotin 10,000 mcg TbDL biotin 10,000 mcg disintegrating tablet, [RxNorm: 4893004]    cyanocobalamin (VITAMIN B-12) 100 mcg, Daily    famotidine (PEPCID) 20 MG tablet  "TAKE 1 TABLET TWICE DAILY    LEXAPRO 20 mg, Oral, Daily    LIDOcaine (LIDODERM) 5 % 1 patch, Every 24 hours (non-standard times)    metroNIDAZOLE (METROGEL) 0.75 % gel 1 Application, Topical (Top), 2 times daily    morphine (MSIR) 7.5 mg, Oral, 2 times daily    nitroGLYCERIN (NITROSTAT) 0.4 mg, Every 5 min PRN    OMEGA-3-EPA-FISH OIL ORAL 1 capsule, Daily    omeprazole (PRILOSEC) 40 MG capsule TAKE 1 CAPSULE EVERY DAY    ONETOUCH ULTRA TEST Strp TEST BLOOD SUGAR EVERY DAY    ONETOUCH ULTRASOFT 2 LANCET 30 gauge Misc TEST EVERY DAY    polyethylene glycol (GLYCOLAX) 17 g, As needed (PRN)    pravastatin (PRAVACHOL) 40 mg, Oral    propylene glycol (SYSTANE BALANCE OPHT) Apply to eye. PRN    senna (SENOKOT) 8.6 mg tablet 1 tablet, As needed (PRN)    timolol maleate 0.5% (TIMOPTIC) 0.5 % Drop 1 drop, Daily    vitamin D (VITAMIN D3) 5,000 Units, Daily    warfarin (COUMADIN) 4 mg, Oral, Daily       Review of patient's allergies indicates:   Allergen Reactions    Hydrocodone-acetaminophen      Other reaction(s): Difficulty breathing, Throat tightness    Oxycodone-acetaminophen Hallucinations    Iodine     Meperidine      Other reaction(s): Unknown (qualifier value)    Promethazine      Other reaction(s): Unknown (qualifier value), Unknown (qualifier value)        Patient Care Team:  Alex Treviño MD as PCP - General (Internal Medicine)  [facility] as Care Coordinator  Humberto Chopra MD as Consulting Physician (Gastroenterology)  Sicard, Laurie, OD (Optometry)  Ayan Duenas MD (Anesthesiology)     Subjective:     Review of Systems    12 point review of systems conducted, negative except as stated in the history of present illness. See HPI for details.    Objective:     Visit Vitals  /60 (BP Location: Right arm, Patient Position: Sitting)   Pulse (!) 52   Temp 97.4 °F (36.3 °C) (Temporal)   Resp 16   Ht 5' 4" (1.626 m)   Wt 72.6 kg (160 lb)   SpO2 97% "   BMI 27.46 kg/m²       Physical Exam  Constitutional:       Appearance: Normal appearance.   HENT:      Head: Normocephalic and atraumatic.   Eyes:      Extraocular Movements: Extraocular movements intact.      Pupils: Pupils are equal, round, and reactive to light.      Comments: Bilateral eye injection   Cardiovascular:      Rate and Rhythm: Normal rate and regular rhythm.   Pulmonary:      Effort: Pulmonary effort is normal.      Breath sounds: Normal breath sounds.   Skin:     General: Skin is warm and dry.   Neurological:      General: No focal deficit present.      Mental Status: She is alert.   Psychiatric:         Mood and Affect: Mood normal.         Labs Reviewed:     Chemistry:  Lab Results   Component Value Date     10/28/2024    K 4.7 10/28/2024    BUN 11.0 10/28/2024    CREATININE 0.67 10/28/2024    EGFRNORACEVR >60 10/28/2024    GLUCOSE 103 10/28/2024    CALCIUM 8.9 10/28/2024    ALKPHOS 93 10/28/2024    LABPROT 6.5 10/28/2024    ALBUMIN 3.3 (L) 10/28/2024    BILIDIR 0.2 09/13/2021    IBILI 0.30 09/13/2021    AST 32 10/28/2024    ALT 21 10/28/2024    MG 1.90 06/18/2023    PHOS 3.9 06/18/2023    [id number] 49 10/28/2024    TSH 0.795 10/28/2024        Lab Results   Component Value Date    HGBA1C 5.9 10/28/2024        Hematology:  Lab Results   Component Value Date    WBC 6.30 10/28/2024    HGB 13.2 10/28/2024    HCT 41.2 10/28/2024     10/28/2024       Lipid Panel:  Lab Results   Component Value Date    CHOL 137 10/28/2024    HDL 43 10/28/2024    LDL 78.00 10/28/2024    TRIG 79 10/28/2024    TOTALCHOLEST 3 10/28/2024        Urine:  Lab Results   Component Value Date    APPEARANCEUA Clear 10/28/2024    SGUA 1.020 10/28/2024    PROTEINUA Trace (A) 10/28/2024    KETONESUA Negative 10/28/2024    LEUKOCYTESUR 500 (A) 10/28/2024    RBCUA 0-5 10/28/2024    WBCUA 0-5 10/28/2024    BACTERIA Trace 10/28/2024    SQEPUA Few (A) 10/28/2024    CREATRANDUR 154.0 (H) 02/29/2024        Assessment and  Plan:     Assessment & Plan    F41.8 Mixed anxiety depressive disorder  I95.9 Hypotension, unspecified hypotension type  Z78.0 Postmenopausal state  I82.502 Chronic embolism and thrombosis of unspecified deep veins of left lower extremity  M85.80 Other specified disorders of bone density and structure, unspecified site    IMPRESSION:   Evaluated eye symptoms, likely related to Sjogren's syndrome   Assessed current use of Sustain eye drops for dryness   Considered Lumify eye drops for redness   Reviewed blood pressure, noting it was low (112/60)   Determined fluid pill (diuretic) no longer necessary given current blood pressure   Noted need for bone density scan    F41.8 MIXED ANXIETY DEPRESSIVE DISORDER:  - Continue Lexapro, to be taken at night.  - Educated the patient about potential somnolence as a side effect, justifying its nighttime administration.    I95.9 HYPOTENSION, UNSPECIFIED HYPOTENSION TYPE:  - Discontinued diuretic medication due to low blood pressure (112/60).  - Discussed potential side effects of diuretic, including polyuria and fatigue.  - Instructed the patient to monitor blood pressure at home and report readings to the office.      M85.80 OTHER SPECIFIED DISORDERS OF BONE DENSITY AND STRUCTURE, UNSPECIFIED SITE:  - Ordered a bone density scan and inquired about its scheduling, as the patient is due for this exam.          Follow up in about 3 months (around 5/13/2025). In addition to their scheduled follow up, the patient has also been instructed to follow up on as needed basis.     Future Appointments   Date Time Provider Department Center   5/14/2025 11:20 AM Alex Treviño MD [facility] [id number]        Alex Treviño MD

## 2025-02-20 ENCOUNTER — TELEPHONE (OUTPATIENT)
Dept: INTERNAL MEDICINE | Facility: CLINIC | Age: 75
End: 2025-02-20
Payer: MEDICARE

## 2025-02-20 NOTE — TELEPHONE ENCOUNTER
Message  Received: Today  Alma Delia Landon Staff  Caller: Unspecified (Today,  4:50 PM)  .Type:  Patient Returning Call    Who Called:pt  Who Left Message for Patient:pt  Does the patient know what this is regarding?:missed call  Would the patient rather a call back or a response via [facility]?   Best Call Back Number:[phone]  Additional Information: Please call back missed call

## 2025-02-20 NOTE — TELEPHONE ENCOUNTER
Spoke to pt and BP reading are:  136/59 - HR 55  130/57 - HR 52  124/62 - HR 56  128/64 - HR 57  140/72 - HR 56  133/74 - HR 57    Per Dr. Madrid pt is schedule with FNP and can take 0.5 tablet of HCTZ tonight (2/20/25)

## 2025-02-20 NOTE — TELEPHONE ENCOUNTER
----- Message from Alex Treviño MD sent at 2/20/2025  4:25 PM CST -----  Regarding: RE: Pt advice  Contact: Alejandrina  What's her blood pressure? Her blood pressure was in the toliet when she came in the last time remember??  ----- Message -----  From: Per Medrano MA  Sent: 2/20/2025   4:23 PM CST  To: Alex Treviño MD  Subject: FW: Pt advice                                      ----- Message -----  From: Ivone Jaffe  Sent: 2/20/2025   4:05 PM CST  To: Galen Johnson Staff  Subject: Pt advice                                        Pt called said her legs has been swelling since she been off  the hydroCHLOROthiazide (HYDRODIURIL) 25 MG tablet please advise.

## 2025-02-21 ENCOUNTER — HOSPITAL ENCOUNTER (OUTPATIENT)
Dept: RADIOLOGY | Facility: HOSPITAL | Age: 75
Discharge: HOME | End: 2025-02-21
Attending: INTERNAL MEDICINE
Payer: MEDICARE

## 2025-02-21 ENCOUNTER — OFFICE VISIT (OUTPATIENT)
Dept: INTERNAL MEDICINE | Facility: CLINIC | Age: 75
End: 2025-02-21
Payer: MEDICARE

## 2025-02-21 VITALS
BODY MASS INDEX: 27.66 KG/M2 | OXYGEN SATURATION: 96 % | SYSTOLIC BLOOD PRESSURE: 139 MMHG | RESPIRATION RATE: 16 BRPM | DIASTOLIC BLOOD PRESSURE: 73 MMHG | HEIGHT: 64 IN | WEIGHT: 162 LBS | TEMPERATURE: 97 F | HEART RATE: 56 BPM

## 2025-02-21 DIAGNOSIS — Z78.0 POSTMENOPAUSAL STATE: ICD-10-CM

## 2025-02-21 DIAGNOSIS — I10 PRIMARY HYPERTENSION: Primary | ICD-10-CM

## 2025-02-21 PROCEDURE — 77081 DXA BONE DENSITY APPENDICULR: CPT | Mod: TC

## 2025-02-21 PROCEDURE — 77080 DXA BONE DENSITY AXIAL: CPT | Mod: XU,TC

## 2025-02-21 NOTE — ASSESSMENT & PLAN NOTE
Well controlled.   Hypertension Medications              nitroGLYCERIN (NITROSTAT) 0.4 MG SL tablet Place 0.4 mg under the tongue every 5 (five) minutes as needed for Chest pain.   Wear compression socks daily + avoid prolonged standing or sitting  Low Sodium Diet (DASH Diet - Less than 2 grams of sodium per day).  Monitor blood pressure daily and log. Report consistent numbers greater than 140/90.  Maintain healthy weight with goal BMI <30. Exercise 30 minutes per day, 5 days per week.

## 2025-02-21 NOTE — PROGRESS NOTES
Internal Medicine    Patient ID: [id number]     Chief Complaint: Hypertension and Edema      HPI:     Alejandrina Rodriguez is a 74 y.o. female here today for a follow up. She is concerned about elevated BP readings in the home - reviewed log with averages in the 120-140s/60s. Having some mild edema bilaterally, L >R. Had recent procedure with Dr. Blue on varicosities to left leg. Denies pain. No other complaints today.     Past Medical History:   Diagnosis Date    Abnormal blood smear     Acid reflux     Anxiety and depression     Bilateral carotid artery disease     Cervical spondylosis with radiculopathy     Chronic lumbar pain     Diabetes mellitus     History of continuous positive airway pressure (CPAP) therapy at home     HLD (hyperlipidemia)     Hypertension     Iron deficiency anemia, unspecified     Neuropathy     On anticoagulant therapy     Osteopenia     Personal history of colonic polyps 07/14/2014    Protein C deficiency     Recurrent deep vein thrombosis (DVT) 9/7/2017    Sleep apnea, unspecified     Unspecified osteoarthritis, unspecified site         Past Surgical History:   Procedure Laterality Date    BREAST SURGERY  1995    Cyst removed from left breast    COLON SURGERY  1970    Ruptured intestines    COLONOSCOPY  07/14/2014    COLONOSCOPY W/ POLYPECTOMY  08/29/2022    Humberto Chopra/ Follow up pending pathology results    EYE SURGERY  2020    Dont remember the date    LAPAROTOMY, EXPLORATORY N/A 06/04/2023    Procedure: LAPAROTOMY, EXPLORATORY;  Surgeon: Sd Conway Jr., MD;  Location: [facility] OR;  Service: General;  Laterality: N/A;    LAPAROTOMY, EXPLORATORY N/A 06/05/2023    Procedure: LAPAROTOMY, EXPLORATORY;  Surgeon: Camilo Valadez MD;  Location: [facility] OR;  Service: General;  Laterality: N/A;    LAPAROTOMY, EXPLORATORY N/A 06/07/2023    Procedure: LAPAROTOMY, EXPLORATORY;  Surgeon: Camilo Valadez MD;  Location: [facility] OR;  Service: General;  Laterality: N/A;  CLOSURE OF WOUND     LYSIS OF  ADHESIONS N/A 06/04/2023    Procedure: LYSIS, ADHESIONS;  Surgeon: Sd Conway Jr., MD;  Location: [facility] OR;  Service: General;  Laterality: N/A;  detorsion of small bowel volvulus    SMALL INTESTINE SURGERY  1970    SPINAL CORD STIMULATOR IMPLANT      SPINE SURGERY  2003 and 2004    Lower back surgery and Spinal cord implant    TUBAL LIGATION  1974    WASHOUT N/A 06/07/2023    Procedure: WASHOUT;  Surgeon: Camilo Valadez MD;  Location: [facility] OR;  Service: General;  Laterality: N/A;  WASHOUT OF ABDOMEN         Social History     Tobacco Use    Smoking status: Former     Types: Cigarettes    Smokeless tobacco: Never    Tobacco comments:     Two cigs per day   Substance and Sexual Activity    Alcohol use: Not Currently     Comment: One beer per month    Drug use: Never    Sexual activity: Yes     Partners: Male     Birth control/protection: Post-menopausal, See Surgical Hx        Current Outpatient Medications   Medication Instructions    azelastine (ASTELIN) 137 mcg, Nasal, 2 times daily    B-complex with vitamin C (Z-BEC OR EQUIV) tablet 1 tablet, Daily    baclofen (LIORESAL) 10 MG tablet 4 times daily    biotin 10,000 mcg TbDL biotin 10,000 mcg disintegrating tablet, [RxNorm: 1762334]    cyanocobalamin (VITAMIN B-12) 100 mcg, Daily    famotidine (PEPCID) 20 MG tablet TAKE 1 TABLET TWICE DAILY    LEXAPRO 20 mg, Daily    LIDOcaine (LIDODERM) 5 % 1 patch, Every 24 hours (non-standard times)    metroNIDAZOLE (METROGEL) 0.75 % gel 1 Application, Topical (Top), 2 times daily    morphine (MSIR) 7.5 mg, Oral, 2 times daily    nitroGLYCERIN (NITROSTAT) 0.4 mg, Every 5 min PRN    OMEGA-3-EPA-FISH OIL ORAL 1 capsule, Daily    omeprazole (PRILOSEC) 40 MG capsule TAKE 1 CAPSULE EVERY DAY    ONETOUCH ULTRA TEST Strp TEST BLOOD SUGAR EVERY DAY    ONETOUCH ULTRASOFT 2 LANCET 30 gauge Misc TEST EVERY DAY    polyethylene glycol (GLYCOLAX) 17 g, As needed (PRN)    pravastatin (PRAVACHOL) 40 mg, Oral    propylene glycol (SYSTANE  "BALANCE OPHT) Apply to eye. PRN    senna (SENOKOT) 8.6 mg tablet 1 tablet, As needed (PRN)    timolol maleate 0.5% (TIMOPTIC) 0.5 % Drop 1 drop, Daily    vitamin D (VITAMIN D3) 5,000 Units, Daily    warfarin (COUMADIN) 4 mg, Oral, Daily       Review of patient's allergies indicates:   Allergen Reactions    Hydrocodone-acetaminophen      Other reaction(s): Difficulty breathing, Throat tightness    Oxycodone-acetaminophen Hallucinations    Iodine     Meperidine      Other reaction(s): Unknown (qualifier value)    Promethazine      Other reaction(s): Unknown (qualifier value), Unknown (qualifier value)        Patient Care Team:  Alex Treviño MD as PCP - General (Internal Medicine)  [facility] Margaret, LPN as Care Coordinator  Humberto Chopra MD as Consulting Physician (Gastroenterology)  Sicard, Laurie, OD (Optometry)  Ayan Duenas MD (Anesthesiology)     Subjective:     Review of Systems    12 point review of systems conducted, negative except as stated in the history of present illness. See HPI for details.    Objective:     Visit Vitals  /73 (BP Location: Left arm, Patient Position: Sitting)   Pulse (!) 56   Temp 97.4 °F (36.3 °C) (Temporal)   Resp 16   Ht 5' 4" (1.626 m)   Wt 73.5 kg (162 lb)   SpO2 96%   BMI 27.81 kg/m²       Physical Exam  Constitutional:       Appearance: Normal appearance.   HENT:      Head: Normocephalic and atraumatic.   Eyes:      Extraocular Movements: Extraocular movements intact.      Pupils: Pupils are equal, round, and reactive to light.   Cardiovascular:      Rate and Rhythm: Normal rate and regular rhythm.      Heart sounds: No murmur heard.  Pulmonary:      Effort: Pulmonary effort is normal. No respiratory distress.      Breath sounds: Normal breath sounds.   Skin:     General: Skin is warm and dry.      Comments: Spider veins to bilateral LE, L>R + varicosities   Neurological:      General: No focal deficit present.      " Mental Status: She is alert and oriented to person, place, and time. Mental status is at baseline.   Psychiatric:         Mood and Affect: Mood normal.         Behavior: Behavior normal.         Judgment: Judgment normal.         Labs Reviewed:     Chemistry:  Lab Results   Component Value Date     10/28/2024    K 4.7 10/28/2024    BUN 11.0 10/28/2024    CREATININE 0.67 10/28/2024    EGFRNORACEVR >60 10/28/2024    GLUCOSE 103 10/28/2024    CALCIUM 8.9 10/28/2024    ALKPHOS 93 10/28/2024    LABPROT 6.5 10/28/2024    ALBUMIN 3.3 (L) 10/28/2024    BILIDIR 0.2 09/13/2021    IBILI 0.30 09/13/2021    AST 32 10/28/2024    ALT 21 10/28/2024    MG 1.90 06/18/2023    PHOS 3.9 06/18/2023    HHHSNTGX89HQ 49 10/28/2024    TSH 0.795 10/28/2024        Lab Results   Component Value Date    HGBA1C 5.9 10/28/2024        Hematology:  Lab Results   Component Value Date    WBC 6.30 10/28/2024    HGB 13.2 10/28/2024    HCT 41.2 10/28/2024     10/28/2024       Lipid Panel:  Lab Results   Component Value Date    CHOL 137 10/28/2024    HDL 43 10/28/2024    LDL 78.00 10/28/2024    TRIG 79 10/28/2024    TOTALCHOLEST 3 10/28/2024        Urine:  Lab Results   Component Value Date    APPEARANCEUA Clear 10/28/2024    SGUA 1.020 10/28/2024    PROTEINUA Trace (A) 10/28/2024    KETONESUA Negative 10/28/2024    LEUKOCYTESUR 500 (A) 10/28/2024    RBCUA 0-5 10/28/2024    WBCUA 0-5 10/28/2024    BACTERIA Trace 10/28/2024    SQEPUA Few (A) 10/28/2024    CREATRANDUR 154.0 (H) 02/29/2024        Assessment:       ICD-10-CM ICD-9-CM   1. Primary hypertension  I10 401.9        Plan:     1. Primary hypertension  Assessment & Plan:  Well controlled.   Hypertension Medications              nitroGLYCERIN (NITROSTAT) 0.4 MG SL tablet Place 0.4 mg under the tongue every 5 (five) minutes as needed for Chest pain.   Wear compression socks daily + avoid prolonged standing or sitting  Low Sodium Diet (DASH Diet - Less than 2 grams of sodium per  day).  Monitor blood pressure daily and log. Report consistent numbers greater than 140/90.  Maintain healthy weight with goal BMI <30. Exercise 30 minutes per day, 5 days per week.        In addition to their scheduled follow up, the patient has also been instructed to follow up on as needed basis.     Future Appointments   Date Time Provider Department Center   5/14/2025 11:20 AM Alex Treviño MD [facility] [address] [address]        DEYVI Maher

## 2025-02-26 ENCOUNTER — RESULTS FOLLOW-UP (OUTPATIENT)
Dept: INTERNAL MEDICINE | Facility: CLINIC | Age: 75
End: 2025-02-26
Payer: MEDICARE

## 2025-02-26 NOTE — TELEPHONE ENCOUNTER
2nd atc lvm to call back               Cj Vicente Staff  Caller: Unspecified (Today,  4:44 PM)  Who Called: Alejandrina Rodriguez    Patient is returning phone call    Who Left Message for Patient:unknown  Does the patient know what this is regarding?:n/a      Preferred Method of Contact: Phone Call  Patient's Preferred Phone Number on File: [phone]  Best Call Back Number, if different:  Additional Information: Pt states she is has a missed call from clinic.

## 2025-02-26 NOTE — TELEPHONE ENCOUNTER
----- Message from DEYVI Maher sent at 2/26/2025  3:38 PM CST -----  DEXA Results: Please inform patient of DEXA results, which show OSTEOPENIA. This is a mild thinning of the bone. Encourage Calcium 600mg + at least Vitamin D 800units BID plus weight bearing exercise   at least 3 days per week. Repeat in 2 years.     Thanks for all you do,    Willi  ----- Message -----  From: Interface, Rad Results In  Sent: 2/21/2025   6:27 PM CST  To: Alex Treviño MD

## 2025-03-03 ENCOUNTER — TELEPHONE (OUTPATIENT)
Dept: INTERNAL MEDICINE | Facility: CLINIC | Age: 75
End: 2025-03-03
Payer: MEDICARE

## 2025-03-03 NOTE — TELEPHONE ENCOUNTER
The patient reported at her OV on 02/13 that she was advised to discontinue her HCTZ. She mentioned her b/p had been sable until last night when she began experiencing headaches and dizziness. Her b/p readings 5:30 am 169/82, 6:08 am 180/95, and 8:00 am 155/75. Please advise?

## 2025-03-05 DIAGNOSIS — I82.409 RECURRENT DEEP VEIN THROMBOSIS (DVT): ICD-10-CM

## 2025-03-05 RX ORDER — WARFARIN 4 MG/1
4 TABLET ORAL DAILY
Qty: 90 TABLET | Refills: 0 | Status: SHIPPED | OUTPATIENT
Start: 2025-03-05

## 2025-03-13 ENCOUNTER — TELEPHONE (OUTPATIENT)
Dept: INTERNAL MEDICINE | Facility: CLINIC | Age: 75
End: 2025-03-13
Payer: MEDICARE

## 2025-03-13 NOTE — TELEPHONE ENCOUNTER
----- Message from DEYVI Maher sent at 3/13/2025  4:34 PM CDT -----  Looks OK - continue to monitor daily and log.  ----- Message -----  From: Dorothea Santamaria CMA  Sent: 3/13/2025   4:19 PM CDT  To: DEYVI Hale      ----- Message -----  From: Neri Ann  Sent: 3/13/2025   3:34 PM CDT  To: Galen Johnson Staff    Who Called: Alejandrina Chouduhry is requesting assistance/information from provider's office.Symptoms (please be specific): N/A How long has patient had these symptoms:  N/AList of preferred pharmacies on file (remove unneeded): [unfilled]If different, enter pharmacy into here including location and phone number: N/APreferred Method of Contact: Phone CallPatient's Preferred Phone Number on File: [phone] Best Call Back Number, if different:Additional Information: Pt reporting blood pressures. Pt stated swelling has gone down and she feels better 3/7 6am- 148/72 3/8 8am -132/633/9 7:25am -121/60      5:09pm   127/663/10 6:05am - 131/703/11 4:45am -  135/71        7:15pm-   157/783/12 3:00pm -  128/68

## 2025-03-14 NOTE — TELEPHONE ENCOUNTER
3rd atc lvm to call back                 America Moore Staff  Caller: Unspecified (Today, 11:44 AM)  Who Called: Alejandrina Rodriguez    Patient is returning phone call    Who Left Message for Patient:  Does the patient know what this is regarding?:      Preferred Method of Contact: Phone Call  Patient's Preferred Phone Number on File: [phone]  Best Call Back Number, if different:  Additional Information:  returning call back

## 2025-03-14 NOTE — TELEPHONE ENCOUNTER
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 She was last seen 4/6/2021 for follow-up regarding a liver lesion, incidentally noted while undergoing a work-up for a possible uterine mass.  Her AST was markedly elevated, but ALT and ALP were normal.  Extensive work-up revealed a positive MORENO and anti-Lexi 1 antibody, of unclear significance.  She was encouraged to follow-up with rheumatology as planned at [facility], additional labs, a MRI, and Doppler ultrasound of the abdomen were planned. MRI of the abdomen with and without contrast 5/12/2021 showed benign focal nodular hyperplasia within segment II of the liver measuring up to 1.7 cm which corresponds to the lesion seen on prior imaging.  No suspicious or concerning liver lesion identified.  Linear reticulation within the periphery of the liver and most pronounced within the right hepatic lobe which may reflect changes of hepatic congestion or fibrosis. Ultrasound liver Doppler 5/12/2021:A 1.7 x 1.2 x 1.7 cm hyperechoic, round lesion in the left hepatic lobe.  Findings may represent a FNH, hemangioma versus hepatic adenoma. She saw [facility] in May, at which time prior lab findings, including MORENO, were suspected to be false positive. No further work-up was recommended, and she was not given a follow-up visit. She continues to do fairly well from a GI standpoint, though she has experienced an increase in reflux symptoms.  She complains of heartburn, indigestion, and belching following most meals.  She has not tried any antacid therapy.  She denies abdominal pain, nausea or vomiting.  She does admit to the use of NSAIDs a few times per week for headaches.  2nd atc lvm to call back

## 2025-03-20 ENCOUNTER — TELEPHONE (OUTPATIENT)
Dept: INTERNAL MEDICINE | Facility: CLINIC | Age: 75
End: 2025-03-20
Payer: MEDICARE

## 2025-03-20 DIAGNOSIS — Z12.31 BREAST CANCER SCREENING BY MAMMOGRAM: Primary | ICD-10-CM

## 2025-03-20 NOTE — TELEPHONE ENCOUNTER
----- Message from Judith sent at 3/20/2025  9:06 AM CDT -----  Regarding: mammogram  Who Called: Alejandrina Choudhury is requesting to schedule their annual mammogram appointment. Order is not listed in Epic. Please enter order and contact patient to schedule.Where would they like the mammogram performed? [facility] Preferred Method of Contact: Phone CallPatient's Preferred Phone Number on File: [phone] Best Call Back Number, if different:Additional Information: Please send referral for a mammogram

## 2025-03-24 ENCOUNTER — TELEPHONE (OUTPATIENT)
Dept: INTERNAL MEDICINE | Facility: CLINIC | Age: 75
End: 2025-03-24
Payer: MEDICARE

## 2025-03-24 DIAGNOSIS — Z78.0 POSTMENOPAUSAL STATE: Primary | ICD-10-CM

## 2025-03-24 NOTE — TELEPHONE ENCOUNTER
----- Message from America sent at 3/24/2025  8:39 AM CDT -----  Who Called: Alejandrina Choudhury is requesting assistance/information from provider's office.Symptoms (please be specific):   How long has patient had these symptoms:   List of preferred pharmacies on file (remove unneeded): [unfilled]If different, enter pharmacy into here including location and phone number:  Preferred Method of Contact: Phone CallPatient's Preferred Phone Number on File: [phone] Best Call Back Number, if different:Additional Information:  pt would like to speak with nurse about getting a Ref sent over [place] [phone]

## 2025-03-26 ENCOUNTER — HOSPITAL ENCOUNTER (OUTPATIENT)
Dept: RADIOLOGY | Facility: HOSPITAL | Age: 75
Discharge: HOME | End: 2025-03-26
Attending: INTERNAL MEDICINE
Payer: MEDICARE

## 2025-03-26 DIAGNOSIS — Z12.31 BREAST CANCER SCREENING BY MAMMOGRAM: ICD-10-CM

## 2025-03-26 PROCEDURE — 77063 BREAST TOMOSYNTHESIS BI: CPT | Mod: 26,,, | Performed by: RADIOLOGY

## 2025-03-26 PROCEDURE — 77067 SCR MAMMO BI INCL CAD: CPT | Mod: TC

## 2025-03-26 PROCEDURE — 77067 SCR MAMMO BI INCL CAD: CPT | Mod: 26,,, | Performed by: RADIOLOGY

## 2025-03-27 ENCOUNTER — RESULTS FOLLOW-UP (OUTPATIENT)
Dept: INTERNAL MEDICINE | Facility: CLINIC | Age: 75
End: 2025-03-27
Payer: MEDICARE

## 2025-04-01 ENCOUNTER — TELEPHONE (OUTPATIENT)
Dept: INTERNAL MEDICINE | Facility: CLINIC | Age: 75
End: 2025-04-01
Payer: MEDICARE

## 2025-04-01 DIAGNOSIS — F41.8 MIXED ANXIETY DEPRESSIVE DISORDER: Primary | ICD-10-CM

## 2025-04-01 NOTE — TELEPHONE ENCOUNTER
Pt stated that the thoughts are not going on right now. Was referring to the time she spoke with MD at visit.

## 2025-04-01 NOTE — TELEPHONE ENCOUNTER
----- Message from Judith sent at 3/31/2025  4:58 PM CDT -----  Regarding: call back  Who Called: Alejandrina RodriguezPatient is returning phone callWho Left Message for Patient: JennaSvetlana the patient know what this is regarding?:?Preferred Method of Contact: Phone CallPatient's Preferred Phone Number on File: [phone] Best Call Back Number, if different:Additional Information:

## 2025-04-15 ENCOUNTER — LAB VISIT (OUTPATIENT)
Dept: LAB | Facility: HOSPITAL | Age: 75
End: 2025-04-15
Attending: ANESTHESIOLOGY
Payer: OTHER MISCELLANEOUS

## 2025-04-15 DIAGNOSIS — F11.20 OPIOID TYPE DEPENDENCE, CONTINUOUS: ICD-10-CM

## 2025-04-15 DIAGNOSIS — G89.4 CHRONIC PAIN SYNDROME: Primary | ICD-10-CM

## 2025-04-15 LAB
ALBUMIN SERPL-MCNC: 3.6 G/DL (ref 3.4–4.8)
ALBUMIN/GLOB SERPL: 0.9 RATIO (ref 1.1–2)
ALP SERPL-CCNC: 115 UNIT/L (ref 40–150)
ALT SERPL-CCNC: 22 UNIT/L (ref 0–55)
ANION GAP SERPL CALC-SCNC: 8 MEQ/L
AST SERPL-CCNC: 26 UNIT/L (ref 11–45)
BASOPHILS # BLD AUTO: 0.02 X10(3)/MCL
BASOPHILS NFR BLD AUTO: 0.2 %
BILIRUB SERPL-MCNC: 0.6 MG/DL
BUN SERPL-MCNC: 17.4 MG/DL (ref 9.8–20.1)
CALCIUM SERPL-MCNC: 9.1 MG/DL (ref 8.4–10.2)
CHLORIDE SERPL-SCNC: 104 MMOL/L (ref 98–107)
CO2 SERPL-SCNC: 27 MMOL/L (ref 23–31)
CREAT SERPL-MCNC: 0.66 MG/DL (ref 0.55–1.02)
CREAT/UREA NIT SERPL: 26
EOSINOPHIL # BLD AUTO: 0.06 X10(3)/MCL (ref 0–0.9)
EOSINOPHIL NFR BLD AUTO: 0.6 %
ERYTHROCYTE [DISTWIDTH] IN BLOOD BY AUTOMATED COUNT: 14.7 % (ref 11.5–17)
GFR SERPLBLD CREATININE-BSD FMLA CKD-EPI: >60 ML/MIN/1.73/M2
GLOBULIN SER-MCNC: 3.8 GM/DL (ref 2.4–3.5)
GLUCOSE SERPL-MCNC: 78 MG/DL (ref 82–115)
HCT VFR BLD AUTO: 44.6 % (ref 37–47)
HGB BLD-MCNC: 14.7 G/DL (ref 12–16)
IMM GRANULOCYTES # BLD AUTO: 0.03 X10(3)/MCL (ref 0–0.04)
IMM GRANULOCYTES NFR BLD AUTO: 0.3 %
LYMPHOCYTES # BLD AUTO: 2.1 X10(3)/MCL (ref 0.6–4.6)
LYMPHOCYTES NFR BLD AUTO: 20.5 %
MCH RBC QN AUTO: 32.3 PG (ref 27–31)
MCHC RBC AUTO-ENTMCNC: 33 G/DL (ref 33–36)
MCV RBC AUTO: 98 FL (ref 80–94)
MONOCYTES # BLD AUTO: 1.03 X10(3)/MCL (ref 0.1–1.3)
MONOCYTES NFR BLD AUTO: 10.1 %
NEUTROPHILS # BLD AUTO: 7 X10(3)/MCL (ref 2.1–9.2)
NEUTROPHILS NFR BLD AUTO: 68.3 %
NRBC BLD AUTO-RTO: 0 %
PLATELET # BLD AUTO: 214 X10(3)/MCL (ref 130–400)
PMV BLD AUTO: 11.1 FL (ref 7.4–10.4)
POTASSIUM SERPL-SCNC: 4.2 MMOL/L (ref 3.5–5.1)
PROT SERPL-MCNC: 7.4 GM/DL (ref 5.8–7.6)
RBC # BLD AUTO: 4.55 X10(6)/MCL (ref 4.2–5.4)
SODIUM SERPL-SCNC: 139 MMOL/L (ref 136–145)
WBC # BLD AUTO: 10.24 X10(3)/MCL (ref 4.5–11.5)

## 2025-04-15 PROCEDURE — 85025 COMPLETE CBC W/AUTO DIFF WBC: CPT

## 2025-04-15 PROCEDURE — 36415 COLL VENOUS BLD VENIPUNCTURE: CPT

## 2025-04-15 PROCEDURE — 80053 COMPREHEN METABOLIC PANEL: CPT

## 2025-04-16 ENCOUNTER — TELEPHONE (OUTPATIENT)
Dept: INTERNAL MEDICINE | Facility: CLINIC | Age: 75
End: 2025-04-16
Payer: MEDICARE

## 2025-04-16 NOTE — TELEPHONE ENCOUNTER
----- Message from Mary sent at 4/16/2025  3:45 PM CDT -----  .Who Called: Alejandrina Pineda the patient already have the specialty appointment scheduled?:noIf yes, what is the date of that appointment?:noReferral to What Specialty:gynReason for Referral:unkDoes the patient want the referral with a specific physician?:If yes, which provider?: Is the specialist an [facility]?:[facility]erred Method of Contact: Phone CallPatient's Preferred Phone Number on File: [phone] Best Call Back Number, if different:Additional Information: gyn pt was referred to dont take medicare for new as patients pt asking for referral be sent to  [facility] [phone]

## 2025-04-23 DIAGNOSIS — K21.9 GASTROESOPHAGEAL REFLUX DISEASE, UNSPECIFIED WHETHER ESOPHAGITIS PRESENT: ICD-10-CM

## 2025-04-23 RX ORDER — OMEPRAZOLE 40 MG/1
40 CAPSULE, DELAYED RELEASE ORAL
Qty: 90 CAPSULE | Refills: 3 | Status: SHIPPED | OUTPATIENT
Start: 2025-04-23

## 2025-05-22 ENCOUNTER — TELEPHONE (OUTPATIENT)
Dept: INTERNAL MEDICINE | Facility: CLINIC | Age: 75
End: 2025-05-22
Payer: MEDICARE

## 2025-05-22 DIAGNOSIS — I82.409 RECURRENT DEEP VEIN THROMBOSIS (DVT): ICD-10-CM

## 2025-05-22 RX ORDER — WARFARIN 4 MG/1
TABLET ORAL
Qty: 90 TABLET | Refills: 0 | Status: SHIPPED | OUTPATIENT
Start: 2025-05-22

## 2025-05-22 NOTE — TELEPHONE ENCOUNTER
Copied from CRM #[id number]. Topic: General Inquiry - Return Call  >> May 22, 2025  1:26 PM Jaymie Ann wrote:  Who Called: Alejandrina Rodriguez    Patient is returning phone call    Who Left Message for Patient:N/A  Does the patient know what this is regarding?:N/A      Preferred Method of Contact: Phone Call  Patient's Preferred Phone Number on File: [phone]   Best Call Back Number, if different:  Additional Information: pt stated she missed call

## 2025-07-06 DIAGNOSIS — E78.2 MIXED HYPERLIPIDEMIA: ICD-10-CM

## 2025-07-07 RX ORDER — PRAVASTATIN SODIUM 40 MG/1
40 TABLET ORAL
Qty: 90 TABLET | Refills: 3 | Status: SHIPPED | OUTPATIENT
Start: 2025-07-07

## 2025-07-15 ENCOUNTER — TELEPHONE (OUTPATIENT)
Dept: INTERNAL MEDICINE | Facility: CLINIC | Age: 75
End: 2025-07-15
Payer: MEDICARE

## 2025-07-15 NOTE — TELEPHONE ENCOUNTER
Copied from CRM #[id number]. Topic: Appointments - Amb Referral  >> Jul 15, 2025 10:34 AM Alma Delia wrote:  .Type:  Patient Returning Call    Who Called:Ruma at  coumadin clinic   Who Left Message for Patient:Ruma  Does the patient know what this is regarding?:coumadin clinic enrollment forms  Would the patient rather a call back or a response via [facility]?   Best Call Back Number:[phone]  Additional Information: Please call back about enrollment forms for the coumadin clinic. Orders have been faxed over several times. Need Dr Monreal, forms  in .

## 2025-08-08 DIAGNOSIS — I82.409 RECURRENT DEEP VEIN THROMBOSIS (DVT): ICD-10-CM

## 2025-08-08 RX ORDER — WARFARIN 4 MG/1
TABLET ORAL
Qty: 90 TABLET | Refills: 0 | Status: SHIPPED | OUTPATIENT
Start: 2025-08-08

## (undated) DEVICE — DRESSING ANTIMIC ISLAND 4X10IN

## (undated) DEVICE — TRAY CATH FOL SIL URIMTR 16FR

## (undated) DEVICE — Device

## (undated) DEVICE — SUT VICRYL PLUS 3-0 SH 18IN

## (undated) DEVICE — SUT SILK 3-0 STRANDS 30IN

## (undated) DEVICE — DECANTER FLUID TRNSF WHITE 9IN

## (undated) DEVICE — SUT PDS PLUS 1 TP1 96IN

## (undated) DEVICE — SPONGE LAP XRAY STERILE 18X18

## (undated) DEVICE — GLOVE PROTEXIS LTX MICRO 6.5

## (undated) DEVICE — SUT SILK 2-0 STRANDS 30IN

## (undated) DEVICE — COVER TABLE HVY DTY 60X90IN

## (undated) DEVICE — SUT VICRYL 1 OB 36 CTX

## (undated) DEVICE — DRAPE FLUID WARMER 44X44IN

## (undated) DEVICE — ELECTRODE BLD EXT 6.50 ST DISP

## (undated) DEVICE — GLOVE PROTEXIS LTX MICRO  7.5

## (undated) DEVICE — DEVICE ENSEAL X1 LARGE JAW

## (undated) DEVICE — SUT SILK 3-0 SH 18IN BLACK

## (undated) DEVICE — SPONGE LAP STRL 18X18IN

## (undated) DEVICE — SUT PDS PLUS MONO 1 CT 36IN

## (undated) DEVICE — DRAPE FLUID WARMER ORS 44X44IN

## (undated) DEVICE — SOL NACL IRR 1000ML BTL

## (undated) DEVICE — SUT 1 48IN PDS II VIO MONO

## (undated) DEVICE — DRESSING ABTHERA ADV OPEN ABD

## (undated) DEVICE — SUT VICRYL 3-0 27 SH

## (undated) DEVICE — RETAINER VISCERAL 3204 MEDIUM

## (undated) DEVICE — STAPLER SKIN PROXIMATE WIDE

## (undated) DEVICE — CLAMP TOWEL EASY RELEASE TAB

## (undated) DEVICE — TAPE MEDIPORE 4IN X 2YDS

## (undated) DEVICE — SUT SILK 3-0 SH DETACH 30IN

## (undated) DEVICE — ELECTRODE PATIENT RETURN DISP

## (undated) DEVICE — SUT SILK 0 STRANDS 30IN BLK

## (undated) DEVICE — GLOVE PROTEXIS HYDROGEL SZ7.5

## (undated) DEVICE — KIT SURGICAL TURNOVER

## (undated) DEVICE — SUT MCRYL PLUS 4-0 PS2 27IN

## (undated) DEVICE — SUT 0 VICRYL PLUS CT-1 27IN

## (undated) DEVICE — TOWEL OR WHT XRAY 16X26 2/PK